# Patient Record
Sex: FEMALE | Race: WHITE | Employment: OTHER | URBAN - NONMETROPOLITAN AREA
[De-identification: names, ages, dates, MRNs, and addresses within clinical notes are randomized per-mention and may not be internally consistent; named-entity substitution may affect disease eponyms.]

---

## 2017-08-12 PROBLEM — E55.9 VITAMIN D DEFICIENCY: Status: ACTIVE | Noted: 2017-08-12

## 2017-08-12 PROBLEM — Z12.11 ENCOUNTER FOR COLORECTAL CANCER SCREENING: Status: ACTIVE | Noted: 2017-08-12

## 2017-08-12 PROBLEM — E11.9 TYPE 2 DIABETES MELLITUS WITHOUT COMPLICATION, WITHOUT LONG-TERM CURRENT USE OF INSULIN (HCC): Status: ACTIVE | Noted: 2017-08-12

## 2017-08-12 PROBLEM — F41.1 GENERALIZED ANXIETY DISORDER: Status: ACTIVE | Noted: 2017-08-12

## 2017-08-12 PROBLEM — E66.09 NON MORBID OBESITY DUE TO EXCESS CALORIES: Status: ACTIVE | Noted: 2017-08-12

## 2017-08-12 PROBLEM — Z12.12 ENCOUNTER FOR COLORECTAL CANCER SCREENING: Status: ACTIVE | Noted: 2017-08-12

## 2017-08-12 PROBLEM — K21.9 GERD (GASTROESOPHAGEAL REFLUX DISEASE): Status: ACTIVE | Noted: 2017-08-12

## 2017-08-12 PROBLEM — E78.00 PURE HYPERCHOLESTEROLEMIA: Status: ACTIVE | Noted: 2017-08-12

## 2017-08-14 LAB
ALBUMIN SERPL-MCNC: 4.1 G/DL (ref 3.5–5.2)
ALP BLD-CCNC: 94 U/L (ref 35–104)
ALT SERPL-CCNC: 25 U/L (ref 5–33)
ANION GAP SERPL CALCULATED.3IONS-SCNC: 16 MMOL/L (ref 7–19)
AST SERPL-CCNC: 19 U/L (ref 5–32)
BILIRUB SERPL-MCNC: 0.3 MG/DL (ref 0.2–1.2)
BUN BLDV-MCNC: 14 MG/DL (ref 8–23)
CALCIUM SERPL-MCNC: 9.6 MG/DL (ref 8.8–10.2)
CHLORIDE BLD-SCNC: 102 MMOL/L (ref 98–111)
CHOLESTEROL, TOTAL: 178 MG/DL (ref 160–199)
CO2: 24 MMOL/L (ref 22–29)
CREAT SERPL-MCNC: 0.6 MG/DL (ref 0.5–0.9)
GFR NON-AFRICAN AMERICAN: >60
GLUCOSE BLD-MCNC: 126 MG/DL (ref 74–109)
HBA1C MFR BLD: 6.8 %
HDLC SERPL-MCNC: 51 MG/DL (ref 65–121)
LDL CHOLESTEROL CALCULATED: 98 MG/DL
POTASSIUM SERPL-SCNC: 4.1 MMOL/L (ref 3.5–5)
SODIUM BLD-SCNC: 142 MMOL/L (ref 136–145)
TOTAL PROTEIN: 7.2 G/DL (ref 6.6–8.7)
TRIGL SERPL-MCNC: 143 MG/DL (ref 150–199)

## 2017-08-16 PROBLEM — M85.862 OSTEOPENIA OF LEFT LOWER LEG: Status: ACTIVE | Noted: 2017-08-16

## 2017-08-17 RX ORDER — MULTIVIT-MIN/IRON/FOLIC ACID/K 18-600-40
CAPSULE ORAL DAILY
COMMUNITY

## 2017-08-17 RX ORDER — LOVASTATIN 20 MG/1
20 TABLET ORAL NIGHTLY
COMMUNITY
End: 2018-01-19 | Stop reason: SDUPTHER

## 2017-08-21 ENCOUNTER — OFFICE VISIT (OUTPATIENT)
Dept: INTERNAL MEDICINE | Age: 67
End: 2017-08-21
Payer: COMMERCIAL

## 2017-08-21 VITALS
DIASTOLIC BLOOD PRESSURE: 68 MMHG | WEIGHT: 193 LBS | HEIGHT: 64 IN | OXYGEN SATURATION: 94 % | BODY MASS INDEX: 32.95 KG/M2 | SYSTOLIC BLOOD PRESSURE: 110 MMHG | HEART RATE: 77 BPM

## 2017-08-21 DIAGNOSIS — R07.89 FEELING OF CHEST TIGHTNESS: ICD-10-CM

## 2017-08-21 DIAGNOSIS — E78.00 PURE HYPERCHOLESTEROLEMIA: ICD-10-CM

## 2017-08-21 DIAGNOSIS — R06.09 EXERTIONAL DYSPNEA: ICD-10-CM

## 2017-08-21 DIAGNOSIS — E55.9 VITAMIN D DEFICIENCY: ICD-10-CM

## 2017-08-21 DIAGNOSIS — E55.9 VITAMIN D DEFICIENCY: Primary | ICD-10-CM

## 2017-08-21 DIAGNOSIS — E11.9 TYPE 2 DIABETES MELLITUS WITHOUT COMPLICATION, WITHOUT LONG-TERM CURRENT USE OF INSULIN (HCC): ICD-10-CM

## 2017-08-21 DIAGNOSIS — E11.9 TYPE 2 DIABETES MELLITUS WITHOUT COMPLICATION, WITHOUT LONG-TERM CURRENT USE OF INSULIN (HCC): Primary | ICD-10-CM

## 2017-08-21 DIAGNOSIS — K21.9 GASTROESOPHAGEAL REFLUX DISEASE WITHOUT ESOPHAGITIS: ICD-10-CM

## 2017-08-21 DIAGNOSIS — Z00.00 ANNUAL PHYSICAL EXAM: ICD-10-CM

## 2017-08-21 DIAGNOSIS — F41.1 GENERALIZED ANXIETY DISORDER: ICD-10-CM

## 2017-08-21 PROCEDURE — 99214 OFFICE O/P EST MOD 30 MIN: CPT | Performed by: INTERNAL MEDICINE

## 2017-08-21 ASSESSMENT — PATIENT HEALTH QUESTIONNAIRE - PHQ9
2. FEELING DOWN, DEPRESSED OR HOPELESS: 0
1. LITTLE INTEREST OR PLEASURE IN DOING THINGS: 0
SUM OF ALL RESPONSES TO PHQ QUESTIONS 1-9: 0
SUM OF ALL RESPONSES TO PHQ9 QUESTIONS 1 & 2: 0

## 2017-08-21 ASSESSMENT — ENCOUNTER SYMPTOMS
ABDOMINAL PAIN: 0
COUGH: 0
CONSTIPATION: 0
CHEST TIGHTNESS: 1
SORE THROAT: 0
SHORTNESS OF BREATH: 1
WHEEZING: 0

## 2018-01-19 RX ORDER — LOVASTATIN 20 MG/1
TABLET ORAL
Qty: 180 TABLET | Refills: 1 | Status: SHIPPED | OUTPATIENT
Start: 2018-01-19 | End: 2018-02-23 | Stop reason: SDUPTHER

## 2018-01-19 RX ORDER — ESCITALOPRAM OXALATE 10 MG/1
TABLET ORAL
Qty: 90 TABLET | Refills: 1 | Status: SHIPPED | OUTPATIENT
Start: 2018-01-19 | End: 2018-02-23 | Stop reason: SDUPTHER

## 2018-02-19 DIAGNOSIS — E11.9 TYPE 2 DIABETES MELLITUS WITHOUT COMPLICATION, WITHOUT LONG-TERM CURRENT USE OF INSULIN (HCC): ICD-10-CM

## 2018-02-19 DIAGNOSIS — Z00.00 ANNUAL PHYSICAL EXAM: ICD-10-CM

## 2018-02-19 DIAGNOSIS — E78.00 PURE HYPERCHOLESTEROLEMIA: ICD-10-CM

## 2018-02-19 DIAGNOSIS — E55.9 VITAMIN D DEFICIENCY: ICD-10-CM

## 2018-02-19 LAB
ALBUMIN SERPL-MCNC: 3.9 G/DL (ref 3.5–5.2)
ALP BLD-CCNC: 100 U/L (ref 35–104)
ALT SERPL-CCNC: 31 U/L (ref 5–33)
ANION GAP SERPL CALCULATED.3IONS-SCNC: 14 MMOL/L (ref 7–19)
AST SERPL-CCNC: 19 U/L (ref 5–32)
BACTERIA: NEGATIVE /HPF
BASOPHILS ABSOLUTE: 0 K/UL (ref 0–0.2)
BASOPHILS RELATIVE PERCENT: 0.7 % (ref 0–1)
BILIRUB SERPL-MCNC: <0.2 MG/DL (ref 0.2–1.2)
BILIRUBIN URINE: NEGATIVE
BLOOD, URINE: NEGATIVE
BUN BLDV-MCNC: 15 MG/DL (ref 8–23)
CALCIUM SERPL-MCNC: 8.7 MG/DL (ref 8.8–10.2)
CHLORIDE BLD-SCNC: 101 MMOL/L (ref 98–111)
CHOLESTEROL, TOTAL: 173 MG/DL (ref 160–199)
CLARITY: CLEAR
CO2: 24 MMOL/L (ref 22–29)
COLOR: YELLOW
CREAT SERPL-MCNC: 0.6 MG/DL (ref 0.5–0.9)
CREATININE URINE: 152.6 MG/DL (ref 4.2–622)
EOSINOPHILS ABSOLUTE: 0.2 K/UL (ref 0–0.6)
EOSINOPHILS RELATIVE PERCENT: 2.9 % (ref 0–5)
EPITHELIAL CELLS, UA: 3 /HPF (ref 0–5)
GFR NON-AFRICAN AMERICAN: >60
GLUCOSE BLD-MCNC: 157 MG/DL (ref 74–109)
GLUCOSE URINE: NEGATIVE MG/DL
HBA1C MFR BLD: 6.6 %
HCT VFR BLD CALC: 38.1 % (ref 37–47)
HDLC SERPL-MCNC: 51 MG/DL (ref 65–121)
HEMOGLOBIN: 12 G/DL (ref 12–16)
HYALINE CASTS: 7 /HPF (ref 0–8)
KETONES, URINE: NEGATIVE MG/DL
LDL CHOLESTEROL CALCULATED: 105 MG/DL
LEUKOCYTE ESTERASE, URINE: ABNORMAL
LYMPHOCYTES ABSOLUTE: 2.1 K/UL (ref 1.1–4.5)
LYMPHOCYTES RELATIVE PERCENT: 34.2 % (ref 20–40)
MCH RBC QN AUTO: 29 PG (ref 27–31)
MCHC RBC AUTO-ENTMCNC: 31.5 G/DL (ref 33–37)
MCV RBC AUTO: 92 FL (ref 81–99)
MICROALBUMIN UR-MCNC: <1.2 MG/DL (ref 0–19)
MICROALBUMIN/CREAT UR-RTO: NORMAL MG/G
MONOCYTES ABSOLUTE: 0.5 K/UL (ref 0–0.9)
MONOCYTES RELATIVE PERCENT: 8.2 % (ref 0–10)
NEUTROPHILS ABSOLUTE: 3.3 K/UL (ref 1.5–7.5)
NEUTROPHILS RELATIVE PERCENT: 53.7 % (ref 50–65)
NITRITE, URINE: NEGATIVE
PDW BLD-RTO: 14.7 % (ref 11.5–14.5)
PH UA: 5.5
PLATELET # BLD: 255 K/UL (ref 130–400)
PMV BLD AUTO: 11 FL (ref 9.4–12.3)
POTASSIUM SERPL-SCNC: 4 MMOL/L (ref 3.5–5)
PROTEIN UA: NEGATIVE MG/DL
RBC # BLD: 4.14 M/UL (ref 4.2–5.4)
RBC UA: 2 /HPF (ref 0–4)
SODIUM BLD-SCNC: 139 MMOL/L (ref 136–145)
SPECIFIC GRAVITY UA: 1.02
TOTAL PROTEIN: 6.7 G/DL (ref 6.6–8.7)
TRIGL SERPL-MCNC: 84 MG/DL (ref 0–149)
TSH SERPL DL<=0.05 MIU/L-ACNC: 2.37 UIU/ML (ref 0.27–4.2)
UROBILINOGEN, URINE: 0.2 E.U./DL
VITAMIN D 25-HYDROXY: 54.2 NG/ML
WBC # BLD: 6.1 K/UL (ref 4.8–10.8)
WBC UA: 9 /HPF (ref 0–5)

## 2018-02-23 ENCOUNTER — OFFICE VISIT (OUTPATIENT)
Dept: INTERNAL MEDICINE | Age: 68
End: 2018-02-23
Payer: COMMERCIAL

## 2018-02-23 VITALS
RESPIRATION RATE: 18 BRPM | WEIGHT: 198 LBS | DIASTOLIC BLOOD PRESSURE: 80 MMHG | HEIGHT: 64 IN | BODY MASS INDEX: 33.8 KG/M2 | SYSTOLIC BLOOD PRESSURE: 126 MMHG | OXYGEN SATURATION: 97 % | HEART RATE: 80 BPM

## 2018-02-23 DIAGNOSIS — E11.9 TYPE 2 DIABETES MELLITUS WITHOUT COMPLICATION, WITHOUT LONG-TERM CURRENT USE OF INSULIN (HCC): ICD-10-CM

## 2018-02-23 DIAGNOSIS — E78.00 PURE HYPERCHOLESTEROLEMIA: ICD-10-CM

## 2018-02-23 DIAGNOSIS — Z00.00 ANNUAL PHYSICAL EXAM: Primary | ICD-10-CM

## 2018-02-23 DIAGNOSIS — Z12.31 SCREENING MAMMOGRAM, ENCOUNTER FOR: ICD-10-CM

## 2018-02-23 DIAGNOSIS — E55.9 VITAMIN D DEFICIENCY: ICD-10-CM

## 2018-02-23 DIAGNOSIS — F41.1 GENERALIZED ANXIETY DISORDER: ICD-10-CM

## 2018-02-23 DIAGNOSIS — E66.09 EXOGENOUS OBESITY: ICD-10-CM

## 2018-02-23 PROCEDURE — 99397 PER PM REEVAL EST PAT 65+ YR: CPT | Performed by: INTERNAL MEDICINE

## 2018-02-23 RX ORDER — ESCITALOPRAM OXALATE 10 MG/1
TABLET ORAL
Qty: 45 TABLET | Refills: 1 | Status: SHIPPED | OUTPATIENT
Start: 2018-02-23 | End: 2018-08-27 | Stop reason: SDUPTHER

## 2018-02-23 RX ORDER — LOVASTATIN 20 MG/1
TABLET ORAL
Qty: 180 TABLET | Refills: 1 | Status: SHIPPED | OUTPATIENT
Start: 2018-02-23 | End: 2018-08-27 | Stop reason: SDUPTHER

## 2018-02-23 ASSESSMENT — ENCOUNTER SYMPTOMS
VOMITING: 0
ABDOMINAL PAIN: 0
WHEEZING: 0
COUGH: 0
COLOR CHANGE: 0
DIARRHEA: 0
SINUS PRESSURE: 0
BLOOD IN STOOL: 0
CONSTIPATION: 0
EYE REDNESS: 0
VOICE CHANGE: 0
NAUSEA: 0
SORE THROAT: 0
TROUBLE SWALLOWING: 0
CHEST TIGHTNESS: 0
EYE PAIN: 0

## 2018-02-23 NOTE — PROGRESS NOTES
COLONOSCOPY      HERNIA REPAIR      LOBECTOMY Right 5/3/2016    THORACOTOMY, WEDGE RESECTION PULMONARY NODULE RIGHT MIDDLE LOBE performed by Jess Altamirano MD at [address] [facility]          Current Outpatient Prescriptions   Medication Sig Dispense Refill    lovastatin (MEVACOR) 20 MG tablet TAKE TWO TABLETS BY MOUTH ONCE DAILY 180 tablet 1    escitalopram (LEXAPRO) 10 MG tablet Take 1/2 tablet daily 45 tablet 1    aspirin 81 MG tablet Take 81 mg by mouth daily      Cholecalciferol (VITAMIN D) 2000 units CAPS capsule Take by mouth       No current facility-administered medications for this visit.       Allergies   Allergen Reactions    Codeine Itching       Social History     Social History    Marital status:      Spouse name: N/A    Number of children: N/A    Years of education: N/A     Social History Main Topics    Smoking status: Former Smoker     Years: 3.00    Smokeless tobacco: Never Used      Comment: 3 YEARS PLAYED AROUND AND QUIT IN THE 1980's    Alcohol use No    Drug use: No    Sexual activity: Not on file     Other Topics Concern    Not on file     Social History Narrative    No narrative on file     Family History   Problem Relation Age of Onset    Cancer Father      bladder ca    Other Sister      MULTIPLE SCLEROSIS    High Blood Pressure Mother     Heart Disease Mother        Past Surgical History:   Procedure Laterality Date    CHOLECYSTECTOMY      COLONOSCOPY      HERNIA REPAIR      LOBECTOMY Right 5/3/2016    THORACOTOMY, WEDGE RESECTION PULMONARY NODULE RIGHT MIDDLE LOBE performed by Jess Altamirano MD at [address]            Lab Review   Orders Only on 02/19/2018   Component Date Value    WBC 02/19/2018 6.1     RBC 02/19/2018 4.14*    Hemoglobin 02/19/2018 12.0     Hematocrit 02/19/2018 38.1     MCV 02/19/2018 92.0     MCH 02/19/2018 29.0     MCHC 02/19/2018 31.5*    RDW 02/19/2018 14.7*    congestion, ear pain, postnasal drip, sinus pressure, sore throat, trouble swallowing and voice change. Eyes: Negative for pain, redness and visual disturbance. Respiratory: Negative for cough, chest tightness and wheezing. Cardiovascular: Negative for chest pain, palpitations and leg swelling. Gastrointestinal: Negative for abdominal pain, blood in stool, constipation, diarrhea, nausea and vomiting. Endocrine: Negative for polydipsia and polyuria. Genitourinary: Negative for dysuria, enuresis, flank pain, frequency and urgency. Musculoskeletal: Negative for arthralgias, gait problem and joint swelling. Skin: Negative for color change and rash. Neurological: Negative for dizziness, tremors, syncope, facial asymmetry, speech difficulty, weakness, numbness and headaches. Psychiatric/Behavioral: Negative for agitation, behavioral problems, confusion, sleep disturbance and suicidal ideas. The patient is not nervous/anxious. Vitals:    02/23/18 1318   BP: 126/80   Site: Left Arm   Position: Sitting   Cuff Size: Large Adult   Pulse: 80   Resp: 18   SpO2: 97%   Weight: 198 lb (89.8 kg)   Height: 5' 4\" (1.626 m)      Wt Readings from Last 3 Encounters:   02/23/18 198 lb (89.8 kg)   08/21/17 193 lb (87.5 kg)   06/07/16 188 lb (85.3 kg)   Body mass index is 33.99 kg/m². BP Readings from Last 3 Encounters:   02/23/18 126/80   08/21/17 110/68   06/07/16 123/71       Physical Exam   Constitutional: She is oriented to person, place, and time. She appears well-developed and well-nourished. HENT:   Head: Normocephalic and atraumatic. Right Ear: External ear normal.   Left Ear: External ear normal.   Mouth/Throat: Oropharynx is clear and moist.   Eyes: Conjunctivae are normal. Pupils are equal, round, and reactive to light. No scleral icterus. Neck: Normal range of motion. Neck supple. No JVD present. No thyromegaly present. Cardiovascular: Normal rate, regular rhythm and normal heart sounds. life, it can be hard to focus on making healthy changes to your daily habits. · Track your food and activity. You are likely to do better at losing weight if you keep track of what you eat and what you do. Follow-up care is a key part of your treatment and safety. Be sure to make and go to all appointments, and call your doctor if you are having problems. It's also a good idea to know your test results and keep a list of the medicines you take. Where can you learn more? Go to https://[employer] Expression[employer].Buddy[employer]ounce. org and sign in to your [employer] account. Enter A121 in the [employer] box to learn more about \"Learning About Low-Carbohydrate Diets for Weight Loss. \"     If you do not have an account, please click on the \"Sign Up Now\" link. Current as of: May 12, 2017  Content Version: 11.5  © 4880-9301 Healthwise, [employer]. Care instructions adapted under license by [facility] ([facility]). If you have questions about a medical condition or this instruction, always ask your healthcare professional. [address] any warranty or liability for your use of this information. Return in about 6 months (around 8/23/2018) for Medication check. EMR Dragon/transcription disclaimer:Significant part of this  encounter note is electronic transcription/translation of spoken language to printed text. The electronic translation of spoken language may be erroneous, or at times, nonsensical words or phrases may be inadvertently transcribed.  Although I have reviewed the note for such errors, some may still exist.

## 2018-02-28 DIAGNOSIS — R07.89 FEELING OF CHEST TIGHTNESS: ICD-10-CM

## 2018-02-28 DIAGNOSIS — R06.09 EXERTIONAL DYSPNEA: ICD-10-CM

## 2018-02-28 PROCEDURE — 93000 ELECTROCARDIOGRAM COMPLETE: CPT | Performed by: INTERNAL MEDICINE

## 2018-03-01 ENCOUNTER — PATIENT MESSAGE (OUTPATIENT)
Dept: INTERNAL MEDICINE | Age: 68
End: 2018-03-01

## 2018-03-01 DIAGNOSIS — Z12.11 ENCOUNTER FOR SCREENING COLONOSCOPY: Primary | ICD-10-CM

## 2018-03-01 NOTE — TELEPHONE ENCOUNTER
From: Mariana Ying  To: Shirley Peng MD  Sent: 3/1/2018 3:44 PM CST  Subject: Visit Follow-Up Question    Dr. Maynor Sebastian wanted me to let her know if my insurance will pay for a colonoscopy and I called my insurance and they will pay 100%. So you can schedule me an appointment in about a month on a Monday or get back with me at [phone] or daytime [phone]. Thank you.

## 2018-03-09 DIAGNOSIS — Z12.31 SCREENING MAMMOGRAM, ENCOUNTER FOR: ICD-10-CM

## 2018-03-26 ENCOUNTER — TELEPHONE (OUTPATIENT)
Dept: GASTROENTEROLOGY | Age: 68
End: 2018-03-26

## 2018-04-11 PROBLEM — Z12.31 SCREENING MAMMOGRAM, ENCOUNTER FOR: Status: RESOLVED | Noted: 2018-02-23 | Resolved: 2018-04-11

## 2018-04-23 ENCOUNTER — TELEPHONE (OUTPATIENT)
Dept: INTERNAL MEDICINE | Age: 68
End: 2018-04-23

## 2018-04-23 RX ORDER — AZITHROMYCIN 250 MG/1
TABLET, FILM COATED ORAL
Qty: 1 PACKET | Refills: 0 | Status: SHIPPED | OUTPATIENT
Start: 2018-04-23 | End: 2018-04-27

## 2018-04-23 RX ORDER — BENZONATATE 200 MG/1
200 CAPSULE ORAL 3 TIMES DAILY PRN
Qty: 30 CAPSULE | Refills: 0 | Status: SHIPPED | OUTPATIENT
Start: 2018-04-23 | End: 2018-04-30

## 2018-05-03 ENCOUNTER — PATIENT MESSAGE (OUTPATIENT)
Dept: INTERNAL MEDICINE | Age: 68
End: 2018-05-03

## 2018-05-04 ENCOUNTER — HOSPITAL ENCOUNTER (OUTPATIENT)
Dept: GENERAL RADIOLOGY | Age: 68
Discharge: HOME | End: 2018-05-04
Payer: COMMERCIAL

## 2018-05-04 ENCOUNTER — OFFICE VISIT (OUTPATIENT)
Dept: INTERNAL MEDICINE | Age: 68
End: 2018-05-04
Payer: COMMERCIAL

## 2018-05-04 VITALS
WEIGHT: 196 LBS | RESPIRATION RATE: 18 BRPM | BODY MASS INDEX: 33.46 KG/M2 | HEIGHT: 64 IN | SYSTOLIC BLOOD PRESSURE: 120 MMHG | DIASTOLIC BLOOD PRESSURE: 62 MMHG | OXYGEN SATURATION: 96 % | HEART RATE: 80 BPM

## 2018-05-04 DIAGNOSIS — R07.89 RIGHT-SIDED CHEST WALL PAIN: Primary | ICD-10-CM

## 2018-05-04 DIAGNOSIS — G89.29 CHRONIC RIGHT FLANK PAIN: ICD-10-CM

## 2018-05-04 DIAGNOSIS — R10.9 CHRONIC RIGHT FLANK PAIN: ICD-10-CM

## 2018-05-04 DIAGNOSIS — R07.89 RIGHT-SIDED CHEST WALL PAIN: ICD-10-CM

## 2018-05-04 PROBLEM — M54.50 BILATERAL LOW BACK PAIN WITHOUT SCIATICA: Status: ACTIVE | Noted: 2018-05-04

## 2018-05-04 LAB
APPEARANCE FLUID: CLEAR
BILIRUBIN, POC: NEGATIVE
BLOOD URINE, POC: ABNORMAL
CLARITY, POC: CLEAR
COLOR, POC: YELLOW
GLUCOSE URINE, POC: NEGATIVE
KETONES, POC: NEGATIVE
LEUKOCYTE EST, POC: ABNORMAL
NITRITE, POC: NEGATIVE
PH, POC: 5.5
PROTEIN, POC: NEGATIVE
SPECIFIC GRAVITY, POC: 1.02
UROBILINOGEN, POC: 0.2

## 2018-05-04 PROCEDURE — 71046 X-RAY EXAM CHEST 2 VIEWS: CPT

## 2018-05-04 PROCEDURE — 81002 URINALYSIS NONAUTO W/O SCOPE: CPT | Performed by: INTERNAL MEDICINE

## 2018-05-04 PROCEDURE — 99213 OFFICE O/P EST LOW 20 MIN: CPT | Performed by: INTERNAL MEDICINE

## 2018-05-04 RX ORDER — PREDNISONE 10 MG/1
10 TABLET ORAL 2 TIMES DAILY
Qty: 8 TABLET | Refills: 0 | Status: SHIPPED | OUTPATIENT
Start: 2018-05-04 | End: 2018-05-08

## 2018-05-05 ASSESSMENT — ENCOUNTER SYMPTOMS
ABDOMINAL PAIN: 0
SPUTUM PRODUCTION: 0
NAUSEA: 0
EYE DISCHARGE: 0
HEMOPTYSIS: 0
EYES NEGATIVE: 1
COUGH: 0
BACK PAIN: 0
EYE PAIN: 0
VOMITING: 0
DIARRHEA: 0
SHORTNESS OF BREATH: 0
WHEEZING: 0

## 2018-05-06 LAB — URINE CULTURE, ROUTINE: NORMAL

## 2018-05-07 ENCOUNTER — TELEPHONE (OUTPATIENT)
Dept: INTERNAL MEDICINE | Age: 68
End: 2018-05-07

## 2018-05-07 DIAGNOSIS — R10.9 CHRONIC RIGHT FLANK PAIN: Primary | ICD-10-CM

## 2018-05-07 DIAGNOSIS — G89.29 CHRONIC RIGHT FLANK PAIN: Primary | ICD-10-CM

## 2018-05-08 DIAGNOSIS — E11.9 TYPE 2 DIABETES MELLITUS WITHOUT COMPLICATION, WITHOUT LONG-TERM CURRENT USE OF INSULIN (HCC): ICD-10-CM

## 2018-05-08 DIAGNOSIS — E78.00 PURE HYPERCHOLESTEROLEMIA: ICD-10-CM

## 2018-05-08 DIAGNOSIS — E11.9 TYPE 2 DIABETES MELLITUS WITHOUT COMPLICATION, WITHOUT LONG-TERM CURRENT USE OF INSULIN (HCC): Primary | ICD-10-CM

## 2018-05-08 DIAGNOSIS — G89.29 CHRONIC RIGHT FLANK PAIN: ICD-10-CM

## 2018-05-08 DIAGNOSIS — R10.9 CHRONIC RIGHT FLANK PAIN: ICD-10-CM

## 2018-05-08 LAB
BILIRUBIN URINE: NEGATIVE
BLOOD, URINE: NEGATIVE
CLARITY: CLEAR
COLOR: YELLOW
GLUCOSE URINE: NEGATIVE MG/DL
KETONES, URINE: NEGATIVE MG/DL
LEUKOCYTE ESTERASE, URINE: NEGATIVE
NITRITE, URINE: NEGATIVE
PH UA: 5.5
PROTEIN UA: NEGATIVE MG/DL
SPECIFIC GRAVITY UA: 1.01
UROBILINOGEN, URINE: 0.2 E.U./DL

## 2018-08-01 NOTE — TELEPHONE ENCOUNTER
Requested Prescriptions     Pending Prescriptions Disp Refills    diclofenac (VOLTAREN) 50 MG EC tablet [Pharmacy Med Name: DICLOFENAC SOD DR 50 MG TAB] 40 tablet 0     Sig: TAKE ONE TABLET BY MOUTH TWICE A DAY

## 2018-08-22 DIAGNOSIS — E11.9 TYPE 2 DIABETES MELLITUS WITHOUT COMPLICATION, WITHOUT LONG-TERM CURRENT USE OF INSULIN (HCC): ICD-10-CM

## 2018-08-22 DIAGNOSIS — E78.00 PURE HYPERCHOLESTEROLEMIA: ICD-10-CM

## 2018-08-22 LAB
ALBUMIN SERPL-MCNC: 3.9 G/DL (ref 3.5–5.2)
ALP BLD-CCNC: 95 U/L (ref 35–104)
ALT SERPL-CCNC: 26 U/L (ref 5–33)
ANION GAP SERPL CALCULATED.3IONS-SCNC: 17 MMOL/L (ref 7–19)
AST SERPL-CCNC: 20 U/L (ref 5–32)
BILIRUB SERPL-MCNC: <0.2 MG/DL (ref 0.2–1.2)
BUN BLDV-MCNC: 12 MG/DL (ref 8–23)
CALCIUM SERPL-MCNC: 9.2 MG/DL (ref 8.8–10.2)
CHLORIDE BLD-SCNC: 103 MMOL/L (ref 98–111)
CHOLESTEROL, TOTAL: 161 MG/DL (ref 160–199)
CO2: 21 MMOL/L (ref 22–29)
CREAT SERPL-MCNC: 0.6 MG/DL (ref 0.5–0.9)
GFR NON-AFRICAN AMERICAN: >60
GLUCOSE BLD-MCNC: 153 MG/DL (ref 74–109)
HBA1C MFR BLD: 6.8 % (ref 4–6)
HDLC SERPL-MCNC: 47 MG/DL (ref 65–121)
LDL CHOLESTEROL CALCULATED: 88 MG/DL
POTASSIUM SERPL-SCNC: 4.4 MMOL/L (ref 3.5–5)
SODIUM BLD-SCNC: 141 MMOL/L (ref 136–145)
TOTAL PROTEIN: 6.8 G/DL (ref 6.6–8.7)
TRIGL SERPL-MCNC: 132 MG/DL (ref 0–149)

## 2018-08-27 ENCOUNTER — OFFICE VISIT (OUTPATIENT)
Dept: INTERNAL MEDICINE | Age: 68
End: 2018-08-27
Payer: COMMERCIAL

## 2018-08-27 VITALS
DIASTOLIC BLOOD PRESSURE: 65 MMHG | HEART RATE: 76 BPM | BODY MASS INDEX: 33.29 KG/M2 | WEIGHT: 195 LBS | SYSTOLIC BLOOD PRESSURE: 120 MMHG | HEIGHT: 64 IN | OXYGEN SATURATION: 93 %

## 2018-08-27 DIAGNOSIS — F41.1 GENERALIZED ANXIETY DISORDER: ICD-10-CM

## 2018-08-27 DIAGNOSIS — E11.9 TYPE 2 DIABETES MELLITUS WITHOUT COMPLICATION, WITHOUT LONG-TERM CURRENT USE OF INSULIN (HCC): Primary | ICD-10-CM

## 2018-08-27 DIAGNOSIS — E55.9 VITAMIN D DEFICIENCY: ICD-10-CM

## 2018-08-27 DIAGNOSIS — K21.9 GASTROESOPHAGEAL REFLUX DISEASE WITHOUT ESOPHAGITIS: ICD-10-CM

## 2018-08-27 DIAGNOSIS — L25.8 CONTACT DERMATITIS DUE TO OTHER AGENT, UNSPECIFIED CONTACT DERMATITIS TYPE: ICD-10-CM

## 2018-08-27 DIAGNOSIS — E78.00 PURE HYPERCHOLESTEROLEMIA: ICD-10-CM

## 2018-08-27 DIAGNOSIS — R13.19 ESOPHAGEAL DYSPHAGIA: ICD-10-CM

## 2018-08-27 PROCEDURE — 99214 OFFICE O/P EST MOD 30 MIN: CPT | Performed by: INTERNAL MEDICINE

## 2018-08-27 RX ORDER — LOVASTATIN 20 MG/1
TABLET ORAL
Qty: 180 TABLET | Refills: 1 | Status: SHIPPED | OUTPATIENT
Start: 2018-08-27 | End: 2019-06-18 | Stop reason: SDUPTHER

## 2018-08-27 RX ORDER — TRIAMCINOLONE ACETONIDE 1 MG/G
CREAM TOPICAL
Qty: 45 G | Refills: 1 | Status: SHIPPED | OUTPATIENT
Start: 2018-08-27 | End: 2019-06-07

## 2018-08-27 RX ORDER — ESCITALOPRAM OXALATE 10 MG/1
TABLET ORAL
Qty: 45 TABLET | Refills: 1 | Status: SHIPPED | OUTPATIENT
Start: 2018-08-27 | End: 2018-12-18

## 2018-08-27 ASSESSMENT — ENCOUNTER SYMPTOMS
SORE THROAT: 0
ABDOMINAL PAIN: 0
CONSTIPATION: 0
CHEST TIGHTNESS: 0
WHEEZING: 0
COUGH: 0

## 2018-08-27 ASSESSMENT — PATIENT HEALTH QUESTIONNAIRE - PHQ9
2. FEELING DOWN, DEPRESSED OR HOPELESS: 0
SUM OF ALL RESPONSES TO PHQ9 QUESTIONS 1 & 2: 0
SUM OF ALL RESPONSES TO PHQ QUESTIONS 1-9: 0
1. LITTLE INTEREST OR PLEASURE IN DOING THINGS: 0
SUM OF ALL RESPONSES TO PHQ QUESTIONS 1-9: 0

## 2018-08-27 NOTE — PROGRESS NOTES
CHOLECYSTECTOMY      COLONOSCOPY      HERNIA REPAIR      LOBECTOMY Right 5/3/2016    THORACOTOMY, WEDGE RESECTION PULMONARY NODULE RIGHT MIDDLE LOBE performed by Loni Jacques MD at [address]       Current Outpatient Prescriptions   Medication Sig Dispense Refill    lovastatin (MEVACOR) 20 MG tablet TAKE TWO TABLETS BY MOUTH ONCE DAILY 180 tablet 1    escitalopram (LEXAPRO) 10 MG tablet Take 1/2 tablet daily 45 tablet 1    triamcinolone (KENALOG) 0.1 % cream Apply topically 2 times daily. 45 g 1    aspirin 81 MG tablet Take 81 mg by mouth daily      Cholecalciferol (VITAMIN D) 2000 units CAPS capsule Take by mouth       No current facility-administered medications for this visit. Allergies   Allergen Reactions    Codeine Itching     Social History   Substance Use Topics    Smoking status: Former Smoker     Packs/day: 1.00     Years: 3.00     Types: Cigarettes     Quit date: 10/1/1984    Smokeless tobacco: Never Used      Comment: 3 YEARS PLAYED AROUND AND QUIT IN THE 1980's    Alcohol use No      Family History   Problem Relation Age of Onset    Cancer Father         bladder ca    Other Sister         MULTIPLE SCLEROSIS    High Blood Pressure Mother     Heart Disease Mother        Review of Systems   Constitutional: Negative for chills, fatigue and fever. HENT: Negative for congestion, ear pain, nosebleeds, postnasal drip and sore throat. Respiratory: Negative for cough, chest tightness and wheezing. Cardiovascular: Negative for chest pain, palpitations and leg swelling. Gastrointestinal: Negative for abdominal pain and constipation. Issues with swallowing   Genitourinary: Negative for dysuria and urgency. Musculoskeletal: Negative. Negative for arthralgias. Skin: Negative for rash. Neurological: Negative for dizziness and headaches. Psychiatric/Behavioral: Negative.       Vitals:    08/27/18 0732   BP: 120/65   Pulse: 76   SpO2: 93%

## 2018-09-06 RX ORDER — ESCITALOPRAM OXALATE 5 MG/1
2.5 TABLET ORAL DAILY
COMMUNITY
End: 2019-11-30 | Stop reason: DRUGHIGH

## 2018-09-06 RX ORDER — ASPIRIN 81 MG/1
81 TABLET ORAL DAILY
COMMUNITY

## 2018-09-06 RX ORDER — TRIAMCINOLONE ACETONIDE 1 MG/G
CREAM TOPICAL 2 TIMES DAILY
COMMUNITY
End: 2021-07-20

## 2018-09-06 RX ORDER — LOVASTATIN 20 MG/1
40 TABLET ORAL NIGHTLY
COMMUNITY

## 2018-09-26 ENCOUNTER — OFFICE VISIT (OUTPATIENT)
Dept: GASTROENTEROLOGY | Facility: CLINIC | Age: 68
End: 2018-09-26

## 2018-09-26 VITALS
WEIGHT: 199 LBS | BODY MASS INDEX: 33.97 KG/M2 | OXYGEN SATURATION: 99 % | DIASTOLIC BLOOD PRESSURE: 70 MMHG | SYSTOLIC BLOOD PRESSURE: 126 MMHG | HEIGHT: 64 IN | HEART RATE: 83 BPM | TEMPERATURE: 97.6 F

## 2018-09-26 DIAGNOSIS — K62.5 BRBPR (BRIGHT RED BLOOD PER RECTUM): ICD-10-CM

## 2018-09-26 DIAGNOSIS — R13.19 ESOPHAGEAL DYSPHAGIA: Primary | ICD-10-CM

## 2018-09-26 PROBLEM — Z12.11 ENCOUNTER FOR COLORECTAL CANCER SCREENING: Status: RESOLVED | Noted: 2017-08-12 | Resolved: 2018-09-26

## 2018-09-26 PROBLEM — Z00.00 ANNUAL PHYSICAL EXAM: Status: RESOLVED | Noted: 2017-08-21 | Resolved: 2018-09-26

## 2018-09-26 PROBLEM — Z12.12 ENCOUNTER FOR COLORECTAL CANCER SCREENING: Status: RESOLVED | Noted: 2017-08-12 | Resolved: 2018-09-26

## 2018-09-26 PROCEDURE — 99204 OFFICE O/P NEW MOD 45 MIN: CPT | Performed by: NURSE PRACTITIONER

## 2018-09-26 NOTE — PROGRESS NOTES
[facility]    Primary Physician Mary Becker MD    9/26/2018    Chely Venegas   1950      Chief Complaint   Patient presents with   • Colonoscopy   dysphagia.   brbpr         Subjective     HPI    Chely Venegas is a 68 y.o. female who presents with dysphagia x 6 months. Occurs about once per month. Noted with solids and liquids but mostly solids. Points to mid chest area where food and liquids will hang. Feels like has to belch when this occurs.   Eventually has to regurgitate food back up with a lot of phlegm. No reflux symptoms. No weight loss. No nausea. No hematemesis. No fever. No h/o egd     Has noted a cough for a few months. Non productive.       Does have intermittent brb pr, small amount x 3-4 months.  No change in bowel habits.  No melena. No abdominal pain.  No weight loss.       Last colonoscopy was 4/2013, normal, recall rec 10 yr.  The patient does not have history of colon polyps. The patient does not have history of colon cancer.   There is no family history of colon polyps. There is no family history of colon cancer.     Past Medical History:   Diagnosis Date   • Anxiety    • Arthritis    • Carpal tunnel syndrome    • Cervicalgia    • Depression    • Diabetes mellitus (CMS/HCC)    • Esophagitis    • Exogenous obesity    • Hyperlipidemia    • Insomnia    • Neuropathy    • Osteopenia    • Vitamin D deficiency        Past Surgical History:   Procedure Laterality Date   • CHOLECYSTECTOMY     • COLONOSCOPY  07/21/2006    normal   • HERNIA REPAIR     • LUNG LOBECTOMY     • THORACOTOMY     • TONSILLECTOMY         Outpatient Prescriptions Marked as Taking for the 9/26/18 encounter (Office Visit) with Renata Almodovar APRN   Medication Sig Dispense Refill   • aspirin 81 MG EC tablet Take 81 mg by mouth Daily.     • escitalopram (LEXAPRO) 5 MG tablet Take 2.5 mg by mouth Daily.     • lovastatin (MEVACOR) 20 MG tablet Take 40 mg by mouth Every Night.     •  triamcinolone (KENALOG) 0.1 % cream Apply  topically to the appropriate area as directed 2 (Two) Times a Day.         Allergies   Allergen Reactions   • Codeine Itching       Social History     Social History   • Marital status:      Spouse name: N/A   • Number of children: N/A   • Years of education: N/A     Occupational History   • Not on file.     Social History Main Topics   • Smoking status: Former Smoker   • Smokeless tobacco: Never Used   • Alcohol use No   • Drug use: No   • Sexual activity: Defer     Other Topics Concern   • Not on file     Social History Narrative   • No narrative on file       Family History   Problem Relation Age of Onset   • Colon cancer Neg Hx    • Colon polyps Neg Hx        Review of Systems   Constitutional: Positive for fatigue. Negative for appetite change, chills, fever and unexpected weight change.   HENT: Positive for trouble swallowing. Negative for sore throat.    Eyes: Negative for visual disturbance.   Respiratory: Negative for cough, chest tightness, shortness of breath and wheezing.    Cardiovascular: Negative for chest pain and palpitations.   Gastrointestinal: Positive for blood in stool. Negative for abdominal distention, abdominal pain, constipation, diarrhea, nausea, rectal pain and vomiting.        As mentioned in hpi   Genitourinary: Negative for difficulty urinating and hematuria.   Musculoskeletal: Negative for arthralgias and back pain.   Skin: Negative for color change and rash.   Neurological: Negative for dizziness, seizures, syncope, light-headedness and headaches.   Hematological: Negative for adenopathy.   Psychiatric/Behavioral: Negative for confusion. The patient is not nervous/anxious.        Objective     Vitals:    09/26/18 1032   BP: 126/70   Pulse: 83   Temp: 97.6 °F (36.4 °C)   SpO2: 99%     1    09/26/18  1032   Weight: 90.3 kg (199 lb)     Body mass index is 34.16 kg/m².    Physical Exam   Constitutional: She appears well-developed and  well-nourished. No distress.   HENT:   Head: Normocephalic and atraumatic.   Eyes: EOM are normal. No scleral icterus.   Neck: Neck supple. No JVD present.   Cardiovascular: Normal rate, regular rhythm and normal heart sounds.    Pulmonary/Chest: Effort normal and breath sounds normal. No stridor.   Abdominal: Soft. Bowel sounds are normal. She exhibits no distension and no mass. There is no tenderness. There is no rebound and no guarding.   Musculoskeletal: Normal range of motion. She exhibits no deformity.   Neurological: She is alert.   Skin: Skin is warm and dry. No rash noted.   Psychiatric: She has a normal mood and affect. Her behavior is normal.   Vitals reviewed.      Imaging Results (most recent)     None          Assessment/Plan     Chely was seen today for colonoscopy.    Diagnoses and all orders for this visit:    Esophageal dysphagia  -     Case Request; Standing  -     Case Request  -     FL Esophagram Complete    BRBPR (bright red blood per rectum)  -     Case Request; Standing  -     Case Request    Other orders  -     Follow Anesthesia Guidelines / Standing Orders; Future  -     Implement Anesthesia Orders Day of Procedure; Standing  -     Obtain Informed Consent; Standing    in regards to dysphagia ,  differential diagnoses discussed.  Plan for EGD.  Recommend cut food small pieces and chew well.     In regards to brbpr, differential diagnoses discussed. Plan for colonoscopy. Recommend ER if worsening bleeding. She did have appt scheduled with dr solitario earlier this year for colonoscopy and got prescription for laxative. She will call me with the name of laxative so we can get her instructions.             Body mass index is 34.16 kg/m².    Patient's Body mass index is 34.16 kg/m². BMI is above normal parameters. Recommendations include: nutrition counseling. Recommend weight loss.       ESOPHAGOGASTRODUODENOSCOPY WITH ANESTHESIA (N/A), COLONOSCOPY WITH ANESTHESIA (N/A)  All risks, benefits,  alternatives, and indications of colonoscopy procedure have been discussed with the patient. Risks to include perforation of the colon requiring possible surgery or colostomy, risk of bleeding from biopsies or removal of colon tissue, possibility of missing a colon polyp or cancer, or adverse drug reaction.  Benefits to include the diagnosis and management of disease of the colon and rectum. Alternatives to include barium enema, radiographic evaluation, lab testing or no intervention. Pt verbalizes understanding and agrees.     Risk, benefits, and alternatives of endoscopy were explained in full.  They understand that there is a risk of bleeding, perforation, and infection.  The risk of perforation goes up with esophageal dilation.  Other options to evaluate UGI complaints could involve barium swallow or UGI series, but these would be diagnostic tests only.  Patient was given time to ask questions.  I answered them to their satisfaction and they are agreeable to proceeding      NAKUL Powers      EMR Dragon/transcription disclaimer:  Much of this encounter note is electronic transcription/translation of spoken language to printed text.  The electronic translation of spoken language may be erroneous, or at times, nonsensical words or phrases may be inadvertently transcribed.  Although I have reviewed the note for such errors, some may still exist.

## 2018-10-01 ENCOUNTER — HOSPITAL ENCOUNTER (OUTPATIENT)
Dept: GENERAL RADIOLOGY | Facility: HOSPITAL | Age: 68
Discharge: HOME | End: 2018-10-01
Admitting: NURSE PRACTITIONER

## 2018-10-01 ENCOUNTER — TELEPHONE (OUTPATIENT)
Dept: GASTROENTEROLOGY | Facility: CLINIC | Age: 68
End: 2018-10-01

## 2018-10-01 PROCEDURE — 74220 X-RAY XM ESOPHAGUS 1CNTRST: CPT

## 2018-10-01 RX ADMIN — BARIUM SULFATE 50 ML: 980 POWDER, FOR SUSPENSION ORAL at 08:25

## 2018-10-01 RX ADMIN — BARIUM SULFATE 40 ML: 960 POWDER, FOR SUSPENSION ORAL at 08:26

## 2018-10-01 RX ADMIN — ANTACID/ANTIFLATULENT 1 TABLET: 380; 550; 10; 10 GRANULE, EFFERVESCENT ORAL at 08:25

## 2018-10-01 RX ADMIN — BARIUM SULFATE 700 MG: 700 TABLET ORAL at 08:25

## 2018-10-01 NOTE — TELEPHONE ENCOUNTER
Let patient know esophagram showed mild stricture of the distal esophagus. I will fax report to pcp.  She is scheduled for egd and colonoscopy 11-2-18, you can ask her if wants to be moved up.  Recommend continue cut food small pieces and chew well.

## 2018-11-16 ENCOUNTER — ANESTHESIA EVENT (OUTPATIENT)
Dept: GASTROENTEROLOGY | Facility: HOSPITAL | Age: 68
End: 2018-11-16

## 2018-11-16 ENCOUNTER — HOSPITAL ENCOUNTER (OUTPATIENT)
Facility: HOSPITAL | Age: 68
Setting detail: HOSPITAL OUTPATIENT SURGERY
Discharge: HOME | End: 2018-11-16
Attending: INTERNAL MEDICINE | Admitting: INTERNAL MEDICINE

## 2018-11-16 ENCOUNTER — ANESTHESIA (OUTPATIENT)
Dept: GASTROENTEROLOGY | Facility: HOSPITAL | Age: 68
End: 2018-11-16

## 2018-11-16 VITALS
HEIGHT: 64 IN | WEIGHT: 194 LBS | SYSTOLIC BLOOD PRESSURE: 117 MMHG | HEART RATE: 68 BPM | BODY MASS INDEX: 33.12 KG/M2 | RESPIRATION RATE: 21 BRPM | DIASTOLIC BLOOD PRESSURE: 67 MMHG | TEMPERATURE: 97.6 F | OXYGEN SATURATION: 98 %

## 2018-11-16 DIAGNOSIS — R13.19 ESOPHAGEAL DYSPHAGIA: ICD-10-CM

## 2018-11-16 DIAGNOSIS — K62.5 BRBPR (BRIGHT RED BLOOD PER RECTUM): ICD-10-CM

## 2018-11-16 PROCEDURE — 88305 TISSUE EXAM BY PATHOLOGIST: CPT | Performed by: INTERNAL MEDICINE

## 2018-11-16 PROCEDURE — 25010000002 PROPOFOL 10 MG/ML EMULSION: Performed by: NURSE ANESTHETIST, CERTIFIED REGISTERED

## 2018-11-16 PROCEDURE — 45385 COLONOSCOPY W/LESION REMOVAL: CPT | Performed by: INTERNAL MEDICINE

## 2018-11-16 PROCEDURE — 43248 EGD GUIDE WIRE INSERTION: CPT | Performed by: INTERNAL MEDICINE

## 2018-11-16 RX ORDER — LIDOCAINE HYDROCHLORIDE 20 MG/ML
INJECTION, SOLUTION INFILTRATION; PERINEURAL AS NEEDED
Status: DISCONTINUED | OUTPATIENT
Start: 2018-11-16 | End: 2018-11-16 | Stop reason: SURG

## 2018-11-16 RX ORDER — SODIUM CHLORIDE 9 MG/ML
500 INJECTION, SOLUTION INTRAVENOUS CONTINUOUS PRN
Status: DISCONTINUED | OUTPATIENT
Start: 2018-11-16 | End: 2018-11-16 | Stop reason: HOSPADM

## 2018-11-16 RX ORDER — PROPOFOL 10 MG/ML
VIAL (ML) INTRAVENOUS AS NEEDED
Status: DISCONTINUED | OUTPATIENT
Start: 2018-11-16 | End: 2018-11-16 | Stop reason: SURG

## 2018-11-16 RX ORDER — SODIUM CHLORIDE 0.9 % (FLUSH) 0.9 %
3 SYRINGE (ML) INJECTION AS NEEDED
Status: DISCONTINUED | OUTPATIENT
Start: 2018-11-16 | End: 2018-11-16 | Stop reason: HOSPADM

## 2018-11-16 RX ORDER — ONDANSETRON 2 MG/ML
4 INJECTION INTRAMUSCULAR; INTRAVENOUS ONCE AS NEEDED
Status: DISCONTINUED | OUTPATIENT
Start: 2018-11-16 | End: 2018-11-16 | Stop reason: HOSPADM

## 2018-11-16 RX ADMIN — PROPOFOL 50 MG: 10 INJECTION, EMULSION INTRAVENOUS at 09:38

## 2018-11-16 RX ADMIN — PROPOFOL 50 MG: 10 INJECTION, EMULSION INTRAVENOUS at 09:26

## 2018-11-16 RX ADMIN — SODIUM CHLORIDE 500 ML: 9 INJECTION, SOLUTION INTRAVENOUS at 08:15

## 2018-11-16 RX ADMIN — LIDOCAINE HYDROCHLORIDE 100 MG: 20 INJECTION, SOLUTION INFILTRATION; PERINEURAL at 09:23

## 2018-11-16 RX ADMIN — PROPOFOL 50 MG: 10 INJECTION, EMULSION INTRAVENOUS at 09:33

## 2018-11-16 RX ADMIN — PROPOFOL 50 MG: 10 INJECTION, EMULSION INTRAVENOUS at 09:23

## 2018-11-16 RX ADMIN — LIDOCAINE HYDROCHLORIDE 100 MG: 20 INJECTION, SOLUTION INFILTRATION; PERINEURAL at 09:20

## 2018-11-16 RX ADMIN — PROPOFOL 40 MG: 10 INJECTION, EMULSION INTRAVENOUS at 09:41

## 2018-11-16 RX ADMIN — PROPOFOL 100 MG: 10 INJECTION, EMULSION INTRAVENOUS at 09:20

## 2018-11-16 RX ADMIN — PROPOFOL 60 MG: 10 INJECTION, EMULSION INTRAVENOUS at 09:29

## 2018-11-16 NOTE — ANESTHESIA PREPROCEDURE EVALUATION
Anesthesia Evaluation     Patient summary reviewed   no history of anesthetic complications:  NPO Solid Status: > 8 hours             Airway   Mallampati: II  TM distance: >3 FB  Neck ROM: full  Dental      Pulmonary - negative pulmonary ROS   Cardiovascular   Exercise tolerance: excellent (>7 METS)    (+) hyperlipidemia,       Neuro/Psych- negative ROS  GI/Hepatic/Renal/Endo    (+) obesity,       Musculoskeletal     Abdominal    Substance History      OB/GYN          Other                        Anesthesia Plan    ASA 2     general     intravenous induction   Anesthetic plan, all risks, benefits, and alternatives have been provided, discussed and informed consent has been obtained with: patient.

## 2018-11-16 NOTE — ANESTHESIA POSTPROCEDURE EVALUATION
"Patient: Chely Venegas    Procedure Summary     Date:  11/16/18 Room / Location:  [facility] ENDOSCOPY 4 / [facility]    Anesthesia Start:  0916 Anesthesia Stop:  0946    Procedures:       ESOPHAGOGASTRODUODENOSCOPY WITH ANESTHESIA (N/A )      COLONOSCOPY WITH ANESTHESIA (N/A ) Diagnosis:       Esophageal dysphagia      BRBPR (bright red blood per rectum)      (Esophageal dysphagia [R13.10])      (BRBPR (bright red blood per rectum) [K62.5])    Surgeon:  Joe Dougherty MD Provider:  Cuco Muhammad CRNA    Anesthesia Type:  general ASA Status:  2          Anesthesia Type: general  Last vitals  BP   140/81 (11/16/18 0755)   Temp   97.6 °F (36.4 °C) (11/16/18 0755)   Pulse   81 (11/16/18 0755)   Resp   18 (11/16/18 0755)     SpO2   97 % (11/16/18 0755)     Post Anesthesia Care and Evaluation    Patient location during evaluation: PHASE II  Patient participation: complete - patient participated  Level of consciousness: sleepy but conscious  Pain score: 0  Pain management: adequate  Airway patency: patent  Anesthetic complications: No anesthetic complications  PONV Status: none  Cardiovascular status: acceptable  Respiratory status: acceptable  Hydration status: acceptable    Comments: Blood pressure 140/81, pulse 65, temperature 97.6 °F (36.4 °C), temperature source Temporal, resp. rate 19, height 161.3 cm (63.5\"), weight 88 kg (194 lb), SpO2 95 %.        "

## 2018-11-16 NOTE — H&P
"[facility]  Pre Procedure History & Physical    Chief Complaint:   Dysphagia    Subjective     HPI:   The patient complained of dysphagia.  She also had occasional bright red blood per rectum.  None recently    Past Medical History:   Past Medical History:   Diagnosis Date   • Anxiety    • Arthritis    • Depression    • Esophagitis    • Hyperlipidemia        Past Surgical History:  Past Surgical History:   Procedure Laterality Date   • CHOLECYSTECTOMY     • COLONOSCOPY  07/21/2006    normal   • HERNIA REPAIR     • LUNG LOBECTOMY     • THORACOTOMY     • TONSILLECTOMY         Family History:  Family History   Problem Relation Age of Onset   • Colon cancer Neg Hx    • Colon polyps Neg Hx        Social History:   reports that she has quit smoking. she has never used smokeless tobacco. She reports that she does not drink alcohol or use drugs.    Medications:   Prior to Admission medications    Medication Sig Start Date End Date Taking? Authorizing Provider   escitalopram (LEXAPRO) 5 MG tablet Take 2.5 mg by mouth Daily.   Yes Karley Cronin MD   lovastatin (MEVACOR) 20 MG tablet Take 40 mg by mouth Every Night.   Yes Karley Cronin MD   aspirin 81 MG EC tablet Take 81 mg by mouth Daily.    ProviderKarley MD   triamcinolone (KENALOG) 0.1 % cream Apply  topically to the appropriate area as directed 2 (Two) Times a Day.    ProviderKarley MD       Allergies:  Codeine    ROS:    General: Weight stable  Resp: No SOA  Cardiovascular: No CP    Objective     Blood pressure 140/81, pulse 81, temperature 97.6 °F (36.4 °C), temperature source Temporal, resp. rate 18, height 161.3 cm (63.5\"), weight 88 kg (194 lb), SpO2 97 %.    Physical Exam   Constitutional: Pt is oriented to person, place, and in no distress.   HENT: Mouth/Throat: Oropharynx is clear.   Cardiovascular: Normal rate, regular rhythm.    Pulmonary/Chest: Effort normal. No respiratory distress. No  wheezes.   Abdominal: " Soft. Non-distended.  Skin: Skin is warm and dry.   Psychiatric: Mood, memory, affect and judgment appear normal.     Assessment/Plan     Diagnosis:  Dysphagia  Bright red blood per rectum  Anticipated Surgical Procedure:  Endoscopy  Colonoscopy  The risks, benefits, and alternatives of this procedure have been discussed with the patient or the responsible party- the patient understands and agrees to proceed.      EMR Dragon/transcription disclaimer:  Much of this encounter note is electronic transcription/translation of spoken language to printed text.  The electronic translation of spoken language may be erroneous, or at times, nonsensical words or phrases may be inadvertently transcribed.  Although I have reviewed the note for such errors, some may still exist.

## 2018-11-19 LAB
CYTO UR: NORMAL
LAB AP CASE REPORT: NORMAL
LAB AP CLINICAL INFORMATION: NORMAL
PATH REPORT.FINAL DX SPEC: NORMAL
PATH REPORT.GROSS SPEC: NORMAL

## 2018-12-03 ENCOUNTER — PATIENT MESSAGE (OUTPATIENT)
Dept: INTERNAL MEDICINE | Age: 68
End: 2018-12-03

## 2018-12-03 DIAGNOSIS — M19.049 ARTHRITIS OF HAND: Primary | ICD-10-CM

## 2018-12-14 DIAGNOSIS — K21.9 GASTROESOPHAGEAL REFLUX DISEASE WITHOUT ESOPHAGITIS: ICD-10-CM

## 2018-12-14 DIAGNOSIS — R13.19 ESOPHAGEAL DYSPHAGIA: ICD-10-CM

## 2018-12-14 DIAGNOSIS — F41.1 GENERALIZED ANXIETY DISORDER: ICD-10-CM

## 2018-12-14 DIAGNOSIS — E11.9 TYPE 2 DIABETES MELLITUS WITHOUT COMPLICATION, WITHOUT LONG-TERM CURRENT USE OF INSULIN (HCC): ICD-10-CM

## 2018-12-14 DIAGNOSIS — E55.9 VITAMIN D DEFICIENCY: ICD-10-CM

## 2018-12-14 DIAGNOSIS — E78.00 PURE HYPERCHOLESTEROLEMIA: ICD-10-CM

## 2018-12-14 LAB
ALBUMIN SERPL-MCNC: 4.4 G/DL (ref 3.5–5.2)
ALP BLD-CCNC: 112 U/L (ref 35–104)
ALT SERPL-CCNC: 39 U/L (ref 5–33)
ANION GAP SERPL CALCULATED.3IONS-SCNC: 12 MMOL/L (ref 7–19)
AST SERPL-CCNC: 31 U/L (ref 5–32)
BILIRUB SERPL-MCNC: <0.2 MG/DL (ref 0.2–1.2)
BUN BLDV-MCNC: 11 MG/DL (ref 8–23)
CALCIUM SERPL-MCNC: 9.5 MG/DL (ref 8.8–10.2)
CHLORIDE BLD-SCNC: 100 MMOL/L (ref 98–111)
CO2: 28 MMOL/L (ref 22–29)
CREAT SERPL-MCNC: 0.6 MG/DL (ref 0.5–0.9)
GFR NON-AFRICAN AMERICAN: >60
GLUCOSE BLD-MCNC: 148 MG/DL (ref 74–109)
HBA1C MFR BLD: 6.6 % (ref 4–6)
POTASSIUM SERPL-SCNC: 4.2 MMOL/L (ref 3.5–5)
SODIUM BLD-SCNC: 140 MMOL/L (ref 136–145)
TOTAL PROTEIN: 7.2 G/DL (ref 6.6–8.7)

## 2018-12-18 ENCOUNTER — OFFICE VISIT (OUTPATIENT)
Dept: INTERNAL MEDICINE | Age: 68
End: 2018-12-18
Payer: COMMERCIAL

## 2018-12-18 VITALS
OXYGEN SATURATION: 97 % | HEART RATE: 76 BPM | BODY MASS INDEX: 33.63 KG/M2 | SYSTOLIC BLOOD PRESSURE: 110 MMHG | HEIGHT: 64 IN | DIASTOLIC BLOOD PRESSURE: 70 MMHG | WEIGHT: 197 LBS

## 2018-12-18 DIAGNOSIS — E11.9 TYPE 2 DIABETES MELLITUS WITHOUT COMPLICATION, WITHOUT LONG-TERM CURRENT USE OF INSULIN (HCC): Primary | ICD-10-CM

## 2018-12-18 DIAGNOSIS — F41.1 GENERALIZED ANXIETY DISORDER: ICD-10-CM

## 2018-12-18 DIAGNOSIS — E66.09 EXOGENOUS OBESITY: ICD-10-CM

## 2018-12-18 DIAGNOSIS — E55.9 VITAMIN D DEFICIENCY: ICD-10-CM

## 2018-12-18 DIAGNOSIS — E78.00 PURE HYPERCHOLESTEROLEMIA: ICD-10-CM

## 2018-12-18 PROCEDURE — 99214 OFFICE O/P EST MOD 30 MIN: CPT | Performed by: INTERNAL MEDICINE

## 2018-12-18 ASSESSMENT — ENCOUNTER SYMPTOMS
COUGH: 0
CHEST TIGHTNESS: 0
WHEEZING: 0
CONSTIPATION: 0
ABDOMINAL PAIN: 0
SORE THROAT: 0

## 2018-12-18 NOTE — PROGRESS NOTES
Chief Complaint   Patient presents with    3 Month Follow-Up     History of presenting illness:  Amparo Veras is a81 y.o. female who presents today for follow up on her chronic medical conditions as noted below.     Type 2 diabetes mellitus without complication, without long-term current use of insulin ([address]- has tried to follow diet, she is unable to take metformin     Vitamin D deficiency- takes vitamin D supplement 2000 every day      Exogenous obesity- has difficulty loosing weight     Anxiety/ stress- he feels that Lexapro is controlling her symptoms well      Patient Active Problem List    Diagnosis Date Noted    Bilateral low back pain without sciatica 05/04/2018    Osteopenia of left lower leg 08/16/2017     Overview Note:     12/16 lumbar spine normal, left hip neck -1.7      Type 2 diabetes mellitus without complication, without long-term current use of insulin ([address] 08/12/2017    Vitamin D deficiency 08/12/2017    Pure hypercholesterolemia 08/12/2017    GERD (gastroesophageal reflux disease) 08/12/2017    Exogenous obesity 08/12/2017    Generalized anxiety disorder 08/12/2017     Past Medical History:   Diagnosis Date    Anxiety     Carpal tunnel syndrome     Cervicalgia     Depression     Esophagitis     Hyperlipidemia     Idiopathic peripheral neuropathy     Insomnia     Menopause     Obesity due to excess calories     Osteoarthritis     Osteopenia     Type 2 diabetes mellitus without complication ([address]     Vitamin D deficiency       Past Surgical History:   Procedure Laterality Date    CHOLECYSTECTOMY      COLONOSCOPY      HERNIA REPAIR      LOBECTOMY Right 5/3/2016    THORACOTOMY, WEDGE RESECTION PULMONARY NODULE RIGHT MIDDLE LOBE performed by Kathi Bellamy MD at [address]      TONSILLECTOMY       Current Outpatient Prescriptions   Medication Sig Dispense Refill    lovastatin (MEVACOR) 20 MG tablet TAKE TWO TABLETS BY MOUTH ONCE DAILY 180 tablet 1 present. Cardiovascular: Normal rate and normal heart sounds. No murmur heard. Pulmonary/Chest: Breath sounds normal. No respiratory distress. She has no wheezes. She has no rales. She exhibits no tenderness. Abdominal: Soft. Bowel sounds are normal.   Musculoskeletal: Normal range of motion. Lymphadenopathy:     She has no cervical adenopathy. Neurological: She is oriented to person, place, and time. Skin: Skin is warm. No rash noted. Lab Review   Orders Only on 12/14/2018   Component Date Value    Hemoglobin A1C 12/14/2018 6.6*    Sodium 12/14/2018 140     Potassium 12/14/2018 4.2     Chloride 12/14/2018 100     CO2 12/14/2018 28     Anion Gap 12/14/2018 12     Glucose 12/14/2018 148*    BUN 12/14/2018 11     CREATININE 12/14/2018 0.6     GFR Non- 12/14/2018 >60     Calcium 12/14/2018 9.5     Total Protein 12/14/2018 7.2     Alb 12/14/2018 4.4     Total Bilirubin 12/14/2018 <0.2     Alkaline Phosphatase 12/14/2018 112*    ALT 12/14/2018 39*    AST 12/14/2018 31            ASSESSMENT/PLAN:    Type 2 diabetes mellitus without complication, without long-term current use of insulin (HCC)  Her A1c is 6.6 (6.8) ( 6.6)( 6.8) she was unable to tolerate metformin. Strict diet and wtl oss recommneded  Does not want to start Ozepmic today     Vitamin D deficiency- her level was good at 47; cont same 2000 daily/ check in 4/19      Generalized anxiety disorder- she stopped lexapro/ doing better    Obesity  Pt was given approximately 5 minutes of counseling about diet and exercise including education on what calories are, where calories come from, the need for portion control,following lower carbohydrate dietary regimen and healthy snacks along side an active lifestyle with supplementary exercise of approx 30 minutes a week, 5 days a week of exercise for weight loss. The patient voiced increased understanding of the topics discussed.     She has bought treadmill and is

## 2019-02-19 ENCOUNTER — OFFICE VISIT (OUTPATIENT)
Dept: INTERNAL MEDICINE | Age: 69
End: 2019-02-19
Payer: COMMERCIAL

## 2019-02-19 VITALS
WEIGHT: 198 LBS | SYSTOLIC BLOOD PRESSURE: 128 MMHG | BODY MASS INDEX: 33.8 KG/M2 | HEIGHT: 64 IN | RESPIRATION RATE: 18 BRPM | DIASTOLIC BLOOD PRESSURE: 72 MMHG | OXYGEN SATURATION: 96 % | HEART RATE: 88 BPM

## 2019-02-19 DIAGNOSIS — N90.7 VULVAR CYST: Primary | ICD-10-CM

## 2019-02-19 DIAGNOSIS — N90.89 VULVAR MASS: ICD-10-CM

## 2019-02-19 PROCEDURE — 99213 OFFICE O/P EST LOW 20 MIN: CPT | Performed by: INTERNAL MEDICINE

## 2019-02-19 RX ORDER — CEPHALEXIN 500 MG/1
500 CAPSULE ORAL 3 TIMES DAILY
Qty: 21 CAPSULE | Refills: 0 | Status: SHIPPED | OUTPATIENT
Start: 2019-02-19 | End: 2019-02-26

## 2019-02-19 ASSESSMENT — ENCOUNTER SYMPTOMS
SORE THROAT: 0
ABDOMINAL PAIN: 0
WHEEZING: 0
CHEST TIGHTNESS: 0
COUGH: 0
CONSTIPATION: 0

## 2019-02-19 ASSESSMENT — PATIENT HEALTH QUESTIONNAIRE - PHQ9
SUM OF ALL RESPONSES TO PHQ QUESTIONS 1-9: 0
SUM OF ALL RESPONSES TO PHQ9 QUESTIONS 1 & 2: 0
1. LITTLE INTEREST OR PLEASURE IN DOING THINGS: 0
2. FEELING DOWN, DEPRESSED OR HOPELESS: 0
SUM OF ALL RESPONSES TO PHQ QUESTIONS 1-9: 0

## 2019-04-15 ENCOUNTER — OFFICE VISIT (OUTPATIENT)
Dept: INTERNAL MEDICINE | Age: 69
End: 2019-04-15
Payer: COMMERCIAL

## 2019-04-15 ENCOUNTER — NURSE TRIAGE (OUTPATIENT)
Dept: OTHER | Facility: CLINIC | Age: 69
End: 2019-04-15

## 2019-04-15 VITALS
BODY MASS INDEX: 33.63 KG/M2 | SYSTOLIC BLOOD PRESSURE: 118 MMHG | HEIGHT: 64 IN | RESPIRATION RATE: 18 BRPM | HEART RATE: 82 BPM | WEIGHT: 197 LBS | OXYGEN SATURATION: 96 % | DIASTOLIC BLOOD PRESSURE: 70 MMHG

## 2019-04-15 DIAGNOSIS — R07.89 CHEST WALL PAIN: Primary | ICD-10-CM

## 2019-04-15 DIAGNOSIS — R09.1 PLEURISY: ICD-10-CM

## 2019-04-15 PROCEDURE — 99213 OFFICE O/P EST LOW 20 MIN: CPT | Performed by: INTERNAL MEDICINE

## 2019-04-15 RX ORDER — MELOXICAM 15 MG/1
15 TABLET ORAL DAILY PRN
Qty: 15 TABLET | Refills: 0 | Status: SHIPPED | OUTPATIENT
Start: 2019-04-15 | End: 2019-06-07

## 2019-04-15 RX ORDER — PREDNISONE 10 MG/1
10 TABLET ORAL 2 TIMES DAILY
Qty: 10 TABLET | Refills: 0 | Status: SHIPPED | OUTPATIENT
Start: 2019-04-15 | End: 2019-04-20

## 2019-04-15 ASSESSMENT — ENCOUNTER SYMPTOMS
CONSTIPATION: 0
WHEEZING: 0
SORE THROAT: 0
COUGH: 0
CHEST TIGHTNESS: 0
ABDOMINAL PAIN: 0

## 2019-04-15 NOTE — TELEPHONE ENCOUNTER
Reason for Disposition   Intermittent chest pains persist > 3 days    Protocols used: CHEST PAIN-ADULT-[place]    Caller states that she has had chest pain that radiates to her back for the past several weeks. She has had a cough during this time. Pain is not constant, but does hurt when she coughs or sneezes. Caller states that this pain is worsening. Caller has an appointment to be seen in the office today.

## 2019-04-15 NOTE — PROGRESS NOTES
Chief Complaint:   Valiant Cowden is a 79 y.o. female  [address]   Chief Complaint   Patient presents with    Chest Pain     Pt states that she has noticed this off an on for the past few weeks, states that her chest is really sore, and to cough hurts. hurts into her back   .     History of Present Illness:      Bilateral chest wall ) both from and back) pain when  * coughing  * moving./ turning  * at nighttime when turns in bed  * laughing makes it sore    Patient Active Problem List    Diagnosis Date Noted    Bilateral low back pain without sciatica 05/04/2018    Osteopenia of left lower leg 08/16/2017 12/16 lumbar spine normal, left hip neck -1.7      Type 2 diabetes mellitus without complication, without long-term current use of insulin ([address] 08/12/2017    Vitamin D deficiency 08/12/2017    Pure hypercholesterolemia 08/12/2017    GERD (gastroesophageal reflux disease) 08/12/2017    Exogenous obesity 08/12/2017    Generalized anxiety disorder 08/12/2017       Past Medical History:   Diagnosis Date    Anxiety     Carpal tunnel syndrome     Cervicalgia     Depression     Esophagitis     Hyperlipidemia     Idiopathic peripheral neuropathy     Insomnia     Menopause     Obesity due to excess calories     Osteoarthritis     Osteopenia     Type 2 diabetes mellitus without complication ([address]     Vitamin D deficiency        Past Surgical History:   Procedure Laterality Date    CHOLECYSTECTOMY      COLONOSCOPY      HERNIA REPAIR      LOBECTOMY Right 5/3/2016    THORACOTOMY, WEDGE RESECTION PULMONARY NODULE RIGHT MIDDLE LOBE performed by Nicolas Mcwilliams MD at [address]      TONSILLECTOMY         Current Outpatient Medications   Medication Sig Dispense Refill    meloxicam (MOBIC) 15 MG tablet Take 1 tablet by mouth daily as needed for Pain 15 tablet 0    predniSONE (DELTASONE) 10 MG tablet Take 1 tablet by mouth 2 times daily for 5 days 10 tablet 0    lovastatin (MEVACOR)  Heart Disease Mother            Review of Systems   Constitutional: Positive for fatigue. Negative for chills and fever. HENT: Negative for congestion, ear pain, nosebleeds, postnasal drip and sore throat. Respiratory: Negative for cough, chest tightness and wheezing. Cardiovascular: Positive for chest pain. Negative for palpitations and leg swelling. Gastrointestinal: Negative for abdominal pain and constipation. Genitourinary: Negative for dysuria and urgency. Musculoskeletal: Negative. Negative for arthralgias. Skin: Negative for rash. Neurological: Negative for dizziness and headaches. Psychiatric/Behavioral: Negative. Vitals:    04/15/19 1356   BP: 118/70   Site: Left Upper Arm   Position: Sitting   Cuff Size: Large Adult   Pulse: 82   Resp: 18   SpO2: 96%   Weight: 197 lb (89.4 kg)   Height: 5' 4\" (1.626 m)      Wt Readings from Last 3 Encounters:   04/15/19 197 lb (89.4 kg)   02/19/19 198 lb (89.8 kg)   12/18/18 197 lb (89.4 kg)   Body mass index is 33.81 kg/m². BP Readings from Last 3 Encounters:   04/15/19 118/70   02/19/19 128/72   12/18/18 110/70       Physical exam:  GENERAL: Patient is  In no acute distress. HEENT: External ear canals are normal, not erythematous with no discharge. Typmanic membranes are normal. Posterior pharynx is not erythematous, no postnasal drip is present. There is no significant pain on palpation over maxillary sinuses. NECK:There is NO significant palpable submandibular lymphadenopathy present . CHEST: On exam bilaterally  NO  rhonci auscultated. . There is NO wheezing or tightness when patient is coughing. Chest wall tenderness on palpation over upper posterior chest wall and over anterior chest wall  Chest wall pain symptoms also triggered when patient is taking deep breath or coughing  CARDIOVASCULAR: Regular rate, no murmurs, no rubs or gallops present. ABDOMEN: Soft, non-tender with good BS and no organomegaly.   EXTREMITIES: Warm with goodperipheral pulses and no peripheral edema noticed      Lipid panel:   Lab Results   Component Value Date    TRIG 132 08/22/2018    HDL 47 08/22/2018      CBC:  WBC (K/uL)   Date Value   02/19/2018 6.1     Hemoglobin (g/dL)   Date Value   02/19/2018 12.0     Hematocrit (%)   Date Value   02/19/2018 38.1     Platelets (K/uL)   Date Value   02/19/2018 255         Assessment and Plan      Chest wall pain  Pleurisy  -     XR CHEST STANDARD (2 VW); Future    Other orders  -     meloxicam (MOBIC) 15 MG tablet; Take 1 tablet by mouth daily as needed for Pain  -     predniSONE (DELTASONE) 10 MG tablet; Take 1 tablet by mouth 2 times daily for 5 days  If symptoms do not completely resolve, patient would need to call me  She also has appointment with me on 5/10/19 for follow-up    Orders Placed This Encounter   Procedures    XR CHEST STANDARD (2 VW)     New Prescriptions    MELOXICAM (MOBIC) 15 MG TABLET    Take 1 tablet by mouth daily as needed for Pain    PREDNISONE (DELTASONE) 10 MG TABLET    Take 1 tablet by mouth 2 times daily for 5 days      There are no Patient Instructions on file for this visit. No follow-ups on file. EMR Dragon/transcription disclaimer:Significant part of thisencounter note is electronic transcription/translation of spoken language to printed text. The electronic translation of spoken language may be erroneous, or at times, nonsensical words or phrases may be inadvertentlytranscribed.  Although I have reviewed the note for such errors, some may still exist.

## 2019-04-16 DIAGNOSIS — R07.89 CHEST WALL PAIN: ICD-10-CM

## 2019-04-16 DIAGNOSIS — R09.1 PLEURISY: ICD-10-CM

## 2019-05-16 RX ORDER — ESCITALOPRAM OXALATE 10 MG/1
TABLET ORAL
Qty: 45 TABLET | Refills: 0 | Status: SHIPPED | OUTPATIENT
Start: 2019-05-16 | End: 2019-09-12 | Stop reason: SDUPTHER

## 2019-06-03 DIAGNOSIS — E66.09 EXOGENOUS OBESITY: ICD-10-CM

## 2019-06-03 DIAGNOSIS — E78.00 PURE HYPERCHOLESTEROLEMIA: ICD-10-CM

## 2019-06-03 DIAGNOSIS — E11.9 TYPE 2 DIABETES MELLITUS WITHOUT COMPLICATION, WITHOUT LONG-TERM CURRENT USE OF INSULIN (HCC): ICD-10-CM

## 2019-06-03 DIAGNOSIS — E55.9 VITAMIN D DEFICIENCY: ICD-10-CM

## 2019-06-03 DIAGNOSIS — F41.1 GENERALIZED ANXIETY DISORDER: ICD-10-CM

## 2019-06-03 LAB
ALBUMIN SERPL-MCNC: 4.1 G/DL (ref 3.5–5.2)
ALP BLD-CCNC: 106 U/L (ref 35–104)
ALT SERPL-CCNC: 33 U/L (ref 5–33)
ANION GAP SERPL CALCULATED.3IONS-SCNC: 16 MMOL/L (ref 7–19)
AST SERPL-CCNC: 21 U/L (ref 5–32)
BACTERIA: ABNORMAL /HPF
BASOPHILS ABSOLUTE: 0 K/UL (ref 0–0.2)
BASOPHILS RELATIVE PERCENT: 0.6 % (ref 0–1)
BILIRUB SERPL-MCNC: <0.2 MG/DL (ref 0.2–1.2)
BILIRUBIN URINE: NEGATIVE
BLOOD, URINE: NEGATIVE
BUN BLDV-MCNC: 10 MG/DL (ref 8–23)
CALCIUM SERPL-MCNC: 9 MG/DL (ref 8.8–10.2)
CHLORIDE BLD-SCNC: 103 MMOL/L (ref 98–111)
CHOLESTEROL, TOTAL: 173 MG/DL (ref 160–199)
CLARITY: ABNORMAL
CO2: 22 MMOL/L (ref 22–29)
COLOR: YELLOW
CREAT SERPL-MCNC: 0.5 MG/DL (ref 0.5–0.9)
CREATININE URINE: 95.3 MG/DL (ref 4.2–622)
EOSINOPHILS ABSOLUTE: 0.3 K/UL (ref 0–0.6)
EOSINOPHILS RELATIVE PERCENT: 5 % (ref 0–5)
EPITHELIAL CELLS, UA: ABNORMAL /HPF
GFR NON-AFRICAN AMERICAN: >60
GLUCOSE BLD-MCNC: 181 MG/DL (ref 74–109)
GLUCOSE URINE: NEGATIVE MG/DL
HBA1C MFR BLD: 8 % (ref 4–6)
HCT VFR BLD CALC: 38.9 % (ref 37–47)
HDLC SERPL-MCNC: 46 MG/DL (ref 65–121)
HEMOGLOBIN: 12.4 G/DL (ref 12–16)
KETONES, URINE: NEGATIVE MG/DL
LDL CHOLESTEROL CALCULATED: 106 MG/DL
LEUKOCYTE ESTERASE, URINE: ABNORMAL
LYMPHOCYTES ABSOLUTE: 2 K/UL (ref 1.1–4.5)
LYMPHOCYTES RELATIVE PERCENT: 29.9 % (ref 20–40)
MCH RBC QN AUTO: 29.5 PG (ref 27–31)
MCHC RBC AUTO-ENTMCNC: 31.9 G/DL (ref 33–37)
MCV RBC AUTO: 92.4 FL (ref 81–99)
MICROALBUMIN UR-MCNC: <1.2 MG/DL (ref 0–19)
MICROALBUMIN/CREAT UR-RTO: NORMAL MG/G
MONOCYTES ABSOLUTE: 0.6 K/UL (ref 0–0.9)
MONOCYTES RELATIVE PERCENT: 8.5 % (ref 0–10)
NEUTROPHILS ABSOLUTE: 3.7 K/UL (ref 1.5–7.5)
NEUTROPHILS RELATIVE PERCENT: 55.8 % (ref 50–65)
NITRITE, URINE: NEGATIVE
PDW BLD-RTO: 14 % (ref 11.5–14.5)
PH UA: 5.5 (ref 5–8)
PLATELET # BLD: 249 K/UL (ref 130–400)
PMV BLD AUTO: 11.7 FL (ref 9.4–12.3)
POTASSIUM SERPL-SCNC: 4.2 MMOL/L (ref 3.5–5)
PROTEIN UA: NEGATIVE MG/DL
RBC # BLD: 4.21 M/UL (ref 4.2–5.4)
RBC UA: ABNORMAL /HPF (ref 0–2)
SODIUM BLD-SCNC: 141 MMOL/L (ref 136–145)
SPECIFIC GRAVITY UA: 1.02 (ref 1–1.03)
TOTAL PROTEIN: 6.7 G/DL (ref 6.6–8.7)
TRIGL SERPL-MCNC: 103 MG/DL (ref 0–149)
TSH SERPL DL<=0.05 MIU/L-ACNC: 2.48 UIU/ML (ref 0.27–4.2)
UROBILINOGEN, URINE: 0.2 E.U./DL
VITAMIN D 25-HYDROXY: 40.4 NG/ML
WBC # BLD: 6.6 K/UL (ref 4.8–10.8)
WBC UA: ABNORMAL /HPF (ref 0–5)

## 2019-06-07 ENCOUNTER — OFFICE VISIT (OUTPATIENT)
Dept: INTERNAL MEDICINE | Age: 69
End: 2019-06-07
Payer: COMMERCIAL

## 2019-06-07 ENCOUNTER — HOSPITAL ENCOUNTER (OUTPATIENT)
Age: 69
Setting detail: SPECIMEN
Discharge: HOME | End: 2019-06-07
Payer: COMMERCIAL

## 2019-06-07 VITALS
WEIGHT: 198 LBS | SYSTOLIC BLOOD PRESSURE: 134 MMHG | OXYGEN SATURATION: 98 % | DIASTOLIC BLOOD PRESSURE: 68 MMHG | BODY MASS INDEX: 33.8 KG/M2 | HEIGHT: 64 IN | HEART RATE: 72 BPM

## 2019-06-07 DIAGNOSIS — Z00.00 ANNUAL PHYSICAL EXAM: ICD-10-CM

## 2019-06-07 DIAGNOSIS — R31.29 MICROHEMATURIA: ICD-10-CM

## 2019-06-07 DIAGNOSIS — Z12.4 PAP SMEAR FOR CERVICAL CANCER SCREENING: ICD-10-CM

## 2019-06-07 DIAGNOSIS — E11.9 TYPE 2 DIABETES MELLITUS WITHOUT COMPLICATION, WITHOUT LONG-TERM CURRENT USE OF INSULIN (HCC): Primary | ICD-10-CM

## 2019-06-07 DIAGNOSIS — M85.862 OSTEOPENIA OF LEFT LOWER LEG: ICD-10-CM

## 2019-06-07 DIAGNOSIS — E66.09 EXOGENOUS OBESITY: ICD-10-CM

## 2019-06-07 DIAGNOSIS — Z12.39 SCREENING FOR BREAST CANCER: ICD-10-CM

## 2019-06-07 DIAGNOSIS — E78.00 PURE HYPERCHOLESTEROLEMIA: ICD-10-CM

## 2019-06-07 DIAGNOSIS — F41.1 GENERALIZED ANXIETY DISORDER: ICD-10-CM

## 2019-06-07 DIAGNOSIS — E55.9 VITAMIN D DEFICIENCY: ICD-10-CM

## 2019-06-07 PROCEDURE — 99397 PER PM REEVAL EST PAT 65+ YR: CPT | Performed by: INTERNAL MEDICINE

## 2019-06-07 ASSESSMENT — ENCOUNTER SYMPTOMS
EYE REDNESS: 0
CHEST TIGHTNESS: 0
EYE PAIN: 0
WHEEZING: 0
COUGH: 0
DIARRHEA: 0
BLOOD IN STOOL: 0
SINUS PRESSURE: 0
ABDOMINAL PAIN: 0
VOICE CHANGE: 0
VOMITING: 0
TROUBLE SWALLOWING: 0
CONSTIPATION: 0
SORE THROAT: 0
COLOR CHANGE: 0
NAUSEA: 0

## 2019-06-07 NOTE — PROGRESS NOTES
Dispense Refill    escitalopram (LEXAPRO) 10 MG tablet TAKE ONE-HALF TABLET BY MOUTH DAILY 45 tablet 0    lovastatin (MEVACOR) 20 MG tablet TAKE TWO TABLETS BY MOUTH ONCE DAILY 180 tablet 1    Cholecalciferol (VITAMIN D) 2000 units CAPS capsule Take by mouth daily       aspirin 81 MG tablet Take 81 mg by mouth daily       No current facility-administered medications for this visit.       Allergies   Allergen Reactions    Codeine Itching       Social History     Socioeconomic History    Marital status:      Spouse name: None    Number of children: None    Years of education: None    Highest education level: None   Occupational History    None   Social Needs    Financial resource strain: None    Food insecurity:     Worry: None     Inability: None    Transportation needs:     Medical: None     Non-medical: None   Tobacco Use    Smoking status: Former Smoker     Packs/day: 1.00     Years: 3.00     Pack years: 3.00     Types: Cigarettes     Last attempt to quit: 10/1/1984     Years since quittin.7    Smokeless tobacco: Never Used    Tobacco comment: 3 YEARS PLAYED AROUND AND QUIT IN THE    Substance and Sexual Activity    Alcohol use: No     Alcohol/week: 0.0 oz    Drug use: No    Sexual activity: None   Lifestyle    Physical activity:     Days per week: None     Minutes per session: None    Stress: None   Relationships    Social connections:     Talks on phone: None     Gets together: None     Attends Jewish service: None     Active member of club or organization: None     Attends meetings of clubs or organizations: None     Relationship status: None    Intimate partner violence:     Fear of current or ex partner: None     Emotionally abused: None     Physically abused: None     Forced sexual activity: None   Other Topics Concern    None   Social History Narrative    None     Family History   Problem Relation Age of Onset    Cancer Father         bladder ca    Other Sister MULTIPLE SCLEROSIS    High Blood Pressure Mother     Heart Disease Mother           Past Surgical History:   Procedure Laterality Date    CHOLECYSTECTOMY      COLONOSCOPY      HERNIA REPAIR      LOBECTOMY Right 5/3/2016    THORACOTOMY, WEDGE RESECTION PULMONARY NODULE RIGHT MIDDLE LOBE performed by Shefali Rodriguez MD at [address]           Lab Review   Orders Only on 06/03/2019   Component Date Value    Vit D, 25-Hydroxy 06/03/2019 40.4     TSH 06/03/2019 2.480     Color, UA 06/03/2019 YELLOW     Clarity, UA 06/03/2019 CLOUDY*    Glucose, Ur 06/03/2019 Negative     Bilirubin Urine 06/03/2019 Negative     Ketones, Urine 06/03/2019 Negative     Specific Gravity, UA 06/03/2019 1.017     Blood, Urine 06/03/2019 Negative     pH, UA 06/03/2019 5.5     Protein, UA 06/03/2019 Negative     Urobilinogen, Urine 06/03/2019 0.2     Nitrite, Urine 06/03/2019 Negative     Leukocyte Esterase, Urine 06/03/2019 SMALL*    Microalbumin, Random Uri* 06/03/2019 <1.20     Creatinine, Ur 06/03/2019 95.3     Microalbumin Creatinine * 06/03/2019 see below     Cholesterol, Total 06/03/2019 173     Triglycerides 06/03/2019 103     HDL 06/03/2019 46*    LDL Calculated 06/03/2019 106     Hemoglobin A1C 06/03/2019 8.0*    Sodium 06/03/2019 141     Potassium 06/03/2019 4.2     Chloride 06/03/2019 103     CO2 06/03/2019 22     Anion Gap 06/03/2019 16     Glucose 06/03/2019 181*    BUN 06/03/2019 10     CREATININE 06/03/2019 0.5     GFR Non- 06/03/2019 >60     Calcium 06/03/2019 9.0     Total Protein 06/03/2019 6.7     Alb 06/03/2019 4.1     Total Bilirubin 06/03/2019 <0.2     Alkaline Phosphatase 06/03/2019 106*    ALT 06/03/2019 33     AST 06/03/2019 21     WBC 06/03/2019 6.6     RBC 06/03/2019 4.21     Hemoglobin 06/03/2019 12.4     Hematocrit 06/03/2019 38.9     MCV 06/03/2019 92.4     MCH 06/03/2019 29.5     MCHC 06/03/2019 31.9*    04/15/19 118/70   02/19/19 128/72       Physical Exam   Constitutional: She is oriented to person, place, and time. She appears well-developed and well-nourished. HENT:   Head: Normocephalic and atraumatic. Eyes: Pupils are equal, round, and reactive to light. Conjunctivae and EOM are normal.   Neck: Normal range of motion. Neck supple. No JVD present. No thyromegaly present. Cardiovascular: Normal rate, regular rhythm, normal heart sounds and intact distal pulses. No murmur heard. Pulmonary/Chest: Effort normal and breath sounds normal. No respiratory distress. She has no wheezes. She has no rales. She exhibits no tenderness. No breast tenderness, discharge or bleeding. Abdominal: Soft. Bowel sounds are normal. She exhibits no distension and no mass. There is no tenderness. There is no rebound and no guarding. Genitourinary: Vagina normal and uterus normal. No breast tenderness, discharge or bleeding. Uterus is not deviated, not enlarged and not tender. Cervix exhibits no motion tenderness and no discharge. Right adnexum displays no mass. Left adnexum displays no mass. No signs of injury around the vagina. No vaginal discharge found. Musculoskeletal: Normal range of motion. She exhibits no edema, tenderness or deformity. Lymphadenopathy:     She has no cervical adenopathy. Neurological: She is alert and oriented to person, place, and time. She has normal reflexes. She displays normal reflexes. No cranial nerve deficit. She exhibits normal muscle tone. Skin: Skin is warm and dry. No rash noted. No erythema. No pallor. Psychiatric: Her behavior is normal. Judgment and thought content normal.   Nursing note and vitals reviewed.     Breast exam  Bilateral breast exam- symmetric, no nodules, no lymphadenopathy, no nipple discharge          ASSESSMENT/PLAN  Annual physical  * mammogram- order to pt  * cscope 2018 repeat 10 yrs ( hyperplastic cecum)  * BD 12/16- repeat 3-4 yrs- amrik  * PAP today     Type 2 diabetes mellitus without complication, without long-term current use of insulin (HCC)  Her A1c is high 8.0 ( 6.6) (6.8) ( 6.6)( 6.8) she was unable to tolerate metformin. Strict diet and wtl oss recommneded  Initiate ozempic  Starter kit to pt  FU 6 weeks  BS twice daily  Weeks 4-6     Vitamin D deficiency- her level was good at 36( 54); cont same 2000 daily/ check in 4/19      Generalized anxiety disorder- she stopped lexapro/ doing better    - cont lovastatin     Obesity  Pt was given approximately 5 minutes of counseling about diet and exercise including education on what calories are, where calories come from, the need for portion control,following lower carbohydrate dietary regimen and healthy snacks along side an active lifestyle with supplementary exercise of approx 30 minutes a week, 5 days a week of exercise for weight loss. The patient voiced increased understanding of the topics discussed.         UA 3-5 rbc  reepat 6 weeks    Orders Placed This Encounter   Procedures    DEXA BONE DENSITY 2 SITES    DOUG DIGITAL SCREEN W CAD BILATERAL    PAP SMEAR    Urinalysis Reflex to Culture     New Prescriptions    No medications on file      There are no Patient Instructions on file for this visit. Return in about 6 weeks (around 7/19/2019) for Medication check. EMR Dragon/transcription disclaimer:Significant part of this  encounter note is electronic transcription/translation of spoken language to printed text. The electronic translation of spoken language may beerroneous, or at times, nonsensical words or phrases may be inadvertently transcribed.  Although I have reviewed the note for such errors, some may still exist.

## 2019-06-10 ENCOUNTER — HOSPITAL ENCOUNTER (OUTPATIENT)
Age: 69
Setting detail: SPECIMEN
Discharge: HOME | End: 2019-06-10
Payer: COMMERCIAL

## 2019-06-10 DIAGNOSIS — Z12.4 SCREENING FOR CERVICAL CANCER: Primary | ICD-10-CM

## 2019-06-10 PROCEDURE — 88142 CYTOPATH C/V THIN LAYER: CPT

## 2019-06-18 RX ORDER — LOVASTATIN 20 MG/1
TABLET ORAL
Qty: 180 TABLET | Refills: 0 | Status: SHIPPED | OUTPATIENT
Start: 2019-06-18 | End: 2019-10-16 | Stop reason: SDUPTHER

## 2019-06-27 DIAGNOSIS — R31.29 MICROSCOPIC HEMATURIA: Primary | ICD-10-CM

## 2019-07-19 ENCOUNTER — OFFICE VISIT (OUTPATIENT)
Dept: INTERNAL MEDICINE | Age: 69
End: 2019-07-19
Payer: COMMERCIAL

## 2019-07-19 VITALS
DIASTOLIC BLOOD PRESSURE: 70 MMHG | OXYGEN SATURATION: 98 % | BODY MASS INDEX: 32.1 KG/M2 | WEIGHT: 188 LBS | HEIGHT: 64 IN | HEART RATE: 74 BPM | SYSTOLIC BLOOD PRESSURE: 132 MMHG

## 2019-07-19 DIAGNOSIS — E11.9 TYPE 2 DIABETES MELLITUS WITHOUT COMPLICATION, WITHOUT LONG-TERM CURRENT USE OF INSULIN (HCC): Primary | ICD-10-CM

## 2019-07-19 DIAGNOSIS — R31.29 MICROSCOPIC HEMATURIA: ICD-10-CM

## 2019-07-19 DIAGNOSIS — R73.9 HYPERGLYCEMIA: ICD-10-CM

## 2019-07-19 LAB
BILIRUBIN URINE: NEGATIVE
BLOOD, URINE: NEGATIVE
CLARITY: CLEAR
COLOR: YELLOW
GLUCOSE URINE: NEGATIVE MG/DL
KETONES, URINE: NEGATIVE MG/DL
LEUKOCYTE ESTERASE, URINE: NEGATIVE
NITRITE, URINE: NEGATIVE
PH UA: 6 (ref 5–8)
PROTEIN UA: NEGATIVE MG/DL
SPECIFIC GRAVITY UA: 1.01 (ref 1–1.03)
URINE REFLEX TO CULTURE: NORMAL
UROBILINOGEN, URINE: 0.2 E.U./DL

## 2019-07-19 PROCEDURE — 99213 OFFICE O/P EST LOW 20 MIN: CPT | Performed by: INTERNAL MEDICINE

## 2019-07-19 ASSESSMENT — ENCOUNTER SYMPTOMS
ABDOMINAL PAIN: 0
CONSTIPATION: 0
COUGH: 0
CHEST TIGHTNESS: 0
WHEEZING: 0
SORE THROAT: 0

## 2019-07-19 NOTE — PROGRESS NOTES
Chief Complaint   Patient presents with    Diabetes     6 week follow up from being put on ozempic, states she is doing fine     History of presenting illness:  Waqas Bryant is a79 y.o. female who presents today for follow up on her chronic medical conditions as noted below.     Started ozempic 6/7/19  Did well on 0.25 dose  When increased to 0.5 developed cramping and diarrhea    Lost 10 lbs  Feeling much better      Patient Active Problem List    Diagnosis Date Noted    Microhematuria 06/07/2019    Bilateral low back pain without sciatica 05/04/2018    Osteopenia of left lower leg 08/16/2017     Overview Note:     12/16 lumbar spine normal, left hip neck -1.7      Type 2 diabetes mellitus without complication, without long-term current use of insulin ([address] 08/12/2017    Vitamin D deficiency 08/12/2017    Pure hypercholesterolemia 08/12/2017    GERD (gastroesophageal reflux disease) 08/12/2017    Exogenous obesity 08/12/2017    Generalized anxiety disorder 08/12/2017     Past Medical History:   Diagnosis Date    Anxiety     Carpal tunnel syndrome     Cervicalgia     Depression     Esophagitis     Hyperlipidemia     Idiopathic peripheral neuropathy     Insomnia     Menopause     Obesity due to excess calories     Osteoarthritis     Osteopenia     Type 2 diabetes mellitus without complication ([address]     Vitamin D deficiency       Past Surgical History:   Procedure Laterality Date    CHOLECYSTECTOMY      COLONOSCOPY      HERNIA REPAIR      LOBECTOMY Right 5/3/2016    THORACOTOMY, WEDGE RESECTION PULMONARY NODULE RIGHT MIDDLE LOBE performed by Colt Chavez MD at [address]      TONSILLECTOMY       Current Outpatient Medications   Medication Sig Dispense Refill    Semaglutide (OZEMPIC) 0.25 or 0.5 MG/DOSE SOPN Inject 0.25 Units into the skin once a week      Semaglutide 0.25 or 0.5 MG/DOSE SOPN Inject 0.25 mg into the skin once a week 3 pen 3    lovastatin 06/03/2019 <0.2     Alkaline Phosphatase 06/03/2019 106*    ALT 06/03/2019 33     AST 06/03/2019 21     WBC 06/03/2019 6.6     RBC 06/03/2019 4.21     Hemoglobin 06/03/2019 12.4     Hematocrit 06/03/2019 38.9     MCV 06/03/2019 92.4     MCH 06/03/2019 29.5     MCHC 06/03/2019 31.9*    RDW 06/03/2019 14.0     Platelets 11/24/6582 249     MPV 06/03/2019 11.7     Neutrophils % 06/03/2019 55.8     Lymphocytes % 06/03/2019 29.9     Monocytes % 06/03/2019 8.5     Eosinophils % 06/03/2019 5.0     Basophils % 06/03/2019 0.6     Neutrophils # 06/03/2019 3.7     Lymphocytes # 06/03/2019 2.0     Monocytes # 06/03/2019 0.60     Eosinophils # 06/03/2019 0.30     Basophils # 06/03/2019 0.00     WBC, UA 06/03/2019 6-10*    RBC, UA 06/03/2019 3-5*    Epi Cells 06/03/2019 0-2     Bacteria, UA 06/03/2019 NONE            ASSESSMENT/PLAN:    Type 2 diabetes mellitus without complication, without long-term current use of insulin (HCC)  Hyperglycemia  Obesity    Blood sugars improved significantly  Prior to Ozempic 180s fasting now 1 30-1 50 range fasting  As above, patient unable to tolerate higher dose than at 0.25  She, however, has started strict diet and walking  At this time suggest following  1. Continue Ozempic at 0.25 dose  2. Continue strict diet and weight loss program with exercise included  3. Repeat A1c in 6 weeks from now    Microscopic hematuria  3-5 RBCs on urine test that was done 6 weeks ago  Repeat urinalysis today needed              Orders Placed This Encounter   Procedures    Hemoglobin A1C     New Prescriptions    SEMAGLUTIDE 0.25 OR 0.5 MG/DOSE SOPN    Inject 0.25 mg into the skin once a week         No follow-ups on file. There are no Patient Instructions on file for this visit. EMR Dragon/transcription disclaimer:Significant part of this  encounter note is electronic transcription/translationof spoken language to printed text.  The electronic translation of spoken language may be erroneous, or at times, nonsensical words or phrases may be inadvertently transcribed.  Although I have reviewed the note for sucherrors, some may still exist.

## 2019-07-22 ENCOUNTER — TELEPHONE (OUTPATIENT)
Dept: INTERNAL MEDICINE | Age: 69
End: 2019-07-22

## 2019-08-28 DIAGNOSIS — E11.9 TYPE 2 DIABETES MELLITUS WITHOUT COMPLICATION, WITHOUT LONG-TERM CURRENT USE OF INSULIN (HCC): ICD-10-CM

## 2019-08-28 LAB — HBA1C MFR BLD: 6.1 % (ref 4–6)

## 2019-09-03 ENCOUNTER — OFFICE VISIT (OUTPATIENT)
Dept: INTERNAL MEDICINE | Age: 69
End: 2019-09-03
Payer: COMMERCIAL

## 2019-09-03 VITALS
WEIGHT: 177 LBS | HEIGHT: 64 IN | BODY MASS INDEX: 30.22 KG/M2 | HEART RATE: 74 BPM | OXYGEN SATURATION: 96 % | DIASTOLIC BLOOD PRESSURE: 64 MMHG | SYSTOLIC BLOOD PRESSURE: 110 MMHG

## 2019-09-03 DIAGNOSIS — F41.1 GENERALIZED ANXIETY DISORDER: ICD-10-CM

## 2019-09-03 DIAGNOSIS — E78.00 PURE HYPERCHOLESTEROLEMIA: ICD-10-CM

## 2019-09-03 DIAGNOSIS — E11.9 TYPE 2 DIABETES MELLITUS WITHOUT COMPLICATION, WITHOUT LONG-TERM CURRENT USE OF INSULIN (HCC): Primary | ICD-10-CM

## 2019-09-03 DIAGNOSIS — E66.09 EXOGENOUS OBESITY: ICD-10-CM

## 2019-09-03 PROCEDURE — 99214 OFFICE O/P EST MOD 30 MIN: CPT | Performed by: INTERNAL MEDICINE

## 2019-09-03 ASSESSMENT — ENCOUNTER SYMPTOMS
CONSTIPATION: 0
SORE THROAT: 0
COUGH: 0
CHEST TIGHTNESS: 0
WHEEZING: 0
ABDOMINAL PAIN: 0

## 2019-09-12 RX ORDER — ESCITALOPRAM OXALATE 10 MG/1
TABLET ORAL
Qty: 45 TABLET | Refills: 2 | Status: SHIPPED | OUTPATIENT
Start: 2019-09-12 | End: 2020-04-20

## 2019-10-17 RX ORDER — LOVASTATIN 20 MG/1
TABLET ORAL
Qty: 180 TABLET | Refills: 0 | Status: SHIPPED | OUTPATIENT
Start: 2019-10-17 | End: 2020-02-07

## 2019-11-26 DIAGNOSIS — E11.9 TYPE 2 DIABETES MELLITUS WITHOUT COMPLICATION, WITHOUT LONG-TERM CURRENT USE OF INSULIN (HCC): ICD-10-CM

## 2019-11-26 DIAGNOSIS — F41.1 GENERALIZED ANXIETY DISORDER: ICD-10-CM

## 2019-11-26 DIAGNOSIS — E66.09 EXOGENOUS OBESITY: ICD-10-CM

## 2019-11-26 DIAGNOSIS — E78.00 PURE HYPERCHOLESTEROLEMIA: ICD-10-CM

## 2019-11-26 LAB
ALBUMIN SERPL-MCNC: 4.3 G/DL (ref 3.5–5.2)
ALP BLD-CCNC: 96 U/L (ref 35–104)
ALT SERPL-CCNC: 21 U/L (ref 5–33)
ANION GAP SERPL CALCULATED.3IONS-SCNC: 16 MMOL/L (ref 7–19)
AST SERPL-CCNC: 18 U/L (ref 5–32)
BILIRUB SERPL-MCNC: 0.4 MG/DL (ref 0.2–1.2)
BUN BLDV-MCNC: 13 MG/DL (ref 8–23)
CALCIUM SERPL-MCNC: 8.9 MG/DL (ref 8.8–10.2)
CHLORIDE BLD-SCNC: 103 MMOL/L (ref 98–111)
CHOLESTEROL, TOTAL: 172 MG/DL (ref 160–199)
CO2: 23 MMOL/L (ref 22–29)
CREAT SERPL-MCNC: 0.7 MG/DL (ref 0.5–0.9)
GFR NON-AFRICAN AMERICAN: >60
GLUCOSE BLD-MCNC: 105 MG/DL (ref 74–109)
HBA1C MFR BLD: 5.9 % (ref 4–6)
HDLC SERPL-MCNC: 59 MG/DL (ref 65–121)
LDL CHOLESTEROL CALCULATED: 98 MG/DL
POTASSIUM SERPL-SCNC: 4.2 MMOL/L (ref 3.5–5)
SODIUM BLD-SCNC: 142 MMOL/L (ref 136–145)
TOTAL PROTEIN: 7 G/DL (ref 6.6–8.7)
TRIGL SERPL-MCNC: 76 MG/DL (ref 0–149)
TSH SERPL DL<=0.05 MIU/L-ACNC: 2.62 UIU/ML (ref 0.27–4.2)
VITAMIN D 25-HYDROXY: 41.9 NG/ML

## 2019-11-30 ENCOUNTER — OFFICE VISIT (OUTPATIENT)
Dept: RETAIL CLINIC | Facility: CLINIC | Age: 69
End: 2019-11-30

## 2019-11-30 VITALS
TEMPERATURE: 98 F | RESPIRATION RATE: 14 BRPM | OXYGEN SATURATION: 97 % | WEIGHT: 169 LBS | SYSTOLIC BLOOD PRESSURE: 124 MMHG | DIASTOLIC BLOOD PRESSURE: 86 MMHG | HEART RATE: 82 BPM | BODY MASS INDEX: 29.47 KG/M2

## 2019-11-30 DIAGNOSIS — J20.9 ACUTE BRONCHITIS, UNSPECIFIED ORGANISM: Primary | ICD-10-CM

## 2019-11-30 PROBLEM — E66.09 EXOGENOUS OBESITY: Status: ACTIVE | Noted: 2017-08-12

## 2019-11-30 PROBLEM — E78.00 PURE HYPERCHOLESTEROLEMIA: Status: ACTIVE | Noted: 2017-08-12

## 2019-11-30 PROBLEM — E11.9 TYPE 2 DIABETES MELLITUS WITHOUT COMPLICATION, WITHOUT LONG-TERM CURRENT USE OF INSULIN (HCC): Status: ACTIVE | Noted: 2017-08-12

## 2019-11-30 PROBLEM — M85.862 OSTEOPENIA OF LEFT LOWER LEG: Status: ACTIVE | Noted: 2017-08-16

## 2019-11-30 PROBLEM — M54.50 BILATERAL LOW BACK PAIN WITHOUT SCIATICA: Status: ACTIVE | Noted: 2018-05-04

## 2019-11-30 PROBLEM — E55.9 VITAMIN D DEFICIENCY: Status: ACTIVE | Noted: 2017-08-12

## 2019-11-30 PROBLEM — F41.1 GENERALIZED ANXIETY DISORDER: Status: ACTIVE | Noted: 2017-08-12

## 2019-11-30 PROBLEM — R31.29 MICROHEMATURIA: Status: ACTIVE | Noted: 2019-06-07

## 2019-11-30 PROCEDURE — 99213 OFFICE O/P EST LOW 20 MIN: CPT | Performed by: NURSE PRACTITIONER

## 2019-11-30 PROCEDURE — 96372 THER/PROPH/DIAG INJ SC/IM: CPT | Performed by: NURSE PRACTITIONER

## 2019-11-30 RX ORDER — AZITHROMYCIN 250 MG/1
TABLET, FILM COATED ORAL
Qty: 6 TABLET | Refills: 0 | Status: SHIPPED | OUTPATIENT
Start: 2019-11-30 | End: 2020-12-03

## 2019-11-30 RX ORDER — METHYLPREDNISOLONE ACETATE 80 MG/ML
80 INJECTION, SUSPENSION INTRA-ARTICULAR; INTRALESIONAL; INTRAMUSCULAR; SOFT TISSUE ONCE
Status: COMPLETED | OUTPATIENT
Start: 2019-11-30 | End: 2019-11-30

## 2019-11-30 RX ORDER — ESCITALOPRAM OXALATE 5 MG/1
5 TABLET ORAL DAILY
COMMUNITY
Start: 2019-09-12

## 2019-11-30 RX ORDER — MULTIVIT-MIN/IRON/FOLIC ACID/K 18-600-40
CAPSULE ORAL
COMMUNITY

## 2019-11-30 RX ORDER — SEMAGLUTIDE 1.34 MG/ML
INJECTION, SOLUTION SUBCUTANEOUS
COMMUNITY
Start: 2019-10-10

## 2019-11-30 RX ADMIN — METHYLPREDNISOLONE ACETATE 80 MG: 80 INJECTION, SUSPENSION INTRA-ARTICULAR; INTRALESIONAL; INTRAMUSCULAR; SOFT TISSUE at 08:35

## 2019-11-30 NOTE — PROGRESS NOTES
Subjective   Chely Venegas is a 69 y.o. female.     Presents with  being seen for similar      Cough   This is a new problem. The current episode started in the past 7 days. The problem has been gradually worsening. The problem occurs every few minutes. The cough is productive of purulent sputum. Associated symptoms include ear pain (right), postnasal drip and a sore throat. Pertinent negatives include no chest pain, fever, heartburn, hemoptysis, shortness of breath or wheezing. Nothing aggravates the symptoms.        The following portions of the patient's history were reviewed and updated as appropriate: allergies, current medications, past family history, past medical history, past social history, past surgical history and problem list.    Review of Systems   Constitutional: Positive for fatigue. Negative for fever.   HENT: Positive for congestion (chest), ear pain (right), postnasal drip and sore throat.    Respiratory: Positive for cough. Negative for hemoptysis, shortness of breath and wheezing.    Cardiovascular: Negative for chest pain and palpitations.   Gastrointestinal: Negative for diarrhea, nausea and vomiting.   Musculoskeletal: Negative for neck pain and neck stiffness.   Neurological: Negative for dizziness and headache.       Objective      /86   Pulse 82   Temp 98 °F (36.7 °C)   Resp 14   Wt 76.7 kg (169 lb)   SpO2 97%   BMI 29.47 kg/m²     Physical Exam   Constitutional: She is oriented to person, place, and time. She appears well-developed and well-nourished. No distress.   HENT:   Head: Normocephalic and atraumatic.   Right Ear: Hearing, tympanic membrane, external ear and ear canal normal.   Left Ear: Hearing, tympanic membrane, external ear and ear canal normal.   Mouth/Throat: Posterior oropharyngeal erythema present.   Eyes: Conjunctivae and EOM are normal. Pupils are equal, round, and reactive to light.   Neck: Normal range of motion. Neck supple.   Cardiovascular:  Normal rate and regular rhythm.   Pulmonary/Chest: Effort normal. She has rales.   Musculoskeletal: Normal range of motion.   Neurological: She is alert and oriented to person, place, and time. No cranial nerve deficit.   Skin: Skin is warm and dry. Capillary refill takes less than 2 seconds.   Psychiatric: She has a normal mood and affect.   Nursing note and vitals reviewed.        Assessment/Plan   Chely was seen today for cough.    Diagnoses and all orders for this visit:    Acute bronchitis, unspecified organism  -     methylPREDNISolone acetate (DEPO-medrol) injection 80 mg    Other orders  -     azithromycin (ZITHROMAX Z-PANKAJ) 250 MG tablet; Take 2 tablets the first day, then 1 tablet daily for 4 days.      Adequate rest and hydration, and cool mist humidifier  may be useful. Nasal saline spray may help with clearing congestion within the sinuses.       For worsening or persistent problems, follow up with your primary care provider.     You have voiced understanding of treatment plan, visit summary provided.     NAKUL Rosen

## 2019-12-03 ENCOUNTER — OFFICE VISIT (OUTPATIENT)
Dept: INTERNAL MEDICINE | Age: 69
End: 2019-12-03
Payer: COMMERCIAL

## 2019-12-03 VITALS
HEART RATE: 73 BPM | OXYGEN SATURATION: 98 % | WEIGHT: 173 LBS | DIASTOLIC BLOOD PRESSURE: 68 MMHG | BODY MASS INDEX: 29.53 KG/M2 | SYSTOLIC BLOOD PRESSURE: 120 MMHG | HEIGHT: 64 IN

## 2019-12-03 DIAGNOSIS — E66.09 EXOGENOUS OBESITY: ICD-10-CM

## 2019-12-03 DIAGNOSIS — E11.9 TYPE 2 DIABETES MELLITUS WITHOUT COMPLICATION, WITHOUT LONG-TERM CURRENT USE OF INSULIN (HCC): Primary | ICD-10-CM

## 2019-12-03 DIAGNOSIS — E55.9 VITAMIN D DEFICIENCY: ICD-10-CM

## 2019-12-03 DIAGNOSIS — F41.1 GENERALIZED ANXIETY DISORDER: ICD-10-CM

## 2019-12-03 DIAGNOSIS — E78.00 PURE HYPERCHOLESTEROLEMIA: ICD-10-CM

## 2019-12-03 PROCEDURE — 99214 OFFICE O/P EST MOD 30 MIN: CPT | Performed by: INTERNAL MEDICINE

## 2019-12-03 ASSESSMENT — ENCOUNTER SYMPTOMS
ABDOMINAL PAIN: 0
SORE THROAT: 0
CHEST TIGHTNESS: 0
SINUS PAIN: 1
WHEEZING: 0
SINUS PRESSURE: 1
COUGH: 0
CONSTIPATION: 0

## 2020-02-07 RX ORDER — LOVASTATIN 20 MG/1
TABLET ORAL
Qty: 180 TABLET | Refills: 0 | Status: SHIPPED | OUTPATIENT
Start: 2020-02-07 | End: 2020-04-20

## 2020-03-12 ENCOUNTER — TELEPHONE (OUTPATIENT)
Dept: INTERNAL MEDICINE | Age: 70
End: 2020-03-12

## 2020-03-19 ENCOUNTER — HOSPITAL ENCOUNTER (OUTPATIENT)
Dept: WOMENS IMAGING | Age: 70
Discharge: HOME | End: 2020-03-19
Payer: COMMERCIAL

## 2020-03-19 PROCEDURE — 2709999900 US BREAST BIOPSY W LOC DEVICE 1ST LESION RIGHT

## 2020-03-19 PROCEDURE — 88305 TISSUE EXAM BY PATHOLOGIST: CPT

## 2020-03-19 PROCEDURE — 77065 DX MAMMO INCL CAD UNI: CPT

## 2020-03-23 ENCOUNTER — OFFICE VISIT (OUTPATIENT)
Dept: SURGERY | Age: 70
End: 2020-03-23
Payer: COMMERCIAL

## 2020-03-23 VITALS
DIASTOLIC BLOOD PRESSURE: 70 MMHG | BODY MASS INDEX: 30.9 KG/M2 | SYSTOLIC BLOOD PRESSURE: 124 MMHG | HEIGHT: 64 IN | WEIGHT: 181 LBS | HEART RATE: 76 BPM

## 2020-03-23 PROCEDURE — 99213 OFFICE O/P EST LOW 20 MIN: CPT | Performed by: SURGERY

## 2020-03-23 RX ORDER — SEMAGLUTIDE 1.34 MG/ML
INJECTION, SOLUTION SUBCUTANEOUS
COMMUNITY
Start: 2019-10-10 | End: 2020-03-23 | Stop reason: SDUPTHER

## 2020-03-23 NOTE — PROGRESS NOTES
HISTORY OF PRESENT ILLNESS:    Ms. Lori Kaur is a 81 y.o. white female who presents after uncomplicated Right  ultrasound guided breast biopsy by radiology. Pathology demonstrated Invasive ductal carcinoma measuring 0.7 cm in greatest Linear Dimension. We do not have markers yet. PHYSICAL EXAM:  Her wound is well healed with no evidence of infection or hematoma. She has no palpable breast mass. IMPRESSION: Invasive Ductal Carcinoma     PLAN: Will order MRI of Breast, Right Breast Ultrasound and I will see her back for a Breast Talk. DISCUSSION: We had a general discussion for quite some time about the options for breast cancer and how we determine what treatment is right for which patient. She understands that we will discuss this at much greater length when we have the rest of our data. I have seen, examined and reviewed this patient medication list, appropriate labs and imaging studies. I reviewed relevant medical records and others physicians notes. I discussed the plans of care with the patient. I answered all the questions to the patients satisfaction. I, Dr Kanchan Murphy, personally performed the services described in this documentation as scribed by Cassi Gaming MA in my presence and is both accurate and complete. (Please note that portions of this note were completed with a voice recognition program. Efforts were made to edit the dictations but occasionally words are mis-transcribed.)  Over 50% of the total visit time of 30 minutes in face to face encounter with the patient, out of which more than 50% of the time was spent in counseling patient or family and coordination of care. Counseling included but was not limited to time spent reviewing labs, imaging studies/ treatment plan and answering questions.

## 2020-03-24 ENCOUNTER — TELEPHONE (OUTPATIENT)
Dept: SURGERY | Age: 70
End: 2020-03-24

## 2020-03-24 NOTE — TELEPHONE ENCOUNTER
Patient was informed:    MRI scheduled on 4/1/2020 at 8 Am to arrive at 7:30 AM. All instructions for test were given.   Breast US Scheduled before breast talk at [facility] on 4/15/2020 @ 4 PM. She has a Breast talk with Dr. Zeny Pedroza at 5 PM.

## 2020-03-26 ENCOUNTER — TELEPHONE (OUTPATIENT)
Dept: OTHER | Age: 70
End: 2020-03-26

## 2020-04-01 ENCOUNTER — HOSPITAL ENCOUNTER (OUTPATIENT)
Dept: MRI IMAGING | Age: 70
Discharge: HOME | End: 2020-04-01
Payer: COMMERCIAL

## 2020-04-01 LAB
GFR NON-AFRICAN AMERICAN: >60
PERFORMED ON: NORMAL
POC CREATININE: 0.9 MG/DL (ref 0.3–1.3)
POC SAMPLE TYPE: NORMAL

## 2020-04-01 PROCEDURE — 77049 MRI BREAST C-+ W/CAD BI: CPT

## 2020-04-01 PROCEDURE — 82565 ASSAY OF CREATININE: CPT

## 2020-04-01 PROCEDURE — A9577 INJ MULTIHANCE: HCPCS | Performed by: SURGERY

## 2020-04-01 PROCEDURE — 6360000004 HC RX CONTRAST MEDICATION: Performed by: SURGERY

## 2020-04-01 RX ADMIN — GADOBENATE DIMEGLUMINE 20 ML: 529 INJECTION, SOLUTION INTRAVENOUS at 08:50

## 2020-04-14 NOTE — PROGRESS NOTES
HISTORY OF PRESENT ILLNESS:    Ms. Jim Ritter  is recently status post ultrasound guided breast biopsy  on the right which revealed a 0.7 cm grade 3 invasive ductal carcinoma. ER is positive at 4%. NJ is Negative. Her2 is Negative. Ki67 is High at 86%. Mammaprint is High Risk Basal Type. MRI-4/1/2020     MRI BREAST BILATERAL W WO CONTRAST    4/1/2020 9:26 AM   History: Biopsy-proven right breast carcinoma. Comparison is made with ultrasound imaging performed at the time of   biopsy on 3/19/2020. BILATERAL BREAST MRI WITH AND WITHOUT CONTRAST   Multiplanar MR imaging of the breast was performed before and after   contrast injection. The entire study has been evaluated using the [facility] software   program.   Enhancing right breast lesion within the upper outer quadrant. Mid breast 11:00 position. Diameter = 1.1 x 0.85 x 1.4 cm. Lesion volume = 0.64 mL. Nonenlarged lymph nodes deep within the upper outer right breast   against the chest wall. The lymph nodes show no overt malignant characteristics though there   is mild asymmetry of the number of lymph nodes with regards to the   left axilla and directed ultrasound evaluation is recommended. Ultrasound-guided fine-needle biopsy can be performed at that time if   needed. Asymmetric lymph nodes are within the upper outer quadrant deep   against the pectoralis muscle and in the posterior 11:00 position. There is also a small focus of enhancement within the midportion of   the right breast.   This is within the upper outer quadrant and located within the mid   breast 11:00 position. This finding is slightly more medial and anterior with regards to the   biopsy-proven lesion. Diameter = 0.4 x 0.3 x 0.35 cm. Enhancement kinetics show 40% washout. Directed ultrasound of the right breast is recommended in attempt to   visualize this small enhancing focus. Ultrasound-guided needle biopsy can be performed at that time if   needed. malignancy do receive systemic therapy, hormonal therapy or  chemotherapy postoperatively depending upon the final pathology, the lymph node status, and the hormone receptor status. I discussed Oncotype Dx, Mammoprint, and Adjuvant Online as tools which aid in the decision for chemotherapy or hormonal therapy. We also discussed the possibility of breast reconstruction if a mastectomy was required. .  I explained to her the different techniques including placement of a subpectoral implant with a Alloderm sling  versus TRAM flap reconstruction as welll as other methods of reconstruction. She does not wish to pursue reconstruction at this time. After a prolonged discussion lasting 130 minutes  we felt it was most appropriate that she undergo  neoadjuvant chemotherapy and port placement      We discussed the risks and benefits of the surgery including but not limited to bleeding, infection, pain, lymphedema, numbness, and also the risks of general anesthesia including pneumonia, blood clots, heart attack, stroke, and death. She expresses good understanding and is agreeable to proceed with surgery.       We will schedule this to be done as soon as possible  at [facility].

## 2020-04-15 ENCOUNTER — HOSPITAL ENCOUNTER (OUTPATIENT)
Dept: WOMENS IMAGING | Age: 70
Discharge: HOME | End: 2020-04-15
Payer: COMMERCIAL

## 2020-04-15 ENCOUNTER — INITIAL CONSULT (OUTPATIENT)
Dept: SURGERY | Age: 70
End: 2020-04-15
Payer: COMMERCIAL

## 2020-04-15 PROCEDURE — 99355 PR PROLONGED SVC OUTPATIENT SETTING EA ADDL 30 MIN: CPT | Performed by: SURGERY

## 2020-04-15 PROCEDURE — 76642 ULTRASOUND BREAST LIMITED: CPT

## 2020-04-15 PROCEDURE — 99354 PR PROLONGED SVC OUTPATIENT SETTING 1ST HOUR: CPT | Performed by: SURGERY

## 2020-04-15 PROCEDURE — 99215 OFFICE O/P EST HI 40 MIN: CPT | Performed by: SURGERY

## 2020-04-15 NOTE — PROGRESS NOTES
History:    Past Surgical History:   Procedure Laterality Date    CHOLECYSTECTOMY      COLONOSCOPY      HERNIA REPAIR      LOBECTOMY Right 5/3/2016    THORACOTOMY, WEDGE RESECTION PULMONARY NODULE RIGHT MIDDLE LOBE performed by Ruel Walden MD at [address]         Social History:    Social History     Socioeconomic History    Marital status:      Spouse name: Not on file    Number of children: Not on file    Years of education: Not on file    Highest education level: Not on file   Occupational History    Not on file   Social Needs    Financial resource strain: Not on file    Food insecurity     Worry: Not on file     Inability: Not on file    Transportation needs     Medical: Not on file     Non-medical: Not on file   Tobacco Use    Smoking status: Former Smoker     Packs/day: 1.00     Years: 3.00     Pack years: 3.00     Types: Cigarettes     Last attempt to quit: 10/1/1984     Years since quittin.5    Smokeless tobacco: Never Used    Tobacco comment: 3 YEARS PLAYED AROUND AND QUIT IN THE    Substance and Sexual Activity    Alcohol use: No     Alcohol/week: 0.0 standard drinks    Drug use: No    Sexual activity: Not on file   Lifestyle    Physical activity     Days per week: Not on file     Minutes per session: Not on file    Stress: Not on file   Relationships    Social connections     Talks on phone: Not on file     Gets together: Not on file     Attends Zoroastrian service: Not on file     Active member of club or organization: Not on file     Attends meetings of clubs or organizations: Not on file     Relationship status: Not on file    Intimate partner violence     Fear of current or ex partner: Not on file     Emotionally abused: Not on file     Physically abused: Not on file     Forced sexual activity: Not on file   Other Topics Concern    Not on file   Social History Narrative    Not on file       Family History:   Family History abdomen pelvis  E prescribe Zyprexa  E prescribed Zofran    I have seen, examined and reviewed this patient medication list, appropriate labs and imaging studies. I reviewed relevant medical records and others physicians notes. I discussed the plans of care with the patient. I answered all the questions to the patients satisfaction. I have also reviewed the chief complaint (CC) and part of the history (History of Present Illness (HPI), Past Family Social History [facility]), or Review of Systems (ROS) and made changes when appropriated. (Please note that portions of this note were completed with a voice recognition program. Efforts were made to edit the dictations but occasionally words are mis-transcribed.)    Over 50% of the total visit time of 60 minutes in face to face encounter with the patient, out of which more than 50% of the time was spent in counseling patient or family and coordination of care. Counseling included but was not limited to time spent reviewing labs, imaging studies/ treatment plan and answering questions.          Britney Merino MD    04/16/20  2:56 PM

## 2020-04-16 ENCOUNTER — OFFICE VISIT (OUTPATIENT)
Dept: HEMATOLOGY | Age: 70
End: 2020-04-16
Payer: COMMERCIAL

## 2020-04-16 ENCOUNTER — CLINICAL DOCUMENTATION (OUTPATIENT)
Dept: HEMATOLOGY | Age: 70
End: 2020-04-16

## 2020-04-16 ENCOUNTER — HOSPITAL ENCOUNTER (OUTPATIENT)
Dept: INFUSION THERAPY | Age: 70
Discharge: HOME | End: 2020-04-16
Payer: COMMERCIAL

## 2020-04-16 VITALS
HEART RATE: 82 BPM | DIASTOLIC BLOOD PRESSURE: 80 MMHG | OXYGEN SATURATION: 97 % | HEIGHT: 64 IN | BODY MASS INDEX: 30.81 KG/M2 | WEIGHT: 180.5 LBS | SYSTOLIC BLOOD PRESSURE: 122 MMHG | TEMPERATURE: 98.2 F

## 2020-04-16 DIAGNOSIS — Z17.0 MALIGNANT NEOPLASM OF BREAST IN FEMALE, ESTROGEN RECEPTOR POSITIVE, UNSPECIFIED LATERALITY, UNSPECIFIED SITE OF BREAST (HCC): ICD-10-CM

## 2020-04-16 DIAGNOSIS — C50.919 MALIGNANT NEOPLASM OF BREAST IN FEMALE, ESTROGEN RECEPTOR POSITIVE, UNSPECIFIED LATERALITY, UNSPECIFIED SITE OF BREAST (HCC): ICD-10-CM

## 2020-04-16 PROCEDURE — G8427 DOCREV CUR MEDS BY ELIG CLIN: HCPCS | Performed by: INTERNAL MEDICINE

## 2020-04-16 PROCEDURE — 36415 COLL VENOUS BLD VENIPUNCTURE: CPT

## 2020-04-16 PROCEDURE — 1036F TOBACCO NON-USER: CPT | Performed by: INTERNAL MEDICINE

## 2020-04-16 PROCEDURE — G8400 PT W/DXA NO RESULTS DOC: HCPCS | Performed by: INTERNAL MEDICINE

## 2020-04-16 PROCEDURE — 99205 OFFICE O/P NEW HI 60 MIN: CPT | Performed by: INTERNAL MEDICINE

## 2020-04-16 PROCEDURE — 4040F PNEUMOC VAC/ADMIN/RCVD: CPT | Performed by: INTERNAL MEDICINE

## 2020-04-16 PROCEDURE — 85025 COMPLETE CBC W/AUTO DIFF WBC: CPT

## 2020-04-16 PROCEDURE — G8417 CALC BMI ABV UP PARAM F/U: HCPCS | Performed by: INTERNAL MEDICINE

## 2020-04-16 PROCEDURE — 80053 COMPREHEN METABOLIC PANEL: CPT

## 2020-04-16 PROCEDURE — 1123F ACP DISCUSS/DSCN MKR DOCD: CPT | Performed by: INTERNAL MEDICINE

## 2020-04-16 PROCEDURE — 3017F COLORECTAL CA SCREEN DOC REV: CPT | Performed by: INTERNAL MEDICINE

## 2020-04-16 PROCEDURE — 1090F PRES/ABSN URINE INCON ASSESS: CPT | Performed by: INTERNAL MEDICINE

## 2020-04-16 PROCEDURE — 99213 OFFICE O/P EST LOW 20 MIN: CPT

## 2020-04-16 PROCEDURE — 99203 OFFICE O/P NEW LOW 30 MIN: CPT

## 2020-04-16 RX ORDER — ONDANSETRON 4 MG/1
4 TABLET, FILM COATED ORAL 3 TIMES DAILY PRN
Qty: 40 TABLET | Refills: 0 | Status: SHIPPED
Start: 2020-04-16 | End: 2020-09-09 | Stop reason: CLARIF

## 2020-04-16 RX ORDER — OLANZAPINE 10 MG/1
10 TABLET ORAL NIGHTLY
Qty: 16 TABLET | Refills: 0 | Status: SHIPPED
Start: 2020-04-16 | End: 2020-09-09 | Stop reason: CLARIF

## 2020-04-16 NOTE — PROGRESS NOTES
Scheduled to start chemotherapy in 2 weeks    Dose dense Adriamycin/cyclophosphamide with G-CSF support every 2 weeks to be followed by 12 weekly cycles of Taxol 80 mg/m2.

## 2020-04-17 PROBLEM — Z17.1 MALIGNANT NEOPLASM OF RIGHT BREAST IN FEMALE, ESTROGEN RECEPTOR NEGATIVE (HCC): Status: ACTIVE | Noted: 2020-04-17

## 2020-04-17 PROBLEM — C50.911 MALIGNANT NEOPLASM OF RIGHT BREAST IN FEMALE, ESTROGEN RECEPTOR NEGATIVE (HCC): Status: ACTIVE | Noted: 2020-04-17

## 2020-04-20 ENCOUNTER — HOSPITAL ENCOUNTER (OUTPATIENT)
Dept: PREADMISSION TESTING | Age: 70
Discharge: HOME | End: 2020-04-24
Payer: COMMERCIAL

## 2020-04-20 VITALS — WEIGHT: 177 LBS | BODY MASS INDEX: 30.22 KG/M2 | TEMPERATURE: 97.9 F | HEIGHT: 64 IN

## 2020-04-20 LAB
EKG P AXIS: 38 DEGREES
EKG P-R INTERVAL: 148 MS
EKG Q-T INTERVAL: 388 MS
EKG QRS DURATION: 86 MS
EKG QTC CALCULATION (BAZETT): 410 MS
EKG T AXIS: 51 DEGREES

## 2020-04-20 PROCEDURE — 93010 ELECTROCARDIOGRAM REPORT: CPT | Performed by: INTERNAL MEDICINE

## 2020-04-20 PROCEDURE — 93005 ELECTROCARDIOGRAM TRACING: CPT | Performed by: ANESTHESIOLOGY

## 2020-04-20 RX ORDER — ESCITALOPRAM OXALATE 10 MG/1
5 TABLET ORAL DAILY
COMMUNITY
End: 2020-12-28 | Stop reason: SDUPTHER

## 2020-04-22 ENCOUNTER — ANESTHESIA (OUTPATIENT)
Dept: OPERATING ROOM | Age: 70
End: 2020-04-22
Payer: COMMERCIAL

## 2020-04-22 ENCOUNTER — APPOINTMENT (OUTPATIENT)
Dept: GENERAL RADIOLOGY | Age: 70
End: 2020-04-22
Attending: SURGERY
Payer: COMMERCIAL

## 2020-04-22 ENCOUNTER — ANESTHESIA EVENT (OUTPATIENT)
Dept: OPERATING ROOM | Age: 70
End: 2020-04-22
Payer: COMMERCIAL

## 2020-04-22 ENCOUNTER — HOSPITAL ENCOUNTER (OUTPATIENT)
Age: 70
Setting detail: OUTPATIENT SURGERY
Discharge: HOME | End: 2020-04-22
Attending: SURGERY | Admitting: SURGERY
Payer: COMMERCIAL

## 2020-04-22 VITALS
DIASTOLIC BLOOD PRESSURE: 71 MMHG | TEMPERATURE: 97.4 F | SYSTOLIC BLOOD PRESSURE: 130 MMHG | BODY MASS INDEX: 30.22 KG/M2 | OXYGEN SATURATION: 97 % | WEIGHT: 177 LBS | HEART RATE: 64 BPM | HEIGHT: 64 IN | RESPIRATION RATE: 16 BRPM

## 2020-04-22 VITALS — SYSTOLIC BLOOD PRESSURE: 93 MMHG | OXYGEN SATURATION: 91 % | TEMPERATURE: 98 F | DIASTOLIC BLOOD PRESSURE: 54 MMHG

## 2020-04-22 LAB
GLUCOSE BLD-MCNC: 122 MG/DL (ref 70–99)
PERFORMED ON: ABNORMAL

## 2020-04-22 PROCEDURE — 71045 X-RAY EXAM CHEST 1 VIEW: CPT

## 2020-04-22 PROCEDURE — 36561 INSERT TUNNELED CV CATH: CPT | Performed by: SURGERY

## 2020-04-22 PROCEDURE — 2500000003 HC RX 250 WO HCPCS: Performed by: SURGERY

## 2020-04-22 PROCEDURE — 7100000010 HC PHASE II RECOVERY - FIRST 15 MIN: Performed by: SURGERY

## 2020-04-22 PROCEDURE — 3600000003 HC SURGERY LEVEL 3 BASE: Performed by: SURGERY

## 2020-04-22 PROCEDURE — 3700000001 HC ADD 15 MINUTES (ANESTHESIA): Performed by: SURGERY

## 2020-04-22 PROCEDURE — 3600000013 HC SURGERY LEVEL 3 ADDTL 15MIN: Performed by: SURGERY

## 2020-04-22 PROCEDURE — 3700000000 HC ANESTHESIA ATTENDED CARE: Performed by: SURGERY

## 2020-04-22 PROCEDURE — 2580000003 HC RX 258: Performed by: SURGERY

## 2020-04-22 PROCEDURE — 2580000003 HC RX 258: Performed by: ANESTHESIOLOGY

## 2020-04-22 PROCEDURE — 6360000002 HC RX W HCPCS: Performed by: SURGERY

## 2020-04-22 PROCEDURE — 7100000000 HC PACU RECOVERY - FIRST 15 MIN: Performed by: SURGERY

## 2020-04-22 PROCEDURE — 7100000011 HC PHASE II RECOVERY - ADDTL 15 MIN: Performed by: SURGERY

## 2020-04-22 PROCEDURE — 6360000002 HC RX W HCPCS: Performed by: NURSE ANESTHETIST, CERTIFIED REGISTERED

## 2020-04-22 PROCEDURE — 77001 FLUOROGUIDE FOR VEIN DEVICE: CPT | Performed by: SURGERY

## 2020-04-22 PROCEDURE — 82947 ASSAY GLUCOSE BLOOD QUANT: CPT

## 2020-04-22 PROCEDURE — 3209999900 FLUORO FOR SURGICAL PROCEDURES

## 2020-04-22 PROCEDURE — 6360000004 HC RX CONTRAST MEDICATION: Performed by: SURGERY

## 2020-04-22 PROCEDURE — C1788 PORT, INDWELLING, IMP: HCPCS | Performed by: SURGERY

## 2020-04-22 PROCEDURE — C1894 INTRO/SHEATH, NON-LASER: HCPCS | Performed by: SURGERY

## 2020-04-22 PROCEDURE — 36561 INSERT TUNNELED CV CATH: CPT | Performed by: PHYSICIAN ASSISTANT

## 2020-04-22 PROCEDURE — 2709999900 HC NON-CHARGEABLE SUPPLY: Performed by: SURGERY

## 2020-04-22 DEVICE — PORT INFUS PLAS SGL LUMN W/ 9.6FR SIL CATH AIRGUARD VLV: Type: IMPLANTABLE DEVICE | Status: FUNCTIONAL

## 2020-04-22 RX ORDER — HEPARIN SODIUM,PORCINE 10 UNIT/ML
VIAL (ML) INTRAVENOUS PRN
Status: DISCONTINUED | OUTPATIENT
Start: 2020-04-22 | End: 2020-04-22 | Stop reason: ALTCHOICE

## 2020-04-22 RX ORDER — FENTANYL CITRATE 50 UG/ML
INJECTION, SOLUTION INTRAMUSCULAR; INTRAVENOUS PRN
Status: DISCONTINUED | OUTPATIENT
Start: 2020-04-22 | End: 2020-04-22 | Stop reason: SDUPTHER

## 2020-04-22 RX ORDER — ENALAPRILAT 2.5 MG/2ML
1.25 INJECTION INTRAVENOUS
Status: DISCONTINUED | OUTPATIENT
Start: 2020-04-22 | End: 2020-04-22 | Stop reason: HOSPADM

## 2020-04-22 RX ORDER — LABETALOL 20 MG/4 ML (5 MG/ML) INTRAVENOUS SYRINGE
5 EVERY 10 MIN PRN
Status: DISCONTINUED | OUTPATIENT
Start: 2020-04-22 | End: 2020-04-22 | Stop reason: HOSPADM

## 2020-04-22 RX ORDER — HYDRALAZINE HYDROCHLORIDE 20 MG/ML
5 INJECTION INTRAMUSCULAR; INTRAVENOUS EVERY 10 MIN PRN
Status: DISCONTINUED | OUTPATIENT
Start: 2020-04-22 | End: 2020-04-22 | Stop reason: HOSPADM

## 2020-04-22 RX ORDER — METOCLOPRAMIDE HYDROCHLORIDE 5 MG/ML
10 INJECTION INTRAMUSCULAR; INTRAVENOUS
Status: DISCONTINUED | OUTPATIENT
Start: 2020-04-22 | End: 2020-04-22 | Stop reason: HOSPADM

## 2020-04-22 RX ORDER — MEPERIDINE HYDROCHLORIDE 50 MG/ML
12.5 INJECTION INTRAMUSCULAR; INTRAVENOUS; SUBCUTANEOUS EVERY 5 MIN PRN
Status: DISCONTINUED | OUTPATIENT
Start: 2020-04-22 | End: 2020-04-22 | Stop reason: HOSPADM

## 2020-04-22 RX ORDER — PROPOFOL 10 MG/ML
INJECTION, EMULSION INTRAVENOUS CONTINUOUS PRN
Status: DISCONTINUED | OUTPATIENT
Start: 2020-04-22 | End: 2020-04-22 | Stop reason: SDUPTHER

## 2020-04-22 RX ORDER — SODIUM CHLORIDE, SODIUM LACTATE, POTASSIUM CHLORIDE, CALCIUM CHLORIDE 600; 310; 30; 20 MG/100ML; MG/100ML; MG/100ML; MG/100ML
INJECTION, SOLUTION INTRAVENOUS CONTINUOUS
Status: DISCONTINUED | OUTPATIENT
Start: 2020-04-22 | End: 2020-04-22 | Stop reason: HOSPADM

## 2020-04-22 RX ORDER — PROMETHAZINE HYDROCHLORIDE 25 MG/ML
6.25 INJECTION, SOLUTION INTRAMUSCULAR; INTRAVENOUS
Status: DISCONTINUED | OUTPATIENT
Start: 2020-04-22 | End: 2020-04-22 | Stop reason: HOSPADM

## 2020-04-22 RX ORDER — SODIUM CHLORIDE, SODIUM LACTATE, POTASSIUM CHLORIDE, CALCIUM CHLORIDE 600; 310; 30; 20 MG/100ML; MG/100ML; MG/100ML; MG/100ML
INJECTION, SOLUTION INTRAVENOUS CONTINUOUS
Status: DISCONTINUED | OUTPATIENT
Start: 2020-04-22 | End: 2020-04-22

## 2020-04-22 RX ORDER — HYDROCODONE BITARTRATE AND ACETAMINOPHEN 5; 325 MG/1; MG/1
1 TABLET ORAL EVERY 6 HOURS PRN
Qty: 10 TABLET | Refills: 0 | Status: SHIPPED | OUTPATIENT
Start: 2020-04-22 | End: 2020-09-09 | Stop reason: CLARIF

## 2020-04-22 RX ORDER — DIPHENHYDRAMINE HYDROCHLORIDE 50 MG/ML
12.5 INJECTION INTRAMUSCULAR; INTRAVENOUS
Status: DISCONTINUED | OUTPATIENT
Start: 2020-04-22 | End: 2020-04-22 | Stop reason: HOSPADM

## 2020-04-22 RX ORDER — MIDAZOLAM HYDROCHLORIDE 1 MG/ML
INJECTION INTRAMUSCULAR; INTRAVENOUS PRN
Status: DISCONTINUED | OUTPATIENT
Start: 2020-04-22 | End: 2020-04-22 | Stop reason: SDUPTHER

## 2020-04-22 RX ADMIN — SODIUM CHLORIDE, SODIUM LACTATE, POTASSIUM CHLORIDE, AND CALCIUM CHLORIDE: 600; 310; 30; 20 INJECTION, SOLUTION INTRAVENOUS at 08:00

## 2020-04-22 RX ADMIN — FENTANYL CITRATE 100 MCG: 50 INJECTION INTRAMUSCULAR; INTRAVENOUS at 10:54

## 2020-04-22 RX ADMIN — PROPOFOL 100 MCG/KG/MIN: 10 INJECTION, EMULSION INTRAVENOUS at 10:54

## 2020-04-22 RX ADMIN — WATER 1 G: 1 INJECTION INTRAMUSCULAR; INTRAVENOUS; SUBCUTANEOUS at 10:56

## 2020-04-22 RX ADMIN — MIDAZOLAM 2 MG: 1 INJECTION INTRAMUSCULAR; INTRAVENOUS at 10:46

## 2020-04-22 ASSESSMENT — PAIN SCALES - GENERAL
PAINLEVEL_OUTOF10: 0

## 2020-04-22 ASSESSMENT — LIFESTYLE VARIABLES: SMOKING_STATUS: 0

## 2020-04-22 NOTE — ANESTHESIA POSTPROCEDURE EVALUATION
Department of Anesthesiology  Postprocedure Note    Patient: Tomás Torrez  MRN: [id number]  YOB: 1950  Date of evaluation: 4/22/2020  Time:  11:42 AM     Procedure Summary     Date:  04/22/20 Room / Location:  [address]    Anesthesia Start:  5778 Anesthesia Stop:      Procedure:  PORT INSERTION WITH FLUORO (N/A ) Diagnosis:  (C50.919)    Surgeon:  Kiko Kothari MD Responsible Provider:  ASHWIN Moncada CRNA    Anesthesia Type:  MAC ASA Status:  3          Anesthesia Type: MAC    Andra Phase I: Andra Score: 10    Andra Phase II:      Last vitals: Reviewed and per EMR flowsheets.        Anesthesia Post Evaluation    Patient location during evaluation: PACU  Patient participation: complete - patient participated  Level of consciousness: awake and alert  Pain score: 0  Airway patency: patent  Nausea & Vomiting: no nausea and no vomiting  Complications: no  Cardiovascular status: hemodynamically stable  Respiratory status: acceptable  Hydration status: euvolemic

## 2020-04-22 NOTE — BRIEF OP NOTE
Brief Postoperative Note      DATE OF PROCEDURE: 4/22/2020     SURGEON: Lalit Crocker MD    PREOPERATIVE DIAGNOSIS:  C50.919    POSTOPERATIVE DIAGNOSIS: Same     OPERATION: Procedure(s):  PORT INSERTION WITH FLUORO    ANESTHESIA: Monitor Anesthesia Care    ESTIMATED BLOOD LOSS: Minimal    COMPLICATIONS: None. SPECIMENS: * No specimens in log *    DRAINS: None    The patient tolerated the procedure well.     Electronically signed by Lalit Crocker MD  on 4/22/2020 at 11:28 AM

## 2020-04-22 NOTE — OP NOTE
[facility] [employer] [facility] [address], [address] [facility]                                OPERATIVE REPORT    PATIENT NAME: Chichi Ku                 :        1950  MED REC NO:   [id number]                              ROOM:  ACCOUNT NO:   [de-identified]                           ADMIT DATE: 2020  PROVIDER:     Patrizia Sarkar MD    DATE OF PROCEDURE:  2020    PREOPERATIVE DIAGNOSIS:  Right breast cancer, for chemotherapy. POSTOPERATIVE DIAGNOSIS:  Right breast cancer, for chemotherapy. PROCEDURE:  Left subclavian single-lumen LifePort catheter placement. SURGEON:  Patrizia Sarkar MD    ASSISTANT:  Jackie Bond PA-C    ANESTHESIA:  Local with sedation. INDICATIONS:  The patient is a 30-year-old lady newly diagnosed with  breast cancer, it is HER2-positive. She is going to be receiving  HER2-based chemotherapy. We discussed risks and benefits of port  placement with her and her family. They understand and are agreeable. OPERATIVE PROCEDURE:  Today, she was brought to the operating room,  adequately sedated, and prepped and draped in the sterile fashion. The  left infraclavicular area was anesthetized with 1/16% Xylocaine. Subclavian vein was cannulated without incident. A wire was passed into  the superior vena cava under fluoroscopic guidance. We then  anesthetized the subcutaneous pocket, made a transverse incision, and  elevated our pocket. We then threaded the dilator into the superior  vena cava, passed the catheter through the sheath, peeled away the  sheath, and positioned the catheter appropriately again under  fluoroscopic guidance. We did utilize intravenous contrast to  facilitate positioning of the catheter. Once this was accomplished, we  attached the port in the usual fashion, sutured the port in place with  2-0 silk stitches.   We irrigated the pocket with Kefzol-bacitracin  solution, flushed and aspirated the

## 2020-04-27 ENCOUNTER — TELEPHONE (OUTPATIENT)
Dept: HEMATOLOGY | Age: 70
End: 2020-04-27

## 2020-04-27 ENCOUNTER — OFFICE VISIT (OUTPATIENT)
Age: 70
End: 2020-04-27
Payer: COMMERCIAL

## 2020-04-27 VITALS
HEART RATE: 79 BPM | TEMPERATURE: 97.9 F | HEIGHT: 64 IN | SYSTOLIC BLOOD PRESSURE: 110 MMHG | OXYGEN SATURATION: 98 % | DIASTOLIC BLOOD PRESSURE: 60 MMHG | BODY MASS INDEX: 29.88 KG/M2 | WEIGHT: 175 LBS

## 2020-04-27 PROCEDURE — 99213 OFFICE O/P EST LOW 20 MIN: CPT | Performed by: PHYSICIAN ASSISTANT

## 2020-04-27 NOTE — TELEPHONE ENCOUNTER
Patient called in stating she has came in contact with a lady that has the  COVID 19, she works with her and last contact was Tuesday 21st 2020 and does not have any known symptoms. Spoke with Nahum and he advised to Cancell today's appointment to start treatment and go get tested and wait for results to get back and call and reschedule if negative.

## 2020-04-29 ENCOUNTER — TELEPHONE (OUTPATIENT)
Age: 70
End: 2020-04-29

## 2020-04-29 LAB
REPORT: NORMAL
SARS-COV-2: NOT DETECTED
THIS TEST SENT TO: NORMAL

## 2020-04-29 NOTE — TELEPHONE ENCOUNTER
----- Message from ASHWIN iKng sent at 4/29/2020  6:29 PM CDT -----  Please let patient know that her COVID swab ws negative. We continue to recommend that she continue to monitor for any fever.

## 2020-05-05 NOTE — PROGRESS NOTES
MEDICAL ONCOLOGY PROGRESS NOTE    Pt Name: Lennox Mendez  MRN: [id number]  YOB: 1950  Date of evaluation: 5/6/2020    HISTORY OF PRESENT ILLNESS:      Diagnosis  · Invasive ductal carcinoma right breast, March 2020  · ER 4%, NJ 0.1%, HER-2/paty IHC 0, Ki-67 86%  · [id number]  · Mammaprint High risk basal    Treatment summary   · 05/06/2020-ddAC with G-CSF support followed by dose dense weekly Taxol x12 weekly cycles. · Anticipated surgery  · Anticipated adjuvant radiation  · Anticipated adjuvant endocrine therapy    Patient presents today for her first cycle of chemotherapy. She has several questions regarding treatment. Cancer history  Kathleen Hunter was first seen by me on 4/16/2020 referred by Dr. Francia Jean-Baptiste for diagnosis of breast cancer. This was in a screening detected lesion. The patient has a remote history of hormone replacement. She has quit. She denies any family history of breast cancer. · 3/19/2020-core needle biopsy of suspicious right breast lesion consistent with invasive ductal carcinoma grade 3 measuring 0.7 cm. ER 4%, NJ 0%, HER-2/paty IHC 0.  Ki-67 86%. MammaPrint high risk basal type. · 4/1/2020- bilateral breast MRI showed right breast lesion measuring 1.1 x 1.4 x 0.85 cm. Nonenlarged lymph nodes within the upper outer right breast.  · 4/16/2020- she was first seen by me. Recommend neoadjuvant chemotherapy with dose dense AC with G-CSF support followed by 12 weekly cycles Taxol. · 4/23/2020 CT Chest Mild pulmonary scarring. Known right breast cancer. No metastatic disease evident. CT Abdomen Pelvis No acute pathology int the abdomen or pelvis. No evidence of metastatic disease. 2D Echo Normal with EF 60%.          Past Medical History:    Past Medical History:   Diagnosis Date    Anxiety     Cancer [facility])     Breast Cancer    Carpal tunnel syndrome     Cervicalgia     Depression     Esophagitis     Hyperlipidemia     Idiopathic peripheral neuropathy     Insomnia     Menopause     Obesity due to excess calories     Osteoarthritis     Osteopenia     Type 2 diabetes mellitus without complication (HCC)     Vitamin D deficiency        Past Surgical History:    Past Surgical History:   Procedure Laterality Date    CHOLECYSTECTOMY      COLONOSCOPY      HERNIA REPAIR      LOBECTOMY Right 5/3/2016    THORACOTOMY, WEDGE RESECTION PULMONARY NODULE RIGHT MIDDLE LOBE performed by Debi El MD at [address] N/A 2020    PORT INSERTION WITH FLUORO performed by Ivory Martell MD at [address] [facility]          Social History:    Social History     Socioeconomic History    Marital status:      Spouse name: Not on file    Number of children: Not on file    Years of education: Not on file    Highest education level: Not on file   Occupational History    Not on file   Social Needs    Financial resource strain: Not on file    Food insecurity     Worry: Not on file     Inability: Not on file    Transportation needs     Medical: Not on file     Non-medical: Not on file   Tobacco Use    Smoking status: Former Smoker     Packs/day: 1.00     Years: 3.00     Pack years: 3.00     Types: Cigarettes     Last attempt to quit: 10/1/1984     Years since quittin.6    Smokeless tobacco: Never Used    Tobacco comment: 3 YEARS PLAYED AROUND AND QUIT IN THE    Substance and Sexual Activity    Alcohol use: No     Alcohol/week: 0.0 standard drinks    Drug use: No    Sexual activity: Not on file   Lifestyle    Physical activity     Days per week: Not on file     Minutes per session: Not on file    Stress: Not on file   Relationships    Social connections     Talks on phone: Not on file     Gets together: Not on file     Attends Synagogue service: Not on file     Active member of club or organization: Not on file     Attends meetings of clubs or organizations: Not on file     Relationship status: Not on file   [facility] Intimate partner violence     Fear of current or ex partner: Not on file     Emotionally abused: Not on file     Physically abused: Not on file     Forced sexual activity: Not on file   Other Topics Concern    Not on file   Social History Narrative    Not on file       Family History:   Family History   Problem Relation Age of Onset    Cancer Father         bladder ca    Other Sister         MULTIPLE SCLEROSIS    High Blood Pressure Mother     Heart Disease Mother     Stroke Mother        Current Hospital Medications:    No current facility-administered medications for this visit. Facility-Administered Medications Ordered in Other Visits: 0.9 % sodium chloride infusion, 20 mL/hr, Intravenous, Once  sodium chloride flush 0.9 % injection 10 mL, 10 mL, Intravenous, PRN  heparin flush 100 UNIT/ML injection 500 Units, 500 Units, Intracatheter, PRN    Allergies:    Allergies   Allergen Reactions    Codeine Itching    Other Rash     Chloroprep surgical prep         Subjective   REVIEW OF SYSTEMS:   CONSTITUTIONAL: no fever, no night sweats, no fatigue;  HEENT: no blurring of vision, no double vision, no hearing difficulty, no tinnitus, no ulceration, no dysplasia, no epistaxis;  LUNGS: no cough, no hemoptysis, no wheeze,  no shortness of breath;  CARDIOVASCULAR: no palpitation, no chest pain, no shortness of breath;  Breast: Right breast mass  GI: no abdominal pain, no nausea, no vomiting, no diarrhea, no constipation;  PARVEZ: no dysuria, no hematuria, no frequency or urgency, no nephrolithiasis;  MUSCULOSKELETAL: no joint pain, no swelling, no stiffness;  ENDOCRINE: no polyuria, no polydipsia, no cold or heat intolerance;  HEMATOLOGY: no easy bruising or bleeding, no history of clotting disorder;  DERMATOLOGY: no skin rash, no eczema, no pruritus;  PSYCHIATRY: no depression, no anxiety, no panic attacks, no suicidal ideation, no homicidal ideation;  NEUROLOGY: no syncope, no seizures, no numbness or tingling of hands, no numbness or tingling of feet, no paresis;    Objective   /78   Pulse 75   Temp 98.4 °F (36.9 °C)   Ht 5' 4\" (1.626 m)   Wt 180 lb (81.6 kg)   SpO2 97%   BMI 30.90 kg/m²     PHYSICAL EXAM:  CONSTITUTIONAL: Alert, appropriate, no acute distress  EYES: Non icteric, EOM intact, pupils equal round   ENT: Mucus membranes moist, no oral pharyngeal lesions, external inspection of ears and nose are normal  NECK: Supple, no masses. No palpable thyroid mass  CHEST/LUNGS: CTA bilaterally, normal respiratory effort   CARDIOVASCULAR: RRR, no murmurs. No lower extremity edema  ABDOMEN: soft non-tender, active bowel sounds, no HSM. No palpable masses  EXTREMITIES: warm, full ROM in all 4 extremities, no focal weakness. SKIN: warm, dry with no rashes or lesions  LYMPH: No cervical, clavicular, axillary, or inguinal lymphadenopathy  NEUROLOGIC: follows commands, non focal   PSYCH: mood and affect appropriate. Alert and oriented to time, place, person. LABORATORY RESULTS REVIEWED BY ME:  CBC:5/6/2020  WBC-6.56  HGB-12.2  PLT-254,000  Neut-3.70      RADIOLOGY STUDIES REVIEWED BY ME:  4/23/2020 CT Chest    Mild pulmonary scarring. Known right breast cancer. No metastatic disease evident. 4/23/2020 CT Abdomen Pelvis    No acute pathology int the abdomen or pelvis. No evidence of metastatic disease. 4/23/2020 2D Echo    Normal with EF 60%      ASSESSMENT:  #Right breast IDC, triple negative (ER 4%), basal type  MammaPrint  The patient was counseled today about diagnosis, staging, prognosis, diagnostic tests, medications, side effects and disease management. The method of counseling included verbal explanation. The patient verbalized understanding. Essentially, triple negative lesion. Recommended neoadjuvant chemotherapy with dose dense AC followed by Taxol.    Proceed with chemotherapy today    PLAN:  Start chemotherapy today  E prescribe Zyprexa  E prescribed Zofran    I have seen, examined and

## 2020-05-06 ENCOUNTER — OFFICE VISIT (OUTPATIENT)
Dept: HEMATOLOGY | Age: 70
End: 2020-05-06
Payer: COMMERCIAL

## 2020-05-06 ENCOUNTER — HOSPITAL ENCOUNTER (OUTPATIENT)
Dept: INFUSION THERAPY | Age: 70
Discharge: HOME | End: 2020-05-06
Payer: COMMERCIAL

## 2020-05-06 VITALS
HEART RATE: 75 BPM | TEMPERATURE: 98.4 F | DIASTOLIC BLOOD PRESSURE: 78 MMHG | WEIGHT: 180 LBS | OXYGEN SATURATION: 97 % | SYSTOLIC BLOOD PRESSURE: 118 MMHG | BODY MASS INDEX: 30.73 KG/M2 | HEIGHT: 64 IN

## 2020-05-06 DIAGNOSIS — C50.911 MALIGNANT NEOPLASM OF RIGHT BREAST IN FEMALE, ESTROGEN RECEPTOR NEGATIVE, UNSPECIFIED SITE OF BREAST (HCC): Primary | ICD-10-CM

## 2020-05-06 DIAGNOSIS — Z17.0 MALIGNANT NEOPLASM OF BREAST IN FEMALE, ESTROGEN RECEPTOR POSITIVE, UNSPECIFIED LATERALITY, UNSPECIFIED SITE OF BREAST (HCC): ICD-10-CM

## 2020-05-06 DIAGNOSIS — C50.919 MALIGNANT NEOPLASM OF BREAST IN FEMALE, ESTROGEN RECEPTOR POSITIVE, UNSPECIFIED LATERALITY, UNSPECIFIED SITE OF BREAST (HCC): ICD-10-CM

## 2020-05-06 DIAGNOSIS — Z17.1 MALIGNANT NEOPLASM OF RIGHT BREAST IN FEMALE, ESTROGEN RECEPTOR NEGATIVE, UNSPECIFIED SITE OF BREAST (HCC): Primary | ICD-10-CM

## 2020-05-06 DIAGNOSIS — T45.1X5A ADVERSE EFFECT OF ANTINEOPLASTIC AND IMMUNOSUPPRESSIVE DRUGS, INITIAL ENCOUNTER: ICD-10-CM

## 2020-05-06 PROCEDURE — 1036F TOBACCO NON-USER: CPT | Performed by: INTERNAL MEDICINE

## 2020-05-06 PROCEDURE — G8400 PT W/DXA NO RESULTS DOC: HCPCS | Performed by: INTERNAL MEDICINE

## 2020-05-06 PROCEDURE — 4040F PNEUMOC VAC/ADMIN/RCVD: CPT | Performed by: INTERNAL MEDICINE

## 2020-05-06 PROCEDURE — 96375 TX/PRO/DX INJ NEW DRUG ADDON: CPT

## 2020-05-06 PROCEDURE — 1090F PRES/ABSN URINE INCON ASSESS: CPT | Performed by: INTERNAL MEDICINE

## 2020-05-06 PROCEDURE — 96367 TX/PROPH/DG ADDL SEQ IV INF: CPT

## 2020-05-06 PROCEDURE — 2580000003 HC RX 258: Performed by: INTERNAL MEDICINE

## 2020-05-06 PROCEDURE — G8417 CALC BMI ABV UP PARAM F/U: HCPCS | Performed by: INTERNAL MEDICINE

## 2020-05-06 PROCEDURE — 6360000002 HC RX W HCPCS: Performed by: INTERNAL MEDICINE

## 2020-05-06 PROCEDURE — 1123F ACP DISCUSS/DSCN MKR DOCD: CPT | Performed by: INTERNAL MEDICINE

## 2020-05-06 PROCEDURE — 96417 CHEMO IV INFUS EACH ADDL SEQ: CPT

## 2020-05-06 PROCEDURE — 3017F COLORECTAL CA SCREEN DOC REV: CPT | Performed by: INTERNAL MEDICINE

## 2020-05-06 PROCEDURE — 96413 CHEMO IV INFUSION 1 HR: CPT

## 2020-05-06 PROCEDURE — 96415 CHEMO IV INFUSION ADDL HR: CPT

## 2020-05-06 PROCEDURE — G8428 CUR MEDS NOT DOCUMENT: HCPCS | Performed by: INTERNAL MEDICINE

## 2020-05-06 PROCEDURE — 85025 COMPLETE CBC W/AUTO DIFF WBC: CPT

## 2020-05-06 PROCEDURE — 99214 OFFICE O/P EST MOD 30 MIN: CPT | Performed by: INTERNAL MEDICINE

## 2020-05-06 RX ORDER — DEXAMETHASONE SODIUM PHOSPHATE 10 MG/ML
10 INJECTION, SOLUTION INTRAMUSCULAR; INTRAVENOUS ONCE
Status: COMPLETED | OUTPATIENT
Start: 2020-05-06 | End: 2020-05-06

## 2020-05-06 RX ORDER — HEPARIN SODIUM (PORCINE) LOCK FLUSH IV SOLN 100 UNIT/ML 100 UNIT/ML
500 SOLUTION INTRAVENOUS PRN
Status: DISCONTINUED | OUTPATIENT
Start: 2020-05-06 | End: 2020-05-07 | Stop reason: HOSPADM

## 2020-05-06 RX ORDER — DOXORUBICIN HYDROCHLORIDE 2 MG/ML
60 INJECTION, SOLUTION INTRAVENOUS ONCE
Status: CANCELLED | OUTPATIENT
Start: 2020-05-06

## 2020-05-06 RX ORDER — METHYLPREDNISOLONE SODIUM SUCCINATE 125 MG/2ML
125 INJECTION, POWDER, LYOPHILIZED, FOR SOLUTION INTRAMUSCULAR; INTRAVENOUS ONCE
Status: CANCELLED | OUTPATIENT
Start: 2020-05-06

## 2020-05-06 RX ORDER — EPINEPHRINE 1 MG/ML
0.3 INJECTION, SOLUTION, CONCENTRATE INTRAVENOUS PRN
Status: CANCELLED | OUTPATIENT
Start: 2020-05-06

## 2020-05-06 RX ORDER — SODIUM CHLORIDE 9 MG/ML
INJECTION, SOLUTION INTRAVENOUS CONTINUOUS
Status: CANCELLED | OUTPATIENT
Start: 2020-05-06

## 2020-05-06 RX ORDER — DIPHENHYDRAMINE HYDROCHLORIDE 50 MG/ML
50 INJECTION INTRAMUSCULAR; INTRAVENOUS ONCE
Status: CANCELLED | OUTPATIENT
Start: 2020-05-06

## 2020-05-06 RX ORDER — DOXORUBICIN HYDROCHLORIDE 2 MG/ML
60 INJECTION, SOLUTION INTRAVENOUS ONCE
Status: DISCONTINUED | OUTPATIENT
Start: 2020-05-06 | End: 2020-05-06

## 2020-05-06 RX ORDER — SODIUM CHLORIDE 0.9 % (FLUSH) 0.9 %
5 SYRINGE (ML) INJECTION PRN
Status: CANCELLED | OUTPATIENT
Start: 2020-05-06

## 2020-05-06 RX ORDER — HEPARIN SODIUM (PORCINE) LOCK FLUSH IV SOLN 100 UNIT/ML 100 UNIT/ML
500 SOLUTION INTRAVENOUS PRN
Status: CANCELLED | OUTPATIENT
Start: 2020-05-06

## 2020-05-06 RX ORDER — SODIUM CHLORIDE 0.9 % (FLUSH) 0.9 %
10 SYRINGE (ML) INJECTION PRN
Status: CANCELLED | OUTPATIENT
Start: 2020-05-06

## 2020-05-06 RX ORDER — SODIUM CHLORIDE 0.9 % (FLUSH) 0.9 %
10 SYRINGE (ML) INJECTION PRN
Status: DISCONTINUED | OUTPATIENT
Start: 2020-05-06 | End: 2020-05-07 | Stop reason: HOSPADM

## 2020-05-06 RX ORDER — PALONOSETRON 0.05 MG/ML
0.25 INJECTION, SOLUTION INTRAVENOUS ONCE
Status: COMPLETED | OUTPATIENT
Start: 2020-05-06 | End: 2020-05-06

## 2020-05-06 RX ORDER — SODIUM CHLORIDE 9 MG/ML
20 INJECTION, SOLUTION INTRAVENOUS ONCE
Status: COMPLETED | OUTPATIENT
Start: 2020-05-06 | End: 2020-05-07

## 2020-05-06 RX ADMIN — PALONOSETRON HYDROCHLORIDE 0.25 MG: 0.25 INJECTION, SOLUTION INTRAVENOUS at 11:45

## 2020-05-06 RX ADMIN — DEXAMETHASONE SODIUM PHOSPHATE 10 MG: 10 INJECTION, SOLUTION INTRAMUSCULAR; INTRAVENOUS at 11:45

## 2020-05-06 RX ADMIN — SODIUM CHLORIDE 116 MG: 900 INJECTION, SOLUTION INTRAVENOUS at 13:19

## 2020-05-06 RX ADMIN — CYCLOPHOSPHAMIDE 1160 MG: 1 INJECTION, POWDER, FOR SOLUTION INTRAVENOUS; ORAL at 14:28

## 2020-05-06 RX ADMIN — HEPARIN 500 UNITS: 100 SYRINGE at 15:03

## 2020-05-06 RX ADMIN — SODIUM CHLORIDE 20 ML/HR: 9 INJECTION, SOLUTION INTRAVENOUS at 12:31

## 2020-05-06 RX ADMIN — SODIUM CHLORIDE 150 MG: 900 INJECTION, SOLUTION INTRAVENOUS at 11:59

## 2020-05-06 RX ADMIN — Medication 10 ML: at 15:03

## 2020-05-07 ENCOUNTER — HOSPITAL ENCOUNTER (OUTPATIENT)
Dept: INFUSION THERAPY | Age: 70
Discharge: HOME | End: 2020-05-07
Payer: COMMERCIAL

## 2020-05-07 DIAGNOSIS — C50.911 MALIGNANT NEOPLASM OF RIGHT BREAST IN FEMALE, ESTROGEN RECEPTOR NEGATIVE, UNSPECIFIED SITE OF BREAST (HCC): ICD-10-CM

## 2020-05-07 DIAGNOSIS — T45.1X5A ADVERSE EFFECT OF ANTINEOPLASTIC AND IMMUNOSUPPRESSIVE DRUGS, INITIAL ENCOUNTER: Primary | ICD-10-CM

## 2020-05-07 DIAGNOSIS — Z17.1 MALIGNANT NEOPLASM OF RIGHT BREAST IN FEMALE, ESTROGEN RECEPTOR NEGATIVE, UNSPECIFIED SITE OF BREAST (HCC): ICD-10-CM

## 2020-05-07 PROCEDURE — 6360000002 HC RX W HCPCS: Performed by: INTERNAL MEDICINE

## 2020-05-07 PROCEDURE — 96372 THER/PROPH/DIAG INJ SC/IM: CPT

## 2020-05-07 RX ADMIN — PEGFILGRASTIM-CBQV 6 MG: 6 INJECTION, SOLUTION SUBCUTANEOUS at 15:28

## 2020-05-20 ENCOUNTER — HOSPITAL ENCOUNTER (OUTPATIENT)
Dept: INFUSION THERAPY | Age: 70
Discharge: HOME | End: 2020-05-20
Payer: COMMERCIAL

## 2020-05-20 ENCOUNTER — OFFICE VISIT (OUTPATIENT)
Dept: HEMATOLOGY | Age: 70
End: 2020-05-20
Payer: COMMERCIAL

## 2020-05-20 VITALS
RESPIRATION RATE: 16 BRPM | DIASTOLIC BLOOD PRESSURE: 70 MMHG | HEART RATE: 89 BPM | TEMPERATURE: 97.7 F | SYSTOLIC BLOOD PRESSURE: 122 MMHG | HEIGHT: 64 IN | BODY MASS INDEX: 31.22 KG/M2 | OXYGEN SATURATION: 95 % | WEIGHT: 182.9 LBS

## 2020-05-20 VITALS
WEIGHT: 182.9 LBS | BODY MASS INDEX: 31.39 KG/M2 | HEART RATE: 89 BPM | DIASTOLIC BLOOD PRESSURE: 70 MMHG | SYSTOLIC BLOOD PRESSURE: 122 MMHG | RESPIRATION RATE: 16 BRPM | TEMPERATURE: 97.7 F | OXYGEN SATURATION: 95 %

## 2020-05-20 DIAGNOSIS — E55.9 VITAMIN D DEFICIENCY: ICD-10-CM

## 2020-05-20 DIAGNOSIS — T45.1X5A ADVERSE EFFECT OF ANTINEOPLASTIC AND IMMUNOSUPPRESSIVE DRUGS, INITIAL ENCOUNTER: ICD-10-CM

## 2020-05-20 DIAGNOSIS — E78.00 PURE HYPERCHOLESTEROLEMIA: ICD-10-CM

## 2020-05-20 DIAGNOSIS — Z17.0 MALIGNANT NEOPLASM OF BREAST IN FEMALE, ESTROGEN RECEPTOR POSITIVE, UNSPECIFIED LATERALITY, UNSPECIFIED SITE OF BREAST (HCC): ICD-10-CM

## 2020-05-20 DIAGNOSIS — Z17.1 MALIGNANT NEOPLASM OF RIGHT BREAST IN FEMALE, ESTROGEN RECEPTOR NEGATIVE, UNSPECIFIED SITE OF BREAST (HCC): ICD-10-CM

## 2020-05-20 DIAGNOSIS — F41.1 GENERALIZED ANXIETY DISORDER: ICD-10-CM

## 2020-05-20 DIAGNOSIS — E11.9 TYPE 2 DIABETES MELLITUS WITHOUT COMPLICATION, WITHOUT LONG-TERM CURRENT USE OF INSULIN (HCC): Primary | ICD-10-CM

## 2020-05-20 DIAGNOSIS — C50.911 MALIGNANT NEOPLASM OF RIGHT BREAST IN FEMALE, ESTROGEN RECEPTOR NEGATIVE, UNSPECIFIED SITE OF BREAST (HCC): ICD-10-CM

## 2020-05-20 DIAGNOSIS — E66.09 EXOGENOUS OBESITY: ICD-10-CM

## 2020-05-20 DIAGNOSIS — C50.919 MALIGNANT NEOPLASM OF BREAST IN FEMALE, ESTROGEN RECEPTOR POSITIVE, UNSPECIFIED LATERALITY, UNSPECIFIED SITE OF BREAST (HCC): ICD-10-CM

## 2020-05-20 LAB
ALBUMIN SERPL-MCNC: 4 G/DL (ref 3.5–5.2)
ALP BLD-CCNC: 126 U/L (ref 35–104)
ALT SERPL-CCNC: 37 U/L (ref 9–52)
ANION GAP SERPL CALCULATED.3IONS-SCNC: 7 MMOL/L (ref 7–19)
AST SERPL-CCNC: 35 U/L (ref 14–36)
BASOPHILS ABSOLUTE: 0.04 K/UL (ref 0.01–0.08)
BASOPHILS RELATIVE PERCENT: 0.5 % (ref 0.1–1.2)
BILIRUB SERPL-MCNC: 0.3 MG/DL (ref 0.2–1.3)
BUN BLDV-MCNC: 19 MG/DL (ref 7–17)
CALCIUM SERPL-MCNC: 8.7 MG/DL (ref 8.4–10.2)
CHLORIDE BLD-SCNC: 104 MMOL/L (ref 98–111)
CO2: 27 MMOL/L (ref 22–29)
CREAT SERPL-MCNC: 0.6 MG/DL (ref 0.5–1)
EOSINOPHILS ABSOLUTE: 0.26 K/UL (ref 0.04–0.54)
EOSINOPHILS RELATIVE PERCENT: 3.4 % (ref 0.7–7)
GFR NON-AFRICAN AMERICAN: >60
GLOBULIN: 2.3 G/DL
GLUCOSE BLD-MCNC: 141 MG/DL (ref 74–106)
HCT VFR BLD CALC: 33.5 % (ref 34.1–44.9)
HEMOGLOBIN: 11.3 G/DL (ref 11.2–15.7)
LYMPHOCYTES ABSOLUTE: 1.73 K/UL (ref 1.18–3.74)
LYMPHOCYTES RELATIVE PERCENT: 22.8 % (ref 19.3–53.1)
MCH RBC QN AUTO: 31.5 PG (ref 25.6–32.2)
MCHC RBC AUTO-ENTMCNC: 33.7 G/DL (ref 32.3–35.5)
MCV RBC AUTO: 93.3 FL (ref 79.4–94.8)
MONOCYTES ABSOLUTE: 0.79 K/UL (ref 0.24–0.82)
MONOCYTES RELATIVE PERCENT: 10.4 % (ref 4.7–12.5)
NEUTROPHILS ABSOLUTE: 4.77 K/UL (ref 1.56–6.13)
NEUTROPHILS RELATIVE PERCENT: 62.9 % (ref 34–71.1)
PDW BLD-RTO: 13.6 % (ref 11.7–14.4)
PLATELET # BLD: 176 K/UL (ref 182–369)
PMV BLD AUTO: 10 FL (ref 7.4–10.4)
POTASSIUM SERPL-SCNC: 4.3 MMOL/L (ref 3.5–5.1)
RBC # BLD: 3.59 M/UL (ref 3.93–5.22)
SODIUM BLD-SCNC: 138 MMOL/L (ref 137–145)
TOTAL PROTEIN: 6.3 G/DL (ref 6.3–8.2)
WBC # BLD: 7.59 K/UL (ref 3.98–10.04)

## 2020-05-20 PROCEDURE — 1123F ACP DISCUSS/DSCN MKR DOCD: CPT | Performed by: INTERNAL MEDICINE

## 2020-05-20 PROCEDURE — 96417 CHEMO IV INFUS EACH ADDL SEQ: CPT

## 2020-05-20 PROCEDURE — G8417 CALC BMI ABV UP PARAM F/U: HCPCS | Performed by: INTERNAL MEDICINE

## 2020-05-20 PROCEDURE — 85025 COMPLETE CBC W/AUTO DIFF WBC: CPT

## 2020-05-20 PROCEDURE — 1090F PRES/ABSN URINE INCON ASSESS: CPT | Performed by: INTERNAL MEDICINE

## 2020-05-20 PROCEDURE — G8428 CUR MEDS NOT DOCUMENT: HCPCS | Performed by: INTERNAL MEDICINE

## 2020-05-20 PROCEDURE — G8400 PT W/DXA NO RESULTS DOC: HCPCS | Performed by: INTERNAL MEDICINE

## 2020-05-20 PROCEDURE — 96361 HYDRATE IV INFUSION ADD-ON: CPT

## 2020-05-20 PROCEDURE — 96375 TX/PRO/DX INJ NEW DRUG ADDON: CPT

## 2020-05-20 PROCEDURE — 2580000003 HC RX 258: Performed by: INTERNAL MEDICINE

## 2020-05-20 PROCEDURE — 96415 CHEMO IV INFUSION ADDL HR: CPT

## 2020-05-20 PROCEDURE — 80053 COMPREHEN METABOLIC PANEL: CPT

## 2020-05-20 PROCEDURE — 96360 HYDRATION IV INFUSION INIT: CPT

## 2020-05-20 PROCEDURE — 4040F PNEUMOC VAC/ADMIN/RCVD: CPT | Performed by: INTERNAL MEDICINE

## 2020-05-20 PROCEDURE — 3017F COLORECTAL CA SCREEN DOC REV: CPT | Performed by: INTERNAL MEDICINE

## 2020-05-20 PROCEDURE — 96413 CHEMO IV INFUSION 1 HR: CPT

## 2020-05-20 PROCEDURE — 99214 OFFICE O/P EST MOD 30 MIN: CPT | Performed by: INTERNAL MEDICINE

## 2020-05-20 PROCEDURE — 1036F TOBACCO NON-USER: CPT | Performed by: INTERNAL MEDICINE

## 2020-05-20 PROCEDURE — 96367 TX/PROPH/DG ADDL SEQ IV INF: CPT

## 2020-05-20 PROCEDURE — 6360000002 HC RX W HCPCS: Performed by: INTERNAL MEDICINE

## 2020-05-20 RX ORDER — SODIUM CHLORIDE 9 MG/ML
20 INJECTION, SOLUTION INTRAVENOUS ONCE
Status: DISCONTINUED | OUTPATIENT
Start: 2020-05-20 | End: 2020-05-22 | Stop reason: HOSPADM

## 2020-05-20 RX ORDER — SODIUM CHLORIDE 0.9 % (FLUSH) 0.9 %
10 SYRINGE (ML) INJECTION PRN
Status: CANCELLED | OUTPATIENT
Start: 2020-05-20

## 2020-05-20 RX ORDER — PALONOSETRON 0.05 MG/ML
0.25 INJECTION, SOLUTION INTRAVENOUS ONCE
Status: COMPLETED | OUTPATIENT
Start: 2020-05-20 | End: 2020-05-20

## 2020-05-20 RX ORDER — HEPARIN SODIUM (PORCINE) LOCK FLUSH IV SOLN 100 UNIT/ML 100 UNIT/ML
500 SOLUTION INTRAVENOUS PRN
Status: DISCONTINUED | OUTPATIENT
Start: 2020-05-20 | End: 2020-05-21 | Stop reason: HOSPADM

## 2020-05-20 RX ORDER — SODIUM CHLORIDE 0.9 % (FLUSH) 0.9 %
10 SYRINGE (ML) INJECTION PRN
Status: DISCONTINUED | OUTPATIENT
Start: 2020-05-20 | End: 2020-05-21 | Stop reason: HOSPADM

## 2020-05-20 RX ORDER — EPINEPHRINE 1 MG/ML
0.3 INJECTION, SOLUTION, CONCENTRATE INTRAVENOUS PRN
Status: CANCELLED | OUTPATIENT
Start: 2020-05-20

## 2020-05-20 RX ORDER — DEXAMETHASONE SODIUM PHOSPHATE 10 MG/ML
10 INJECTION, SOLUTION INTRAMUSCULAR; INTRAVENOUS ONCE
Status: COMPLETED | OUTPATIENT
Start: 2020-05-20 | End: 2020-05-20

## 2020-05-20 RX ORDER — DIPHENHYDRAMINE HYDROCHLORIDE 50 MG/ML
50 INJECTION INTRAMUSCULAR; INTRAVENOUS ONCE
Status: CANCELLED | OUTPATIENT
Start: 2020-05-20

## 2020-05-20 RX ORDER — 0.9 % SODIUM CHLORIDE 0.9 %
1000 INTRAVENOUS SOLUTION INTRAVENOUS ONCE
Status: COMPLETED | OUTPATIENT
Start: 2020-05-20 | End: 2020-05-20

## 2020-05-20 RX ORDER — SODIUM CHLORIDE 9 MG/ML
INJECTION, SOLUTION INTRAVENOUS CONTINUOUS
Status: CANCELLED | OUTPATIENT
Start: 2020-05-20

## 2020-05-20 RX ORDER — PALONOSETRON 0.05 MG/ML
0.25 INJECTION, SOLUTION INTRAVENOUS ONCE
Status: CANCELLED | OUTPATIENT
Start: 2020-05-20

## 2020-05-20 RX ORDER — METHYLPREDNISOLONE SODIUM SUCCINATE 125 MG/2ML
125 INJECTION, POWDER, LYOPHILIZED, FOR SOLUTION INTRAMUSCULAR; INTRAVENOUS ONCE
Status: CANCELLED | OUTPATIENT
Start: 2020-05-20

## 2020-05-20 RX ORDER — HEPARIN SODIUM (PORCINE) LOCK FLUSH IV SOLN 100 UNIT/ML 100 UNIT/ML
500 SOLUTION INTRAVENOUS PRN
Status: CANCELLED | OUTPATIENT
Start: 2020-05-20

## 2020-05-20 RX ORDER — DEXAMETHASONE SODIUM PHOSPHATE 10 MG/ML
10 INJECTION, SOLUTION INTRAMUSCULAR; INTRAVENOUS ONCE
Status: CANCELLED | OUTPATIENT
Start: 2020-05-20

## 2020-05-20 RX ORDER — SODIUM CHLORIDE 0.9 % (FLUSH) 0.9 %
5 SYRINGE (ML) INJECTION PRN
Status: CANCELLED | OUTPATIENT
Start: 2020-05-20

## 2020-05-20 RX ORDER — SODIUM CHLORIDE 9 MG/ML
20 INJECTION, SOLUTION INTRAVENOUS ONCE
Status: CANCELLED | OUTPATIENT
Start: 2020-05-20

## 2020-05-20 RX ADMIN — CYCLOPHOSPHAMIDE 1160 MG: 1 INJECTION, POWDER, FOR SOLUTION INTRAVENOUS; ORAL at 11:54

## 2020-05-20 RX ADMIN — SODIUM CHLORIDE 116 MG: 900 INJECTION, SOLUTION INTRAVENOUS at 10:43

## 2020-05-20 RX ADMIN — DEXAMETHASONE SODIUM PHOSPHATE 10 MG: 10 INJECTION, SOLUTION INTRAMUSCULAR; INTRAVENOUS at 10:08

## 2020-05-20 RX ADMIN — PALONOSETRON HYDROCHLORIDE 0.25 MG: 0.25 INJECTION, SOLUTION INTRAVENOUS at 10:08

## 2020-05-20 RX ADMIN — SODIUM CHLORIDE 150 MG: 900 INJECTION, SOLUTION INTRAVENOUS at 10:08

## 2020-05-20 RX ADMIN — HEPARIN 500 UNITS: 100 SYRINGE at 14:02

## 2020-05-20 RX ADMIN — SODIUM CHLORIDE 1000 ML: 9 INJECTION, SOLUTION INTRAVENOUS at 12:00

## 2020-05-20 ASSESSMENT — PAIN SCALES - GENERAL: PAINLEVEL_OUTOF10: 0

## 2020-05-20 NOTE — PROGRESS NOTES
Relationship status: Not on file    Intimate partner violence     Fear of current or ex partner: Not on file     Emotionally abused: Not on file     Physically abused: Not on file     Forced sexual activity: Not on file   Other Topics Concern    Not on file   Social History Narrative    Not on file       Family History:   Family History   Problem Relation Age of Onset    Cancer Father         bladder ca    Other Sister         MULTIPLE SCLEROSIS    High Blood Pressure Mother     Heart Disease Mother     Stroke Mother        Current Hospital Medications:    No current facility-administered medications for this visit. Facility-Administered Medications Ordered in Other Visits: fosaprepitant (EMEND) 150 mg in sodium chloride 0.9 % 150 mL IVPB, 150 mg, Intravenous, Once  0.9 % sodium chloride infusion, 20 mL/hr, Intravenous, Once  DOXOrubicin HCl (ADRIAMYCIN) 116 mg in sodium chloride 0.9 % 100 mL chemo IVPB, 60 mg/m2 (Treatment Plan Recorded), Intravenous, Once  cyclophosphamide (CYTOXAN) 1,160 mg in sodium chloride 0.9 % 250 mL chemo IVPB, 600 mg/m2 (Treatment Plan Recorded), Intravenous, Once  sodium chloride flush 0.9 % injection 10 mL, 10 mL, Intravenous, PRN  heparin flush 100 UNIT/ML injection 500 Units, 500 Units, Intracatheter, PRN    Allergies:    Allergies   Allergen Reactions    Codeine Itching    Other Rash     Chloroprep surgical prep         Subjective   REVIEW OF SYSTEMS:   CONSTITUTIONAL: no fever, no night sweats, no fatigue;  HEENT: no blurring of vision, no double vision, no hearing difficulty, no tinnitus, no ulceration, no dysplasia, no epistaxis;  LUNGS: no cough, no hemoptysis, no wheeze,  no shortness of breath;  CARDIOVASCULAR: no palpitation, no chest pain, no shortness of breath;  Breast: Right breast mass  GI: no abdominal pain, nausea, no vomiting, no diarrhea, no constipation;  PARVEZ: no dysuria, no hematuria, no frequency or urgency, no nephrolithiasis;  MUSCULOSKELETAL: no joint

## 2020-05-21 ENCOUNTER — HOSPITAL ENCOUNTER (OUTPATIENT)
Dept: INFUSION THERAPY | Age: 70
Discharge: HOME | End: 2020-05-21
Payer: COMMERCIAL

## 2020-05-21 DIAGNOSIS — C50.911 MALIGNANT NEOPLASM OF RIGHT BREAST IN FEMALE, ESTROGEN RECEPTOR NEGATIVE, UNSPECIFIED SITE OF BREAST (HCC): ICD-10-CM

## 2020-05-21 DIAGNOSIS — T45.1X5A ADVERSE EFFECT OF ANTINEOPLASTIC AND IMMUNOSUPPRESSIVE DRUGS, INITIAL ENCOUNTER: Primary | ICD-10-CM

## 2020-05-21 DIAGNOSIS — Z17.1 MALIGNANT NEOPLASM OF RIGHT BREAST IN FEMALE, ESTROGEN RECEPTOR NEGATIVE, UNSPECIFIED SITE OF BREAST (HCC): ICD-10-CM

## 2020-05-21 PROCEDURE — 6360000002 HC RX W HCPCS: Performed by: INTERNAL MEDICINE

## 2020-05-21 PROCEDURE — 96372 THER/PROPH/DIAG INJ SC/IM: CPT

## 2020-05-21 RX ADMIN — PEGFILGRASTIM-CBQV 6 MG: 6 INJECTION, SOLUTION SUBCUTANEOUS at 12:57

## 2020-06-02 NOTE — PROGRESS NOTES
file     Attends meetings of clubs or organizations: Not on file     Relationship status: Not on file    Intimate partner violence     Fear of current or ex partner: Not on file     Emotionally abused: Not on file     Physically abused: Not on file     Forced sexual activity: Not on file   Other Topics Concern    Not on file   Social History Narrative    Not on file       Family History:   Family History   Problem Relation Age of Onset    Cancer Father         bladder ca    Other Sister         MULTIPLE SCLEROSIS    High Blood Pressure Mother     Heart Disease Mother     Stroke Mother        Current Hospital Medications:    No current facility-administered medications for this visit. Facility-Administered Medications Ordered in Other Visits: 0.9 % sodium chloride bolus, 1,000 mL, Intravenous, Once  DOXOrubicin HCl (ADRIAMYCIN) 116 mg in sodium chloride 0.9 % 100 mL chemo IVPB, 60 mg/m2 (Treatment Plan Recorded), Intravenous, Once  cyclophosphamide (CYTOXAN) 1,160 mg in sodium chloride 0.9 % 250 mL chemo IVPB, 600 mg/m2 (Treatment Plan Recorded), Intravenous, Once  sodium chloride flush 0.9 % injection 10 mL, 10 mL, Intravenous, PRN  heparin flush 100 UNIT/ML injection 500 Units, 500 Units, Intracatheter, PRN    Allergies:    Allergies   Allergen Reactions    Codeine Itching    Other Rash     Chloroprep surgical prep         Subjective   REVIEW OF SYSTEMS:   REVIEW OF SYSTEMS:    Constitutional: no fever, no night sweats, fatigue;   HEENT: no blurring of vision, no double vision, no hearing difficulty, no tinnitus,no ulceration, no dysphagia  Lungs: no cough, no shortness of breath, no wheeze;   CVS: no palpitation, no chest pain, no shortness of breath;  GI: Acid reflux, no abdominal pain, nausea , no vomiting, no constipation;   PARVEZ: no dysuria, frequency and urgency, no hematuria, no kidney stones;   Musculoskeletal: no joint pain, swelling , stiffness;   Endocrine: no polyuria, polydypsia, no cold or heat intolerence; Hematology/lymphatic: no easy brusing or bleeding, no hx of clotting disorder; no peripheral adenopathy. Dermatology: no skin rash, no eczema, no pruritis;   Psychiatry: no depression, no anxiety,no panic attacks, no suicide ideation; Neurology: no syncope, no seizures, no numbness or tingling of hands, no numbness or tingling of feet, no paresis;       Objective   /76   Pulse 96   Temp 97.9 °F (36.6 °C)   Ht 5' 4\" (1.626 m)   Wt 181 lb (82.1 kg)   SpO2 98%   BMI 31.07 kg/m²     PHYSICAL EXAM:  CONSTITUTIONAL: Alert, appropriate, no acute distress,   EYES: Non icteric, EOM intact, pupils equal round and reactive to light and accommodation. ENT: Oral mucus membranes moist, no oral pharyngeal lesions. External inspection of ears and nose are normal.   NECK: Supple, no masses. No palpable thyroid mass    CHEST/LUNGS: CTA bilaterally, normal respiratory effort   CARDIOVASCULAR: RRR, no murmurs. No lower extremity edema   ABDOMEN: soft non-tender, active bowel sounds, no hepatosplenomegaly. No palpable masses. EXTREMITIES: warm, Full ROM of all fours extremities. No focal weakness. SKIN: warm, dry with no rashes or lesions  LYMPH: No cervical, clavicular, axillary, or inguinal lymphadenopathy  NEUROLOGIC: follows commands, non focal.   PSYCH: mood and affect appropriate. Alert and oriented to time and place and person. LABORATORY RESULTS REVIEWED BY ME:  CBC:6/3/2020  WBC-10.19  HGB-11.5  PLT-191,000  Neut-7.39    RADIOLOGY STUDIES REVIEWED BY ME:  No results found. ASSESSMENT:  #Right breast IDC, triple negative (ER 4%), basal type  MammaPrint  The patient was counseled today about diagnosis, staging, prognosis, diagnostic tests, medications, side effects and disease management. The method of counseling included verbal explanation. The patient verbalized understanding. Essentially, triple negative lesion.   Recommended neoadjuvant chemotherapy with dose dense AC

## 2020-06-03 ENCOUNTER — HOSPITAL ENCOUNTER (OUTPATIENT)
Dept: INFUSION THERAPY | Age: 70
Discharge: HOME | End: 2020-06-03
Payer: COMMERCIAL

## 2020-06-03 ENCOUNTER — OFFICE VISIT (OUTPATIENT)
Dept: HEMATOLOGY | Age: 70
End: 2020-06-03
Payer: COMMERCIAL

## 2020-06-03 VITALS
BODY MASS INDEX: 30.9 KG/M2 | DIASTOLIC BLOOD PRESSURE: 76 MMHG | HEART RATE: 96 BPM | SYSTOLIC BLOOD PRESSURE: 132 MMHG | OXYGEN SATURATION: 98 % | TEMPERATURE: 97.9 F | WEIGHT: 181 LBS | HEIGHT: 64 IN

## 2020-06-03 DIAGNOSIS — Z17.1 MALIGNANT NEOPLASM OF RIGHT BREAST IN FEMALE, ESTROGEN RECEPTOR NEGATIVE, UNSPECIFIED SITE OF BREAST (HCC): ICD-10-CM

## 2020-06-03 DIAGNOSIS — T45.1X5A ADVERSE EFFECT OF ANTINEOPLASTIC AND IMMUNOSUPPRESSIVE DRUGS, INITIAL ENCOUNTER: ICD-10-CM

## 2020-06-03 DIAGNOSIS — C50.919 MALIGNANT NEOPLASM OF BREAST IN FEMALE, ESTROGEN RECEPTOR POSITIVE, UNSPECIFIED LATERALITY, UNSPECIFIED SITE OF BREAST (HCC): Primary | ICD-10-CM

## 2020-06-03 DIAGNOSIS — Z17.0 MALIGNANT NEOPLASM OF BREAST IN FEMALE, ESTROGEN RECEPTOR POSITIVE, UNSPECIFIED LATERALITY, UNSPECIFIED SITE OF BREAST (HCC): Primary | ICD-10-CM

## 2020-06-03 DIAGNOSIS — C50.911 MALIGNANT NEOPLASM OF RIGHT BREAST IN FEMALE, ESTROGEN RECEPTOR NEGATIVE, UNSPECIFIED SITE OF BREAST (HCC): ICD-10-CM

## 2020-06-03 LAB
ALBUMIN SERPL-MCNC: 4.2 G/DL (ref 3.5–5.2)
ALP BLD-CCNC: 130 U/L (ref 35–104)
ALT SERPL-CCNC: 30 U/L (ref 9–52)
ANION GAP SERPL CALCULATED.3IONS-SCNC: 11 MMOL/L (ref 7–19)
AST SERPL-CCNC: 24 U/L (ref 14–36)
BASOPHILS ABSOLUTE: 0.05 K/UL (ref 0.01–0.08)
BASOPHILS RELATIVE PERCENT: 0.5 % (ref 0.1–1.2)
BILIRUB SERPL-MCNC: <0.2 MG/DL (ref 0.2–1.3)
BUN BLDV-MCNC: 19 MG/DL (ref 7–17)
CALCIUM SERPL-MCNC: 9 MG/DL (ref 8.4–10.2)
CHLORIDE BLD-SCNC: 105 MMOL/L (ref 98–111)
CO2: 23 MMOL/L (ref 22–29)
CREAT SERPL-MCNC: 0.6 MG/DL (ref 0.5–1)
EOSINOPHILS ABSOLUTE: 0.15 K/UL (ref 0.04–0.54)
EOSINOPHILS RELATIVE PERCENT: 1.5 % (ref 0.7–7)
GFR NON-AFRICAN AMERICAN: >60
GLOBULIN: 2 G/DL
GLUCOSE BLD-MCNC: 160 MG/DL (ref 74–106)
HCT VFR BLD CALC: 34.2 % (ref 34.1–44.9)
HEMOGLOBIN: 11.5 G/DL (ref 11.2–15.7)
LYMPHOCYTES ABSOLUTE: 1.53 K/UL (ref 1.18–3.74)
LYMPHOCYTES RELATIVE PERCENT: 15 % (ref 19.3–53.1)
MCH RBC QN AUTO: 31.6 PG (ref 25.6–32.2)
MCHC RBC AUTO-ENTMCNC: 33.6 G/DL (ref 32.3–35.5)
MCV RBC AUTO: 94 FL (ref 79.4–94.8)
MONOCYTES ABSOLUTE: 1.07 K/UL (ref 0.24–0.82)
MONOCYTES RELATIVE PERCENT: 10.5 % (ref 4.7–12.5)
NEUTROPHILS ABSOLUTE: 7.39 K/UL (ref 1.56–6.13)
NEUTROPHILS RELATIVE PERCENT: 72.5 % (ref 34–71.1)
PDW BLD-RTO: 14.9 % (ref 11.7–14.4)
PLATELET # BLD: 191 K/UL (ref 182–369)
PMV BLD AUTO: 10.1 FL (ref 7.4–10.4)
POTASSIUM SERPL-SCNC: 4.6 MMOL/L (ref 3.5–5.1)
RBC # BLD: 3.64 M/UL (ref 3.93–5.22)
SODIUM BLD-SCNC: 139 MMOL/L (ref 137–145)
TOTAL PROTEIN: 6.2 G/DL (ref 6.3–8.2)
WBC # BLD: 10.19 K/UL (ref 3.98–10.04)

## 2020-06-03 PROCEDURE — 96417 CHEMO IV INFUS EACH ADDL SEQ: CPT

## 2020-06-03 PROCEDURE — 1123F ACP DISCUSS/DSCN MKR DOCD: CPT | Performed by: INTERNAL MEDICINE

## 2020-06-03 PROCEDURE — 96367 TX/PROPH/DG ADDL SEQ IV INF: CPT

## 2020-06-03 PROCEDURE — 4040F PNEUMOC VAC/ADMIN/RCVD: CPT | Performed by: INTERNAL MEDICINE

## 2020-06-03 PROCEDURE — 96375 TX/PRO/DX INJ NEW DRUG ADDON: CPT

## 2020-06-03 PROCEDURE — 80053 COMPREHEN METABOLIC PANEL: CPT

## 2020-06-03 PROCEDURE — 85025 COMPLETE CBC W/AUTO DIFF WBC: CPT

## 2020-06-03 PROCEDURE — 1090F PRES/ABSN URINE INCON ASSESS: CPT | Performed by: INTERNAL MEDICINE

## 2020-06-03 PROCEDURE — G8417 CALC BMI ABV UP PARAM F/U: HCPCS | Performed by: INTERNAL MEDICINE

## 2020-06-03 PROCEDURE — G8427 DOCREV CUR MEDS BY ELIG CLIN: HCPCS | Performed by: INTERNAL MEDICINE

## 2020-06-03 PROCEDURE — 3017F COLORECTAL CA SCREEN DOC REV: CPT | Performed by: INTERNAL MEDICINE

## 2020-06-03 PROCEDURE — 1036F TOBACCO NON-USER: CPT | Performed by: INTERNAL MEDICINE

## 2020-06-03 PROCEDURE — 99214 OFFICE O/P EST MOD 30 MIN: CPT | Performed by: INTERNAL MEDICINE

## 2020-06-03 PROCEDURE — 2580000003 HC RX 258: Performed by: INTERNAL MEDICINE

## 2020-06-03 PROCEDURE — 6360000002 HC RX W HCPCS: Performed by: INTERNAL MEDICINE

## 2020-06-03 PROCEDURE — G8400 PT W/DXA NO RESULTS DOC: HCPCS | Performed by: INTERNAL MEDICINE

## 2020-06-03 PROCEDURE — 96413 CHEMO IV INFUSION 1 HR: CPT

## 2020-06-03 RX ORDER — HEPARIN SODIUM (PORCINE) LOCK FLUSH IV SOLN 100 UNIT/ML 100 UNIT/ML
500 SOLUTION INTRAVENOUS PRN
Status: DISCONTINUED | OUTPATIENT
Start: 2020-06-03 | End: 2020-06-04 | Stop reason: HOSPADM

## 2020-06-03 RX ORDER — SODIUM CHLORIDE 9 MG/ML
INJECTION, SOLUTION INTRAVENOUS CONTINUOUS
Status: CANCELLED | OUTPATIENT
Start: 2020-06-03

## 2020-06-03 RX ORDER — EPINEPHRINE 1 MG/ML
0.3 INJECTION, SOLUTION, CONCENTRATE INTRAVENOUS PRN
Status: CANCELLED | OUTPATIENT
Start: 2020-06-03

## 2020-06-03 RX ORDER — DIPHENHYDRAMINE HYDROCHLORIDE 50 MG/ML
50 INJECTION INTRAMUSCULAR; INTRAVENOUS ONCE
Status: CANCELLED | OUTPATIENT
Start: 2020-06-03

## 2020-06-03 RX ORDER — SODIUM CHLORIDE 0.9 % (FLUSH) 0.9 %
10 SYRINGE (ML) INJECTION PRN
Status: DISCONTINUED | OUTPATIENT
Start: 2020-06-03 | End: 2020-06-04 | Stop reason: HOSPADM

## 2020-06-03 RX ORDER — SODIUM CHLORIDE 0.9 % (FLUSH) 0.9 %
5 SYRINGE (ML) INJECTION PRN
Status: CANCELLED | OUTPATIENT
Start: 2020-06-03

## 2020-06-03 RX ORDER — SODIUM CHLORIDE 0.9 % (FLUSH) 0.9 %
10 SYRINGE (ML) INJECTION PRN
Status: CANCELLED | OUTPATIENT
Start: 2020-06-03

## 2020-06-03 RX ORDER — OMEPRAZOLE 20 MG/1
20 CAPSULE, DELAYED RELEASE ORAL DAILY
Qty: 60 CAPSULE | Refills: 2 | Status: SHIPPED
Start: 2020-06-03 | End: 2020-09-09 | Stop reason: CLARIF

## 2020-06-03 RX ORDER — HEPARIN SODIUM (PORCINE) LOCK FLUSH IV SOLN 100 UNIT/ML 100 UNIT/ML
500 SOLUTION INTRAVENOUS PRN
Status: CANCELLED | OUTPATIENT
Start: 2020-06-03

## 2020-06-03 RX ORDER — METHYLPREDNISOLONE SODIUM SUCCINATE 125 MG/2ML
125 INJECTION, POWDER, LYOPHILIZED, FOR SOLUTION INTRAMUSCULAR; INTRAVENOUS ONCE
Status: CANCELLED | OUTPATIENT
Start: 2020-06-03

## 2020-06-03 RX ORDER — PALONOSETRON 0.05 MG/ML
0.25 INJECTION, SOLUTION INTRAVENOUS ONCE
Status: COMPLETED | OUTPATIENT
Start: 2020-06-03 | End: 2020-06-03

## 2020-06-03 RX ORDER — 0.9 % SODIUM CHLORIDE 0.9 %
1000 INTRAVENOUS SOLUTION INTRAVENOUS ONCE
Status: COMPLETED | OUTPATIENT
Start: 2020-06-03 | End: 2020-06-03

## 2020-06-03 RX ORDER — SODIUM CHLORIDE 9 MG/ML
20 INJECTION, SOLUTION INTRAVENOUS ONCE
Status: CANCELLED | OUTPATIENT
Start: 2020-06-03

## 2020-06-03 RX ORDER — DEXAMETHASONE SODIUM PHOSPHATE 10 MG/ML
10 INJECTION, SOLUTION INTRAMUSCULAR; INTRAVENOUS ONCE
Status: COMPLETED | OUTPATIENT
Start: 2020-06-03 | End: 2020-06-03

## 2020-06-03 RX ADMIN — HEPARIN 500 UNITS: 100 SYRINGE at 12:11

## 2020-06-03 RX ADMIN — SODIUM CHLORIDE 116 MG: 900 INJECTION, SOLUTION INTRAVENOUS at 10:35

## 2020-06-03 RX ADMIN — DEXAMETHASONE SODIUM PHOSPHATE 10 MG: 10 INJECTION, SOLUTION INTRAMUSCULAR; INTRAVENOUS at 09:28

## 2020-06-03 RX ADMIN — SODIUM CHLORIDE 1000 ML: 9 INJECTION, SOLUTION INTRAVENOUS at 09:28

## 2020-06-03 RX ADMIN — PALONOSETRON HYDROCHLORIDE 0.25 MG: 0.05 INJECTION, SOLUTION INTRAVENOUS at 09:28

## 2020-06-03 RX ADMIN — SODIUM CHLORIDE 150 MG: 900 INJECTION, SOLUTION INTRAVENOUS at 09:33

## 2020-06-03 RX ADMIN — Medication 10 ML: at 12:11

## 2020-06-03 RX ADMIN — CYCLOPHOSPHAMIDE 1160 MG: 1 INJECTION, POWDER, FOR SOLUTION INTRAVENOUS; ORAL at 11:37

## 2020-06-04 ENCOUNTER — HOSPITAL ENCOUNTER (OUTPATIENT)
Dept: INFUSION THERAPY | Age: 70
Discharge: HOME | End: 2020-06-04
Payer: COMMERCIAL

## 2020-06-04 ENCOUNTER — OFFICE VISIT (OUTPATIENT)
Dept: INTERNAL MEDICINE | Age: 70
End: 2020-06-04
Payer: COMMERCIAL

## 2020-06-04 VITALS
WEIGHT: 185 LBS | BODY MASS INDEX: 31.58 KG/M2 | HEART RATE: 92 BPM | OXYGEN SATURATION: 98 % | SYSTOLIC BLOOD PRESSURE: 138 MMHG | DIASTOLIC BLOOD PRESSURE: 54 MMHG | HEIGHT: 64 IN

## 2020-06-04 DIAGNOSIS — E78.00 PURE HYPERCHOLESTEROLEMIA: ICD-10-CM

## 2020-06-04 DIAGNOSIS — Z17.0 MALIGNANT NEOPLASM OF BREAST IN FEMALE, ESTROGEN RECEPTOR POSITIVE, UNSPECIFIED LATERALITY, UNSPECIFIED SITE OF BREAST (HCC): ICD-10-CM

## 2020-06-04 DIAGNOSIS — E66.09 EXOGENOUS OBESITY: ICD-10-CM

## 2020-06-04 DIAGNOSIS — T45.1X5A ADVERSE EFFECT OF ANTINEOPLASTIC AND IMMUNOSUPPRESSIVE DRUGS, INITIAL ENCOUNTER: Primary | ICD-10-CM

## 2020-06-04 DIAGNOSIS — Z00.00 ANNUAL PHYSICAL EXAM: ICD-10-CM

## 2020-06-04 DIAGNOSIS — C50.911 MALIGNANT NEOPLASM OF RIGHT BREAST IN FEMALE, ESTROGEN RECEPTOR NEGATIVE, UNSPECIFIED SITE OF BREAST (HCC): ICD-10-CM

## 2020-06-04 DIAGNOSIS — M85.862 OSTEOPENIA OF LEFT LOWER LEG: ICD-10-CM

## 2020-06-04 DIAGNOSIS — E11.9 TYPE 2 DIABETES MELLITUS WITHOUT COMPLICATION, WITHOUT LONG-TERM CURRENT USE OF INSULIN (HCC): ICD-10-CM

## 2020-06-04 DIAGNOSIS — E55.9 VITAMIN D DEFICIENCY: ICD-10-CM

## 2020-06-04 DIAGNOSIS — C50.919 MALIGNANT NEOPLASM OF BREAST IN FEMALE, ESTROGEN RECEPTOR POSITIVE, UNSPECIFIED LATERALITY, UNSPECIFIED SITE OF BREAST (HCC): ICD-10-CM

## 2020-06-04 DIAGNOSIS — F41.1 GENERALIZED ANXIETY DISORDER: ICD-10-CM

## 2020-06-04 DIAGNOSIS — Z17.1 MALIGNANT NEOPLASM OF RIGHT BREAST IN FEMALE, ESTROGEN RECEPTOR NEGATIVE, UNSPECIFIED SITE OF BREAST (HCC): ICD-10-CM

## 2020-06-04 LAB
BILIRUBIN URINE: NEGATIVE
BLOOD, URINE: NEGATIVE
CLARITY: ABNORMAL
COLOR: YELLOW
CREATININE URINE: 89.7 MG/DL (ref 4.2–622)
GLUCOSE URINE: >=1000 MG/DL
HBA1C MFR BLD: 6.7 %
KETONES, URINE: NEGATIVE MG/DL
LEUKOCYTE ESTERASE, URINE: NEGATIVE
MICROALBUMIN UR-MCNC: <1.2 MG/DL (ref 0–19)
MICROALBUMIN/CREAT UR-RTO: NORMAL MG/G
NITRITE, URINE: NEGATIVE
PH UA: 5.5 (ref 5–8)
PROTEIN UA: NEGATIVE MG/DL
SPECIFIC GRAVITY UA: 1.03 (ref 1–1.03)
TSH SERPL DL<=0.05 MIU/L-ACNC: 0.17 UIU/ML (ref 0.27–4.2)
UROBILINOGEN, URINE: 0.2 E.U./DL
VITAMIN D 25-HYDROXY: 42.8 NG/ML

## 2020-06-04 PROCEDURE — 83036 HEMOGLOBIN GLYCOSYLATED A1C: CPT | Performed by: INTERNAL MEDICINE

## 2020-06-04 PROCEDURE — 6360000002 HC RX W HCPCS: Performed by: INTERNAL MEDICINE

## 2020-06-04 PROCEDURE — 96372 THER/PROPH/DIAG INJ SC/IM: CPT

## 2020-06-04 PROCEDURE — 99397 PER PM REEVAL EST PAT 65+ YR: CPT | Performed by: INTERNAL MEDICINE

## 2020-06-04 RX ADMIN — PEGFILGRASTIM-CBQV 6 MG: 6 INJECTION, SOLUTION SUBCUTANEOUS at 09:45

## 2020-06-04 ASSESSMENT — ENCOUNTER SYMPTOMS
WHEEZING: 0
TROUBLE SWALLOWING: 0
EYE REDNESS: 0
BLOOD IN STOOL: 0
ABDOMINAL PAIN: 0
SORE THROAT: 0
SINUS PRESSURE: 0
NAUSEA: 0
COUGH: 0
VOICE CHANGE: 0
CHEST TIGHTNESS: 0
COLOR CHANGE: 0
DIARRHEA: 0
EYE PAIN: 0
CONSTIPATION: 0
VOMITING: 0

## 2020-06-04 ASSESSMENT — PATIENT HEALTH QUESTIONNAIRE - PHQ9
1. LITTLE INTEREST OR PLEASURE IN DOING THINGS: 0
2. FEELING DOWN, DEPRESSED OR HOPELESS: 0
SUM OF ALL RESPONSES TO PHQ QUESTIONS 1-9: 0
SUM OF ALL RESPONSES TO PHQ QUESTIONS 1-9: 0
SUM OF ALL RESPONSES TO PHQ9 QUESTIONS 1 & 2: 0

## 2020-06-04 NOTE — PROGRESS NOTES
Chief Complaint:   Raul Rodriguez is a 81 y.o. female who presents forcomplete physical exam.    History of Present Illness:      Raul Rodriguez is a 81 y.o. female who presents todayfor wellness visit AND follow up on her chronic medical conditions as noted below.     Diagnosis of breast cancer in March 2020  Currently going through chemotherapy with Dr. Chelly Goddard    Type 2 diabetes mellitus without complication, without long-term current use of insulin ([address]-   Taking Ozempic 0.5 mg weekly and metformin     Vitamin D deficiency- takes vitamin D supplement 2000 every day      Exogenous obesity- has gained weight since has been getting steroids for chemotherapy     Anxiety/ stress- he feels that Lexapro is controlling her symptoms well    Patient Active Problem List    Diagnosis Date Noted    Adverse effect of antineoplastic and immunosuppressive drugs, initial encounter      Malignant neoplasm of right breast in female, estrogen receptor negative ([address] 04/17/2020    Microhematuria 06/07/2019    Bilateral low back pain without sciatica 05/04/2018    Osteopenia of left lower leg 08/16/2017 12/16 lumbar spine normal, left hip neck -1.7      Type 2 diabetes mellitus without complication, without long-term current use of insulin ([address] 08/12/2017    Vitamin D deficiency 08/12/2017    Pure hypercholesterolemia 08/12/2017    GERD (gastroesophageal reflux disease) 08/12/2017    Exogenous obesity 08/12/2017    Generalized anxiety disorder 08/12/2017       Past Medical History:   Diagnosis Date    Anxiety     Cancer [facility])     Breast Cancer    Carpal tunnel syndrome     Cervicalgia     Depression     Esophagitis     Hyperlipidemia     Idiopathic peripheral neuropathy     Insomnia     Menopause     Obesity due to excess calories     Osteoarthritis     Osteopenia     Type 2 diabetes mellitus without complication ([address]     Vitamin D deficiency        Past Surgical History:   Procedure Laterality Date    CHOLECYSTECTOMY      COLONOSCOPY      HERNIA REPAIR      LOBECTOMY Right 5/3/2016    THORACOTOMY, WEDGE RESECTION PULMONARY NODULE RIGHT MIDDLE LOBE performed by Danny Tapia MD at [address] N/A 4/22/2020    PORT INSERTION WITH FLUORO performed by Robel Landers MD at [address]         Current Outpatient Medications   Medication Sig Dispense Refill    omeprazole (PRILOSEC) 20 MG delayed release capsule Take 1 capsule by mouth Daily 60 capsule 2    HYDROcodone-acetaminophen (NORCO) 5-325 MG per tablet Take 1 tablet by mouth every 6 hours as needed for Pain. 10 tablet 0    escitalopram (LEXAPRO) 10 MG tablet Take 10 mg by mouth daily      lovastatin (ALTOPREV) 20 MG extended release tablet Take 20 mg by mouth nightly      OLANZapine (ZYPREXA) 10 MG tablet Take 1 tablet by mouth nightly Start the night of Chemo and take for 4 nights. Do this with each Chemo. 16 tablet 0    ondansetron (ZOFRAN) 4 MG tablet Take 1 tablet by mouth 3 times daily as needed for Nausea or Vomiting 40 tablet 0    Semaglutide 0.25 or 0.5 MG/DOSE SOPN Inject 0.25 mg into the skin once a week (Patient taking differently: Inject 0.5 mg into the skin once a week ) 3 pen 3    aspirin 81 MG tablet Take 81 mg by mouth daily      Cholecalciferol (VITAMIN D) 2000 units CAPS capsule Take by mouth daily        No current facility-administered medications for this visit.       Allergies   Allergen Reactions    Codeine Itching    Other Rash     Chloroprep surgical prep       Social History     Socioeconomic History    Marital status:      Spouse name: None    Number of children: None    Years of education: None    Highest education level: None   Occupational History    None   Social Needs    Financial resource strain: None    Food insecurity     Worry: None     Inability: None    Transportation needs     Medical: None     Non-medical: None   Tobacco Use    Smoking status: Former Smoker     Packs/day: 1.00     Years: 3.00     Pack years: 3.00     Types: Cigarettes     Last attempt to quit: 10/1/1984     Years since quittin.7    Smokeless tobacco: Never Used    Tobacco comment: 3 YEARS PLAYED AROUND AND QUIT IN THE    Substance and Sexual Activity    Alcohol use: No     Alcohol/week: 0.0 standard drinks    Drug use: No    Sexual activity: None   Lifestyle    Physical activity     Days per week: None     Minutes per session: None    Stress: None   Relationships    Social connections     Talks on phone: None     Gets together: None     Attends Druze service: None     Active member of club or organization: None     Attends meetings of clubs or organizations: None     Relationship status: None    Intimate partner violence     Fear of current or ex partner: None     Emotionally abused: None     Physically abused: None     Forced sexual activity: None   Other Topics Concern    None   Social History Narrative    None     Family History   Problem Relation Age of Onset    Cancer Father         bladder ca    Other Sister         MULTIPLE SCLEROSIS    High Blood Pressure Mother     Heart Disease Mother     Stroke Mother           Past Surgical History:   Procedure Laterality Date    CHOLECYSTECTOMY      COLONOSCOPY      HERNIA REPAIR      LOBECTOMY Right 5/3/2016    THORACOTOMY, WEDGE RESECTION PULMONARY NODULE RIGHT MIDDLE LOBE performed by Chris Hillman MD at [address] [place] [address] N/A 2020    PORT INSERTION WITH FLUORO performed by Sharyle Spiro, MD at [address] [place] [address] Outpatient Visit on 2020   Component Date Value    WBC 2020 10.19*    RBC 2020 3.64*    Hemoglobin 2020 11.5     Hematocrit 2020 34.2     MCV 2020 94.0     MCH 2020 31.6     MCHC 2020 33.6     RDW 2020 14.9*    Platelets  191     MPV 06/03/2020 10.1     Neutrophils % 06/03/2020 72.5*    Lymphocytes % 06/03/2020 15.0*    Monocytes % 06/03/2020 10.5     Eosinophils % 06/03/2020 1.5     Basophils % 06/03/2020 0.5     Neutrophils Absolute 06/03/2020 7.39*    Lymphocytes Absolute 06/03/2020 1.53     Monocytes Absolute 06/03/2020 1.07*    Eosinophils Absolute 06/03/2020 0.15     Basophils Absolute 06/03/2020 0.05     Sodium 06/03/2020 139     Potassium 06/03/2020 4.6     Chloride 06/03/2020 105     CO2 06/03/2020 23     Anion Gap 06/03/2020 11     Glucose 06/03/2020 160*    BUN 06/03/2020 19*    CREATININE 06/03/2020 0.6     GFR Non- 06/03/2020 >60     Calcium 06/03/2020 9.0     Total Protein 06/03/2020 6.2*    Alb 06/03/2020 4.2     Total Bilirubin 06/03/2020 <0.2     Alkaline Phosphatase 06/03/2020 130*    ALT 06/03/2020 30     AST 06/03/2020 24     Globulin 06/03/2020 2.0    Hospital Outpatient Visit on 05/20/2020   Component Date Value    Sodium 05/20/2020 138     Potassium 05/20/2020 4.3     Chloride 05/20/2020 104     CO2 05/20/2020 27     Anion Gap 05/20/2020 7     Glucose 05/20/2020 141*    BUN 05/20/2020 19*    CREATININE 05/20/2020 0.6     GFR Non- 05/20/2020 >60     Calcium 05/20/2020 8.7     Total Protein 05/20/2020 6.3     Alb 05/20/2020 4.0     Total Bilirubin 05/20/2020 0.3     Alkaline Phosphatase 05/20/2020 126*    ALT 05/20/2020 37     AST 05/20/2020 35     Globulin 05/20/2020 2.3     WBC 05/20/2020 7.59     RBC 05/20/2020 3.59*    Hemoglobin 05/20/2020 11.3     Hematocrit 05/20/2020 33.5*    MCV 05/20/2020 93.3     MCH 05/20/2020 31.5     MCHC 05/20/2020 33.7     RDW 05/20/2020 13.6     Platelets 43/82/7216 176*    MPV 05/20/2020 10.0     Neutrophils % 05/20/2020 62.9     Lymphocytes % 05/20/2020 22.8     Monocytes % 05/20/2020 10.4     Eosinophils % 05/20/2020 3.4     Basophils % 05/20/2020 0.5     Neutrophils Absolute 05/20/2020 4.77     Lymphocytes Absolute 05/20/2020 1.73     Monocytes Absolute 05/20/2020 0.79     Eosinophils Absolute 05/20/2020 0.26     Basophils Absolute 05/20/2020 0.04    Office Visit on 04/27/2020   Component Date Value    Report 04/27/2020 See report     SARS-CoV-2 04/27/2020 Not Detected     This Test Sent To 04/27/2020 [facility] Outpatient Visit on 04/20/2020   Component Date Value    P-R Interval 04/20/2020 148     QRS Duration 04/20/2020 86     Q-T Interval 04/20/2020 388     QTc Calculation ([facility]azett) 04/20/2020 410     P Axis 04/20/2020 38     T Axis 04/20/2020 51    Admission on 04/22/2020, Discharged on 04/22/2020   Component Date Value    POC Glucose 04/22/2020 122*    Performed on 04/22/2020 [facility]          Review of Systems   Constitutional: Negative for chills, fatigue and fever. HENT: Negative for congestion, ear pain, postnasal drip, sinus pressure, sore throat, trouble swallowing and voice change. Eyes: Negative for pain, redness and visual disturbance. Respiratory: Negative for cough, chest tightness and wheezing. Cardiovascular: Negative for chest pain, palpitations and leg swelling. Gastrointestinal: Negative for abdominal pain, blood in stool, constipation, diarrhea, nausea and vomiting. Endocrine: Negative for polydipsia and polyuria. Genitourinary: Negative for dysuria, enuresis, flank pain, frequency and urgency. Musculoskeletal: Negative for arthralgias, gait problem and joint swelling. Skin: Negative for color change and rash. Neurological: Negative for dizziness, tremors, syncope, facial asymmetry, speech difficulty, weakness, numbness and headaches. Psychiatric/Behavioral: Negative for agitation, behavioral problems, confusion, sleep disturbance and suicidal ideas. The patient is not nervous/anxious.            Vitals:    06/04/20 0729   BP: (!) 138/54   Pulse: 92   SpO2: 98%   Weight: 185 lb (83.9 kg)   Height: 5' 4\" (1.626 m)      Wt Readings from

## 2020-06-05 LAB — T4 FREE: 1.07 NG/DL (ref 0.93–1.7)

## 2020-06-15 RX ORDER — LOVASTATIN 20 MG/1
TABLET ORAL
Qty: 180 TABLET | Refills: 0 | Status: SHIPPED | OUTPATIENT
Start: 2020-06-15 | End: 2020-09-16

## 2020-06-17 ENCOUNTER — OFFICE VISIT (OUTPATIENT)
Dept: HEMATOLOGY | Age: 70
End: 2020-06-17
Payer: COMMERCIAL

## 2020-06-17 ENCOUNTER — HOSPITAL ENCOUNTER (OUTPATIENT)
Dept: INFUSION THERAPY | Age: 70
Discharge: HOME | End: 2020-06-17
Payer: COMMERCIAL

## 2020-06-17 VITALS
BODY MASS INDEX: 31.24 KG/M2 | TEMPERATURE: 98.3 F | HEIGHT: 64 IN | WEIGHT: 183 LBS | SYSTOLIC BLOOD PRESSURE: 140 MMHG | DIASTOLIC BLOOD PRESSURE: 76 MMHG | HEART RATE: 89 BPM | OXYGEN SATURATION: 96 %

## 2020-06-17 DIAGNOSIS — C50.911 MALIGNANT NEOPLASM OF RIGHT BREAST IN FEMALE, ESTROGEN RECEPTOR NEGATIVE, UNSPECIFIED SITE OF BREAST (HCC): ICD-10-CM

## 2020-06-17 DIAGNOSIS — C50.919 MALIGNANT NEOPLASM OF BREAST IN FEMALE, ESTROGEN RECEPTOR POSITIVE, UNSPECIFIED LATERALITY, UNSPECIFIED SITE OF BREAST (HCC): Primary | ICD-10-CM

## 2020-06-17 DIAGNOSIS — Z17.0 MALIGNANT NEOPLASM OF BREAST IN FEMALE, ESTROGEN RECEPTOR POSITIVE, UNSPECIFIED LATERALITY, UNSPECIFIED SITE OF BREAST (HCC): Primary | ICD-10-CM

## 2020-06-17 DIAGNOSIS — T45.1X5A ADVERSE EFFECT OF ANTINEOPLASTIC AND IMMUNOSUPPRESSIVE DRUGS, INITIAL ENCOUNTER: ICD-10-CM

## 2020-06-17 DIAGNOSIS — Z17.1 MALIGNANT NEOPLASM OF RIGHT BREAST IN FEMALE, ESTROGEN RECEPTOR NEGATIVE, UNSPECIFIED SITE OF BREAST (HCC): ICD-10-CM

## 2020-06-17 LAB
ALBUMIN SERPL-MCNC: 4.2 G/DL (ref 3.5–5.2)
ALP BLD-CCNC: 131 U/L (ref 35–104)
ALT SERPL-CCNC: 29 U/L (ref 9–52)
ANION GAP SERPL CALCULATED.3IONS-SCNC: 6 MMOL/L (ref 7–19)
AST SERPL-CCNC: 25 U/L (ref 14–36)
BASOPHILS ABSOLUTE: 0.07 K/UL (ref 0.01–0.08)
BASOPHILS RELATIVE PERCENT: 0.8 % (ref 0.1–1.2)
BILIRUB SERPL-MCNC: 0.3 MG/DL (ref 0.2–1.3)
BUN BLDV-MCNC: 12 MG/DL (ref 7–17)
CALCIUM SERPL-MCNC: 8.6 MG/DL (ref 8.4–10.2)
CHLORIDE BLD-SCNC: 104 MMOL/L (ref 98–111)
CO2: 28 MMOL/L (ref 22–29)
CREAT SERPL-MCNC: 0.5 MG/DL (ref 0.5–1)
EOSINOPHILS ABSOLUTE: 0.16 K/UL (ref 0.04–0.54)
EOSINOPHILS RELATIVE PERCENT: 1.8 % (ref 0.7–7)
GFR NON-AFRICAN AMERICAN: >60
GLOBULIN: 2.3 G/DL
GLUCOSE BLD-MCNC: 99 MG/DL (ref 74–106)
HCT VFR BLD CALC: 31.7 % (ref 34.1–44.9)
HEMOGLOBIN: 10.5 G/DL (ref 11.2–15.7)
LYMPHOCYTES ABSOLUTE: 1.24 K/UL (ref 1.18–3.74)
LYMPHOCYTES RELATIVE PERCENT: 13.6 % (ref 19.3–53.1)
MCH RBC QN AUTO: 31.3 PG (ref 25.6–32.2)
MCHC RBC AUTO-ENTMCNC: 33.1 G/DL (ref 32.3–35.5)
MCV RBC AUTO: 94.6 FL (ref 79.4–94.8)
MONOCYTES ABSOLUTE: 1.39 K/UL (ref 0.24–0.82)
MONOCYTES RELATIVE PERCENT: 15.2 % (ref 4.7–12.5)
NEUTROPHILS ABSOLUTE: 6.28 K/UL (ref 1.56–6.13)
NEUTROPHILS RELATIVE PERCENT: 68.6 % (ref 34–71.1)
PDW BLD-RTO: 16.4 % (ref 11.7–14.4)
PLATELET # BLD: 209 K/UL (ref 182–369)
PMV BLD AUTO: 9.4 FL (ref 7.4–10.4)
POTASSIUM SERPL-SCNC: 4.4 MMOL/L (ref 3.5–5.1)
RBC # BLD: 3.35 M/UL (ref 3.93–5.22)
SODIUM BLD-SCNC: 138 MMOL/L (ref 137–145)
TOTAL PROTEIN: 6.5 G/DL (ref 6.3–8.2)
WBC # BLD: 9.14 K/UL (ref 3.98–10.04)

## 2020-06-17 PROCEDURE — 6360000002 HC RX W HCPCS: Performed by: INTERNAL MEDICINE

## 2020-06-17 PROCEDURE — 80053 COMPREHEN METABOLIC PANEL: CPT

## 2020-06-17 PROCEDURE — 96413 CHEMO IV INFUSION 1 HR: CPT

## 2020-06-17 PROCEDURE — 3017F COLORECTAL CA SCREEN DOC REV: CPT | Performed by: INTERNAL MEDICINE

## 2020-06-17 PROCEDURE — G8417 CALC BMI ABV UP PARAM F/U: HCPCS | Performed by: INTERNAL MEDICINE

## 2020-06-17 PROCEDURE — 2580000003 HC RX 258: Performed by: INTERNAL MEDICINE

## 2020-06-17 PROCEDURE — 96375 TX/PRO/DX INJ NEW DRUG ADDON: CPT

## 2020-06-17 PROCEDURE — 96367 TX/PROPH/DG ADDL SEQ IV INF: CPT

## 2020-06-17 PROCEDURE — 1123F ACP DISCUSS/DSCN MKR DOCD: CPT | Performed by: INTERNAL MEDICINE

## 2020-06-17 PROCEDURE — G8399 PT W/DXA RESULTS DOCUMENT: HCPCS | Performed by: INTERNAL MEDICINE

## 2020-06-17 PROCEDURE — 85025 COMPLETE CBC W/AUTO DIFF WBC: CPT

## 2020-06-17 PROCEDURE — 1090F PRES/ABSN URINE INCON ASSESS: CPT | Performed by: INTERNAL MEDICINE

## 2020-06-17 PROCEDURE — 1036F TOBACCO NON-USER: CPT | Performed by: INTERNAL MEDICINE

## 2020-06-17 PROCEDURE — 99214 OFFICE O/P EST MOD 30 MIN: CPT | Performed by: INTERNAL MEDICINE

## 2020-06-17 PROCEDURE — G8427 DOCREV CUR MEDS BY ELIG CLIN: HCPCS | Performed by: INTERNAL MEDICINE

## 2020-06-17 PROCEDURE — 4040F PNEUMOC VAC/ADMIN/RCVD: CPT | Performed by: INTERNAL MEDICINE

## 2020-06-17 PROCEDURE — 96417 CHEMO IV INFUS EACH ADDL SEQ: CPT

## 2020-06-17 RX ORDER — SODIUM CHLORIDE 9 MG/ML
INJECTION, SOLUTION INTRAVENOUS CONTINUOUS
Status: CANCELLED | OUTPATIENT
Start: 2020-06-17

## 2020-06-17 RX ORDER — HEPARIN SODIUM (PORCINE) LOCK FLUSH IV SOLN 100 UNIT/ML 100 UNIT/ML
500 SOLUTION INTRAVENOUS PRN
Status: DISCONTINUED | OUTPATIENT
Start: 2020-06-17 | End: 2020-06-18 | Stop reason: HOSPADM

## 2020-06-17 RX ORDER — DIPHENHYDRAMINE HYDROCHLORIDE 50 MG/ML
50 INJECTION INTRAMUSCULAR; INTRAVENOUS ONCE
Status: CANCELLED | OUTPATIENT
Start: 2020-06-17

## 2020-06-17 RX ORDER — SODIUM CHLORIDE 9 MG/ML
20 INJECTION, SOLUTION INTRAVENOUS ONCE
Status: CANCELLED | OUTPATIENT
Start: 2020-06-17

## 2020-06-17 RX ORDER — EPINEPHRINE 1 MG/ML
0.3 INJECTION, SOLUTION, CONCENTRATE INTRAVENOUS PRN
Status: CANCELLED | OUTPATIENT
Start: 2020-06-17

## 2020-06-17 RX ORDER — DEXAMETHASONE 4 MG/1
TABLET ORAL
Qty: 40 TABLET | Refills: 0 | Status: SHIPPED
Start: 2020-06-17 | End: 2020-09-09 | Stop reason: CLARIF

## 2020-06-17 RX ORDER — HEPARIN SODIUM (PORCINE) LOCK FLUSH IV SOLN 100 UNIT/ML 100 UNIT/ML
500 SOLUTION INTRAVENOUS PRN
Status: CANCELLED | OUTPATIENT
Start: 2020-06-17

## 2020-06-17 RX ORDER — 0.9 % SODIUM CHLORIDE 0.9 %
1000 INTRAVENOUS SOLUTION INTRAVENOUS ONCE
Status: COMPLETED | OUTPATIENT
Start: 2020-06-17 | End: 2020-06-17

## 2020-06-17 RX ORDER — DEXAMETHASONE SODIUM PHOSPHATE 10 MG/ML
10 INJECTION, SOLUTION INTRAMUSCULAR; INTRAVENOUS ONCE
Status: COMPLETED | OUTPATIENT
Start: 2020-06-17 | End: 2020-06-17

## 2020-06-17 RX ORDER — SODIUM CHLORIDE 0.9 % (FLUSH) 0.9 %
10 SYRINGE (ML) INJECTION PRN
Status: DISCONTINUED | OUTPATIENT
Start: 2020-06-17 | End: 2020-06-18 | Stop reason: HOSPADM

## 2020-06-17 RX ORDER — PALONOSETRON 0.05 MG/ML
0.25 INJECTION, SOLUTION INTRAVENOUS ONCE
Status: COMPLETED | OUTPATIENT
Start: 2020-06-17 | End: 2020-06-17

## 2020-06-17 RX ORDER — SODIUM CHLORIDE 0.9 % (FLUSH) 0.9 %
10 SYRINGE (ML) INJECTION PRN
Status: CANCELLED | OUTPATIENT
Start: 2020-06-17

## 2020-06-17 RX ORDER — SODIUM CHLORIDE 0.9 % (FLUSH) 0.9 %
5 SYRINGE (ML) INJECTION PRN
Status: CANCELLED | OUTPATIENT
Start: 2020-06-17

## 2020-06-17 RX ORDER — PROMETHAZINE HYDROCHLORIDE 12.5 MG/1
12.5 TABLET ORAL EVERY 6 HOURS PRN
Qty: 40 TABLET | Refills: 1 | Status: SHIPPED
Start: 2020-06-17 | End: 2020-09-09 | Stop reason: CLARIF

## 2020-06-17 RX ORDER — METHYLPREDNISOLONE SODIUM SUCCINATE 125 MG/2ML
125 INJECTION, POWDER, LYOPHILIZED, FOR SOLUTION INTRAMUSCULAR; INTRAVENOUS ONCE
Status: CANCELLED | OUTPATIENT
Start: 2020-06-17

## 2020-06-17 RX ADMIN — DOXORUBICIN HYDROCHLORIDE 116 MG: 2 INJECTION, SOLUTION INTRAVENOUS at 11:12

## 2020-06-17 RX ADMIN — PALONOSETRON 0.25 MG: 0.05 INJECTION, SOLUTION INTRAVENOUS at 10:08

## 2020-06-17 RX ADMIN — CYCLOPHOSPHAMIDE 1160 MG: 1 INJECTION, POWDER, FOR SOLUTION INTRAVENOUS; ORAL at 12:15

## 2020-06-17 RX ADMIN — SODIUM CHLORIDE 500 ML: 9 INJECTION, SOLUTION INTRAVENOUS at 10:06

## 2020-06-17 RX ADMIN — SODIUM CHLORIDE, PRESERVATIVE FREE 10 ML: 5 INJECTION INTRAVENOUS at 12:49

## 2020-06-17 RX ADMIN — HEPARIN 500 UNITS: 100 SYRINGE at 12:49

## 2020-06-17 RX ADMIN — SODIUM CHLORIDE 150 MG: 900 INJECTION, SOLUTION INTRAVENOUS at 10:22

## 2020-06-17 RX ADMIN — DEXAMETHASONE SODIUM PHOSPHATE 10 MG: 10 INJECTION, SOLUTION INTRAMUSCULAR; INTRAVENOUS at 10:08

## 2020-06-18 ENCOUNTER — HOSPITAL ENCOUNTER (OUTPATIENT)
Dept: INFUSION THERAPY | Age: 70
End: 2020-06-18
Payer: COMMERCIAL

## 2020-06-18 ENCOUNTER — HOSPITAL ENCOUNTER (OUTPATIENT)
Dept: INFUSION THERAPY | Age: 70
Discharge: HOME | End: 2020-06-18
Payer: COMMERCIAL

## 2020-06-18 DIAGNOSIS — C50.911 MALIGNANT NEOPLASM OF RIGHT BREAST IN FEMALE, ESTROGEN RECEPTOR NEGATIVE, UNSPECIFIED SITE OF BREAST (HCC): ICD-10-CM

## 2020-06-18 DIAGNOSIS — T45.1X5A ADVERSE EFFECT OF ANTINEOPLASTIC AND IMMUNOSUPPRESSIVE DRUGS, INITIAL ENCOUNTER: Primary | ICD-10-CM

## 2020-06-18 DIAGNOSIS — Z17.1 MALIGNANT NEOPLASM OF RIGHT BREAST IN FEMALE, ESTROGEN RECEPTOR NEGATIVE, UNSPECIFIED SITE OF BREAST (HCC): ICD-10-CM

## 2020-06-18 PROCEDURE — 96372 THER/PROPH/DIAG INJ SC/IM: CPT

## 2020-06-18 PROCEDURE — 6360000002 HC RX W HCPCS: Performed by: INTERNAL MEDICINE

## 2020-06-18 RX ADMIN — PEGFILGRASTIM-CBQV 6 MG: 6 INJECTION, SOLUTION SUBCUTANEOUS at 10:50

## 2020-06-30 NOTE — PROGRESS NOTES
MEDICAL ONCOLOGY PROGRESS NOTE    Pt Name: Brandy Celestin  MRN: [id number]  YOB: 1950  Date of evaluation: 7/1/2020    HISTORY OF PRESENT ILLNESS:      Diagnosis  · Invasive ductal carcinoma right breast, March 2020  · ER 4%, IL 0.1%, HER-2/paty IHC 0, Ki-67 86%  · [id number]  · Mammaprint High risk basal    Treatment summary   · 05/06/2020-ddAC with G-CSF support followed by dose dense weekly Taxol x12 weekly cycles. · Anticipated surgery  · Anticipated adjuvant radiation  · Anticipated adjuvant endocrine therapy    Patient has completed 4 cycles of dose dense AC. She is here today for cycle #1 of dose dense Taxol. She tolerated last cycle with complaints of fatigue and some mild nausea. She also had complaint of generalized bone pain and took Advil with improvement. She is also taking Claritin for that. She reports complete alopecia. Cancer history  Grace Nolasco was first seen by me on 4/16/2020 referred by Dr. Skip Donis for diagnosis of breast cancer. This was in a screening detected lesion. The patient has a remote history of hormone replacement. She has quit. She denies any family history of breast cancer. · 3/19/2020-core needle biopsy of suspicious right breast lesion consistent with invasive ductal carcinoma grade 3 measuring 0.7 cm. ER 4%, IL 0%, HER-2/paty IHC 0.  Ki-67 86%. MammaPrint high risk basal type. · 4/1/2020- bilateral breast MRI showed right breast lesion measuring 1.1 x 1.4 x 0.85 cm. Nonenlarged lymph nodes within the upper outer right breast.  · 4/16/2020- she was first seen by me. Recommend neoadjuvant chemotherapy with dose dense AC with G-CSF support followed by dose dense Taxol. · 4/23/2020 CT Chest Mild pulmonary scarring. Known right breast cancer. No metastatic disease evident. CT Abdomen Pelvis No acute pathology int the abdomen or pelvis. No evidence of metastatic disease. 2D Echo Normal with EF 60%.    · 6/9/2020 Bone Density Femur Neck T-Score--1.5. This patient is considered Osteopenic.         Past Medical History:    Past Medical History:   Diagnosis Date    Anxiety     Cancer [facility])     Breast Cancer    Carpal tunnel syndrome     Cervicalgia     Depression     Esophagitis     Hyperlipidemia     Idiopathic peripheral neuropathy     Insomnia     Menopause     Obesity due to excess calories     Osteoarthritis     Osteopenia     Type 2 diabetes mellitus without complication (HCC)     Vitamin D deficiency        Past Surgical History:    Past Surgical History:   Procedure Laterality Date    CHOLECYSTECTOMY      COLONOSCOPY      HERNIA REPAIR      LOBECTOMY Right 5/3/2016    THORACOTOMY, WEDGE RESECTION PULMONARY NODULE RIGHT MIDDLE LOBE performed by Soumya Lozano MD at [address] N/A 2020    PORT INSERTION WITH FLUORO performed by Ruben Montenegro MD at [facility]          Social History:    Social History     Socioeconomic History    Marital status:      Spouse name: Not on file    Number of children: Not on file    Years of education: Not on file    Highest education level: Not on file   Occupational History    Not on file   Social Needs    Financial resource strain: Not on file    Food insecurity     Worry: Not on file     Inability: Not on file    Transportation needs     Medical: Not on file     Non-medical: Not on file   Tobacco Use    Smoking status: Former Smoker     Packs/day: 1.00     Years: 3.00     Pack years: 3.00     Types: Cigarettes     Last attempt to quit: 10/1/1984     Years since quittin.7    Smokeless tobacco: Never Used    Tobacco comment: 3 YEARS PLAYED AROUND AND QUIT IN THE    Substance and Sexual Activity    Alcohol use: No     Alcohol/week: 0.0 standard drinks    Drug use: No    Sexual activity: Not on file   Lifestyle    Physical activity     Days per week: Not on file     Minutes per session: Not on file    Stress: Not on file   Relationships    Social connections     Talks on phone: Not on file     Gets together: Not on file     Attends Pent[employer]ostalism service: Not on file     Active member of club or organization: Not on file     Attends meetings of clubs or organizations: Not on file     Relationship status: Not on file    Intimate partner violence     Fear of current or ex partner: Not on file     Emotionally abused: Not on file     Physically abused: Not on file     Forced sexual activity: Not on file   Other Topics Concern    Not on file   Social History Narrative    Not on file       Family History:   Family History   Problem Relation Age of Onset    Cancer Father         bladder ca    Other Sister         MULTIPLE SCLEROSIS    High Blood Pressure Mother     Heart Disease Mother     Stroke Mother        Current Hospital Medications:    No current facility-administered medications for this visit. Facility-Administered Medications Ordered in Other Visits: pegfilgrastim (NEULASTA) on-body injector 6 mg, 6 mg, Subcutaneous, Once    Allergies: Allergies   Allergen Reactions    Codeine Itching    Other Rash     Chloroprep surgical prep         Subjective   REVIEW OF SYSTEMS:   Constitutional: no fever, no night sweats, fatigue;   HEENT: no blurring of vision, no double vision, no hearing difficulty, no tinnitus,no ulceration, no dysphagia  Lungs: no cough, no shortness of breath, no wheeze;   CVS: no palpitation, no chest pain, no shortness of breath;  GI: Acid reflux, no abdominal pain, persistent nausea , no vomiting, no constipation;   PARVEZ: no dysuria, frequency and urgency, no hematuria, no kidney stones;   Musculoskeletal: no joint pain, swelling , stiffness;   Endocrine: no polyuria, polydypsia, no cold or heat intolerence; Hematology/lymphatic: no easy brusing or bleeding, no hx of clotting disorder; no peripheral adenopathy.   Dermatology: no skin rash, no eczema, no pruritis;   Psychiatry: no depression, no anxiety,no panic attacks, no suicide ideation; Neurology: no syncope, no seizures, no numbness or tingling of hands, no numbness or tingling of feet, no paresis;       Objective   /76   Pulse 111   Temp 97.6 °F (36.4 °C)   Ht 5' 4\" (1.626 m)   Wt 185 lb (83.9 kg)   SpO2 96%   BMI 31.76 kg/m²     PHYSICAL EXAM:  CONSTITUTIONAL: Alert, appropriate, no acute distress,   EYES: Non icteric, EOM intact, pupils equal round and reactive to light and accommodation. ENT: Oral mucus membranes moist, no oral pharyngeal lesions. External inspection of ears and nose are normal.   NECK: Supple, no masses. No palpable thyroid mass    CHEST/LUNGS: CTA bilaterally, normal respiratory effort   CARDIOVASCULAR: RRR, no murmurs. No lower extremity edema   ABDOMEN: soft non-tender, active bowel sounds, no hepatosplenomegaly. No palpable masses. EXTREMITIES: warm, Full ROM of all fours extremities. No focal weakness. SKIN: warm, dry with no rashes or lesions  LYMPH: No cervical, clavicular, axillary, or inguinal lymphadenopathy  NEUROLOGIC: follows commands, non focal.   PSYCH: mood and affect appropriate. Alert and oriented to time and place and person. LABORATORY RESULTS REVIEWED BY ME:  CBC:7/1/2020  WBC-17.74  HGB-10.6  PLT-2000,000  Neut-16.33      RADIOLOGY STUDIES REVIEWED BY ME:  No results found. ASSESSMENT:  #Right breast IDC, triple negative (ER 4%), basal type  MammaPrint  The patient was counseled today about diagnosis, staging, prognosis, diagnostic tests, medications, side effects and disease management. The method of counseling included verbal explanation. The patient verbalized understanding. Essentially, triple negative lesion. Recommended neoadjuvant chemotherapy with dose dense AC followed by Taxol. Proceed with dose dense Taxol cycle #1    The patient has had encouraged to obtain cold glove/cold socks for prevention of neuropathy    #Diabetes type 2-  controlled.     #Therapy-induced

## 2020-07-01 ENCOUNTER — HOSPITAL ENCOUNTER (OUTPATIENT)
Dept: INFUSION THERAPY | Age: 70
Discharge: HOME | End: 2020-07-01
Payer: COMMERCIAL

## 2020-07-01 ENCOUNTER — OFFICE VISIT (OUTPATIENT)
Dept: HEMATOLOGY | Age: 70
End: 2020-07-01
Payer: COMMERCIAL

## 2020-07-01 VITALS
HEIGHT: 64 IN | HEART RATE: 111 BPM | WEIGHT: 185 LBS | OXYGEN SATURATION: 96 % | BODY MASS INDEX: 31.58 KG/M2 | SYSTOLIC BLOOD PRESSURE: 138 MMHG | TEMPERATURE: 97.6 F | DIASTOLIC BLOOD PRESSURE: 76 MMHG

## 2020-07-01 DIAGNOSIS — Z17.0 MALIGNANT NEOPLASM OF BREAST IN FEMALE, ESTROGEN RECEPTOR POSITIVE, UNSPECIFIED LATERALITY, UNSPECIFIED SITE OF BREAST (HCC): Primary | ICD-10-CM

## 2020-07-01 DIAGNOSIS — C50.919 MALIGNANT NEOPLASM OF BREAST IN FEMALE, ESTROGEN RECEPTOR POSITIVE, UNSPECIFIED LATERALITY, UNSPECIFIED SITE OF BREAST (HCC): Primary | ICD-10-CM

## 2020-07-01 DIAGNOSIS — Z17.1 MALIGNANT NEOPLASM OF RIGHT BREAST IN FEMALE, ESTROGEN RECEPTOR NEGATIVE, UNSPECIFIED SITE OF BREAST (HCC): ICD-10-CM

## 2020-07-01 DIAGNOSIS — C50.911 MALIGNANT NEOPLASM OF RIGHT BREAST IN FEMALE, ESTROGEN RECEPTOR NEGATIVE, UNSPECIFIED SITE OF BREAST (HCC): ICD-10-CM

## 2020-07-01 DIAGNOSIS — T45.1X5A ADVERSE EFFECT OF ANTINEOPLASTIC AND IMMUNOSUPPRESSIVE DRUGS, INITIAL ENCOUNTER: ICD-10-CM

## 2020-07-01 LAB
ALBUMIN SERPL-MCNC: 4.2 G/DL (ref 3.5–5.2)
ALP BLD-CCNC: 175 U/L (ref 35–104)
ALT SERPL-CCNC: 34 U/L (ref 9–52)
ANION GAP SERPL CALCULATED.3IONS-SCNC: 10 MMOL/L (ref 7–19)
AST SERPL-CCNC: 27 U/L (ref 14–36)
BASOPHILS ABSOLUTE: 0.04 K/UL (ref 0.01–0.08)
BASOPHILS RELATIVE PERCENT: 0.2 % (ref 0.1–1.2)
BILIRUB SERPL-MCNC: 0.4 MG/DL (ref 0.2–1.3)
BUN BLDV-MCNC: 17 MG/DL (ref 7–17)
CALCIUM SERPL-MCNC: 9.2 MG/DL (ref 8.4–10.2)
CHLORIDE BLD-SCNC: 107 MMOL/L (ref 98–111)
CO2: 22 MMOL/L (ref 22–29)
CREAT SERPL-MCNC: 0.5 MG/DL (ref 0.5–1)
EOSINOPHILS ABSOLUTE: 0.01 K/UL (ref 0.04–0.54)
EOSINOPHILS RELATIVE PERCENT: 0.1 % (ref 0.7–7)
GFR NON-AFRICAN AMERICAN: >60
GLOBULIN: 2.2 G/DL
GLUCOSE BLD-MCNC: 228 MG/DL (ref 74–106)
HCT VFR BLD CALC: 31.3 % (ref 34.1–44.9)
HEMOGLOBIN: 10.6 G/DL (ref 11.2–15.7)
LYMPHOCYTES ABSOLUTE: 0.75 K/UL (ref 1.18–3.74)
LYMPHOCYTES RELATIVE PERCENT: 4.2 % (ref 19.3–53.1)
MCH RBC QN AUTO: 32.8 PG (ref 25.6–32.2)
MCHC RBC AUTO-ENTMCNC: 33.9 G/DL (ref 32.3–35.5)
MCV RBC AUTO: 96.9 FL (ref 79.4–94.8)
MONOCYTES ABSOLUTE: 0.61 K/UL (ref 0.24–0.82)
MONOCYTES RELATIVE PERCENT: 3.4 % (ref 4.7–12.5)
NEUTROPHILS ABSOLUTE: 16.33 K/UL (ref 1.56–6.13)
NEUTROPHILS RELATIVE PERCENT: 92.1 % (ref 34–71.1)
PDW BLD-RTO: 17.4 % (ref 11.7–14.4)
PLATELET # BLD: 200 K/UL (ref 182–369)
PMV BLD AUTO: 9.7 FL (ref 7.4–10.4)
POTASSIUM SERPL-SCNC: 5 MMOL/L (ref 3.5–5.1)
RBC # BLD: 3.23 M/UL (ref 3.93–5.22)
SODIUM BLD-SCNC: 139 MMOL/L (ref 137–145)
TOTAL PROTEIN: 6.4 G/DL (ref 6.3–8.2)
WBC # BLD: 17.74 K/UL (ref 3.98–10.04)

## 2020-07-01 PROCEDURE — G8399 PT W/DXA RESULTS DOCUMENT: HCPCS | Performed by: INTERNAL MEDICINE

## 2020-07-01 PROCEDURE — G8427 DOCREV CUR MEDS BY ELIG CLIN: HCPCS | Performed by: INTERNAL MEDICINE

## 2020-07-01 PROCEDURE — 2500000003 HC RX 250 WO HCPCS: Performed by: INTERNAL MEDICINE

## 2020-07-01 PROCEDURE — G8417 CALC BMI ABV UP PARAM F/U: HCPCS | Performed by: INTERNAL MEDICINE

## 2020-07-01 PROCEDURE — 96413 CHEMO IV INFUSION 1 HR: CPT

## 2020-07-01 PROCEDURE — 1123F ACP DISCUSS/DSCN MKR DOCD: CPT | Performed by: INTERNAL MEDICINE

## 2020-07-01 PROCEDURE — 99214 OFFICE O/P EST MOD 30 MIN: CPT | Performed by: INTERNAL MEDICINE

## 2020-07-01 PROCEDURE — 96367 TX/PROPH/DG ADDL SEQ IV INF: CPT

## 2020-07-01 PROCEDURE — 4040F PNEUMOC VAC/ADMIN/RCVD: CPT | Performed by: INTERNAL MEDICINE

## 2020-07-01 PROCEDURE — 6360000002 HC RX W HCPCS: Performed by: INTERNAL MEDICINE

## 2020-07-01 PROCEDURE — 1090F PRES/ABSN URINE INCON ASSESS: CPT | Performed by: INTERNAL MEDICINE

## 2020-07-01 PROCEDURE — 3017F COLORECTAL CA SCREEN DOC REV: CPT | Performed by: INTERNAL MEDICINE

## 2020-07-01 PROCEDURE — 2580000003 HC RX 258: Performed by: INTERNAL MEDICINE

## 2020-07-01 PROCEDURE — 80053 COMPREHEN METABOLIC PANEL: CPT

## 2020-07-01 PROCEDURE — 85025 COMPLETE CBC W/AUTO DIFF WBC: CPT

## 2020-07-01 PROCEDURE — 96375 TX/PRO/DX INJ NEW DRUG ADDON: CPT

## 2020-07-01 PROCEDURE — 1036F TOBACCO NON-USER: CPT | Performed by: INTERNAL MEDICINE

## 2020-07-01 PROCEDURE — 96415 CHEMO IV INFUSION ADDL HR: CPT

## 2020-07-01 RX ORDER — SODIUM CHLORIDE 0.9 % (FLUSH) 0.9 %
5 SYRINGE (ML) INJECTION PRN
Status: CANCELLED | OUTPATIENT
Start: 2020-07-01

## 2020-07-01 RX ORDER — MEPERIDINE HYDROCHLORIDE 50 MG/ML
12.5 INJECTION INTRAMUSCULAR; INTRAVENOUS; SUBCUTANEOUS ONCE
Status: CANCELLED | OUTPATIENT
Start: 2020-07-01

## 2020-07-01 RX ORDER — SODIUM CHLORIDE 9 MG/ML
20 INJECTION, SOLUTION INTRAVENOUS ONCE
Status: CANCELLED | OUTPATIENT
Start: 2020-07-01

## 2020-07-01 RX ORDER — EPINEPHRINE 1 MG/ML
0.3 INJECTION, SOLUTION, CONCENTRATE INTRAVENOUS PRN
Status: CANCELLED | OUTPATIENT
Start: 2020-07-01

## 2020-07-01 RX ORDER — SODIUM CHLORIDE 0.9 % (FLUSH) 0.9 %
10 SYRINGE (ML) INJECTION PRN
Status: CANCELLED | OUTPATIENT
Start: 2020-07-01

## 2020-07-01 RX ORDER — SODIUM CHLORIDE 9 MG/ML
INJECTION, SOLUTION INTRAVENOUS CONTINUOUS
Status: CANCELLED | OUTPATIENT
Start: 2020-07-01

## 2020-07-01 RX ORDER — DIPHENHYDRAMINE HYDROCHLORIDE 50 MG/ML
50 INJECTION INTRAMUSCULAR; INTRAVENOUS ONCE
Status: COMPLETED | OUTPATIENT
Start: 2020-07-01 | End: 2020-07-01

## 2020-07-01 RX ORDER — METHYLPREDNISOLONE SODIUM SUCCINATE 125 MG/2ML
125 INJECTION, POWDER, LYOPHILIZED, FOR SOLUTION INTRAMUSCULAR; INTRAVENOUS ONCE
Status: CANCELLED | OUTPATIENT
Start: 2020-07-01

## 2020-07-01 RX ORDER — DIPHENHYDRAMINE HYDROCHLORIDE 50 MG/ML
50 INJECTION INTRAMUSCULAR; INTRAVENOUS ONCE
Status: CANCELLED | OUTPATIENT
Start: 2020-07-01

## 2020-07-01 RX ORDER — HEPARIN SODIUM (PORCINE) LOCK FLUSH IV SOLN 100 UNIT/ML 100 UNIT/ML
500 SOLUTION INTRAVENOUS PRN
Status: CANCELLED | OUTPATIENT
Start: 2020-07-01

## 2020-07-01 RX ADMIN — FAMOTIDINE 20 MG: 10 INJECTION, SOLUTION INTRAVENOUS at 09:05

## 2020-07-01 RX ADMIN — DEXAMETHASONE SODIUM PHOSPHATE: 10 INJECTION, SOLUTION INTRAMUSCULAR; INTRAVENOUS at 09:37

## 2020-07-01 RX ADMIN — PACLITAXEL 342 MG: 6 INJECTION, SOLUTION, CONCENTRATE INTRAVENOUS at 10:08

## 2020-07-01 RX ADMIN — DIPHENHYDRAMINE HYDROCHLORIDE 50 MG: 50 INJECTION, SOLUTION INTRAMUSCULAR; INTRAVENOUS at 09:05

## 2020-07-01 NOTE — PROGRESS NOTES
HISTORY OF PRESENT ILLNESS:    Ms. Seema Gallego  is status post ultrasound guided breast biopsy  on the right which revealed a 0.7 cm grade 3 invasive ductal carcinoma. ER is positive at 4%. IN is Negative. Her2 is Negative. Ki67 is High at 86%. Mammaprint is High Risk Basal Type. MRI-4/1/2020     MRI BREAST BILATERAL W WO CONTRAST    4/1/2020 9:26 AM   History: Biopsy-proven right breast carcinoma. Comparison is made with ultrasound imaging performed at the time of   biopsy on 3/19/2020. BILATERAL BREAST MRI WITH AND WITHOUT CONTRAST   Multiplanar MR imaging of the breast was performed before and after   contrast injection. The entire study has been evaluated using the [facility] software   program.   Enhancing right breast lesion within the upper outer quadrant. Mid breast 11:00 position. Diameter = 1.1 x 0.85 x 1.4 cm. Lesion volume = 0.64 mL. Nonenlarged lymph nodes deep within the upper outer right breast   against the chest wall. The lymph nodes show no overt malignant characteristics though there   is mild asymmetry of the number of lymph nodes with regards to the   left axilla and directed ultrasound evaluation is recommended. Ultrasound-guided fine-needle biopsy can be performed at that time if   needed. Asymmetric lymph nodes are within the upper outer quadrant deep   against the pectoralis muscle and in the posterior 11:00 position. There is also a small focus of enhancement within the midportion of   the right breast.   This is within the upper outer quadrant and located within the mid   breast 11:00 position. This finding is slightly more medial and anterior with regards to the   biopsy-proven lesion. Diameter = 0.4 x 0.3 x 0.35 cm. Enhancement kinetics show 40% washout. Directed ultrasound of the right breast is recommended in attempt to   visualize this small enhancing focus. Ultrasound-guided needle biopsy can be performed at that time if   needed.    No left Efforts were made to edit the dictations but occasionally words are mis-transcribed.)  Over 50% of the total visit time of 15 minutes in face to face encounter with the patient, out of which more than 50% of the time was spent in counseling patient or family and coordination of care. Counseling included but was not limited to time spent reviewing labs, imaging studies/ treatment plan and answering questions.

## 2020-07-02 ENCOUNTER — HOSPITAL ENCOUNTER (OUTPATIENT)
Dept: INFUSION THERAPY | Age: 70
Discharge: HOME | End: 2020-07-02
Payer: COMMERCIAL

## 2020-07-02 DIAGNOSIS — Z17.1 MALIGNANT NEOPLASM OF RIGHT BREAST IN FEMALE, ESTROGEN RECEPTOR NEGATIVE, UNSPECIFIED SITE OF BREAST (HCC): ICD-10-CM

## 2020-07-02 DIAGNOSIS — T45.1X5A ADVERSE EFFECT OF ANTINEOPLASTIC AND IMMUNOSUPPRESSIVE DRUGS, INITIAL ENCOUNTER: Primary | ICD-10-CM

## 2020-07-02 DIAGNOSIS — C50.911 MALIGNANT NEOPLASM OF RIGHT BREAST IN FEMALE, ESTROGEN RECEPTOR NEGATIVE, UNSPECIFIED SITE OF BREAST (HCC): ICD-10-CM

## 2020-07-02 PROCEDURE — 96372 THER/PROPH/DIAG INJ SC/IM: CPT

## 2020-07-02 PROCEDURE — 6360000002 HC RX W HCPCS: Performed by: INTERNAL MEDICINE

## 2020-07-02 RX ADMIN — PEGFILGRASTIM-CBQV 6 MG: 6 INJECTION, SOLUTION SUBCUTANEOUS at 12:27

## 2020-07-06 ENCOUNTER — OFFICE VISIT (OUTPATIENT)
Dept: SURGERY | Age: 70
End: 2020-07-06
Payer: COMMERCIAL

## 2020-07-06 ENCOUNTER — HOSPITAL ENCOUNTER (OUTPATIENT)
Dept: ULTRASOUND IMAGING | Age: 70
Discharge: HOME | End: 2020-07-06
Payer: COMMERCIAL

## 2020-07-06 PROCEDURE — 76642 ULTRASOUND BREAST LIMITED: CPT

## 2020-07-06 PROCEDURE — 99213 OFFICE O/P EST LOW 20 MIN: CPT | Performed by: SURGERY

## 2020-07-06 RX ORDER — MONTELUKAST SODIUM 10 MG/1
10 TABLET ORAL DAILY
Qty: 30 TABLET | Refills: 5 | Status: SHIPPED | OUTPATIENT
Start: 2020-07-06 | End: 2020-12-21 | Stop reason: SDUPTHER

## 2020-07-13 NOTE — PROGRESS NOTES
MEDICAL ONCOLOGY PROGRESS NOTE    Pt Name: Tiffanie Gilbert  MRN: [id number]  YOB: 1950  Date of evaluation: 7/15/2020    HISTORY OF PRESENT ILLNESS:      Diagnosis  · Invasive ductal carcinoma right breast, March 2020  · ER 4%, OK 0.1%, HER-2/paty IHC 0, Ki-67 86%  · [id number]  · Mammaprint High risk basal    Treatment summary   · 05/06/2020-ddAC with G-CSF support followed by dose dense weekly Taxol x12 weekly cycles. · Anticipated surgery  · Anticipated adjuvant radiation  · Anticipated adjuvant endocrine therapy    Patient has completed 4 cycles of dose dense AC. She is here today for cycle #2 of dose dense Taxol. She tolerated last cycle with complaints of fatigue and generalized pain. She also had complaint of generalized bone pain and took Advil and Loratadine without. She is also taking Claritin for that. She reports complete alopecia. Cancer history  Astrid Naik was first seen by me on 4/16/2020 referred by Dr. Meghan Lopez for diagnosis of breast cancer. This was in a screening detected lesion. The patient has a remote history of hormone replacement. She has quit. She denies any family history of breast cancer. · 3/19/2020-core needle biopsy of suspicious right breast lesion consistent with invasive ductal carcinoma grade 3 measuring 0.7 cm. ER 4%, OK 0%, HER-2/paty IHC 0.  Ki-67 86%. MammaPrint high risk basal type. · 4/1/2020- bilateral breast MRI showed right breast lesion measuring 1.1 x 1.4 x 0.85 cm. Nonenlarged lymph nodes within the upper outer right breast.  · 4/16/2020- she was first seen by me. Recommend neoadjuvant chemotherapy with dose dense AC with G-CSF support followed by dose dense Taxol. · 4/23/2020 CT Chest Mild pulmonary scarring. Known right breast cancer. No metastatic disease evident. CT Abdomen Pelvis No acute pathology int the abdomen or pelvis. No evidence of metastatic disease. 2D Echo Normal with EF 60%.    · 6/9/2020 Bone Density Femur Neck T-Score--1.5. This patient is considered Osteopenic. · 2020- Us Breast Limited Right Positive response to therapy with decreased size of the known right breast malignancy.        Past Medical History:    Past Medical History:   Diagnosis Date    Anxiety     Cancer [facility])     Breast Cancer    Carpal tunnel syndrome     Cervicalgia     Depression     Esophagitis     Hyperlipidemia     Idiopathic peripheral neuropathy     Insomnia     Menopause     Obesity due to excess calories     Osteoarthritis     Osteopenia     Type 2 diabetes mellitus without complication (HCC)     Vitamin D deficiency        Past Surgical History:    Past Surgical History:   Procedure Laterality Date    CHOLECYSTECTOMY      COLONOSCOPY      HERNIA REPAIR      LOBECTOMY Right 5/3/2016    THORACOTOMY, WEDGE RESECTION PULMONARY NODULE RIGHT MIDDLE LOBE performed by Dipak Anand MD at [address] N/A 2020    PORT INSERTION WITH FLUORO performed by Kassidy Eden MD at [address]         Social History:    Social History     Socioeconomic History    Marital status:      Spouse name: Not on file    Number of children: Not on file    Years of education: Not on file    Highest education level: Not on file   Occupational History    Not on file   Social Needs    Financial resource strain: Not on file    Food insecurity     Worry: Not on file     Inability: Not on file    Transportation needs     Medical: Not on file     Non-medical: Not on file   Tobacco Use    Smoking status: Former Smoker     Packs/day: 1.00     Years: 3.00     Pack years: 3.00     Types: Cigarettes     Last attempt to quit: 10/1/1984     Years since quittin.8    Smokeless tobacco: Never Used    Tobacco comment: 3 YEARS PLAYED AROUND AND QUIT IN THE    Substance and Sexual Activity    Alcohol use: No     Alcohol/week: 0.0 standard drinks    Drug use: No    Sexual activity: Not on file   Lifestyle    Physical activity     Days per week: Not on file     Minutes per session: Not on file    Stress: Not on file   Relationships    Social connections     Talks on phone: Not on file     Gets together: Not on file     Attends Ca[employer] service: Not on file     Active member of club or organization: Not on file     Attends meetings of clubs or organizations: Not on file     Relationship status: Not on file    Intimate partner violence     Fear of current or ex partner: Not on file     Emotionally abused: Not on file     Physically abused: Not on file     Forced sexual activity: Not on file   Other Topics Concern    Not on file   Social History Narrative    Not on file       Family History:   Family History   Problem Relation Age of Onset    Cancer Father         bladder ca    Other Sister         MULTIPLE SCLEROSIS    High Blood Pressure Mother     Heart Disease Mother     Stroke Mother        Current Hospital Medications:    No current facility-administered medications for this visit. Allergies: Allergies   Allergen Reactions    Codeine Itching    Other Rash     Chloroprep surgical prep         Subjective   REVIEW OF SYSTEMS:   Constitutional: no fever, no night sweats,  fatigue;   HEENT: no blurring of vision, no double vision, no hearing difficulty, no tinnitus,no ulceration, no dysphagia  Lungs: no cough, no shortness of breath, no wheeze;   CVS: no palpitation, no chest pain, no shortness of breath;  GI: Acid reflux, no abdominal pain, no nausea , no vomiting, no constipation;   PARVEZ: no dysuria, frequency and urgency, no hematuria, no kidney stones;   Musculoskeletal: no joint pain, swelling , stiffness;   Endocrine: no polyuria, polydypsia, no cold or heat intolerence; Hematology/lymphatic: no easy brusing or bleeding, no hx of clotting disorder; no peripheral adenopathy.   Dermatology: no skin rash, no eczema, no pruritis;   Psychiatry: no depression, no anxiety,no panic attacks, no suicide ideation; Neurology: no syncope, no seizures, no numbness or tingling of hands, no numbness or tingling of feet, no paresis;       Objective   BP (!) 148/78   Pulse 109   Temp 97.6 °F (36.4 °C)   Wt 183 lb 9.6 oz (83.3 kg)   SpO2 96%   BMI 31.51 kg/m²     PHYSICAL EXAM:  CONSTITUTIONAL: Alert, appropriate, no acute distress,   EYES: Non icteric, EOM intact, pupils equal round and reactive to light and accommodation. ENT: Oral mucus membranes moist, no oral pharyngeal lesions. External inspection of ears and nose are normal.   NECK: Supple, no masses. No palpable thyroid mass    CHEST/LUNGS: CTA bilaterally, normal respiratory effort   CARDIOVASCULAR: RRR, no murmurs. No lower extremity edema   ABDOMEN: soft non-tender, active bowel sounds, no hepatosplenomegaly. No palpable masses. EXTREMITIES: warm, Full ROM of all fours extremities. No focal weakness. SKIN: warm, dry with no rashes or lesions  LYMPH: No cervical, clavicular, axillary, or inguinal lymphadenopathy  NEUROLOGIC: follows commands, non focal.   PSYCH: mood and affect appropriate. Alert and oriented to time and place and person. LABORATORY RESULTS REVIEWED BY ME:  CBC:7/15/2020  WBC- 19.26  HGB- 10.5  PLT- 180,000  Neut- 18.34    7/1/2020  Glucose- 228 (H)  BUN- 17  Creat- 0.5  GFR- >60  Alk Phos- 175 (H)  ALT-34  AST-27    RADIOLOGY STUDIES REVIEWED BY ME:  Us Breast Limited Right    Result Date: 7/6/2020  1. Positive response to therapy with decreased size of the known right breast malignancy. Signed by Dr Julieta Villalpando on 7/6/2020 3:25 PM      ASSESSMENT:  #Right breast IDC, triple negative (ER 4%), basal type  MammaPrint  The patient was counseled today about diagnosis, staging, prognosis, diagnostic tests, medications, side effects and disease management. The method of counseling included verbal explanation. The patient verbalized understanding. Essentially, triple negative lesion.   Recommended

## 2020-07-15 ENCOUNTER — OFFICE VISIT (OUTPATIENT)
Dept: HEMATOLOGY | Age: 70
End: 2020-07-15
Payer: COMMERCIAL

## 2020-07-15 ENCOUNTER — HOSPITAL ENCOUNTER (OUTPATIENT)
Dept: INFUSION THERAPY | Age: 70
Discharge: HOME | End: 2020-07-15
Payer: COMMERCIAL

## 2020-07-15 VITALS
TEMPERATURE: 97.6 F | BODY MASS INDEX: 31.51 KG/M2 | OXYGEN SATURATION: 96 % | HEART RATE: 109 BPM | DIASTOLIC BLOOD PRESSURE: 78 MMHG | SYSTOLIC BLOOD PRESSURE: 148 MMHG | WEIGHT: 183.6 LBS

## 2020-07-15 DIAGNOSIS — Z17.0 MALIGNANT NEOPLASM OF BREAST IN FEMALE, ESTROGEN RECEPTOR POSITIVE, UNSPECIFIED LATERALITY, UNSPECIFIED SITE OF BREAST (HCC): Primary | ICD-10-CM

## 2020-07-15 DIAGNOSIS — C50.911 MALIGNANT NEOPLASM OF RIGHT BREAST IN FEMALE, ESTROGEN RECEPTOR NEGATIVE, UNSPECIFIED SITE OF BREAST (HCC): ICD-10-CM

## 2020-07-15 DIAGNOSIS — T45.1X5A ADVERSE EFFECT OF ANTINEOPLASTIC AND IMMUNOSUPPRESSIVE DRUGS, INITIAL ENCOUNTER: ICD-10-CM

## 2020-07-15 DIAGNOSIS — Z17.1 MALIGNANT NEOPLASM OF RIGHT BREAST IN FEMALE, ESTROGEN RECEPTOR NEGATIVE, UNSPECIFIED SITE OF BREAST (HCC): ICD-10-CM

## 2020-07-15 DIAGNOSIS — C50.919 MALIGNANT NEOPLASM OF BREAST IN FEMALE, ESTROGEN RECEPTOR POSITIVE, UNSPECIFIED LATERALITY, UNSPECIFIED SITE OF BREAST (HCC): Primary | ICD-10-CM

## 2020-07-15 LAB
ALBUMIN SERPL-MCNC: 4.4 G/DL (ref 3.5–5.2)
ALP BLD-CCNC: 203 U/L (ref 35–104)
ALT SERPL-CCNC: 46 U/L (ref 9–52)
ANION GAP SERPL CALCULATED.3IONS-SCNC: 10 MMOL/L (ref 7–19)
AST SERPL-CCNC: 41 U/L (ref 14–36)
BASOPHILS ABSOLUTE: 0.09 K/UL (ref 0.01–0.08)
BASOPHILS RELATIVE PERCENT: 0.5 % (ref 0.1–1.2)
BILIRUB SERPL-MCNC: 0.5 MG/DL (ref 0.2–1.3)
BUN BLDV-MCNC: 21 MG/DL (ref 7–17)
CALCIUM SERPL-MCNC: 9.9 MG/DL (ref 8.4–10.2)
CHLORIDE BLD-SCNC: 104 MMOL/L (ref 98–111)
CO2: 22 MMOL/L (ref 22–29)
CREAT SERPL-MCNC: 0.6 MG/DL (ref 0.5–1)
EOSINOPHILS ABSOLUTE: 0.01 K/UL (ref 0.04–0.54)
EOSINOPHILS RELATIVE PERCENT: 0.1 % (ref 0.7–7)
GFR NON-AFRICAN AMERICAN: >60
GLUCOSE BLD-MCNC: 331 MG/DL (ref 74–106)
HCT VFR BLD CALC: 32.5 % (ref 34.1–44.9)
HEMOGLOBIN: 10.5 G/DL (ref 11.2–15.7)
LYMPHOCYTES ABSOLUTE: 0.66 K/UL (ref 1.18–3.74)
LYMPHOCYTES RELATIVE PERCENT: 3.4 % (ref 19.3–53.1)
MCH RBC QN AUTO: 32.7 PG (ref 25.6–32.2)
MCHC RBC AUTO-ENTMCNC: 32.3 G/DL (ref 32.3–35.5)
MCV RBC AUTO: 101.2 FL (ref 79.4–94.8)
MONOCYTES ABSOLUTE: 0.16 K/UL (ref 0.24–0.82)
MONOCYTES RELATIVE PERCENT: 0.8 % (ref 4.7–12.5)
NEUTROPHILS ABSOLUTE: 18.34 K/UL (ref 1.56–6.13)
NEUTROPHILS RELATIVE PERCENT: 95.2 % (ref 34–71.1)
PDW BLD-RTO: 17.6 % (ref 11.7–14.4)
PLATELET # BLD: 180 K/UL (ref 182–369)
PMV BLD AUTO: 10.5 FL (ref 7.4–10.4)
POTASSIUM SERPL-SCNC: 4.3 MMOL/L (ref 3.5–5.1)
RBC # BLD: 3.21 M/UL (ref 3.93–5.22)
SODIUM BLD-SCNC: 136 MMOL/L (ref 137–145)
TOTAL PROTEIN: 7.1 G/DL (ref 6.3–8.2)
WBC # BLD: 19.26 K/UL (ref 3.98–10.04)

## 2020-07-15 PROCEDURE — G8417 CALC BMI ABV UP PARAM F/U: HCPCS | Performed by: INTERNAL MEDICINE

## 2020-07-15 PROCEDURE — 85025 COMPLETE CBC W/AUTO DIFF WBC: CPT

## 2020-07-15 PROCEDURE — 2500000003 HC RX 250 WO HCPCS: Performed by: INTERNAL MEDICINE

## 2020-07-15 PROCEDURE — 96367 TX/PROPH/DG ADDL SEQ IV INF: CPT

## 2020-07-15 PROCEDURE — 80053 COMPREHEN METABOLIC PANEL: CPT

## 2020-07-15 PROCEDURE — 1036F TOBACCO NON-USER: CPT | Performed by: INTERNAL MEDICINE

## 2020-07-15 PROCEDURE — 96413 CHEMO IV INFUSION 1 HR: CPT

## 2020-07-15 PROCEDURE — 3017F COLORECTAL CA SCREEN DOC REV: CPT | Performed by: INTERNAL MEDICINE

## 2020-07-15 PROCEDURE — 6360000002 HC RX W HCPCS: Performed by: INTERNAL MEDICINE

## 2020-07-15 PROCEDURE — 99214 OFFICE O/P EST MOD 30 MIN: CPT | Performed by: INTERNAL MEDICINE

## 2020-07-15 PROCEDURE — 2580000003 HC RX 258: Performed by: INTERNAL MEDICINE

## 2020-07-15 PROCEDURE — 1090F PRES/ABSN URINE INCON ASSESS: CPT | Performed by: INTERNAL MEDICINE

## 2020-07-15 PROCEDURE — G8427 DOCREV CUR MEDS BY ELIG CLIN: HCPCS | Performed by: INTERNAL MEDICINE

## 2020-07-15 PROCEDURE — 96415 CHEMO IV INFUSION ADDL HR: CPT

## 2020-07-15 PROCEDURE — 96375 TX/PRO/DX INJ NEW DRUG ADDON: CPT

## 2020-07-15 PROCEDURE — 1123F ACP DISCUSS/DSCN MKR DOCD: CPT | Performed by: INTERNAL MEDICINE

## 2020-07-15 PROCEDURE — G8399 PT W/DXA RESULTS DOCUMENT: HCPCS | Performed by: INTERNAL MEDICINE

## 2020-07-15 PROCEDURE — 4040F PNEUMOC VAC/ADMIN/RCVD: CPT | Performed by: INTERNAL MEDICINE

## 2020-07-15 RX ORDER — SODIUM CHLORIDE 0.9 % (FLUSH) 0.9 %
10 SYRINGE (ML) INJECTION PRN
Status: DISCONTINUED | OUTPATIENT
Start: 2020-07-15 | End: 2020-07-16 | Stop reason: HOSPADM

## 2020-07-15 RX ORDER — SODIUM CHLORIDE 9 MG/ML
20 INJECTION, SOLUTION INTRAVENOUS ONCE
Status: CANCELLED | OUTPATIENT
Start: 2020-07-15

## 2020-07-15 RX ORDER — SODIUM CHLORIDE 9 MG/ML
INJECTION, SOLUTION INTRAVENOUS CONTINUOUS
Status: CANCELLED | OUTPATIENT
Start: 2020-07-15

## 2020-07-15 RX ORDER — SODIUM CHLORIDE 0.9 % (FLUSH) 0.9 %
5 SYRINGE (ML) INJECTION PRN
Status: CANCELLED | OUTPATIENT
Start: 2020-07-15

## 2020-07-15 RX ORDER — DIPHENHYDRAMINE HYDROCHLORIDE 50 MG/ML
50 INJECTION INTRAMUSCULAR; INTRAVENOUS ONCE
Status: COMPLETED | OUTPATIENT
Start: 2020-07-15 | End: 2020-07-15

## 2020-07-15 RX ORDER — MEPERIDINE HYDROCHLORIDE 50 MG/ML
12.5 INJECTION INTRAMUSCULAR; INTRAVENOUS; SUBCUTANEOUS ONCE
Status: CANCELLED | OUTPATIENT
Start: 2020-07-15

## 2020-07-15 RX ORDER — DIPHENHYDRAMINE HYDROCHLORIDE 50 MG/ML
50 INJECTION INTRAMUSCULAR; INTRAVENOUS ONCE
Status: CANCELLED | OUTPATIENT
Start: 2020-07-15

## 2020-07-15 RX ORDER — EPINEPHRINE 1 MG/ML
0.3 INJECTION, SOLUTION, CONCENTRATE INTRAVENOUS PRN
Status: CANCELLED | OUTPATIENT
Start: 2020-07-15

## 2020-07-15 RX ORDER — HEPARIN SODIUM (PORCINE) LOCK FLUSH IV SOLN 100 UNIT/ML 100 UNIT/ML
500 SOLUTION INTRAVENOUS PRN
Status: CANCELLED | OUTPATIENT
Start: 2020-07-15

## 2020-07-15 RX ORDER — SODIUM CHLORIDE 0.9 % (FLUSH) 0.9 %
10 SYRINGE (ML) INJECTION PRN
Status: CANCELLED | OUTPATIENT
Start: 2020-07-15

## 2020-07-15 RX ORDER — OXYCODONE AND ACETAMINOPHEN 7.5; 325 MG/1; MG/1
1 TABLET ORAL EVERY 6 HOURS PRN
Qty: 40 TABLET | Refills: 0 | Status: SHIPPED | OUTPATIENT
Start: 2020-07-15 | End: 2020-08-14

## 2020-07-15 RX ORDER — METHYLPREDNISOLONE SODIUM SUCCINATE 125 MG/2ML
125 INJECTION, POWDER, LYOPHILIZED, FOR SOLUTION INTRAMUSCULAR; INTRAVENOUS ONCE
Status: CANCELLED | OUTPATIENT
Start: 2020-07-15

## 2020-07-15 RX ORDER — HEPARIN SODIUM (PORCINE) LOCK FLUSH IV SOLN 100 UNIT/ML 100 UNIT/ML
500 SOLUTION INTRAVENOUS PRN
Status: DISCONTINUED | OUTPATIENT
Start: 2020-07-15 | End: 2020-07-16 | Stop reason: HOSPADM

## 2020-07-15 RX ADMIN — DEXAMETHASONE SODIUM PHOSPHATE: 10 INJECTION, SOLUTION INTRAMUSCULAR; INTRAVENOUS at 10:49

## 2020-07-15 RX ADMIN — HEPARIN 500 UNITS: 100 SYRINGE at 14:35

## 2020-07-15 RX ADMIN — PACLITAXEL 342 MG: 6 INJECTION, SOLUTION, CONCENTRATE INTRAVENOUS at 11:22

## 2020-07-15 RX ADMIN — SODIUM CHLORIDE, PRESERVATIVE FREE 10 ML: 5 INJECTION INTRAVENOUS at 14:35

## 2020-07-15 RX ADMIN — FAMOTIDINE 20 MG: 10 INJECTION, SOLUTION INTRAVENOUS at 10:47

## 2020-07-15 RX ADMIN — DIPHENHYDRAMINE HYDROCHLORIDE 50 MG: 50 INJECTION, SOLUTION INTRAMUSCULAR; INTRAVENOUS at 10:47

## 2020-07-15 NOTE — PROGRESS NOTES
Patient presents for C2DD Taxol. She reports having significant bone/joint/muscle pain x  4 days ( Fri-Sun) after tx. Ineffective relief with OTC Aleeve. States that she feels she can tolerate DD regimen if she has something to tx acute pain during that time. (She has taken Percocet in the past after surgery with effective pain relief.)   Patient denies any s/s of infection. Has taken decadron premed as prescribed. Denies any nausea. She is otherwise without complaint.

## 2020-07-16 ENCOUNTER — HOSPITAL ENCOUNTER (OUTPATIENT)
Dept: INFUSION THERAPY | Age: 70
Discharge: HOME | End: 2020-07-16
Payer: COMMERCIAL

## 2020-07-16 DIAGNOSIS — Z17.1 MALIGNANT NEOPLASM OF RIGHT BREAST IN FEMALE, ESTROGEN RECEPTOR NEGATIVE, UNSPECIFIED SITE OF BREAST (HCC): ICD-10-CM

## 2020-07-16 DIAGNOSIS — C50.911 MALIGNANT NEOPLASM OF RIGHT BREAST IN FEMALE, ESTROGEN RECEPTOR NEGATIVE, UNSPECIFIED SITE OF BREAST (HCC): ICD-10-CM

## 2020-07-16 DIAGNOSIS — T45.1X5A ADVERSE EFFECT OF ANTINEOPLASTIC AND IMMUNOSUPPRESSIVE DRUGS, INITIAL ENCOUNTER: Primary | ICD-10-CM

## 2020-07-16 PROCEDURE — 96372 THER/PROPH/DIAG INJ SC/IM: CPT

## 2020-07-16 PROCEDURE — 6360000002 HC RX W HCPCS: Performed by: INTERNAL MEDICINE

## 2020-07-16 RX ADMIN — PEGFILGRASTIM-CBQV 6 MG: 6 INJECTION, SOLUTION SUBCUTANEOUS at 11:26

## 2020-07-28 RX ORDER — DIPHENHYDRAMINE HYDROCHLORIDE 50 MG/ML
50 INJECTION INTRAMUSCULAR; INTRAVENOUS ONCE
Status: CANCELLED | OUTPATIENT
Start: 2020-07-29

## 2020-07-28 RX ORDER — SODIUM CHLORIDE 0.9 % (FLUSH) 0.9 %
5 SYRINGE (ML) INJECTION PRN
Status: CANCELLED | OUTPATIENT
Start: 2020-07-29

## 2020-07-28 RX ORDER — SODIUM CHLORIDE 9 MG/ML
INJECTION, SOLUTION INTRAVENOUS CONTINUOUS
Status: CANCELLED | OUTPATIENT
Start: 2020-07-29

## 2020-07-28 RX ORDER — MEPERIDINE HYDROCHLORIDE 50 MG/ML
12.5 INJECTION INTRAMUSCULAR; INTRAVENOUS; SUBCUTANEOUS ONCE
Status: CANCELLED | OUTPATIENT
Start: 2020-07-29

## 2020-07-28 RX ORDER — METHYLPREDNISOLONE SODIUM SUCCINATE 125 MG/2ML
125 INJECTION, POWDER, LYOPHILIZED, FOR SOLUTION INTRAMUSCULAR; INTRAVENOUS ONCE
Status: CANCELLED | OUTPATIENT
Start: 2020-07-29

## 2020-07-28 RX ORDER — HEPARIN SODIUM (PORCINE) LOCK FLUSH IV SOLN 100 UNIT/ML 100 UNIT/ML
500 SOLUTION INTRAVENOUS PRN
Status: CANCELLED | OUTPATIENT
Start: 2020-07-29

## 2020-07-28 RX ORDER — SODIUM CHLORIDE 0.9 % (FLUSH) 0.9 %
10 SYRINGE (ML) INJECTION PRN
Status: CANCELLED | OUTPATIENT
Start: 2020-07-29

## 2020-07-28 RX ORDER — SODIUM CHLORIDE 9 MG/ML
20 INJECTION, SOLUTION INTRAVENOUS ONCE
Status: CANCELLED | OUTPATIENT
Start: 2020-07-29

## 2020-07-28 RX ORDER — EPINEPHRINE 1 MG/ML
0.3 INJECTION, SOLUTION, CONCENTRATE INTRAVENOUS PRN
Status: CANCELLED | OUTPATIENT
Start: 2020-07-29

## 2020-07-29 ENCOUNTER — HOSPITAL ENCOUNTER (OUTPATIENT)
Dept: INFUSION THERAPY | Age: 70
Discharge: HOME | End: 2020-07-29
Payer: COMMERCIAL

## 2020-07-29 VITALS
TEMPERATURE: 97.6 F | HEIGHT: 64 IN | SYSTOLIC BLOOD PRESSURE: 132 MMHG | WEIGHT: 184 LBS | DIASTOLIC BLOOD PRESSURE: 78 MMHG | OXYGEN SATURATION: 97 % | BODY MASS INDEX: 31.41 KG/M2 | HEART RATE: 111 BPM

## 2020-07-29 DIAGNOSIS — T45.1X5A ADVERSE EFFECT OF ANTINEOPLASTIC AND IMMUNOSUPPRESSIVE DRUGS, INITIAL ENCOUNTER: ICD-10-CM

## 2020-07-29 DIAGNOSIS — C50.911 MALIGNANT NEOPLASM OF RIGHT BREAST IN FEMALE, ESTROGEN RECEPTOR NEGATIVE, UNSPECIFIED SITE OF BREAST (HCC): ICD-10-CM

## 2020-07-29 DIAGNOSIS — Z17.0 MALIGNANT NEOPLASM OF BREAST IN FEMALE, ESTROGEN RECEPTOR POSITIVE, UNSPECIFIED LATERALITY, UNSPECIFIED SITE OF BREAST (HCC): Primary | ICD-10-CM

## 2020-07-29 DIAGNOSIS — C50.919 MALIGNANT NEOPLASM OF BREAST IN FEMALE, ESTROGEN RECEPTOR POSITIVE, UNSPECIFIED LATERALITY, UNSPECIFIED SITE OF BREAST (HCC): Primary | ICD-10-CM

## 2020-07-29 DIAGNOSIS — Z17.1 MALIGNANT NEOPLASM OF RIGHT BREAST IN FEMALE, ESTROGEN RECEPTOR NEGATIVE, UNSPECIFIED SITE OF BREAST (HCC): ICD-10-CM

## 2020-07-29 LAB
ALBUMIN SERPL-MCNC: 4.5 G/DL (ref 3.5–5.2)
ALP BLD-CCNC: 192 U/L (ref 35–104)
ALT SERPL-CCNC: 53 U/L (ref 9–52)
ANION GAP SERPL CALCULATED.3IONS-SCNC: 9 MMOL/L (ref 7–19)
AST SERPL-CCNC: 38 U/L (ref 14–36)
BASOPHILS ABSOLUTE: 0.03 K/UL (ref 0.01–0.08)
BASOPHILS RELATIVE PERCENT: 0.3 % (ref 0.1–1.2)
BILIRUB SERPL-MCNC: 0.5 MG/DL (ref 0.2–1.3)
BUN BLDV-MCNC: 16 MG/DL (ref 7–17)
CALCIUM SERPL-MCNC: 9.2 MG/DL (ref 8.4–10.2)
CHLORIDE BLD-SCNC: 103 MMOL/L (ref 98–111)
CO2: 23 MMOL/L (ref 22–29)
CREAT SERPL-MCNC: 0.5 MG/DL (ref 0.5–1)
EOSINOPHILS ABSOLUTE: 0 K/UL (ref 0.04–0.54)
EOSINOPHILS RELATIVE PERCENT: 0 % (ref 0.7–7)
GFR NON-AFRICAN AMERICAN: >60
GLOBULIN: 2.6 G/DL
GLUCOSE BLD-MCNC: 289 MG/DL (ref 74–106)
HCT VFR BLD CALC: 32.5 % (ref 34.1–44.9)
HEMOGLOBIN: 11.1 G/DL (ref 11.2–15.7)
LYMPHOCYTES ABSOLUTE: 0.58 K/UL (ref 1.18–3.74)
LYMPHOCYTES RELATIVE PERCENT: 5.1 % (ref 19.3–53.1)
MCH RBC QN AUTO: 33.1 PG (ref 25.6–32.2)
MCHC RBC AUTO-ENTMCNC: 34.2 G/DL (ref 32.3–35.5)
MCV RBC AUTO: 97 FL (ref 79.4–94.8)
MONOCYTES ABSOLUTE: 0.06 K/UL (ref 0.24–0.82)
MONOCYTES RELATIVE PERCENT: 0.5 % (ref 4.7–12.5)
NEUTROPHILS ABSOLUTE: 10.79 K/UL (ref 1.56–6.13)
NEUTROPHILS RELATIVE PERCENT: 94.1 % (ref 34–71.1)
PDW BLD-RTO: 16.4 % (ref 11.7–14.4)
PLATELET # BLD: 244 K/UL (ref 182–369)
PMV BLD AUTO: 10 FL (ref 7.4–10.4)
POTASSIUM SERPL-SCNC: 4.3 MMOL/L (ref 3.5–5.1)
RBC # BLD: 3.35 M/UL (ref 3.93–5.22)
SODIUM BLD-SCNC: 135 MMOL/L (ref 137–145)
TOTAL PROTEIN: 7.1 G/DL (ref 6.3–8.2)
WBC # BLD: 11.46 K/UL (ref 3.98–10.04)

## 2020-07-29 PROCEDURE — 80053 COMPREHEN METABOLIC PANEL: CPT

## 2020-07-29 PROCEDURE — 6360000002 HC RX W HCPCS: Performed by: PHYSICIAN ASSISTANT

## 2020-07-29 PROCEDURE — 2580000003 HC RX 258: Performed by: PHYSICIAN ASSISTANT

## 2020-07-29 PROCEDURE — 2500000003 HC RX 250 WO HCPCS: Performed by: PHYSICIAN ASSISTANT

## 2020-07-29 PROCEDURE — 85025 COMPLETE CBC W/AUTO DIFF WBC: CPT

## 2020-07-29 PROCEDURE — 96415 CHEMO IV INFUSION ADDL HR: CPT

## 2020-07-29 PROCEDURE — 96375 TX/PRO/DX INJ NEW DRUG ADDON: CPT

## 2020-07-29 PROCEDURE — 96413 CHEMO IV INFUSION 1 HR: CPT

## 2020-07-29 PROCEDURE — 96367 TX/PROPH/DG ADDL SEQ IV INF: CPT

## 2020-07-29 RX ORDER — DIPHENHYDRAMINE HYDROCHLORIDE 50 MG/ML
50 INJECTION INTRAMUSCULAR; INTRAVENOUS ONCE
Status: COMPLETED | OUTPATIENT
Start: 2020-07-29 | End: 2020-07-29

## 2020-07-29 RX ORDER — HEPARIN SODIUM (PORCINE) LOCK FLUSH IV SOLN 100 UNIT/ML 100 UNIT/ML
500 SOLUTION INTRAVENOUS PRN
Status: DISCONTINUED | OUTPATIENT
Start: 2020-07-29 | End: 2020-07-30 | Stop reason: HOSPADM

## 2020-07-29 RX ORDER — SODIUM CHLORIDE 0.9 % (FLUSH) 0.9 %
10 SYRINGE (ML) INJECTION PRN
Status: DISCONTINUED | OUTPATIENT
Start: 2020-07-29 | End: 2020-07-30 | Stop reason: HOSPADM

## 2020-07-29 RX ADMIN — DEXAMETHASONE SODIUM PHOSPHATE: 10 INJECTION, SOLUTION INTRAMUSCULAR; INTRAVENOUS at 08:57

## 2020-07-29 RX ADMIN — PACLITAXEL 342 MG: 6 INJECTION, SOLUTION, CONCENTRATE INTRAVENOUS at 09:40

## 2020-07-29 RX ADMIN — FAMOTIDINE 20 MG: 10 INJECTION, SOLUTION INTRAVENOUS at 08:55

## 2020-07-29 RX ADMIN — HEPARIN 500 UNITS: 100 SYRINGE at 12:48

## 2020-07-29 RX ADMIN — DIPHENHYDRAMINE HYDROCHLORIDE 50 MG: 50 INJECTION, SOLUTION INTRAMUSCULAR; INTRAVENOUS at 08:55

## 2020-07-29 RX ADMIN — SODIUM CHLORIDE, PRESERVATIVE FREE 10 ML: 5 INJECTION INTRAVENOUS at 12:48

## 2020-07-30 ENCOUNTER — HOSPITAL ENCOUNTER (OUTPATIENT)
Dept: INFUSION THERAPY | Age: 70
Discharge: HOME | End: 2020-07-30
Payer: COMMERCIAL

## 2020-07-30 DIAGNOSIS — C50.911 MALIGNANT NEOPLASM OF RIGHT BREAST IN FEMALE, ESTROGEN RECEPTOR NEGATIVE, UNSPECIFIED SITE OF BREAST (HCC): ICD-10-CM

## 2020-07-30 DIAGNOSIS — T45.1X5A ADVERSE EFFECT OF ANTINEOPLASTIC AND IMMUNOSUPPRESSIVE DRUGS, INITIAL ENCOUNTER: Primary | ICD-10-CM

## 2020-07-30 DIAGNOSIS — Z17.1 MALIGNANT NEOPLASM OF RIGHT BREAST IN FEMALE, ESTROGEN RECEPTOR NEGATIVE, UNSPECIFIED SITE OF BREAST (HCC): ICD-10-CM

## 2020-07-30 PROCEDURE — 96372 THER/PROPH/DIAG INJ SC/IM: CPT

## 2020-07-30 PROCEDURE — 6360000002 HC RX W HCPCS: Performed by: INTERNAL MEDICINE

## 2020-07-30 PROCEDURE — 96377 APPLICATON ON-BODY INJECTOR: CPT

## 2020-07-30 RX ADMIN — PEGFILGRASTIM-CBQV 6 MG: 6 INJECTION, SOLUTION SUBCUTANEOUS at 11:24

## 2020-08-04 NOTE — PROGRESS NOTES
MEDICAL ONCOLOGY PROGRESS NOTE    Pt Name: Nani Thomas  MRN: [id number]  YOB: 1950  Date of evaluation: 8/12/2020    HISTORY OF PRESENT ILLNESS:      Diagnosis  · Invasive ductal carcinoma right breast, March 2020  · ER 4%, NH 0.1%, HER-2/paty IHC 0, Ki-67 86%  · [id number]  · Mammaprint High risk basal    Treatment summary   · 05/06/2020-8/12/2020 ddAC with G-CSF support followed by dose dense Taxol x4 cycles with G-CSF. · Anticipated surgery  · Anticipated adjuvant radiation  · Anticipated adjuvant endocrine therapy    Patient has completed 4 cycles of dose dense AC. She is here today for cycle #4 of dose dense Taxol. She tolerated last cycle with complaints of fatigue and generalized pain. Cancer history  Manny Bingham was first seen by me on 4/16/2020 referred by Dr. Marie Bailey for diagnosis of breast cancer. This was in a screening detected lesion. The patient has a remote history of hormone replacement. She has quit. She denies any family history of breast cancer. · 3/19/2020-core needle biopsy of suspicious right breast lesion consistent with invasive ductal carcinoma grade 3 measuring 0.7 cm. ER 4%, NH 0%, HER-2/paty IHC 0.  Ki-67 86%. MammaPrint high risk basal type. · 4/1/2020- bilateral breast MRI showed right breast lesion measuring 1.1 x 1.4 x 0.85 cm. Nonenlarged lymph nodes within the upper outer right breast.  · 4/16/2020- she was first seen by me. Recommend neoadjuvant chemotherapy with dose dense AC with G-CSF support followed by dose dense Taxol. · 4/23/2020 CT Chest Mild pulmonary scarring. Known right breast cancer. No metastatic disease evident. CT Abdomen Pelvis No acute pathology int the abdomen or pelvis. No evidence of metastatic disease. 2D Echo Normal with EF 60%. · 6/9/2020 Bone Density Femur Neck T-Score--1.5. This patient is considered Osteopenic.   · 7/6/2020- Us Breast Limited Right Positive response to therapy with decreased size of the known right breast malignancy. · 2020-completion of neoadjuvant chemotherapy with dose dense AC followed by 4 cycles of dose dense Taxol with G-CSF support.       Past Medical History:    Past Medical History:   Diagnosis Date    Anxiety     Cancer [facility])     Breast Cancer    Carpal tunnel syndrome     Cervicalgia     Depression     Esophagitis     Hyperlipidemia     Idiopathic peripheral neuropathy     Insomnia     Menopause     Obesity due to excess calories     Osteoarthritis     Osteopenia     Type 2 diabetes mellitus without complication (HCC)     Vitamin D deficiency        Past Surgical History:    Past Surgical History:   Procedure Laterality Date    CHOLECYSTECTOMY      COLONOSCOPY      HERNIA REPAIR      LOBECTOMY Right 5/3/2016    THORACOTOMY, WEDGE RESECTION PULMONARY NODULE RIGHT MIDDLE LOBE performed by Jolie Park MD at [address] N/A 2020    PORT INSERTION WITH FLUORO performed by Ramiro Green MD at [facility] [facility]          Social History:    Social History     Socioeconomic History    Marital status:      Spouse name: Not on file    Number of children: Not on file    Years of education: Not on file    Highest education level: Not on file   Occupational History    Not on file   Social Needs    Financial resource strain: Not on file    Food insecurity     Worry: Not on file     Inability: Not on file    Transportation needs     Medical: Not on file     Non-medical: Not on file   Tobacco Use    Smoking status: Former Smoker     Packs/day: 1.00     Years: 3.00     Pack years: 3.00     Types: Cigarettes     Last attempt to quit: 10/1/1984     Years since quittin.8    Smokeless tobacco: Never Used    Tobacco comment: 3 YEARS PLAYED AROUND AND QUIT IN THE    Substance and Sexual Activity    Alcohol use: No     Alcohol/week: 0.0 standard drinks    Drug use: No    Sexual activity: Not on file   Lifestyle  Physical activity     Days per week: Not on file     Minutes per session: Not on file    Stress: Not on file   Relationships    Social connections     Talks on phone: Not on file     Gets together: Not on file     Attends Synagogue service: Not on file     Active member of club or organization: Not on file     Attends meetings of clubs or organizations: Not on file     Relationship status: Not on file    Intimate partner violence     Fear of current or ex partner: Not on file     Emotionally abused: Not on file     Physically abused: Not on file     Forced sexual activity: Not on file   Other Topics Concern    Not on file   Social History Narrative    Not on file       Family History:   Family History   Problem Relation Age of Onset    Cancer Father         bladder ca    Other Sister         MULTIPLE SCLEROSIS    High Blood Pressure Mother     Heart Disease Mother     Stroke Mother        Current Hospital Medications:    No current facility-administered medications for this visit. Allergies: Allergies   Allergen Reactions    Codeine Itching    Other Rash     Chloroprep surgical prep         Subjective   REVIEW OF SYSTEMS:   Constitutional: no fever, no night sweats, fatigue;   HEENT: no blurring of vision, no double vision, no hearing difficulty, no tinnitus,no ulceration, no dysphagia  Lungs: no cough, no shortness of breath, no wheeze;   CVS: no palpitation, no chest pain, no shortness of breath;  GI: no abdominal pain, no nausea , no vomiting, no constipation;   PARVEZ: no dysuria, frequency and urgency, no hematuria, no kidney stones;   Musculoskeletal: no joint pain, swelling , stiffness;   Endocrine: no polyuria, polydypsia, no cold or heat intolerence; Hematology/lymphatic: no easy brusing or bleeding, no hx of clotting disorder; no peripheral adenopathy.   Dermatology: no skin rash, no eczema, no pruritis;   Psychiatry: no depression, no anxiety,no panic attacks, no suicide ideation; Neurology: no syncope, no seizures, mild numbness or tingling of hands, mild numbness or tingling of feet, no paresis;       Objective   BP (!) 148/76   Pulse 110   Temp 96.9 °F (36.1 °C)   Resp 18   Wt 181 lb 12.8 oz (82.5 kg)   SpO2 97%   BMI 31.21 kg/m²     PHYSICAL EXAM:  CONSTITUTIONAL: Alert, appropriate, no acute distress,   EYES: Non icteric, EOM intact, pupils equal round and reactive to light and accommodation. ENT: Oral mucus membranes moist, no oral pharyngeal lesions. External inspection of ears and nose are normal.   NECK: Supple, no masses. No palpable thyroid mass    CHEST/LUNGS: CTA bilaterally, normal respiratory effort   CARDIOVASCULAR: RRR, no murmurs. No lower extremity edema   ABDOMEN: soft non-tender, active bowel sounds, no hepatosplenomegaly. No palpable masses. EXTREMITIES: warm, Full ROM of all fours extremities. No focal weakness. SKIN: warm, dry with no rashes or lesions  LYMPH: No cervical, clavicular, axillary, or inguinal lymphadenopathy  NEUROLOGIC: follows commands, non focal.   PSYCH: mood and affect appropriate. Alert and oriented to time and place and person. LABORATORY RESULTS REVIEWED BY ME:  CBC:8/12/2020  WBC- 13.81  HGB- 11.4  PLT- 209,000  Neut- 13.12    7/29/2020  Glucose- 289 (H)  BUN- 16  Creat- 0.5  Alk Phos- 192 (H)  ALT-53  AST-38    RADIOLOGY STUDIES REVIEWED BY ME:  No results found. ASSESSMENT:  #Right breast IDC, triple negative (ER 4%), basal type  MammaPrint  The patient was counseled today about diagnosis, staging, prognosis, diagnostic tests, medications, side effects and disease management. The method of counseling included verbal explanation. The patient verbalized understanding. Essentially, triple negative lesion. Recommended neoadjuvant chemotherapy with dose dense AC followed by Taxol. Proceed with dose dense Taxol cycle #4      #Diabetes type 2-  controlled.     #Therapy-induced toxicity-fatigue mild, persistent nausea, acid

## 2020-08-12 ENCOUNTER — HOSPITAL ENCOUNTER (OUTPATIENT)
Dept: INFUSION THERAPY | Age: 70
Discharge: HOME | End: 2020-08-12
Payer: COMMERCIAL

## 2020-08-12 ENCOUNTER — OFFICE VISIT (OUTPATIENT)
Dept: HEMATOLOGY | Age: 70
End: 2020-08-12
Payer: COMMERCIAL

## 2020-08-12 VITALS
RESPIRATION RATE: 18 BRPM | WEIGHT: 181.8 LBS | DIASTOLIC BLOOD PRESSURE: 76 MMHG | BODY MASS INDEX: 31.21 KG/M2 | OXYGEN SATURATION: 97 % | HEART RATE: 110 BPM | SYSTOLIC BLOOD PRESSURE: 148 MMHG | TEMPERATURE: 96.9 F

## 2020-08-12 DIAGNOSIS — C50.911 MALIGNANT NEOPLASM OF RIGHT BREAST IN FEMALE, ESTROGEN RECEPTOR NEGATIVE, UNSPECIFIED SITE OF BREAST (HCC): ICD-10-CM

## 2020-08-12 DIAGNOSIS — Z17.0 MALIGNANT NEOPLASM OF BREAST IN FEMALE, ESTROGEN RECEPTOR POSITIVE, UNSPECIFIED LATERALITY, UNSPECIFIED SITE OF BREAST (HCC): ICD-10-CM

## 2020-08-12 DIAGNOSIS — Z17.1 MALIGNANT NEOPLASM OF RIGHT BREAST IN FEMALE, ESTROGEN RECEPTOR NEGATIVE, UNSPECIFIED SITE OF BREAST (HCC): ICD-10-CM

## 2020-08-12 DIAGNOSIS — T45.1X5A ADVERSE EFFECT OF ANTINEOPLASTIC AND IMMUNOSUPPRESSIVE DRUGS, INITIAL ENCOUNTER: Primary | ICD-10-CM

## 2020-08-12 DIAGNOSIS — C50.919 MALIGNANT NEOPLASM OF BREAST IN FEMALE, ESTROGEN RECEPTOR POSITIVE, UNSPECIFIED LATERALITY, UNSPECIFIED SITE OF BREAST (HCC): ICD-10-CM

## 2020-08-12 LAB
ALBUMIN SERPL-MCNC: 5.2 G/DL (ref 3.5–5.2)
ALP BLD-CCNC: 187 U/L (ref 35–104)
ALT SERPL-CCNC: 52 U/L (ref 9–52)
ANION GAP SERPL CALCULATED.3IONS-SCNC: 8 MMOL/L (ref 7–19)
AST SERPL-CCNC: 41 U/L (ref 14–36)
BASOPHILS ABSOLUTE: 0.03 K/UL (ref 0.01–0.08)
BASOPHILS RELATIVE PERCENT: 0.2 % (ref 0.1–1.2)
BILIRUB SERPL-MCNC: 0.5 MG/DL (ref 0.2–1.3)
BUN BLDV-MCNC: 21 MG/DL (ref 7–17)
CALCIUM SERPL-MCNC: 9.6 MG/DL (ref 8.4–10.2)
CHLORIDE BLD-SCNC: 105 MMOL/L (ref 98–111)
CO2: 24 MMOL/L (ref 22–29)
CREAT SERPL-MCNC: 0.5 MG/DL (ref 0.5–1)
EOSINOPHILS ABSOLUTE: 0 K/UL (ref 0.04–0.54)
EOSINOPHILS RELATIVE PERCENT: 0 % (ref 0.7–7)
GFR NON-AFRICAN AMERICAN: >60
GLOBULIN: 2.4 G/DL
GLUCOSE BLD-MCNC: 230 MG/DL (ref 74–106)
HCT VFR BLD CALC: 33.7 % (ref 34.1–44.9)
HEMOGLOBIN: 11.4 G/DL (ref 11.2–15.7)
LYMPHOCYTES ABSOLUTE: 0.62 K/UL (ref 1.18–3.74)
LYMPHOCYTES RELATIVE PERCENT: 4.5 % (ref 19.3–53.1)
MCH RBC QN AUTO: 32.9 PG (ref 25.6–32.2)
MCHC RBC AUTO-ENTMCNC: 33.8 G/DL (ref 32.3–35.5)
MCV RBC AUTO: 97.4 FL (ref 79.4–94.8)
MONOCYTES ABSOLUTE: 0.04 K/UL (ref 0.24–0.82)
MONOCYTES RELATIVE PERCENT: 0.3 % (ref 4.7–12.5)
NEUTROPHILS ABSOLUTE: 13.12 K/UL (ref 1.56–6.13)
NEUTROPHILS RELATIVE PERCENT: 95 % (ref 34–71.1)
PDW BLD-RTO: 15.3 % (ref 11.7–14.4)
PLATELET # BLD: 209 K/UL (ref 182–369)
PMV BLD AUTO: 10.3 FL (ref 7.4–10.4)
POTASSIUM SERPL-SCNC: 4.3 MMOL/L (ref 3.5–5.1)
RBC # BLD: 3.46 M/UL (ref 3.93–5.22)
SODIUM BLD-SCNC: 137 MMOL/L (ref 137–145)
TOTAL PROTEIN: 7.6 G/DL (ref 6.3–8.2)
WBC # BLD: 13.81 K/UL (ref 3.98–10.04)

## 2020-08-12 PROCEDURE — 80053 COMPREHEN METABOLIC PANEL: CPT

## 2020-08-12 PROCEDURE — 3017F COLORECTAL CA SCREEN DOC REV: CPT | Performed by: INTERNAL MEDICINE

## 2020-08-12 PROCEDURE — 85025 COMPLETE CBC W/AUTO DIFF WBC: CPT

## 2020-08-12 PROCEDURE — 2500000003 HC RX 250 WO HCPCS: Performed by: INTERNAL MEDICINE

## 2020-08-12 PROCEDURE — 1123F ACP DISCUSS/DSCN MKR DOCD: CPT | Performed by: INTERNAL MEDICINE

## 2020-08-12 PROCEDURE — 4040F PNEUMOC VAC/ADMIN/RCVD: CPT | Performed by: INTERNAL MEDICINE

## 2020-08-12 PROCEDURE — 1036F TOBACCO NON-USER: CPT | Performed by: INTERNAL MEDICINE

## 2020-08-12 PROCEDURE — G8417 CALC BMI ABV UP PARAM F/U: HCPCS | Performed by: INTERNAL MEDICINE

## 2020-08-12 PROCEDURE — G8399 PT W/DXA RESULTS DOCUMENT: HCPCS | Performed by: INTERNAL MEDICINE

## 2020-08-12 PROCEDURE — 2580000003 HC RX 258: Performed by: INTERNAL MEDICINE

## 2020-08-12 PROCEDURE — 96375 TX/PRO/DX INJ NEW DRUG ADDON: CPT

## 2020-08-12 PROCEDURE — 96367 TX/PROPH/DG ADDL SEQ IV INF: CPT

## 2020-08-12 PROCEDURE — G8427 DOCREV CUR MEDS BY ELIG CLIN: HCPCS | Performed by: INTERNAL MEDICINE

## 2020-08-12 PROCEDURE — 1090F PRES/ABSN URINE INCON ASSESS: CPT | Performed by: INTERNAL MEDICINE

## 2020-08-12 PROCEDURE — 6360000002 HC RX W HCPCS: Performed by: INTERNAL MEDICINE

## 2020-08-12 PROCEDURE — 96413 CHEMO IV INFUSION 1 HR: CPT

## 2020-08-12 PROCEDURE — 99214 OFFICE O/P EST MOD 30 MIN: CPT | Performed by: INTERNAL MEDICINE

## 2020-08-12 PROCEDURE — 96415 CHEMO IV INFUSION ADDL HR: CPT

## 2020-08-12 RX ORDER — METHYLPREDNISOLONE SODIUM SUCCINATE 125 MG/2ML
125 INJECTION, POWDER, LYOPHILIZED, FOR SOLUTION INTRAMUSCULAR; INTRAVENOUS ONCE
Status: CANCELLED | OUTPATIENT
Start: 2020-08-12

## 2020-08-12 RX ORDER — EPINEPHRINE 1 MG/ML
0.3 INJECTION, SOLUTION, CONCENTRATE INTRAVENOUS PRN
Status: CANCELLED | OUTPATIENT
Start: 2020-08-12

## 2020-08-12 RX ORDER — SODIUM CHLORIDE 0.9 % (FLUSH) 0.9 %
10 SYRINGE (ML) INJECTION PRN
Status: DISCONTINUED | OUTPATIENT
Start: 2020-08-12 | End: 2020-08-13 | Stop reason: HOSPADM

## 2020-08-12 RX ORDER — DIPHENHYDRAMINE HYDROCHLORIDE 50 MG/ML
50 INJECTION INTRAMUSCULAR; INTRAVENOUS ONCE
Status: CANCELLED | OUTPATIENT
Start: 2020-08-12

## 2020-08-12 RX ORDER — SODIUM CHLORIDE 0.9 % (FLUSH) 0.9 %
10 SYRINGE (ML) INJECTION PRN
Status: CANCELLED | OUTPATIENT
Start: 2020-08-12

## 2020-08-12 RX ORDER — MEPERIDINE HYDROCHLORIDE 50 MG/ML
12.5 INJECTION INTRAMUSCULAR; INTRAVENOUS; SUBCUTANEOUS ONCE
Status: CANCELLED | OUTPATIENT
Start: 2020-08-12

## 2020-08-12 RX ORDER — SODIUM CHLORIDE 9 MG/ML
INJECTION, SOLUTION INTRAVENOUS CONTINUOUS
Status: CANCELLED | OUTPATIENT
Start: 2020-08-12

## 2020-08-12 RX ORDER — SODIUM CHLORIDE 0.9 % (FLUSH) 0.9 %
5 SYRINGE (ML) INJECTION PRN
Status: CANCELLED | OUTPATIENT
Start: 2020-08-12

## 2020-08-12 RX ORDER — HEPARIN SODIUM (PORCINE) LOCK FLUSH IV SOLN 100 UNIT/ML 100 UNIT/ML
500 SOLUTION INTRAVENOUS PRN
Status: CANCELLED | OUTPATIENT
Start: 2020-08-12

## 2020-08-12 RX ORDER — SODIUM CHLORIDE 9 MG/ML
20 INJECTION, SOLUTION INTRAVENOUS ONCE
Status: CANCELLED | OUTPATIENT
Start: 2020-08-12

## 2020-08-12 RX ORDER — HEPARIN SODIUM (PORCINE) LOCK FLUSH IV SOLN 100 UNIT/ML 100 UNIT/ML
500 SOLUTION INTRAVENOUS PRN
Status: DISCONTINUED | OUTPATIENT
Start: 2020-08-12 | End: 2020-08-13 | Stop reason: HOSPADM

## 2020-08-12 RX ORDER — DIPHENHYDRAMINE HYDROCHLORIDE 50 MG/ML
50 INJECTION INTRAMUSCULAR; INTRAVENOUS ONCE
Status: COMPLETED | OUTPATIENT
Start: 2020-08-12 | End: 2020-08-12

## 2020-08-12 RX ADMIN — PACLITAXEL 342 MG: 6 INJECTION, SOLUTION, CONCENTRATE INTRAVENOUS at 10:58

## 2020-08-12 RX ADMIN — FAMOTIDINE 20 MG: 10 INJECTION, SOLUTION INTRAVENOUS at 10:22

## 2020-08-12 RX ADMIN — DEXAMETHASONE SODIUM PHOSPHATE: 10 INJECTION, SOLUTION INTRAMUSCULAR; INTRAVENOUS at 10:22

## 2020-08-12 RX ADMIN — HEPARIN 500 UNITS: 100 SYRINGE at 14:02

## 2020-08-12 RX ADMIN — SODIUM CHLORIDE, PRESERVATIVE FREE 10 ML: 5 INJECTION INTRAVENOUS at 14:02

## 2020-08-12 RX ADMIN — DIPHENHYDRAMINE HYDROCHLORIDE 50 MG: 50 INJECTION, SOLUTION INTRAMUSCULAR; INTRAVENOUS at 10:22

## 2020-08-13 ENCOUNTER — OFFICE VISIT (OUTPATIENT)
Dept: SURGERY | Age: 70
End: 2020-08-13
Payer: COMMERCIAL

## 2020-08-13 ENCOUNTER — HOSPITAL ENCOUNTER (OUTPATIENT)
Dept: INFUSION THERAPY | Age: 70
Discharge: HOME | End: 2020-08-13
Payer: COMMERCIAL

## 2020-08-13 VITALS — DIASTOLIC BLOOD PRESSURE: 68 MMHG | HEART RATE: 80 BPM | SYSTOLIC BLOOD PRESSURE: 124 MMHG

## 2020-08-13 DIAGNOSIS — C50.911 MALIGNANT NEOPLASM OF RIGHT BREAST IN FEMALE, ESTROGEN RECEPTOR NEGATIVE, UNSPECIFIED SITE OF BREAST (HCC): ICD-10-CM

## 2020-08-13 DIAGNOSIS — Z17.1 MALIGNANT NEOPLASM OF RIGHT BREAST IN FEMALE, ESTROGEN RECEPTOR NEGATIVE, UNSPECIFIED SITE OF BREAST (HCC): ICD-10-CM

## 2020-08-13 DIAGNOSIS — T45.1X5A ADVERSE EFFECT OF ANTINEOPLASTIC AND IMMUNOSUPPRESSIVE DRUGS, INITIAL ENCOUNTER: Primary | ICD-10-CM

## 2020-08-13 PROCEDURE — 99213 OFFICE O/P EST LOW 20 MIN: CPT | Performed by: SURGERY

## 2020-08-13 PROCEDURE — 96372 THER/PROPH/DIAG INJ SC/IM: CPT

## 2020-08-13 PROCEDURE — 6360000002 HC RX W HCPCS: Performed by: INTERNAL MEDICINE

## 2020-08-13 RX ADMIN — PEGFILGRASTIM-CBQV 6 MG: 6 INJECTION, SOLUTION SUBCUTANEOUS at 10:58

## 2020-08-13 NOTE — Clinical Note
Save space around September 15 for right lumpectomy and sentinel node biopsy .   more details to follow at her next appointment

## 2020-08-13 NOTE — PROGRESS NOTES
breast abnormality is seen.       Impression   1. Right breast lesion 1 represents biopsy-proven invasive ductal   carcinoma. 2. Right breast lesion 2 represents nonenlarged though asymmetric   lymph nodes deep within the right breast adjacent to the pectoralis   muscle. 3. Right breast lesion 3 represents a 4 mm enhancing focus within the   central right breast.   4. Directed ultrasound evaluation with potential ultrasound-guided   biopsy is recommended with regards to the right side lymph nodes and   the tiny enhancing focus within the central right breast separate from   the previously biopsied site. 7/6/2020-Unilateral Right Ultrasound  Irregular hypoechoic nodule representing residual disease and   measuring 7 x 6 x 6 mm compared with 11 x 11 x 8 mm on 3/19/2020.       Impression   1. Positive response to therapy with decreased size of the known right   breast malignancy. Signed by Dr Luli Kinney on 7/6/2020 3:25 PM       She has not noticed any changes in her breasts. She completed her chemotherapy treatments on 8/12/2020  : Start taking Singulair. BREAST EXAM:  On examination, she has fibrocystic changes throughout both breasts,There Is no palpable abnormality at this point in time,, no skin or nipple changes and no axillary adenopathy. PLAN: We will get a Bilateral Breast MRI, unilateral Right Mammogram and Ultrasound. I will see her a weeks or so after these test to schedule surgery. I have seen, examined and reviewed this patient medication list, appropriate labs and imaging studies. I reviewed relevant medical records and others physicians notes. I discussed the plans of care with the patient. I answered all the questions to the patients satisfaction. I, Dr Eldon Cruz, personally performed the services described in this documentation as scribed by Adryan Castaneda MA in my presence and is both accurate and complete.      (Please note that portions of this note were completed with a voice recognition program. Efforts were made to edit the dictations but occasionally words are mis-transcribed.)  Over 50% of the total visit time of 15 minutes in face to face encounter with the patient, out of which more than 50% of the time was spent in counseling patient or family and coordination of care. Counseling included but was not limited to time spent reviewing labs, imaging studies/ treatment plan and answering questions.

## 2020-08-17 ENCOUNTER — TELEPHONE (OUTPATIENT)
Dept: SURGERY | Age: 70
End: 2020-08-17

## 2020-08-17 NOTE — TELEPHONE ENCOUNTER
Confirmed with patient/Left message    Mri scheduled at the [facility] on [email or web address]  All instructions were given to patient     DOUG/US scheduled at [facility] on [email or web address]    Will see Dr. Juan Amaro on 9/3/2020 @ 3:45 Pm

## 2020-08-21 ENCOUNTER — PATIENT MESSAGE (OUTPATIENT)
Dept: INTERNAL MEDICINE | Age: 70
End: 2020-08-21

## 2020-08-21 NOTE — TELEPHONE ENCOUNTER
From: Vasyl Multani  To: Cristofer Fletcher MD  Sent: 8/21/2020 8:29 AM CDT  Subject: Prescription Question    I need my ozempic refilled. It was not on the list to pick for refill. Thank you.

## 2020-09-01 ENCOUNTER — HOSPITAL ENCOUNTER (OUTPATIENT)
Dept: WOMENS IMAGING | Age: 70
Discharge: HOME | End: 2020-09-01
Payer: COMMERCIAL

## 2020-09-01 ENCOUNTER — HOSPITAL ENCOUNTER (OUTPATIENT)
Dept: MRI IMAGING | Age: 70
Discharge: HOME | End: 2020-09-01
Payer: COMMERCIAL

## 2020-09-01 PROCEDURE — 76642 ULTRASOUND BREAST LIMITED: CPT

## 2020-09-01 PROCEDURE — G0279 TOMOSYNTHESIS, MAMMO: HCPCS

## 2020-09-01 PROCEDURE — A9577 INJ MULTIHANCE: HCPCS | Performed by: SURGERY

## 2020-09-01 PROCEDURE — 77049 MRI BREAST C-+ W/CAD BI: CPT

## 2020-09-01 PROCEDURE — 6360000004 HC RX CONTRAST MEDICATION: Performed by: SURGERY

## 2020-09-01 RX ADMIN — GADOBENATE DIMEGLUMINE 17.5 ML: 529 INJECTION, SOLUTION INTRAVENOUS at 09:30

## 2020-09-01 NOTE — PROGRESS NOTES
HISTORY OF PRESENT ILLNESS:    Ms. Mary Coombs  is here today to go over results of imaging and to discuss surgery. She underwent an ultrasound guided breast biopsy  on the right which revealed a 1.1 cm grade 3 invasive ductal carcinoma. ER is positive at 4%. MD is Negative. Her2 is Negative. Ki67 is High at 86%. Mammaprint is High Risk Basal Type. She has not noticed any changes in her breasts. She completed her chemotherapy treatments on 8/12/2020 9/1/2020-Bilateral MRI Breast  FINDINGS:  Biopsy clip with surrounding hydration cavity in the RIGHT upper outer    quadrant is noted. The mass present on the prior MRI is no longer    visible. No suspicious soft tissue mass within the region of the    biopsy clip. The satellite lesion identified on prior breast MR is    also no longer visualized. No new suspicious mass or abnormal enhancement in either breast. LEFT    breast ductal ectasia with T1 hyperintense proteinaceous debris within    the ducts is again noted. No axillary or internal mammary    lymphadenopathy. LEFT chest port catheter is noted. No suspicious bone    lesions identified. No acute finding in the anterior liver.         Impression    No residual soft tissue mass at the site of RIGHT upper outer quadrant    biopsy clip, correlating with site of biopsy-proven neoplasm. No    evidence of residual disease by MR. The small satellite nodule    identified on prior breast MR is also no longer identified. No    axillary or internal mammary lymphadenopathy. BI-RADS CATEGORY 6: KNOWN BIOPSY-PROVEN MALIGNANCY. Signed by Dr Dustin Shirley on 9/1/2020 1:51 PM      9/1/2020-Unilateral Right Mammogram/Ultrasound  FINDINGS:  RIGHT breast upper outer quadrant biopsy clip marker at the site of    biopsy-proven neoplasm is identified. 5 x 12 mm focal density    surrounds a biopsy clip marker, which likely represents hydrated    biopsy cavity surrounding hydromark clip.  Some faint calcification adjacent to the biopsy clip marker has developed. There is a small 3    mm asymmetry in the lateral RIGHT breast anteriorly 2 cm which likely    represented summation artifact. Targeted ultrasound of the RIGHT breast outer quadrant was performed. The biopsy clip marker is identified with surrounding hydration    cavity. No residual soft tissue mass visible. The lateral RIGHT breast    was also imaged. No suspicious nodule or mass is identified in the    lateral RIGHT breast.         Impression    No definite residual mass at the biopsy clip marker, marking site of    biopsy-proven neoplasm. Some new faint calcification within this    region on mammogram is present, which may represent fat necrosis. BI-RADS CATEGORY 6: KNOWN BIOPSY-PROVEN MALIGNANCY. Signed by Dr Christal Loza on 9/1/2020 11:57 AM          BREAST EXAM:  On examination, she has fibrocystic changes throughout both breasts,There Is no palpable abnormality at this point in time,, no skin or nipple changes and no axillary adenopathy. PLAN: Right lumpectomy and sentinel node biopsy on 9/15    I have seen, examined and reviewed this patient medication list, appropriate labs and imaging studies. I reviewed relevant medical records and others physicians notes. I discussed the plans of care with the patient. I answered all the questions to the patients satisfaction. I, Dr González Garcia, personally performed the services described in this documentation as scribed by Kristal Aguilera MA in my presence and is both accurate and complete. (Please note that portions of this note were completed with a voice recognition program. Efforts were made to edit the dictations but occasionally words are mis-transcribed.)  Over 50% of the total visit time of 15 minutes in face to face encounter with the patient, out of which more than 50% of the time was spent in counseling patient or family and coordination of care.  Counseling included but was not limited to time spent reviewing labs, imaging studies/ treatment plan and answering questions.

## 2020-09-03 ENCOUNTER — OFFICE VISIT (OUTPATIENT)
Dept: SURGERY | Age: 70
End: 2020-09-03
Payer: COMMERCIAL

## 2020-09-03 VITALS — SYSTOLIC BLOOD PRESSURE: 128 MMHG | HEART RATE: 80 BPM | DIASTOLIC BLOOD PRESSURE: 80 MMHG

## 2020-09-03 PROCEDURE — 99213 OFFICE O/P EST LOW 20 MIN: CPT | Performed by: SURGERY

## 2020-09-08 DIAGNOSIS — Z00.00 ANNUAL PHYSICAL EXAM: ICD-10-CM

## 2020-09-08 DIAGNOSIS — E11.9 TYPE 2 DIABETES MELLITUS WITHOUT COMPLICATION, WITHOUT LONG-TERM CURRENT USE OF INSULIN (HCC): ICD-10-CM

## 2020-09-08 DIAGNOSIS — M85.862 OSTEOPENIA OF LEFT LOWER LEG: ICD-10-CM

## 2020-09-08 DIAGNOSIS — E78.00 PURE HYPERCHOLESTEROLEMIA: ICD-10-CM

## 2020-09-08 DIAGNOSIS — E05.90 SUBCLINICAL HYPERTHYROIDISM: ICD-10-CM

## 2020-09-08 DIAGNOSIS — F41.1 GENERALIZED ANXIETY DISORDER: ICD-10-CM

## 2020-09-08 DIAGNOSIS — E55.9 VITAMIN D DEFICIENCY: ICD-10-CM

## 2020-09-08 LAB
ALBUMIN SERPL-MCNC: 4.8 G/DL (ref 3.5–5.2)
ALP BLD-CCNC: 128 U/L (ref 35–104)
ALT SERPL-CCNC: 57 U/L (ref 5–33)
ANION GAP SERPL CALCULATED.3IONS-SCNC: 16 MMOL/L (ref 7–19)
AST SERPL-CCNC: 33 U/L (ref 5–32)
BILIRUB SERPL-MCNC: 0.3 MG/DL (ref 0.2–1.2)
BUN BLDV-MCNC: 11 MG/DL (ref 8–23)
CALCIUM SERPL-MCNC: 9.6 MG/DL (ref 8.8–10.2)
CHLORIDE BLD-SCNC: 103 MMOL/L (ref 98–111)
CHOLESTEROL, TOTAL: 186 MG/DL (ref 160–199)
CO2: 22 MMOL/L (ref 22–29)
CREAT SERPL-MCNC: 0.6 MG/DL (ref 0.5–0.9)
GFR AFRICAN AMERICAN: >59
GFR NON-AFRICAN AMERICAN: >60
GLUCOSE BLD-MCNC: 120 MG/DL (ref 74–109)
HBA1C MFR BLD: 6 % (ref 4–6)
HDLC SERPL-MCNC: 49 MG/DL (ref 65–121)
LDL CHOLESTEROL CALCULATED: 110 MG/DL
POTASSIUM SERPL-SCNC: 4.4 MMOL/L (ref 3.5–5)
SODIUM BLD-SCNC: 141 MMOL/L (ref 136–145)
T4 FREE: 0.99 NG/DL (ref 0.93–1.7)
TOTAL PROTEIN: 7.4 G/DL (ref 6.6–8.7)
TRIGL SERPL-MCNC: 137 MG/DL (ref 0–149)
TSH SERPL DL<=0.05 MIU/L-ACNC: 0.93 UIU/ML (ref 0.27–4.2)

## 2020-09-09 ENCOUNTER — OFFICE VISIT (OUTPATIENT)
Dept: INTERNAL MEDICINE | Age: 70
End: 2020-09-09
Payer: COMMERCIAL

## 2020-09-09 VITALS
BODY MASS INDEX: 30.9 KG/M2 | DIASTOLIC BLOOD PRESSURE: 64 MMHG | SYSTOLIC BLOOD PRESSURE: 122 MMHG | HEART RATE: 76 BPM | WEIGHT: 180 LBS | OXYGEN SATURATION: 98 %

## 2020-09-09 PROCEDURE — 99214 OFFICE O/P EST MOD 30 MIN: CPT | Performed by: INTERNAL MEDICINE

## 2020-09-09 ASSESSMENT — ENCOUNTER SYMPTOMS
CONSTIPATION: 0
CHEST TIGHTNESS: 0
ABDOMINAL PAIN: 0
WHEEZING: 0
COUGH: 0
SORE THROAT: 0

## 2020-09-09 ASSESSMENT — PATIENT HEALTH QUESTIONNAIRE - PHQ9
1. LITTLE INTEREST OR PLEASURE IN DOING THINGS: 0
SUM OF ALL RESPONSES TO PHQ9 QUESTIONS 1 & 2: 0
SUM OF ALL RESPONSES TO PHQ QUESTIONS 1-9: 0
SUM OF ALL RESPONSES TO PHQ QUESTIONS 1-9: 0
2. FEELING DOWN, DEPRESSED OR HOPELESS: 0

## 2020-09-09 NOTE — PROGRESS NOTES
for abdominal pain and constipation. Genitourinary: Negative for dysuria and urgency. Musculoskeletal: Negative. Negative for arthralgias. Skin: Negative for rash. Neurological: Negative for dizziness and headaches. Psychiatric/Behavioral: Negative. Vitals:    09/09/20 0805   BP: 122/64   Pulse: 76   SpO2: 98%   Weight: 180 lb (81.6 kg)     Body mass index is 30.9 kg/m². Physical Exam  Constitutional:       Appearance: She is well-developed. HENT:      Right Ear: External ear normal.      Left Ear: External ear normal.      Mouth/Throat:      Pharynx: No oropharyngeal exudate. Eyes:      Conjunctiva/sclera: Conjunctivae normal.      Pupils: Pupils are equal, round, and reactive to light. Neck:      Musculoskeletal: Neck supple. Thyroid: No thyromegaly. Vascular: No JVD. Cardiovascular:      Rate and Rhythm: Normal rate. Heart sounds: Normal heart sounds. No murmur. Pulmonary:      Effort: No respiratory distress. Breath sounds: Normal breath sounds. No wheezing or rales. Chest:      Chest wall: No tenderness. Abdominal:      General: Bowel sounds are normal.      Palpations: Abdomen is soft. Lymphadenopathy:      Cervical: No cervical adenopathy. Skin:     General: Skin is warm. Findings: No rash. Neurological:      Mental Status: She is oriented to person, place, and time.          Lab Review   Orders Only on 09/08/2020   Component Date Value    T4 Free 09/08/2020 0.99     TSH 09/08/2020 0.933     Cholesterol, Total 09/08/2020 186     Triglycerides 09/08/2020 137     HDL 09/08/2020 49*    LDL Calculated 09/08/2020 110     Hemoglobin A1C 09/08/2020 6.0     Sodium 09/08/2020 141     Potassium 09/08/2020 4.4     Chloride 09/08/2020 103     CO2 09/08/2020 22     Anion Gap 09/08/2020 16     Glucose 09/08/2020 120*    BUN 09/08/2020 11     CREATININE 09/08/2020 0.6     GFR Non- 09/08/2020 >60     GFR  09/08/2020 >59     Calcium 09/08/2020 9.6     Total Protein 09/08/2020 7.4     Alb 09/08/2020 4.8     Total Bilirubin 09/08/2020 0.3     Alkaline Phosphatase 09/08/2020 128*    ALT 09/08/2020 57*    AST 09/08/2020 33*   Hospital Outpatient Visit on 08/12/2020   Component Date Value    Sodium 08/12/2020 137     Potassium 08/12/2020 4.3     Chloride 08/12/2020 105     CO2 08/12/2020 24     Anion Gap 08/12/2020 8     Glucose 08/12/2020 230*    BUN 08/12/2020 21*    CREATININE 08/12/2020 0.5     GFR Non- 08/12/2020 >60     Calcium 08/12/2020 9.6     Total Protein 08/12/2020 7.6     Alb 08/12/2020 5.2     Total Bilirubin 08/12/2020 0.5     Alkaline Phosphatase 08/12/2020 187*    ALT 08/12/2020 52     AST 08/12/2020 41*    Globulin 08/12/2020 2.4     WBC 08/12/2020 13.81*    RBC 08/12/2020 3.46*    Hemoglobin 08/12/2020 11.4     Hematocrit 08/12/2020 33.7*    MCV 08/12/2020 97.4*    MCH 08/12/2020 32.9*    MCHC 08/12/2020 33.8     RDW 08/12/2020 15.3*    Platelets 95/28/7657 209     MPV 08/12/2020 10.3     Neutrophils % 08/12/2020 95.0*    Lymphocytes % 08/12/2020 4.5*    Monocytes % 08/12/2020 0.3*    Eosinophils % 08/12/2020 0.0*    Basophils % 08/12/2020 0.2     Neutrophils Absolute 08/12/2020 13.12*    Lymphocytes Absolute 08/12/2020 0.62*    Monocytes Absolute 08/12/2020 0.04*    Eosinophils Absolute 08/12/2020 0.00*    Basophils Absolute 08/12/2020 0.03    Hospital Outpatient Visit on 07/29/2020   Component Date Value    WBC 07/29/2020 11.46*    RBC 07/29/2020 3.35*    Hemoglobin 07/29/2020 11.1*    Hematocrit 07/29/2020 32.5*    MCV 07/29/2020 97.0*    MCH 07/29/2020 33.1*    MCHC 07/29/2020 34.2     RDW 07/29/2020 16.4*    Platelets 95/49/3734 244     MPV 07/29/2020 10.0     Neutrophils % 07/29/2020 94.1*    Lymphocytes % 07/29/2020 5.1*    Monocytes % 07/29/2020 0.5*    Eosinophils % 07/29/2020 0.0*    Basophils % 07/29/2020 0.3     Neutrophils Absolute 07/29/2020 10.79*    Lymphocytes Absolute 07/29/2020 0.58*    Monocytes Absolute 07/29/2020 0.06*    Eosinophils Absolute 07/29/2020 0.00*    Basophils Absolute 07/29/2020 0.03     Sodium 07/29/2020 135*    Potassium 07/29/2020 4.3     Chloride 07/29/2020 103     CO2 07/29/2020 23     Anion Gap 07/29/2020 9     Glucose 07/29/2020 289*    BUN 07/29/2020 16     CREATININE 07/29/2020 0.5     GFR Non- 07/29/2020 >60     Calcium 07/29/2020 9.2     Total Protein 07/29/2020 7.1     Alb 07/29/2020 4.5     Total Bilirubin 07/29/2020 0.5     Alkaline Phosphatase 07/29/2020 192*    ALT 07/29/2020 53*    AST 07/29/2020 38*    Globulin 07/29/2020 2.6    Hospital Outpatient Visit on 07/15/2020   Component Date Value    WBC 07/15/2020 19.26*    RBC 07/15/2020 3.21*    Hemoglobin 07/15/2020 10.5*    Hematocrit 07/15/2020 32.5*    MCV 07/15/2020 101.2*    MCH 07/15/2020 32.7*    MCHC 07/15/2020 32.3     RDW 07/15/2020 17.6*    Platelets 84/27/3324 180*    MPV 07/15/2020 10.5*    Neutrophils % 07/15/2020 95.2*    Lymphocytes % 07/15/2020 3.4*    Monocytes % 07/15/2020 0.8*    Eosinophils % 07/15/2020 0.1*    Basophils % 07/15/2020 0.5     Neutrophils Absolute 07/15/2020 18.34*    Lymphocytes Absolute 07/15/2020 0.66*    Monocytes Absolute 07/15/2020 0.16*    Eosinophils Absolute 07/15/2020 0.01*    Basophils Absolute 07/15/2020 0.09*    Sodium 07/15/2020 136*    Potassium 07/15/2020 4.3     Chloride 07/15/2020 104     CO2 07/15/2020 22     Anion Gap 07/15/2020 10     Glucose 07/15/2020 331*    BUN 07/15/2020 21*    CREATININE 07/15/2020 0.6     GFR Non- 07/15/2020 >60     Calcium 07/15/2020 9.9     Total Protein 07/15/2020 7.1     Alb 07/15/2020 4.4     Total Bilirubin 07/15/2020 0.5     Alkaline Phosphatase 07/15/2020 203*    ALT 07/15/2020 46     AST 07/15/2020 41*           ASSESSMENT/PLAN:    Recent DX RT breast cancer  Per dr Yvette Mercado  (following highlighted text has been copied from this specialist note)  Diagnosis  · Invasive ductal carcinoma right breast, March 2020  · ER 4%, NH 0.1%, HER-2/paty IHC 0, Ki-67 86%  · [id number]  · Mammaprint High risk basal     Treatment summary   · 05/06/2020-ddAC with G-CSF support followed by dose dense weekly Taxol x12 weekly cycles. · Anticipated surgery  · Anticipated adjuvant radiation  · Anticipated adjuvant endocrine therapy     Type 2 diabetes mellitus without complication, without long-term current use of insulin (HCC)  Her A1c is 6.0 (6.7( 5.9)( 6.1)( 8.0) ( 6.6) (6.8) ( 6.6)( 6.8) she was unable to tolerate metformin. Strict diet and wtl oss recommneded  ozempic cont 0.5     Vitamin D deficiency- her level was good at 43- 6/2020 ( 73( 35);   cont same 2000 daily/ check in 4/19      Generalized anxiety disorder-  She takes 1/2 tablet of 10 mg      (106)  - cont lovastatin    TFT's now normal    Mild alt/ ast elevation  Improved compared to July 2020 labs     Obesity  Pt was given approximately 5 minutes of counseling about diet and exercise including education on what calories are, where calories come from, the need for portion control,following lower carbohydrate dietary regimen and healthy snacks along side an active lifestyle with supplementary exercise of approx 30 minutes a week, 5 days a week of exercise for weight loss.  The patient voiced increased understanding of the topics discussed.         Orders Placed This Encounter   Procedures    Hemoglobin A1C    Comprehensive Metabolic Panel    Lipid Panel    TSH without Reflex    Vitamin D 25 Hydroxy     New Prescriptions    No medications on file         Return in about 4 months (around 1/9/2021) for Medication check. There are no Patient Instructions on file for this visit.   EMR Dragon/transcription disclaimer:Significant part of this  encounter note is electronic transcription/translationof spoken language to printed text. The electronic translation of spoken language may be erroneous, or at times, nonsensical words or phrases may be inadvertently transcribed.  Although I have reviewed the note for sucherrors, some may still exist.

## 2020-09-10 ENCOUNTER — TELEPHONE (OUTPATIENT)
Dept: SURGERY | Age: 70
End: 2020-09-10

## 2020-09-11 ENCOUNTER — HOSPITAL ENCOUNTER (OUTPATIENT)
Dept: PREADMISSION TESTING | Age: 70
Discharge: HOME | End: 2020-09-15
Payer: COMMERCIAL

## 2020-09-11 VITALS — WEIGHT: 180 LBS | HEIGHT: 64 IN | BODY MASS INDEX: 30.73 KG/M2

## 2020-09-11 PROCEDURE — 93005 ELECTROCARDIOGRAM TRACING: CPT | Performed by: SURGERY

## 2020-09-11 PROCEDURE — U0003 INFECTIOUS AGENT DETECTION BY NUCLEIC ACID (DNA OR RNA); SEVERE ACUTE RESPIRATORY SYNDROME CORONAVIRUS 2 (SARS-COV-2) (CORONAVIRUS DISEASE [COVID-19]), AMPLIFIED PROBE TECHNIQUE, MAKING USE OF HIGH THROUGHPUT TECHNOLOGIES AS DESCRIBED BY CMS-2020-01-R: HCPCS

## 2020-09-11 RX ORDER — CELECOXIB 200 MG/1
200 CAPSULE ORAL ONCE
Status: CANCELLED | OUTPATIENT
Start: 2020-09-15

## 2020-09-11 RX ORDER — GABAPENTIN 300 MG/1
300 CAPSULE ORAL ONCE
Status: CANCELLED | OUTPATIENT
Start: 2020-09-15

## 2020-09-11 RX ORDER — ACETAMINOPHEN 325 MG/1
975 TABLET ORAL ONCE
Status: CANCELLED | OUTPATIENT
Start: 2020-09-15

## 2020-09-12 LAB
REPORT: NORMAL
SARS-COV-2: NOT DETECTED
THIS TEST SENT TO: NORMAL

## 2020-09-13 LAB
EKG P AXIS: 41 DEGREES
EKG P-R INTERVAL: 144 MS
EKG Q-T INTERVAL: 384 MS
EKG QRS DURATION: 82 MS
EKG QTC CALCULATION (BAZETT): 420 MS
EKG T AXIS: 20 DEGREES

## 2020-09-14 ENCOUNTER — HOSPITAL ENCOUNTER (OUTPATIENT)
Dept: WOMENS IMAGING | Age: 70
Discharge: HOME | End: 2020-09-14
Payer: COMMERCIAL

## 2020-09-14 PROCEDURE — 19285 PERQ DEV BREAST 1ST US IMAG: CPT

## 2020-09-15 ENCOUNTER — HOSPITAL ENCOUNTER (OUTPATIENT)
Dept: ULTRASOUND IMAGING | Age: 70
Discharge: HOME | End: 2020-09-15
Payer: COMMERCIAL

## 2020-09-15 ENCOUNTER — ANESTHESIA (OUTPATIENT)
Dept: OPERATING ROOM | Age: 70
End: 2020-09-15
Payer: COMMERCIAL

## 2020-09-15 ENCOUNTER — HOSPITAL ENCOUNTER (OUTPATIENT)
Dept: NUCLEAR MEDICINE | Age: 70
Discharge: HOME | End: 2020-09-17
Payer: COMMERCIAL

## 2020-09-15 ENCOUNTER — ANESTHESIA EVENT (OUTPATIENT)
Dept: OPERATING ROOM | Age: 70
End: 2020-09-15
Payer: COMMERCIAL

## 2020-09-15 ENCOUNTER — HOSPITAL ENCOUNTER (OUTPATIENT)
Age: 70
Setting detail: OUTPATIENT SURGERY
Discharge: HOME | End: 2020-09-15
Attending: SURGERY | Admitting: SURGERY
Payer: COMMERCIAL

## 2020-09-15 VITALS
WEIGHT: 180 LBS | DIASTOLIC BLOOD PRESSURE: 52 MMHG | TEMPERATURE: 97 F | RESPIRATION RATE: 16 BRPM | HEIGHT: 64 IN | HEART RATE: 82 BPM | BODY MASS INDEX: 30.73 KG/M2 | SYSTOLIC BLOOD PRESSURE: 109 MMHG | OXYGEN SATURATION: 93 %

## 2020-09-15 VITALS
OXYGEN SATURATION: 92 % | DIASTOLIC BLOOD PRESSURE: 57 MMHG | SYSTOLIC BLOOD PRESSURE: 96 MMHG | RESPIRATION RATE: 1 BRPM | TEMPERATURE: 97 F

## 2020-09-15 LAB
GLUCOSE BLD-MCNC: 103 MG/DL (ref 70–99)
PERFORMED ON: ABNORMAL

## 2020-09-15 PROCEDURE — 6370000000 HC RX 637 (ALT 250 FOR IP): Performed by: SURGERY

## 2020-09-15 PROCEDURE — 88329 PATH CONSLTJ DRG SURG: CPT

## 2020-09-15 PROCEDURE — 7100000010 HC PHASE II RECOVERY - FIRST 15 MIN: Performed by: SURGERY

## 2020-09-15 PROCEDURE — 2580000003 HC RX 258: Performed by: ANESTHESIOLOGY

## 2020-09-15 PROCEDURE — 6360000002 HC RX W HCPCS: Performed by: ANESTHESIOLOGY

## 2020-09-15 PROCEDURE — 6360000002 HC RX W HCPCS: Performed by: NURSE ANESTHETIST, CERTIFIED REGISTERED

## 2020-09-15 PROCEDURE — 3600000005 HC SURGERY LEVEL 5 BASE: Performed by: SURGERY

## 2020-09-15 PROCEDURE — 14301 TIS TRNFR ANY 30.1-60 SQ CM: CPT | Performed by: PHYSICIAN ASSISTANT

## 2020-09-15 PROCEDURE — 19301 PARTIAL MASTECTOMY: CPT | Performed by: SURGERY

## 2020-09-15 PROCEDURE — 14302 TIS TRNFR ADDL 30 SQ CM: CPT | Performed by: SURGERY

## 2020-09-15 PROCEDURE — 3700000001 HC ADD 15 MINUTES (ANESTHESIA): Performed by: SURGERY

## 2020-09-15 PROCEDURE — 7100000000 HC PACU RECOVERY - FIRST 15 MIN: Performed by: SURGERY

## 2020-09-15 PROCEDURE — 3430000000 HC RX DIAGNOSTIC RADIOPHARMACEUTICAL: Performed by: SURGERY

## 2020-09-15 PROCEDURE — 7100000011 HC PHASE II RECOVERY - ADDTL 15 MIN: Performed by: SURGERY

## 2020-09-15 PROCEDURE — 3700000000 HC ANESTHESIA ATTENDED CARE: Performed by: SURGERY

## 2020-09-15 PROCEDURE — 6360000002 HC RX W HCPCS: Performed by: SURGERY

## 2020-09-15 PROCEDURE — 14302 TIS TRNFR ADDL 30 SQ CM: CPT | Performed by: PHYSICIAN ASSISTANT

## 2020-09-15 PROCEDURE — 88307 TISSUE EXAM BY PATHOLOGIST: CPT

## 2020-09-15 PROCEDURE — 38525 BIOPSY/REMOVAL LYMPH NODES: CPT | Performed by: SURGERY

## 2020-09-15 PROCEDURE — 19301 PARTIAL MASTECTOMY: CPT | Performed by: PHYSICIAN ASSISTANT

## 2020-09-15 PROCEDURE — A9520 TC99 TILMANOCEPT DIAG 0.5MCI: HCPCS | Performed by: SURGERY

## 2020-09-15 PROCEDURE — 2709999900 HC NON-CHARGEABLE SUPPLY: Performed by: SURGERY

## 2020-09-15 PROCEDURE — 6370000000 HC RX 637 (ALT 250 FOR IP): Performed by: ANESTHESIOLOGY

## 2020-09-15 PROCEDURE — 38792 RA TRACER ID OF SENTINL NODE: CPT

## 2020-09-15 PROCEDURE — 76998 US GUIDE INTRAOP: CPT | Performed by: SURGERY

## 2020-09-15 PROCEDURE — 82947 ASSAY GLUCOSE BLOOD QUANT: CPT

## 2020-09-15 PROCEDURE — 19285 PERQ DEV BREAST 1ST US IMAG: CPT | Performed by: SURGERY

## 2020-09-15 PROCEDURE — 2500000003 HC RX 250 WO HCPCS: Performed by: NURSE ANESTHETIST, CERTIFIED REGISTERED

## 2020-09-15 PROCEDURE — 38900 IO MAP OF SENT LYMPH NODE: CPT | Performed by: SURGERY

## 2020-09-15 PROCEDURE — 7100000001 HC PACU RECOVERY - ADDTL 15 MIN: Performed by: SURGERY

## 2020-09-15 PROCEDURE — 19285 PERQ DEV BREAST 1ST US IMAG: CPT

## 2020-09-15 PROCEDURE — 19294 PREPJ TUM CAV IORT PRTL MAST: CPT | Performed by: SURGERY

## 2020-09-15 PROCEDURE — 2580000003 HC RX 258: Performed by: SURGERY

## 2020-09-15 PROCEDURE — 19294 PREPJ TUM CAV IORT PRTL MAST: CPT | Performed by: PHYSICIAN ASSISTANT

## 2020-09-15 PROCEDURE — 3600000015 HC SURGERY LEVEL 5 ADDTL 15MIN: Performed by: SURGERY

## 2020-09-15 PROCEDURE — 14301 TIS TRNFR ANY 30.1-60 SQ CM: CPT | Performed by: SURGERY

## 2020-09-15 PROCEDURE — A4648 IMPLANTABLE TISSUE MARKER: HCPCS | Performed by: SURGERY

## 2020-09-15 DEVICE — THE MARKER IS A RADIOGRAPHIC IMPLANTABLE MARKER USED TO MARK SOFT TISSUE.IT IS COMPRISED OF A BIOABSORBABLE SPACER THAT HOLDS RADIOPAQUE MARKER CLIPS.
Type: IMPLANTABLE DEVICE | Site: BREAST | Status: FUNCTIONAL
Brand: BIOZORB MARKER

## 2020-09-15 RX ORDER — CELECOXIB 200 MG/1
200 CAPSULE ORAL ONCE
Status: COMPLETED | OUTPATIENT
Start: 2020-09-15 | End: 2020-09-15

## 2020-09-15 RX ORDER — LIDOCAINE HYDROCHLORIDE 10 MG/ML
INJECTION, SOLUTION INFILTRATION; PERINEURAL PRN
Status: DISCONTINUED | OUTPATIENT
Start: 2020-09-15 | End: 2020-09-15 | Stop reason: SDUPTHER

## 2020-09-15 RX ORDER — DIPHENHYDRAMINE HYDROCHLORIDE 50 MG/ML
INJECTION INTRAMUSCULAR; INTRAVENOUS PRN
Status: DISCONTINUED | OUTPATIENT
Start: 2020-09-15 | End: 2020-09-15 | Stop reason: SDUPTHER

## 2020-09-15 RX ORDER — LABETALOL HYDROCHLORIDE 5 MG/ML
5 INJECTION, SOLUTION INTRAVENOUS EVERY 10 MIN PRN
Status: DISCONTINUED | OUTPATIENT
Start: 2020-09-15 | End: 2020-09-15 | Stop reason: HOSPADM

## 2020-09-15 RX ORDER — SCOLOPAMINE TRANSDERMAL SYSTEM 1 MG/1
1 PATCH, EXTENDED RELEASE TRANSDERMAL
Status: DISCONTINUED | OUTPATIENT
Start: 2020-09-15 | End: 2020-09-15 | Stop reason: HOSPADM

## 2020-09-15 RX ORDER — SODIUM CHLORIDE, SODIUM LACTATE, POTASSIUM CHLORIDE, CALCIUM CHLORIDE 600; 310; 30; 20 MG/100ML; MG/100ML; MG/100ML; MG/100ML
INJECTION, SOLUTION INTRAVENOUS CONTINUOUS
Status: DISCONTINUED | OUTPATIENT
Start: 2020-09-15 | End: 2020-09-15 | Stop reason: HOSPADM

## 2020-09-15 RX ORDER — MIDAZOLAM HYDROCHLORIDE 1 MG/ML
2 INJECTION INTRAMUSCULAR; INTRAVENOUS
Status: DISCONTINUED | OUTPATIENT
Start: 2020-09-15 | End: 2020-09-15 | Stop reason: HOSPADM

## 2020-09-15 RX ORDER — FENTANYL CITRATE 50 UG/ML
25 INJECTION, SOLUTION INTRAMUSCULAR; INTRAVENOUS
Status: DISCONTINUED | OUTPATIENT
Start: 2020-09-15 | End: 2020-09-15 | Stop reason: HOSPADM

## 2020-09-15 RX ORDER — HYDROCODONE BITARTRATE AND ACETAMINOPHEN 5; 325 MG/1; MG/1
1 TABLET ORAL EVERY 6 HOURS PRN
Qty: 12 TABLET | Refills: 0 | Status: SHIPPED | OUTPATIENT
Start: 2020-09-15 | End: 2020-09-23 | Stop reason: ALTCHOICE

## 2020-09-15 RX ORDER — METOCLOPRAMIDE HYDROCHLORIDE 5 MG/ML
10 INJECTION INTRAMUSCULAR; INTRAVENOUS
Status: DISCONTINUED | OUTPATIENT
Start: 2020-09-15 | End: 2020-09-15 | Stop reason: HOSPADM

## 2020-09-15 RX ORDER — DEXAMETHASONE SODIUM PHOSPHATE 10 MG/ML
INJECTION, SOLUTION INTRAMUSCULAR; INTRAVENOUS PRN
Status: DISCONTINUED | OUTPATIENT
Start: 2020-09-15 | End: 2020-09-15 | Stop reason: SDUPTHER

## 2020-09-15 RX ORDER — HYDRALAZINE HYDROCHLORIDE 20 MG/ML
5 INJECTION INTRAMUSCULAR; INTRAVENOUS EVERY 10 MIN PRN
Status: DISCONTINUED | OUTPATIENT
Start: 2020-09-15 | End: 2020-09-15 | Stop reason: HOSPADM

## 2020-09-15 RX ORDER — PROMETHAZINE HYDROCHLORIDE 25 MG/ML
6.25 INJECTION, SOLUTION INTRAMUSCULAR; INTRAVENOUS
Status: DISCONTINUED | OUTPATIENT
Start: 2020-09-15 | End: 2020-09-15 | Stop reason: HOSPADM

## 2020-09-15 RX ORDER — ENALAPRILAT 2.5 MG/2ML
1.25 INJECTION INTRAVENOUS
Status: DISCONTINUED | OUTPATIENT
Start: 2020-09-15 | End: 2020-09-15 | Stop reason: HOSPADM

## 2020-09-15 RX ORDER — PROPOFOL 10 MG/ML
INJECTION, EMULSION INTRAVENOUS PRN
Status: DISCONTINUED | OUTPATIENT
Start: 2020-09-15 | End: 2020-09-15 | Stop reason: SDUPTHER

## 2020-09-15 RX ORDER — ONDANSETRON 2 MG/ML
INJECTION INTRAMUSCULAR; INTRAVENOUS PRN
Status: DISCONTINUED | OUTPATIENT
Start: 2020-09-15 | End: 2020-09-15 | Stop reason: SDUPTHER

## 2020-09-15 RX ORDER — ACETAMINOPHEN 325 MG/1
975 TABLET ORAL ONCE
Status: COMPLETED | OUTPATIENT
Start: 2020-09-15 | End: 2020-09-15

## 2020-09-15 RX ORDER — FENTANYL CITRATE 50 UG/ML
50 INJECTION, SOLUTION INTRAMUSCULAR; INTRAVENOUS
Status: DISCONTINUED | OUTPATIENT
Start: 2020-09-15 | End: 2020-09-15 | Stop reason: HOSPADM

## 2020-09-15 RX ORDER — SODIUM CHLORIDE 0.9 % (FLUSH) 0.9 %
10 SYRINGE (ML) INJECTION PRN
Status: DISCONTINUED | OUTPATIENT
Start: 2020-09-15 | End: 2020-09-15 | Stop reason: HOSPADM

## 2020-09-15 RX ORDER — DIPHENHYDRAMINE HYDROCHLORIDE 50 MG/ML
12.5 INJECTION INTRAMUSCULAR; INTRAVENOUS
Status: DISCONTINUED | OUTPATIENT
Start: 2020-09-15 | End: 2020-09-15 | Stop reason: HOSPADM

## 2020-09-15 RX ORDER — MORPHINE SULFATE 4 MG/ML
4 INJECTION, SOLUTION INTRAMUSCULAR; INTRAVENOUS EVERY 5 MIN PRN
Status: DISCONTINUED | OUTPATIENT
Start: 2020-09-15 | End: 2020-09-15 | Stop reason: HOSPADM

## 2020-09-15 RX ORDER — SODIUM CHLORIDE 0.9 % (FLUSH) 0.9 %
10 SYRINGE (ML) INJECTION EVERY 12 HOURS SCHEDULED
Status: DISCONTINUED | OUTPATIENT
Start: 2020-09-15 | End: 2020-09-15 | Stop reason: HOSPADM

## 2020-09-15 RX ORDER — GABAPENTIN 300 MG/1
300 CAPSULE ORAL ONCE
Status: COMPLETED | OUTPATIENT
Start: 2020-09-15 | End: 2020-09-15

## 2020-09-15 RX ORDER — LIDOCAINE HYDROCHLORIDE 10 MG/ML
1 INJECTION, SOLUTION EPIDURAL; INFILTRATION; INTRACAUDAL; PERINEURAL
Status: DISCONTINUED | OUTPATIENT
Start: 2020-09-15 | End: 2020-09-15 | Stop reason: HOSPADM

## 2020-09-15 RX ORDER — MORPHINE SULFATE 4 MG/ML
2 INJECTION, SOLUTION INTRAMUSCULAR; INTRAVENOUS EVERY 5 MIN PRN
Status: DISCONTINUED | OUTPATIENT
Start: 2020-09-15 | End: 2020-09-15 | Stop reason: HOSPADM

## 2020-09-15 RX ORDER — HYDROMORPHONE HYDROCHLORIDE 1 MG/ML
0.5 INJECTION, SOLUTION INTRAMUSCULAR; INTRAVENOUS; SUBCUTANEOUS EVERY 5 MIN PRN
Status: DISCONTINUED | OUTPATIENT
Start: 2020-09-15 | End: 2020-09-15 | Stop reason: HOSPADM

## 2020-09-15 RX ORDER — APREPITANT 40 MG/1
40 CAPSULE ORAL ONCE
Status: COMPLETED | OUTPATIENT
Start: 2020-09-15 | End: 2020-09-15

## 2020-09-15 RX ORDER — HEPARIN SODIUM (PORCINE) LOCK FLUSH IV SOLN 100 UNIT/ML 100 UNIT/ML
100 SOLUTION INTRAVENOUS PRN
Status: DISCONTINUED | OUTPATIENT
Start: 2020-09-15 | End: 2020-09-15 | Stop reason: HOSPADM

## 2020-09-15 RX ORDER — KETOROLAC TROMETHAMINE 30 MG/ML
INJECTION, SOLUTION INTRAMUSCULAR; INTRAVENOUS PRN
Status: DISCONTINUED | OUTPATIENT
Start: 2020-09-15 | End: 2020-09-15 | Stop reason: SDUPTHER

## 2020-09-15 RX ORDER — ISOSULFAN BLUE 50 MG/5ML
INJECTION, SOLUTION SUBCUTANEOUS PRN
Status: DISCONTINUED | OUTPATIENT
Start: 2020-09-15 | End: 2020-09-15 | Stop reason: ALTCHOICE

## 2020-09-15 RX ORDER — MEPERIDINE HYDROCHLORIDE 50 MG/ML
12.5 INJECTION INTRAMUSCULAR; INTRAVENOUS; SUBCUTANEOUS EVERY 5 MIN PRN
Status: DISCONTINUED | OUTPATIENT
Start: 2020-09-15 | End: 2020-09-15 | Stop reason: HOSPADM

## 2020-09-15 RX ORDER — HYDROMORPHONE HYDROCHLORIDE 1 MG/ML
0.25 INJECTION, SOLUTION INTRAMUSCULAR; INTRAVENOUS; SUBCUTANEOUS EVERY 5 MIN PRN
Status: DISCONTINUED | OUTPATIENT
Start: 2020-09-15 | End: 2020-09-15 | Stop reason: HOSPADM

## 2020-09-15 RX ORDER — FENTANYL CITRATE 50 UG/ML
INJECTION, SOLUTION INTRAMUSCULAR; INTRAVENOUS PRN
Status: DISCONTINUED | OUTPATIENT
Start: 2020-09-15 | End: 2020-09-15 | Stop reason: SDUPTHER

## 2020-09-15 RX ORDER — SODIUM CHLORIDE 9 MG/ML
INJECTION, SOLUTION INTRAVENOUS CONTINUOUS
Status: DISCONTINUED | OUTPATIENT
Start: 2020-09-15 | End: 2020-09-15 | Stop reason: HOSPADM

## 2020-09-15 RX ADMIN — FAMOTIDINE 20 MG: 10 INJECTION INTRAVENOUS at 11:38

## 2020-09-15 RX ADMIN — SODIUM CHLORIDE, SODIUM LACTATE, POTASSIUM CHLORIDE, AND CALCIUM CHLORIDE: 600; 310; 30; 20 INJECTION, SOLUTION INTRAVENOUS at 11:35

## 2020-09-15 RX ADMIN — LIDOCAINE HYDROCHLORIDE 50 MG: 10 INJECTION, SOLUTION INFILTRATION; PERINEURAL at 11:38

## 2020-09-15 RX ADMIN — DIPHENHYDRAMINE HYDROCHLORIDE 25 MG: 50 INJECTION, SOLUTION INTRAMUSCULAR; INTRAVENOUS at 11:38

## 2020-09-15 RX ADMIN — Medication 2 G: at 11:38

## 2020-09-15 RX ADMIN — TILMANOCEPT 0.5 MILLICURIE: KIT at 10:05

## 2020-09-15 RX ADMIN — KETOROLAC TROMETHAMINE 15 MG: 30 INJECTION, SOLUTION INTRAMUSCULAR; INTRAVENOUS at 13:14

## 2020-09-15 RX ADMIN — APREPITANT 40 MG: 40 CAPSULE ORAL at 10:04

## 2020-09-15 RX ADMIN — GABAPENTIN 300 MG: 300 CAPSULE ORAL at 10:04

## 2020-09-15 RX ADMIN — SODIUM CHLORIDE, SODIUM LACTATE, POTASSIUM CHLORIDE, AND CALCIUM CHLORIDE: 600; 310; 30; 20 INJECTION, SOLUTION INTRAVENOUS at 09:58

## 2020-09-15 RX ADMIN — CELECOXIB 200 MG: 200 CAPSULE ORAL at 10:04

## 2020-09-15 RX ADMIN — ONDANSETRON HYDROCHLORIDE 4 MG: 2 INJECTION, SOLUTION INTRAMUSCULAR; INTRAVENOUS at 13:14

## 2020-09-15 RX ADMIN — HYDROMORPHONE HYDROCHLORIDE 0.25 MG: 1 INJECTION, SOLUTION INTRAMUSCULAR; INTRAVENOUS; SUBCUTANEOUS at 13:19

## 2020-09-15 RX ADMIN — DEXAMETHASONE SODIUM PHOSPHATE 10 MG: 10 INJECTION, SOLUTION INTRAMUSCULAR; INTRAVENOUS at 11:38

## 2020-09-15 RX ADMIN — SODIUM CHLORIDE, SODIUM LACTATE, POTASSIUM CHLORIDE, AND CALCIUM CHLORIDE: 600; 310; 30; 20 INJECTION, SOLUTION INTRAVENOUS at 11:54

## 2020-09-15 RX ADMIN — PROPOFOL 140 MG: 10 INJECTION, EMULSION INTRAVENOUS at 11:38

## 2020-09-15 RX ADMIN — HYDROMORPHONE HYDROCHLORIDE 0.25 MG: 1 INJECTION, SOLUTION INTRAMUSCULAR; INTRAVENOUS; SUBCUTANEOUS at 13:08

## 2020-09-15 RX ADMIN — ACETAMINOPHEN 975 MG: 325 TABLET, FILM COATED ORAL at 10:03

## 2020-09-15 RX ADMIN — FENTANYL CITRATE 50 MCG: 50 INJECTION INTRAMUSCULAR; INTRAVENOUS at 11:38

## 2020-09-15 ASSESSMENT — PAIN DESCRIPTION - ONSET
ONSET: AWAKENED FROM SLEEP
ONSET: AWAKENED FROM SLEEP

## 2020-09-15 ASSESSMENT — LIFESTYLE VARIABLES: SMOKING_STATUS: 0

## 2020-09-15 ASSESSMENT — PAIN DESCRIPTION - PAIN TYPE
TYPE: SURGICAL PAIN
TYPE: SURGICAL PAIN

## 2020-09-15 ASSESSMENT — PAIN DESCRIPTION - ORIENTATION
ORIENTATION: RIGHT
ORIENTATION: RIGHT

## 2020-09-15 ASSESSMENT — PAIN - FUNCTIONAL ASSESSMENT
PAIN_FUNCTIONAL_ASSESSMENT: PREVENTS OR INTERFERES SOME ACTIVE ACTIVITIES AND ADLS
PAIN_FUNCTIONAL_ASSESSMENT: PREVENTS OR INTERFERES SOME ACTIVE ACTIVITIES AND ADLS

## 2020-09-15 ASSESSMENT — PAIN DESCRIPTION - FREQUENCY
FREQUENCY: INTERMITTENT
FREQUENCY: INTERMITTENT

## 2020-09-15 ASSESSMENT — PAIN DESCRIPTION - DESCRIPTORS
DESCRIPTORS: TENDER
DESCRIPTORS: TENDER

## 2020-09-15 ASSESSMENT — PAIN DESCRIPTION - LOCATION
LOCATION: BREAST
LOCATION: BREAST

## 2020-09-15 ASSESSMENT — PAIN SCALES - GENERAL
PAINLEVEL_OUTOF10: 3
PAINLEVEL_OUTOF10: 0
PAINLEVEL_OUTOF10: 3

## 2020-09-15 ASSESSMENT — PAIN DESCRIPTION - PROGRESSION
CLINICAL_PROGRESSION: NOT CHANGED
CLINICAL_PROGRESSION: NOT CHANGED

## 2020-09-15 NOTE — ANESTHESIA PRE PROCEDURE
Department of Anesthesiology  Preprocedure Note       Name:  Mykel Agosto   Age:  78 y.o.  :  1950                                          MRN:  [id number]         Date:  9/15/2020      Surgeon: Keyla Saab):  Catrina James MD    Procedure: RIGHT LUMPECTOMY WITH SNB, PREOP AND INTRAOP US GUIDED NL, FLAPS, BIOZORB, MARGIN PROBE,PEC BLOCK (Right )    Medications prior to admission:   Prior to Admission medications    Medication Sig Start Date End Date Taking? Authorizing Provider   mupirocin (BACTROBAN) 2 % ointment Apply to bilateral nares for 5 days. 9/10/20 9/17/20  Emery Martinez PA-C   Semaglutide,0.25 or 0.5MG/DOS, 2 MG/1.5ML SOPN Inject 0.5 mg into the skin once a week 20   Juan East MD   montelukast (SINGULAIR) 10 MG tablet Take 1 tablet by mouth daily 20   Catrina James MD   lovastatin (MEVACOR) 20 MG tablet TAKE TWO TABLETS BY MOUTH DAILY 6/15/20   Juan East MD   escitalopram (LEXAPRO) 10 MG tablet Take 5 mg by mouth daily     Historical Provider, MD   Cholecalciferol (VITAMIN D) 2000 units CAPS capsule Take by mouth daily     Historical Provider, MD       Current medications:    No current facility-administered medications for this visit. No current outpatient medications on file. Facility-Administered Medications Ordered in Other Visits   Medication Dose Route Frequency Provider Last Rate Last Dose    acetaminophen (TYLENOL) tablet 975 mg  975 mg Oral Once Catrina James MD        celecoxib (CELEBREX) capsule 200 mg  200 mg Oral Once Catrina James MD        gabapentin (NEURONTIN) capsule 300 mg  300 mg Oral Once Catrina James MD           Allergies:     Allergies   Allergen Reactions    Codeine Itching    Other Rash     Chloroprep surgical prep       Problem List:    Patient Active Problem List   Diagnosis Code    Type 2 diabetes mellitus without complication, without long-term current use of insulin (HCC) E11.9    Vitamin D deficiency E55.9    Pure hypercholesterolemia E78.00    GERD (gastroesophageal reflux disease) K21.9    Exogenous obesity E66.09    Generalized anxiety disorder F41.1    Osteopenia of left lower leg M85.862    Bilateral low back pain without sciatica M54.5    Microhematuria R31.29    Malignant neoplasm of right breast in female, estrogen receptor negative (HCC) C50.911, Z17.1    Adverse effect of antineoplastic and immunosuppressive drugs, initial encounter  T45.1X5A       Past Medical History:        Diagnosis Date    Anxiety     Cancer ([facility] [address]     Breast Cancer    Carpal tunnel syndrome     Cervicalgia     Depression     Esophagitis     Hyperlipidemia     Idiopathic peripheral neuropathy     Insomnia     Menopause     Obesity due to excess calories     Osteoarthritis     Osteopenia     Type 2 diabetes mellitus without complication ([facility] [address]     Vitamin D deficiency        Past Surgical History:        Procedure Laterality Date    CHOLECYSTECTOMY      COLONOSCOPY      HERNIA REPAIR      LOBECTOMY Right 5/3/2016    THORACOTOMY, WEDGE RESECTION PULMONARY NODULE RIGHT MIDDLE LOBE performed by Lissa Swanson MD at [address] N/A 2020    PORT INSERTION WITH FLUORO performed by Sissy Montgomery MD at [address] [facility]         Social History:    Social History     Tobacco Use    Smoking status: Former Smoker     Packs/day: 1.00     Years: 3.00     Pack years: 3.00     Types: Cigarettes     Last attempt to quit: 10/1/1984     Years since quittin.9    Smokeless tobacco: Never Used    Tobacco comment: 3 YEARS PLAYED AROUND AND QUIT IN THE    Substance Use Topics    Alcohol use: No     Alcohol/week: 0.0 standard drinks                                Counseling given: Not Answered  Comment: 3 YEARS PLAYED AROUND AND QUIT IN THE       Vital Signs (Current): There were no vitals filed for this visit.                                            BP Readings from Last 3 Encounters:   09/09/20 122/64   09/03/20 128/80   08/13/20 124/68       NPO Status:                                                                                 BMI:   Wt Readings from Last 3 Encounters:   09/11/20 180 lb (81.6 kg)   09/09/20 180 lb (81.6 kg)   08/12/20 181 lb 12.8 oz (82.5 kg)     There is no height or weight on file to calculate BMI.    CBC:   Lab Results   Component Value Date    WBC 13.81 08/12/2020    RBC 3.46 08/12/2020    HGB 11.4 08/12/2020    HCT 33.7 08/12/2020    MCV 97.4 08/12/2020    RDW 15.3 08/12/2020     08/12/2020       CMP:   Lab Results   Component Value Date     09/08/2020    K 4.4 09/08/2020     09/08/2020    CO2 22 09/08/2020    BUN 11 09/08/2020    CREATININE 0.6 09/08/2020    GFRAA >59 09/08/2020    LABGLOM >60 09/08/2020    GLUCOSE 120 09/08/2020    PROT 7.4 09/08/2020    CALCIUM 9.6 09/08/2020    BILITOT 0.3 09/08/2020    ALKPHOS 128 09/08/2020    AST 33 09/08/2020    ALT 57 09/08/2020       POC Tests: No results for input(s): POCGLU, POCNA, POCK, POCCL, POCBUN, POCHEMO, POCHCT in the last 72 hours.     Coags: No results found for: PROTIME, INR, APTT    HCG (If Applicable): No results found for: PREGTESTUR, PREGSERUM, HCG, HCGQUANT     ABGs: No results found for: PHART, PO2ART, BKQ5KUG, GHC1XNF, BEART, P3UKLOCJ     Type & Screen (If Applicable):  No results found for: [facility]    Anesthesia Evaluation  Patient summary reviewed and Nursing notes reviewed no history of anesthetic complications:   Airway: Mallampati: II  TM distance: >3 FB   Neck ROM: full  Mouth opening: > = 3 FB Dental:          Pulmonary:normal exam        (-) not a current smoker                          ROS comment: S/p wedge resection   Cardiovascular:  Exercise tolerance: good (>4 METS),   (+) hyperlipidemia    (-) pacemaker, past MI, CAD and CABG/stent    ECG reviewed               Beta Blocker:  Not on Beta Blocker         Neuro/Psych:   (+) psychiatric history:depression/anxiety    (-) seizures and CVA           GI/Hepatic/Renal:   (+) GERD: well controlled,      (-) liver disease and no renal disease       Endo/Other:    (+) DiabetesType II DM, well controlled, , : arthritis:., malignancy/cancer (breast cancer). Abdominal:           Vascular: negative vascular ROS. - DVT and PE. Anesthesia Plan      general     ASA 3     (Scop and emend in preop.)  Induction: intravenous. BIS  MIPS: Postoperative opioids intended and Prophylactic antiemetics administered. Anesthetic plan and risks discussed with patient and spouse. Plan discussed with CRNA.                   Dre Ma DO   9/15/2020

## 2020-09-15 NOTE — H&P
adjacent to the biopsy clip marker has developed. There is a small 3    mm asymmetry in the lateral RIGHT breast anteriorly 2 cm which likely    represented summation artifact. Targeted ultrasound of the RIGHT breast outer quadrant was performed. The biopsy clip marker is identified with surrounding hydration    cavity. No residual soft tissue mass visible. The lateral RIGHT breast    was also imaged. No suspicious nodule or mass is identified in the    lateral RIGHT breast.         Impression    No definite residual mass at the biopsy clip marker, marking site of    biopsy-proven neoplasm. Some new faint calcification within this    region on mammogram is present, which may represent fat necrosis. BI-RADS CATEGORY 6: KNOWN BIOPSY-PROVEN MALIGNANCY.     Signed by Dr Julissa Silva on 9/1/2020 11:57 AM          Noel Crespo is a 78 y.o. female with the following history as recorded in [facility]:  Patient Active Problem List    Diagnosis Date Noted    Adverse effect of antineoplastic and immunosuppressive drugs, initial encounter      Malignant neoplasm of right breast in female, estrogen receptor negative ([facility] [address] 04/17/2020    Microhematuria 06/07/2019    Bilateral low back pain without sciatica 05/04/2018    Osteopenia of left lower leg 08/16/2017    Type 2 diabetes mellitus without complication, without long-term current use of insulin ([facility] [address] 08/12/2017    Vitamin D deficiency 08/12/2017    Pure hypercholesterolemia 08/12/2017    GERD (gastroesophageal reflux disease) 08/12/2017    Exogenous obesity 08/12/2017    Generalized anxiety disorder 08/12/2017     Current Facility-Administered Medications   Medication Dose Route Frequency Provider Last Rate Last Dose    0.9 % sodium chloride infusion   Intravenous Continuous Cordella Gambles, DO        lactated ringers infusion   Intravenous Continuous Cordella Gambles,  mL/hr at 09/15/20 0958      sodium chloride flush 0.9 % injection 10 mL  10 (LYMPHOSEEK) injection 0.5 millicurie  520 micro curie Subcutaneous ONCE PRN Komal Davis MD   0.5 millicurie at 11 8032     Allergies: Codeine and Other  Past Medical History:   Diagnosis Date    Anxiety     Cancer [facility])     Breast Cancer    Carpal tunnel syndrome     pt denies having carpal tunnel syndrome    Cervicalgia     Depression     Esophagitis     Hyperlipidemia     Idiopathic peripheral neuropathy     Insomnia     Menopause     Obesity due to excess calories     Osteoarthritis     Osteopenia     Type 2 diabetes mellitus without complication (HCC)     Vitamin D deficiency      Past Surgical History:   Procedure Laterality Date    CHOLECYSTECTOMY      COLONOSCOPY      HERNIA REPAIR      LOBECTOMY Right 5/3/2016    THORACOTOMY, WEDGE RESECTION PULMONARY NODULE RIGHT MIDDLE LOBE performed by Penelope Montes MD at [address] N/A 2020    PORT INSERTION WITH FLUORO performed by Komal Davis MD at [address]       Family History   Problem Relation Age of Onset    Cancer Father         bladder ca    Other Sister         MULTIPLE SCLEROSIS    High Blood Pressure Mother     Heart Disease Mother     Stroke Mother      Social History     Tobacco Use    Smoking status: Former Smoker     Packs/day: 1.00     Years: 3.00     Pack years: 3.00     Types: Cigarettes     Last attempt to quit: 10/1/1984     Years since quittin.9    Smokeless tobacco: Never Used    Tobacco comment: 3 YEARS PLAYED AROUND AND QUIT IN THE    Substance Use Topics    Alcohol use: No     Alcohol/week: 0.0 standard drinks       ROS:  14 point review of systems is negative except for the above. PHYSICAL EXAM:    The patient is a 78 y.o. female  in no acute distress. She is alert oriented and cooperative. Mood and affect are appropriate. Skin is warm and dry without rashes.     /66   Pulse 67   Temp 97.4 °F (36.3 °C) (Temporal)   Resp 16   Ht 5' 4\" (1.626 m)   Wt 180 lb (81.6 kg)   SpO2 98%   BMI 30.90 kg/m²       HEENT: Normocephalic and atraumatic. EOMs intact. Pupils equal and round and reactive to light and accommodation. External ears and nose are normal.  Sclera nonicteric. Conjunctiva normal  Oropharynx without masses or lesions. Neck: Neck is supple without masses or thyromegaly    Chest: Lungs are clear to auscultation. Respiratory effort normal    Cardiac: Regular rate and rhythm without rubs, murmurs, or gallops    Breasts: On examination, she has fibrocystic changes throughout both breasts,There Is no palpable abnormality at this point in time,, no skin or nipple changes and no axillary adenopathy. Abdomen: The abdomen is soft and nontender with no hepatosplenomegaly. There are no abdominal hernias noted. Extremities: The extremities are normal. There are no signs of clubbing, cyanosis, or edema. IMPRESSION: Right breast cancer status post neoadjuvant chemotherapy    PLAN:  Right lumpectomy and sentinel node biopsy      I have seen, examined and reviewed this patient medication list, appropriate labs and imaging studies. I reviewed relevant medical records and others physicians notes. I discussed the plans of care with the patient. I answered all the questions to the patients satisfaction. I, Dr Christos Arias, personally performed the services described in this documentation as scribed by Gerber Nash MA in my presence and is both accurate and complete.     (Please note that portions of this note were completed with a voice recognition program. Efforts were made to edit the dictations but occasionally words are mis-transcribed.)  Over 50% of the total visit time of 15 minutes in face to face encounter with the patient, out of which more than 50% of the time was spent in counseling patient or family and coordination of care.  Counseling included but was not limited to time spent reviewing labs, imaging studies/ treatment plan and answering questions.

## 2020-09-15 NOTE — BRIEF OP NOTE
Brief Postoperative Note      DATE OF PROCEDURE: 9/15/2020     SURGEON: Susy Vazquez MD    PREOPERATIVE DIAGNOSIS:  C50.811    POSTOPERATIVE DIAGNOSIS: Same     OPERATION: Procedure(s):  RIGHT LUMPECTOMY WITH SNB, PREOP AND INTRAOP US GUIDED NL, FLAPS, BIOZORB, MARGIN PROBE,PEC BLOCK    ANESTHESIA: General    ESTIMATED BLOOD LOSS: Minimal    COMPLICATIONS: None. SPECIMENS:   ID Type Source Tests Collected by Time Destination   A : right breast tissue Tissue Breast SURGICAL PATHOLOGY Susy Vazquez MD 9/15/2020 1226    B : right breast tissue-additional cranial margins Tissue Breast SURGICAL PATHOLOGY Susy Vazquez MD 9/15/2020 1231    C : right breast tissue-additional anterior/center margins Tissue Breast SURGICAL PATHOLOGY Susy Vazquez MD 9/15/2020 1233    D : right sentinel node  Tissue Breast SURGICAL PATHOLOGY Susy Vazquez MD 9/15/2020 1234        DRAINS: RAMONITA    The patient tolerated the procedure well.     Electronically signed by Susy Vazquez MD  on 9/15/2020 at 1:20 PM

## 2020-09-15 NOTE — ANESTHESIA POSTPROCEDURE EVALUATION
Department of Anesthesiology  Postprocedure Note    Patient: Joseph Barry  MRN: [id number]  YOB: 1950  Date of evaluation: 9/15/2020  Time:  1:38 PM     Procedure Summary     Date:  09/15/20 Room / Location:  [address]    Anesthesia Start:  7825 Anesthesia Stop:  6006    Procedure:  RIGHT LUMPECTOMY WITH SNB, PREOP AND INTRAOP US GUIDED NL, FLAPS, BIOZORB, MARGIN PROBE,PEC BLOCK (Right ) Diagnosis:  (B16.450)    Surgeon:  Nikolai Arriola MD Responsible Provider:  ASHWIN Callahan CRNA    Anesthesia Type:  general ASA Status:  3          Anesthesia Type: general    Andra Phase I: Andra Score: 4    Andra Phase II:      Last vitals: Reviewed and per EMR flowsheets. Anesthesia Post Evaluation    Patient location during evaluation: PACU  Patient participation: complete - patient participated  Level of consciousness: awake and alert  Pain score: 0  Airway patency: patent  Nausea & Vomiting: no nausea and no vomiting  Complications: no  Cardiovascular status: hemodynamically stable and blood pressure returned to baseline  Respiratory status: acceptable and nasal cannula  Hydration status: stable  Comments: Vital Signs Stable. Exchanging well. PACU RN received care.

## 2020-09-16 RX ORDER — LOVASTATIN 20 MG/1
TABLET ORAL
Qty: 180 TABLET | Refills: 1 | Status: SHIPPED | OUTPATIENT
Start: 2020-09-16 | End: 2021-01-12 | Stop reason: SDUPTHER

## 2020-09-16 NOTE — TELEPHONE ENCOUNTER
July Sessions called requesting a refill of the below medication which has been pended for you:     Requested Prescriptions     Pending Prescriptions Disp Refills    lovastatin (MEVACOR) 20 MG tablet [Pharmacy Med Name: LOVASTATIN 20 MG TABLET] 180 tablet 0     Sig: TAKE TWO TABLETS BY MOUTH DAILY       Last Appointment Date: 9/9/2020  Next Appointment Date: 1/12/2021    Allergies   Allergen Reactions    Codeine Itching    Other Rash     Chloroprep surgical prep

## 2020-09-16 NOTE — OP NOTE
Dictated
then  irrigated copiously, made sure we had good hemostasis, and then turned  our attention to the axilla. We utilized a Neoprobe to identify the hot  area. The lymph node tissue was hot at about 4000 counts. Grossly, it  was somewhat firm, but not obviously containing tumor. There was no  significant residual radiation in the axilla. We utilized a combination  of hemoclips and Bovie electrocautery for dissection. We obtained good  hemostasis, irrigated copiously and made sure everything looked good in  the axilla. We then returned to the breast parenchyma. We placed a  three-dimensional implant into the tumor bed, both for tissue filling as  well as tumor site marking and radiation guidance. We sutured in place  with 3-0 Vicryl sutures. We then rotated the breast parenchyma and we  closed our primary defect, which measured approximately 5 x 8 cm. This  was closed with 2-0 and 3-0 Vicryl and then the secondary defect was  closed over a large round Jay-Henry drain. This was very large at  approximately 13 x 14 cm for a total of 222 cm2. As I said, a single  Jay-Henry drain was placed. A Prineo dressing was applied. Estimated blood loss, minimal.  Complications, none. She tolerated this  quite well.         Lena Vera MD    D: 09/15/2020 14:41:45      T: 09/15/2020 16:06:21     [email or web address]  Job#: [id number]     Doc#: [id number]    CC:

## 2020-09-21 ENCOUNTER — OFFICE VISIT (OUTPATIENT)
Dept: SURGERY | Age: 70
End: 2020-09-21

## 2020-09-21 VITALS — DIASTOLIC BLOOD PRESSURE: 60 MMHG | SYSTOLIC BLOOD PRESSURE: 118 MMHG | HEART RATE: 88 BPM

## 2020-09-21 PROCEDURE — 99024 POSTOP FOLLOW-UP VISIT: CPT | Performed by: SURGERY

## 2020-09-21 NOTE — Clinical Note
She will need to see Adan Nicholson in one week for fluid check. She will need an appointment with Dr. Jarrell aYnez in 3 weeks. I will want to see her back in one month. She will call if anything changes.

## 2020-09-21 NOTE — PROGRESS NOTES
HISTORY OF PRESENT ILLNESS:    Ms. Lonny Jeff  is status post ultrasound guided breast biopsy  on the right which revealed a 1.1 cm grade 3 invasive ductal carcinoma. ER is positive at 4%. OR is Negative. Her2 is Negative. Ki67 is High at 86%. Mammaprint is High Risk Basal Type. 9/1/2020-Bilateral Breast MRI  No suspicious soft tissue mass within the region of the    biopsy clip. The satellite lesion identified on prior breast MR is    also no longer visualized. No new suspicious mass or abnormal enhancement in either breast. LEFT    breast ductal ectasia with T1 hyperintense proteinaceous debris within    the ducts is again noted. No axillary or internal mammary    lymphadenopathy. LEFT chest port catheter is noted. No suspicious bone    lesions identified. No acute finding in the anterior liver.         Impression    No residual soft tissue mass at the site of RIGHT upper outer quadrant    biopsy clip, correlating with site of biopsy-proven neoplasm. No    evidence of residual disease by MR. The small satellite nodule    identified on prior breast MR is also no longer identified. No    axillary or internal mammary lymphadenopathy. BI-RADS CATEGORY 6: KNOWN BIOPSY-PROVEN MALIGNANCY. Signed by Dr Jm Rush on 9/1/2020 1:51 PM          She is now status post Right lumpectomy with Right sentinel lymph node biopsy on 9/15/2020. PATHOLOGY REVEALS:  FINAL DIAGNOSIS:     A.  Breast, right lumpectomy:    1.  Prior biopsy site identified, negative for residual carcinoma or   carcinoma in situ.    2.  Focal atypical ductal hyperplasia, margins negative.    3.  Extensive fibrosis present adjacent to prior biopsy site consistent   with complete therapeutic response.    4.  Surgical excision margins are negative for prior biopsy site. B.  Breast, excision of additional right breast anterior margin: Benign   breast parenchyma.      C.  Breast, excision of additional right breast cranial margin: Benign   breast parenchyma. D.  Lymph node, right [place] lymph node biopsy: One lymph node,   negative for evidence of malignancy. PHYSICAL EXAM:  The  wounds look good with no evidence of infection, fluid accumulation, or skin necrosis. RAMONITA drain was removed without incident    IMPRESSION: Path CR    Doing well s/p Right lumpectomy with Right sentinel lymph node biopsy      PLAN: Return in one week for a fluid check. See Dr. Munir Holt as scheduled and I will see her in one month. She will call with any changes. I have seen, examined and reviewed this patient medication list, appropriate labs and imaging studies. I reviewed relevant medical records and others physicians notes. I discussed the plans of care with the patient. I answered all the questions to the patients satisfaction. I, Dr Adi Lucero, personally performed the services described in this documentation as scribed by Rachel Mckenna MA in my presence and is both accurate and complete. (Please note that portions of this note were completed with a voice recognition program. Efforts were made to edit the dictations but occasionally words are mis-transcribed.)  Over 50% of the total visit time of 15 minutes in face to face encounter with the patient, out of which more than 50% of the time was spent in counseling patient or family and coordination of care. Counseling included but was not limited to time spent reviewing labs, imaging studies/ treatment plan and answering questions.

## 2020-09-22 PROBLEM — Z98.890 S/P LUMPECTOMY, RIGHT BREAST: Status: ACTIVE | Noted: 2020-09-22

## 2020-09-23 ENCOUNTER — HOSPITAL ENCOUNTER (OUTPATIENT)
Dept: INFUSION THERAPY | Age: 70
Discharge: HOME | End: 2020-09-23
Payer: COMMERCIAL

## 2020-09-23 ENCOUNTER — OFFICE VISIT (OUTPATIENT)
Dept: HEMATOLOGY | Age: 70
End: 2020-09-23
Payer: COMMERCIAL

## 2020-09-23 VITALS
BODY MASS INDEX: 30.35 KG/M2 | WEIGHT: 177.8 LBS | HEIGHT: 64 IN | HEART RATE: 92 BPM | TEMPERATURE: 96.9 F | SYSTOLIC BLOOD PRESSURE: 132 MMHG | OXYGEN SATURATION: 98 % | DIASTOLIC BLOOD PRESSURE: 80 MMHG

## 2020-09-23 DIAGNOSIS — Z17.0 MALIGNANT NEOPLASM OF BREAST IN FEMALE, ESTROGEN RECEPTOR POSITIVE, UNSPECIFIED LATERALITY, UNSPECIFIED SITE OF BREAST (HCC): ICD-10-CM

## 2020-09-23 DIAGNOSIS — C50.919 MALIGNANT NEOPLASM OF BREAST IN FEMALE, ESTROGEN RECEPTOR POSITIVE, UNSPECIFIED LATERALITY, UNSPECIFIED SITE OF BREAST (HCC): ICD-10-CM

## 2020-09-23 LAB
BASOPHILS ABSOLUTE: 0.02 K/UL (ref 0.01–0.08)
BASOPHILS RELATIVE PERCENT: 0.3 % (ref 0.1–1.2)
EOSINOPHILS ABSOLUTE: 0.98 K/UL (ref 0.04–0.54)
EOSINOPHILS RELATIVE PERCENT: 12.8 % (ref 0.7–7)
HCT VFR BLD CALC: 39.2 % (ref 34.1–44.9)
HEMOGLOBIN: 12.2 G/DL (ref 11.2–15.7)
LYMPHOCYTES ABSOLUTE: 1.35 K/UL (ref 1.18–3.74)
LYMPHOCYTES RELATIVE PERCENT: 17.6 % (ref 19.3–53.1)
MCH RBC QN AUTO: 31.9 PG (ref 25.6–32.2)
MCHC RBC AUTO-ENTMCNC: 31.1 G/DL (ref 32.3–35.5)
MCV RBC AUTO: 102.6 FL (ref 79.4–94.8)
MONOCYTES ABSOLUTE: 0.76 K/UL (ref 0.24–0.82)
MONOCYTES RELATIVE PERCENT: 9.9 % (ref 4.7–12.5)
NEUTROPHILS ABSOLUTE: 4.57 K/UL (ref 1.56–6.13)
NEUTROPHILS RELATIVE PERCENT: 59.4 % (ref 34–71.1)
PDW BLD-RTO: 12.6 % (ref 11.7–14.4)
PLATELET # BLD: 236 K/UL (ref 182–369)
PMV BLD AUTO: 10.4 FL (ref 7.4–10.4)
RBC # BLD: 3.82 M/UL (ref 3.93–5.22)
WBC # BLD: 7.68 K/UL (ref 3.98–10.04)

## 2020-09-23 PROCEDURE — 85025 COMPLETE CBC W/AUTO DIFF WBC: CPT

## 2020-09-23 PROCEDURE — 3017F COLORECTAL CA SCREEN DOC REV: CPT | Performed by: INTERNAL MEDICINE

## 2020-09-23 PROCEDURE — G8399 PT W/DXA RESULTS DOCUMENT: HCPCS | Performed by: INTERNAL MEDICINE

## 2020-09-23 PROCEDURE — 99212 OFFICE O/P EST SF 10 MIN: CPT

## 2020-09-23 PROCEDURE — 4040F PNEUMOC VAC/ADMIN/RCVD: CPT | Performed by: INTERNAL MEDICINE

## 2020-09-23 PROCEDURE — G8417 CALC BMI ABV UP PARAM F/U: HCPCS | Performed by: INTERNAL MEDICINE

## 2020-09-23 PROCEDURE — 1123F ACP DISCUSS/DSCN MKR DOCD: CPT | Performed by: INTERNAL MEDICINE

## 2020-09-23 PROCEDURE — 1090F PRES/ABSN URINE INCON ASSESS: CPT | Performed by: INTERNAL MEDICINE

## 2020-09-23 PROCEDURE — 1036F TOBACCO NON-USER: CPT | Performed by: INTERNAL MEDICINE

## 2020-09-23 PROCEDURE — G8427 DOCREV CUR MEDS BY ELIG CLIN: HCPCS | Performed by: INTERNAL MEDICINE

## 2020-09-23 PROCEDURE — 99214 OFFICE O/P EST MOD 30 MIN: CPT | Performed by: INTERNAL MEDICINE

## 2020-09-23 RX ORDER — LETROZOLE 2.5 MG/1
2.5 TABLET, FILM COATED ORAL DAILY
Qty: 30 TABLET | Refills: 5 | Status: SHIPPED | OUTPATIENT
Start: 2020-09-23 | End: 2021-06-01

## 2020-09-23 NOTE — PROGRESS NOTES
60%.   · 6/9/2020 Bone Density Femur Neck T-Score--1.5. This patient is considered Osteopenic. · 7/6/2020- Us Breast Limited Right Positive response to therapy with decreased size of the known right breast malignancy. · 8/12/2020-completion of neoadjuvant chemotherapy with dose dense AC followed by 4 cycles of dose dense Taxol with G-CSF support. 9/1/2020 Mri Breast Bilateral- No residual soft tissue mass at the site of RIGHT upper outer quadrant biopsy clip, correlating with site of biopsy-proven neoplasm. No evidence of residual disease by MR. The small satellite nodule identified on prior breast MR is also no longer identified. No axillary or internal mammary lymphadenopathy. 9/15/2020-patient underwent a right lumpectomy/sentinel lymph node biopsy revealing no residual disease. Focal atypical ductal hyperplasia. Margins negative.  AJCC STAGE: ypT0, (sn)pN0, pMx       Past Medical History:    Past Medical History:   Diagnosis Date    Anxiety     Cancer ([facility] [address]     Breast Cancer    Carpal tunnel syndrome     pt denies having carpal tunnel syndrome    Cervicalgia     Depression     Esophagitis     Hyperlipidemia     Idiopathic peripheral neuropathy     Insomnia     Menopause     Obesity due to excess calories     Osteoarthritis     Osteopenia     Type 2 diabetes mellitus without complication ([facility] [address]     Vitamin D deficiency        Past Surgical History:    Past Surgical History:   Procedure Laterality Date    BREAST LUMPECTOMY Right 9/15/2020    RIGHT LUMPECTOMY WITH SNB, PREOP AND INTRAOP US GUIDED NL, FLAPS, BIOZORB, MARGIN PROBE,PEC BLOCK performed by Marialuisa Power MD at [facility] Right 5/3/2016    THORACOTOMY, WEDGE RESECTION PULMONARY NODULE RIGHT MIDDLE LOBE performed by Orion Oreilly MD at [address] N/A 4/22/2020    PORT INSERTION WITH FLUORO performed by Marialuisa Power MD at [address] [facility] THORACOTOMY      TONSILLECTOMY      US GUIDED NEEDLE LOC OF RIGHT BREAST  2020    US GUIDED NEEDLE LOC OF RIGHT BREAST 2020 [facility] [address]       Social History:    Social History     Socioeconomic History    Marital status:      Spouse name: Not on file    Number of children: Not on file    Years of education: Not on file    Highest education level: Not on file   Occupational History    Not on file   Social Needs    Financial resource strain: Not on file    Food insecurity     Worry: Not on file     Inability: Not on file   [employer] needs     Medical: Not on file     Non-medical: Not on file   Tobacco Use    Smoking status: Former Smoker     Packs/day: 1.00     Years: 3.00     Pack years: 3.00     Types: Cigarettes     Last attempt to quit: 10/1/1984     Years since quittin.0    Smokeless tobacco: Never Used    Tobacco comment: 3 YEARS PLAYED AROUND AND QUIT IN THE    Substance and Sexual Activity    Alcohol use: No     Alcohol/week: 0.0 standard drinks    Drug use: No    Sexual activity: Not on file   Lifestyle    Physical activity     Days per week: Not on file     Minutes per session: Not on file    Stress: Not on file   Relationships    Social connections     Talks on phone: Not on file     Gets together: Not on file     Attends [employer] service: Not on file     Active member of club or organization: Not on file     Attends meetings of clubs or organizations: Not on file     Relationship status: Not on file    Intimate partner violence     Fear of current or ex partner: Not on file     Emotionally abused: Not on file     Physically abused: Not on file     Forced sexual activity: Not on file   Other Topics Concern    Not on file   Social History Narrative    Not on file       Family History:   Family History   Problem Relation Age of Onset    Cancer Father         bladder ca    Other Sister         MULTIPLE SCLEROSIS    High Blood Pressure Mother extremities. No focal weakness. SKIN: warm, dry with no rashes or lesions  LYMPH: No cervical, clavicular, axillary, or inguinal lymphadenopathy  NEUROLOGIC: follows commands, non focal.   PSYCH: mood and affect appropriate. Alert and oriented to time and place and person. LABORATORY RESULTS REVIEWED BY ME:  Lab Results   Component Value Date    WBC 7.68 09/23/2020    HGB 12.2 09/23/2020    HCT 39.2 09/23/2020    .6 (H) 09/23/2020     09/23/2020     Lab Results   Component Value Date    NEUTROABS 4.57 09/23/2020       RADIOLOGY STUDIES REVIEWED BY ME:  Mri Breast Bilateral W Wo Contrast    Result Date: 9/1/2020  No residual soft tissue mass at the site of RIGHT upper outer quadrant biopsy clip, correlating with site of biopsy-proven neoplasm. No evidence of residual disease by MR. The small satellite nodule identified on prior breast MR is also no longer identified. No axillary or internal mammary lymphadenopathy. BI-RADS CATEGORY 6: KNOWN BIOPSY-PROVEN MALIGNANCY. Signed by Dr Jackie Shanonn on 9/1/2020 1:51 PM    Us Breast Limited Right    Result Date: 9/1/2020  No definite residual mass at the biopsy clip marker, marking site of biopsy-proven neoplasm. Some new faint calcification within this region on mammogram is present, which may represent fat necrosis. BI-RADS CATEGORY 6: KNOWN BIOPSY-PROVEN MALIGNANCY. Signed by Dr Jackie Shannon on 9/1/2020 11:57 AM    [facility] Unilateral Right    Result Date: 9/1/2020  No definite residual mass at the biopsy clip marker, marking site of biopsy-proven neoplasm. Some new faint calcification within this region on mammogram is present, which may represent fat necrosis. BI-RADS CATEGORY 6: KNOWN BIOPSY-PROVEN MALIGNANCY.  Signed by Dr Jackie Shannon on 9/1/2020 11:57 AM      ASSESSMENT:  #Right breast IDC, triple negative (ER 4%), basal type  MammaPrint  Post dose dense AC/dose dense Taxol  Right lumpectomy and sentinel lymph node biopsy-complete

## 2020-09-24 ENCOUNTER — TELEPHONE (OUTPATIENT)
Dept: RADIATION ONCOLOGY | Facility: HOSPITAL | Age: 70
End: 2020-09-24

## 2020-09-30 ENCOUNTER — OFFICE VISIT (OUTPATIENT)
Dept: SURGERY | Age: 70
End: 2020-09-30

## 2020-09-30 VITALS
WEIGHT: 178 LBS | TEMPERATURE: 98.4 F | SYSTOLIC BLOOD PRESSURE: 118 MMHG | BODY MASS INDEX: 30.39 KG/M2 | HEIGHT: 64 IN | DIASTOLIC BLOOD PRESSURE: 74 MMHG

## 2020-09-30 PROCEDURE — 99024 POSTOP FOLLOW-UP VISIT: CPT | Performed by: PHYSICIAN ASSISTANT

## 2020-09-30 NOTE — LETTER
[facility]  [address]  Phone: [phone]  Fax: [phone]          September 30, 2020     Patient: Analisa Foster   YOB: 1950   Date of Visit: 9/30/2020       To Whom It May Concern: It is my medical opinion that Jolynn Mckeon may return to work on 10/8/20 without restrictions. If you have any questions or concerns, please don't hesitate to call.     Sincerely,        Andi Rodriguez PA-C

## 2020-10-01 NOTE — PROGRESS NOTES
Halima Laws today for her follow-up breast exam.    She is status post right partial mastectomy with sentinel lymph node biopsy done by Dr. Kenya Casiano on September 15, 2020. She is doing well. She presents today for fluid accumulation check. Of note her pathology report from the surgery reviewed revealed:  A.  Breast, right lumpectomy:    1.  Prior biopsy site identified, negative for residual carcinoma or   carcinoma in situ.    2.  Focal atypical ductal hyperplasia, margins negative.    3.  Extensive fibrosis present adjacent to prior biopsy site consistent   with complete therapeutic response.    4.  Surgical excision margins are negative for prior biopsy site. B.  Breast, excision of additional right breast anterior margin: Benign   breast parenchyma. C.  Breast, excision of additional right breast cranial margin: Benign   breast parenchyma. D.  Lymph node, right [place] lymph node biopsy: One lymph node,   negative for evidence of malignancy.      Patient Active Problem List    Diagnosis Date Noted    S/P lumpectomy, right breast 9/15/2020 09/22/2020    Adverse effect of antineoplastic and immunosuppressive drugs, initial encounter      Malignant neoplasm of right breast in female, estrogen receptor negative ([facility] [address] 04/17/2020    Microhematuria 06/07/2019    Bilateral low back pain without sciatica 05/04/2018    Osteopenia of left lower leg 08/16/2017    Type 2 diabetes mellitus without complication, without long-term current use of insulin ([facility] 08/12/2017    Vitamin D deficiency 08/12/2017    Pure hypercholesterolemia 08/12/2017    GERD (gastroesophageal reflux disease) 08/12/2017    Exogenous obesity 08/12/2017    Generalized anxiety disorder 08/12/2017       Current Outpatient Medications   Medication Sig Dispense Refill    letrozole (FEMARA) 2.5 MG tablet Take 1 tablet by mouth daily 30 tablet 5    lovastatin (MEVACOR) 20 MG tablet TAKE TWO TABLETS BY MOUTH DAILY 180 tablet 1    Semaglutide,0.25 or 0.5MG/DOS, 2 MG/1.5ML SOPN Inject 0.5 mg into the skin once a week 3 pen 3    montelukast (SINGULAIR) 10 MG tablet Take 1 tablet by mouth daily 30 tablet 5    escitalopram (LEXAPRO) 10 MG tablet Take 5 mg by mouth daily       Cholecalciferol (VITAMIN D) 2000 units CAPS capsule Take by mouth daily        No current facility-administered medications for this visit.         Allergies: Codeine and Other    Past Medical History:   Diagnosis Date    Anxiety     Cancer ([facility] [address]     Breast Cancer    Carpal tunnel syndrome     pt denies having carpal tunnel syndrome    Cervicalgia     Depression     Esophagitis     Hyperlipidemia     Idiopathic peripheral neuropathy     Insomnia     Menopause     Obesity due to excess calories     Osteoarthritis     Osteopenia     Type 2 diabetes mellitus without complication ([facility] [address]     Vitamin D deficiency        Past Surgical History:   Procedure Laterality Date    BREAST LUMPECTOMY Right 9/15/2020    RIGHT LUMPECTOMY WITH SNB, PREOP AND INTRAOP US GUIDED NL, FLAPS, BIOZORB, MARGIN PROBE,PEC BLOCK performed by Sandoval Vieira MD at [facility] Right 5/3/2016    THORACOTOMY, WEDGE RESECTION PULMONARY NODULE RIGHT MIDDLE LOBE performed by Shan Arreguin MD at [address] [place] [address] N/A 4/22/2020    PORT INSERTION WITH FLUORO performed by Sandoval Vieira MD at [facility] LOC OF RIGHT BREAST  9/14/2020    US GUIDED NEEDLE LOC OF RIGHT BREAST 9/14/2020 [facility] [address] [address]       Family History   Problem Relation Age of Onset    Cancer Father         bladder ca    Other Sister         MULTIPLE SCLEROSIS    High Blood Pressure Mother     Heart Disease Mother     Stroke Mother        Social History     Tobacco Use    Smoking status: Former Smoker     Packs/day: 1.00     Years: 3.00     Pack years: 3.00     Types: Cigarettes     Last attempt to quit: 10/1/1984     Years since quittin.0    Smokeless tobacco: Never Used    Tobacco comment: 3 YEARS PLAYED AROUND AND QUIT IN THE    Substance Use Topics    Alcohol use: No     Alcohol/week: 0.0 standard drinks          ROS:  review of system reviewed and positive for the above all other systems noted to be negative      Physical Exam  Blood pressure 118/74, temperature 98.4 °F (36.9 °C), temperature source Temporal, height 5' 4\" (1.626 m), weight 178 lb (80.7 kg). Constitutional:  This is a 78 y. o.female that appears to be in no acute distress. She is pleasant and answers questions appropriately. Breast:  In examination to her breast, she has some postoperative changes to the right breast.  There is no fluid accumulation. There is no evidence of infection. Impression:  Status post right lumpectomy with sentinel node biopsy no appreciable evidence of seroma or infection        PLAN  We will see her back in the office with Dr. Shayy Zuniga in 2 to 3 weeks and with her medical oncologist as prescribed.

## 2020-10-05 PROBLEM — C50.911 MALIGNANT NEOPLASM OF RIGHT BREAST IN FEMALE, ESTROGEN RECEPTOR NEGATIVE: Status: ACTIVE | Noted: 2020-04-17

## 2020-10-05 PROBLEM — Z17.1 MALIGNANT NEOPLASM OF RIGHT BREAST IN FEMALE, ESTROGEN RECEPTOR NEGATIVE (HCC): Status: ACTIVE | Noted: 2020-04-17

## 2020-10-06 PROBLEM — Z98.890 S/P LYMPH NODE BIOPSY: Status: ACTIVE | Noted: 2020-10-06

## 2020-10-06 PROBLEM — Z98.890 S/P LUMPECTOMY, RIGHT BREAST: Status: ACTIVE | Noted: 2020-10-06

## 2020-10-06 PROBLEM — Z87.891 FORMER SMOKER: Status: ACTIVE | Noted: 2020-10-06

## 2020-10-06 NOTE — PROGRESS NOTES
[facility], P.SRevaCMD Daniela Alfonso BSN, PA-C  _______________________________________________  [facility]  Department of Radiation Oncology  [address] [place] [id number]  Office: [phone]  Fax: [phone]    DATE:  10/07/2020  PATIENT: Chely Venegas  1950                         MEDICAL RECORD #:  [id number]                                                       REASON FOR CONSULTATION:  Chely Venegas is a very pleasant female that has been referred to our office by Alexi Ochoa MD to discuss radiotherapy recommendations. Reports fatigue. Taking Letrozole with hot flashes noted. Denies activity change, appetite change, unexpected weight change, nausea/vomiting, diarrhea, light-headedness, weakness, and headaches. She follows  and .     History of Present Illness:  Diagnosed in March 2020 with Stage (T1,cN0, cM0, G3) Triple Negative Invasive Ductal Carcinoma of overlapping sites of right breast, largest 1.4 cm with MRI clinical asymmetric lymph nodes are within the upper outer quadrant deep against the pectoralis muscle and axilla. ER 4%, [place] 0.1%, HER-2/emperatriz IHC 0, Ki-67 86%. Mammaprint High risk basal. Follows Dr. Ochoa who treated with neoadjuvant chemotherapy, initiated AC/Taxol on 05/06/2020 with completion on 08/12/2020 with a complete pathologic response.   Right breast lumpectomy and lymph node biopsy on 09/15/2020, pathology was negative for residual carcinoma or carcinoma in situ. 1/1 right [place] lymph node negative for evidence of malignancy.    09/21/2020 - Appointment with :  PLAN:   • Return in one week for a fluid check.   • See Dr. Ochoa as scheduled and I will see her in one month.   • She will call with any changes.    09/23/2020 - Appointment with :  ASSESSMENT:  • Right breast IDC, triple negative (ER 4%), basal type MammaPrint  • Post dose dense AC/dose dense  Taxol  • Right lumpectomy and sentinel lymph node biopsy-complete pathologic response  • Findings of atypical ductal hyperplasia  • Recommended adjuvant radiation  • Recommended adjuvant endocrine therapy with letrozole x5 years    03/19/2020 - Breast, right breast needle core biopsies at 10:00 position:  • Invasive ductal carcinoma, no special type, grade 3.  • Invasive carcinoma measures 0.7 cm in greatest linear dimension and is present in all cores.  • ER 4%, WI 0% Her2/emperatriz neg  IHC 0  Ki67  86%  • MammaPrint High Risk Basal type    04/01/2020 - MRI Breast bilateral with and without contrast:  • Enhancing right breast lesion within the upper outer quadrant. Mid breast 11:00 position. Diameter = 1.1 x 0.85 x 1.4 cm. Lesion volume = 0.64 mL.  • Nonenlarged lymph nodes deep within the upper outer right breast against the chest wall.  • The lymph nodes show no overt malignant characteristics though there is mild asymmetry of the number of lymph nodes with regards to the left axilla and directed ultrasound evaluation is recommended. Ultrasound-guided fine-needle biopsy can be performed at that time if needed.  • Asymmetric lymph nodes are within the upper outer quadrant deep against the pectoralis muscle and in the posterior 11:00 position.  • There is also a small focus of enhancement within the midportion of the right breast. This is within the upper outer quadrant and located within the mid breast 11:00 position. This finding is slightly more medial and anterior with regards to the biopsy-proven lesion. Diameter = 0.4 x 0.3 x 0.35 cm. Enhancement kinetics show 40% washout. Directed ultrasound of the right breast is recommended in attempt to visualize this small enhancing focus. Ultrasound-guided needle biopsy can be performed at that time if needed.  • No left breast abnormality is seen.  Impression:  • Right breast lesion 1 represents biopsy-proven invasive ductal carcinoma.  • Right breast lesion 2 represents  nonenlarged though asymmetric lymph nodes deep within the right breast adjacent to the pectoralis muscle.  • Right breast lesion 3 represents a 4 mm enhancing focus within the central right breast.  • Directed ultrasound evaluation with potential ultrasound-guided biopsy is recommended with regards to the right side lymph nodes and the tiny enhancing focus within the central right breast separate from the previously biopsied site.    04/15/2020 - US breast limited right:  • In the right breast at 10:00, approximately 6 cm the nipple, there is a biopsy clip adjacent to a small irregular mass that measures approximately 10 mm, compatible with the known malignancy.   • On recent breast MRI, there is a satellite nodule within the central right breast that measures 4 mm. This is not visualized by ultrasound.   • Also on the previous MRI, there were normal sized lymph nodes deep in the right breast anterior to the pectoralis muscle, one of these is noted is visualized and has a normal size and morphology on today's ultrasound.  Impression:  • Post biopsy changes of the known malignancy in the right breast at 10:00, essentially unchanged compared with the previous ultrasound. This measures about 1 cm.  • A small 4 mm satellite nodule seen in the right breast on recent MRI is not visualized sonographically.  • Normal sized lymph node seen in the deep right breast    05/06/2020 - 08/12/2020 - Chemotherapy course:  • ddAC with G-CSF support followed by dose dense Taxol x4 cycles with G-CSF.    07/06/2020 - US breast limited right:  • Irregular hypoechoic nodule representing residual disease and measuring 7 x 6 x 6 mm compared with 11 x 11 x 8 mm on 3/19/2020.  Impression:  • Positive response to therapy with decreased size of the known right breast malignancy.    09/01/2020 - Unilateral right breast mammogram:  • Category C: The breast tissue is heterogeneously dense, which may obscure small masses.  • RIGHT breast upper  outer quadrant biopsy clip marker at the site of biopsy-proven neoplasm is identified. 5 x 12 mm focal density surrounds a biopsy clip marker, which likely represents hydrated biopsy cavity surrounding hydromark clip.   • Some faint calcification adjacent to the biopsy clip marker has developed.   • There is a small 3 mm asymmetry in the lateral RIGHT breast anteriorly 2 cm which likely represented summation artifact.  Targeted ultrasound of the RIGHT breast outer quadrant was performed.  • The biopsy clip marker is identified with surrounding hydration cavity.   • No residual soft tissue mass visible.   • The lateral RIGHT breast was also imaged. No suspicious nodule or mass is identified in the lateral RIGHT breast.  Impression:  • No definite residual mass at the biopsy clip marker, marking site of biopsy-proven neoplasm.   • Some new faint calcification within this region on mammogram is present, which may represent fat necrosis.  • BI-RADS CATEGORY 6: KNOWN BIOPSY-PROVEN MALIGNANCY.    09/01/2020 - US Breast right limited:  • No definite residual mass at the biopsy clip marker, marking site of biopsy-proven neoplasm.   • Some new faint calcification within this region on mammogram is present, which may represent fat necrosis.  • BI-RADS CATEGORY 6: KNOWN BIOPSY-PROVEN MALIGNANCY.    09/01/2020 - MR Breast bilateral with and without contrast:  • No residual soft tissue mass at the site of RIGHT upper outer quadrant biopsy clip, correlating with site of biopsy-proven neoplasm.   • No evidence of residual disease by MR.   • The small satellite nodule identified on prior breast MR is also no longer identified.   • No axillary or internal mammary lymphadenopathy.  • BI-RADS CATEGORY 6: KNOWN BIOPSY-PROVEN MALIGNANCY.    09/15/2020 - Right breast lumpectomy and lymph node biopsy per :  • Breast, right lumpectomy:  o Prior biopsy site identified, negative for residual carcinoma or carcinoma in situ.  o Focal  atypical ductal hyperplasia, margins negative.  o Extensive fibrosis present adjacent to prior biopsy site consistent with complete therapeutic response.  o Surgical excision margins are negative for prior biopsy site.  • Breast, excision of additional right breast anterior margin:   o Benign breast parenchyma.  • Breast, excision of additional right breast cranial margin:   o Benign breast parenchyma.  • Lymph node, right [place] lymph node biopsy:   o One lymph node, negative for evidence of malignancy.    09/21/2020 - Appointment with :  PLAN:   • Return in one week for a fluid check.   • See Dr. Ochoa as scheduled and I will see her in one month.   • She will call with any changes.    09/23/2020 - Appointment with :  ASSESSMENT:  • Right breast IDC, triple negative (ER 4%), basal type MammaPrint  • Post dose dense AC/dose dense Taxol  • Right lumpectomy and sentinel lymph node biopsy-complete pathologic response  • Findings of atypical ductal hyperplasia  • Recommended adjuvant radiation  • Recommended adjuvant endocrine therapy with letrozole x5 years  PLAN:  • RTC with MD 4 months  • Continue follow-up with Dr. Florian to consider port removal  • Refer to Dr. Jeffrey regarding right breast XRT  • Start Femara 2.5 mg daily  • Recommend Calcium and Vitamin D daily for osteopenia  • Bone density July 2021    History obtained from  PATIENT and CHART    PAST MEDICAL HISTORY  Past Medical History:   Diagnosis Date   • Anxiety    • Arthritis    • Breast cancer (CMS/HCC)    • Depression    • Esophagitis    • Hyperlipidemia       PAST SURGICAL HISTORY  Past Surgical History:   Procedure Laterality Date   • BREAST LUMPECTOMY     • CHOLECYSTECTOMY     • COLONOSCOPY  07/21/2006    normal   • COLONOSCOPY N/A 11/16/2018    Procedure: COLONOSCOPY WITH ANESTHESIA;  Surgeon: Joe Dougherty MD;  Location: [facility] ENDOSCOPY;  Service: Gastroenterology   • ENDOSCOPY N/A 11/16/2018    Procedure:  ESOPHAGOGASTRODUODENOSCOPY WITH ANESTHESIA;  Surgeon: Joe Dougherty MD;  Location: [facility] ENDOSCOPY;  Service: Gastroenterology   • HERNIA REPAIR     • LUNG LOBECTOMY     • THORACOTOMY     • TONSILLECTOMY        FAMILY HISTORY  family history includes Cancer in her father; Stroke in her mother.     SOCIAL HISTORY  Social History     Tobacco Use   • Smoking status: Former Smoker     Years: 3.00     Types: Cigarettes     Quit date: 10/7/1983     Years since quittin.0   • Smokeless tobacco: Never Used   Substance Use Topics   • Alcohol use: No   • Drug use: No     ALLERGIES  Codeine and Other     MEDICATIONS    Current Outpatient Medications:   •  aspirin 81 MG EC tablet, Take 81 mg by mouth Daily., Disp: , Rfl:   •  Cholecalciferol (VITAMIN D) 50 MCG ( [place]) capsule, Take  by mouth., Disp: , Rfl:   •  escitalopram (LEXAPRO) 5 MG tablet, Take 5 mg by mouth Daily., Disp: , Rfl:   •  letrozole (FEMARA) 2.5 MG tablet, Take 2.5 mg by mouth Daily., Disp: , Rfl:   •  lovastatin (MEVACOR) 20 MG tablet, Take 40 mg by mouth Every Night., Disp: , Rfl:   •  montelukast (SINGULAIR) 10 MG tablet, Take 10 mg by mouth Daily., Disp: , Rfl:   •  OZEMPIC, 0.25 OR 0.5 MG/DOSE, 2 MG/1.5ML solution pen-injector, , Disp: , Rfl:   •  azithromycin (ZITHROMAX Z-PANKAJ) 250 MG tablet, Take 2 tablets the first day, then 1 tablet daily for 4 days., Disp: 6 tablet, Rfl: 0  •  triamcinolone (KENALOG) 0.1 % cream, Apply  topically to the appropriate area as directed 2 (Two) Times a Day., Disp: , Rfl:     The following portions of the patient's history were reviewed and updated as appropriate: allergies, current medications, past family history, past medical history, past social history, past surgical history and problem list.    Current outpatient and discharge medications have been reconciled for the patient.  Reviewed by: Titi Jeffrey III, MD    REVIEW OF SYSTEMS  Review of Systems   Constitutional: Positive for fatigue. Negative for  "activity change, appetite change, chills, diaphoresis, fever and unexpected weight change.   HENT: Negative.    Eyes: Negative.    Respiratory: Negative.    Cardiovascular: Negative.    Gastrointestinal: Negative.    Endocrine: Negative.         Letrozole  Some hot flashes   Genitourinary: Negative.    Musculoskeletal: Negative.    Skin: Negative.    Allergic/Immunologic: Negative.    Neurological: Negative.    Hematological: Negative.    Psychiatric/Behavioral: Negative.      PHYSICAL EXAM  VITAL SIGNS:   Vitals:    10/07/20 0956   BP: 125/65   Pulse: 89   Weight: 80.7 kg (178 lb)   Height: 162.6 cm (64\")   PainSc: 0-No pain     General:  Alert and oriented, no acute distress, well developed, Vitals reviewed.  Head:  Normocephalic, without obvious abnormality    Nose/Sinuses:  Nares normal externally, no sinus tenderness.  Mouth/Throat:  Mucosa moist, pharynx without erythema  Neck:  supple, No evidence of adenopathy in the cervical or supraclavicular areas.  Eyes: No gross abnormalities   Ears: Ears intact with no external abnormalities noted  Chest:  Respiratory efforts are normal and unlabored, chest is clear to auscultation.  Cardiovascular: Regular rate and rhythm without murmurs, rubs, or gallops.   Abdomen:  Soft, non-tender, normal bowel sounds; no CVA tenderness   Breast: s/p right breast lumpectomy, left breast normal without mass, skin or nipple changes or axillary nodes, surgical scars noted right breast, risk and benefit of breast self-exam was discussed.  Extremities:  PARIS well, warm to touch, no evidence of cyanosis or edema.  Skin: No suspicious lesions or rashes of concern  Neurologic:  Alert and oriented, non focal exam, strength and sensation grossly normal  Psych: Mood and affect are appropriate    Performance Status: ECOG (0) Fully active, able to carry on all predisease performance without restriction    Clinical Quality Measures  -Pain Documented by Standardized Tool, BRYN Venegas " reports a pain score of 0. Given her pain assessment as noted, treatment options were discussed and the following options were decided upon as a follow-up plan to address the patient's pain: No pain, no plan given.  Pain Medications             aspirin 81 MG EC tablet Take 81 mg by mouth Daily.    escitalopram (LEXAPRO) 5 MG tablet Take 5 mg by mouth Daily.        -Advanced Care Planning Advance Care Planning   ACP discussion was held with the patient during this visit. Patient does not have an advance directive, information provided.     -Body Mass Index Screening and Follow-Up Plan Patient's Body mass index is 30.55 kg/m². BMI is above normal parameters. Recommendations include: educational material.  -Tobacco Use: Screening and Cessation Intervention Social History    Tobacco Use      Smoking status: Former Smoker        Years: 3.00        Types: Cigarettes        Quit date: 10/7/1983        Years since quittin.0      Smokeless tobacco: Never Used    -HER-2 nue Results: are not overexpressed by FISH    ASSESSMENT AND PLAN  1. Malignant neoplasm of right breast in female, estrogen receptor negative, unspecified site of breast (CMS/HCC)    2. S/P lumpectomy, right breast    3. S/P lymph node biopsy    4. Former smoker      Orders Placed This Encounter   Procedures   • Ambulatory Referral to Physical Therapy Evaluate and treat, Bioimpedance, Lymphedema     RECOMMENDATIONS: Chely Venegas is a very pleasant 70 y.o. female that has been referred to our clinic by Alexi Ochoa MD to discuss radiotherapy recommendations for breast carcinoma.     Diagnosed in 2020 with Stage (T1,cN0, cM0, G3) Triple Negative Invasive Ductal Carcinoma of overlapping sites of right breast, largest 1.4 cm with MRI clinical asymmetric lymph nodes are within the upper outer quadrant deep against the pectoralis muscle and axilla. ER 4%, [place] 0.1%, HER-2/emperatriz IHC 0, Ki-67 86%. Mammaprint High risk basal. Follows Dr. Ochoa  who treated with neoadjuvant chemotherapy, initiated AC/Taxol on 05/06/2020 with completion on 08/12/2020 with a complete pathologic response.   Right breast lumpectomy and lymph node biopsy on 09/15/2020, pathology was negative for residual carcinoma or carcinoma in situ. 1/1 right [place] lymph node negative for evidence of malignancy.    We have discussed the role of radiation therapy with this diagnosis as well as the iIndications and rationale of adjuvant radiation therapy according to the NCCN Guidelines. I have extensively reviewed the risks, benefits and alternatives of therapy for cancer of the breast.  Risks of radiation therapy includes but is not limited to radiation induced red, dry, tender, or itchy sunburn-type skin irritation of the targeted area, which may range from mild to intense, skin, breast heaviness,  redness, bruised appearance or discoloration of the skin, cumulative general fatigue and the risk of progression of disease in spite of therapy with either local or systemic failure.      I have seen, examined and reviewed this patient's medication list, appropriate labs and imaging studies as well as other physician notes. We discussed the goals and plans of care with the patient and family and answered all questions.     Since the patient is at risk for lymphedema post surgery and radiation, I will ask physical therapy for lymphedema baseline evaluation.     The patient has verbalized understanding of the risk of therapy and agrees to the treatment recommendations for postoperative radiation therapy to the right breast and nodes.  We will simulate treatment fields with dose and final number of treatment fractions to be determined during the treatment planning process,  I anticipate a dose of 5040 cGy with 1000 cGy boost to the tumor bed for a total of 6040 cGy/33 fractions with plans to start in the near future.    Todays appointment time was spent in counseling and coordination of care as  follows: diagnosis, intent of treatment discussing radiation therapy specifics: logistics, possible and probable side effects and after effects, staging of cancer, standard of care in for this stage of this cancer and treatment options   Titi Jeffrey III, MD  10/07/2020

## 2020-10-07 ENCOUNTER — CONSULT (OUTPATIENT)
Dept: RADIATION ONCOLOGY | Facility: HOSPITAL | Age: 70
End: 2020-10-07

## 2020-10-07 ENCOUNTER — HOSPITAL ENCOUNTER (OUTPATIENT)
Dept: RADIATION ONCOLOGY | Facility: HOSPITAL | Age: 70
Setting detail: RADIATION/ONCOLOGY SERIES
End: 2020-10-07

## 2020-10-07 VITALS
HEIGHT: 64 IN | DIASTOLIC BLOOD PRESSURE: 65 MMHG | HEART RATE: 89 BPM | SYSTOLIC BLOOD PRESSURE: 125 MMHG | WEIGHT: 178 LBS | BODY MASS INDEX: 30.39 KG/M2

## 2020-10-07 DIAGNOSIS — Z87.891 FORMER SMOKER: ICD-10-CM

## 2020-10-07 DIAGNOSIS — Z98.890 S/P LYMPH NODE BIOPSY: ICD-10-CM

## 2020-10-07 DIAGNOSIS — C50.911 MALIGNANT NEOPLASM OF RIGHT BREAST IN FEMALE, ESTROGEN RECEPTOR NEGATIVE, UNSPECIFIED SITE OF BREAST (HCC): Primary | ICD-10-CM

## 2020-10-07 DIAGNOSIS — Z17.1 MALIGNANT NEOPLASM OF RIGHT BREAST IN FEMALE, ESTROGEN RECEPTOR NEGATIVE, UNSPECIFIED SITE OF BREAST (HCC): Primary | ICD-10-CM

## 2020-10-07 DIAGNOSIS — Z98.890 S/P LUMPECTOMY, RIGHT BREAST: ICD-10-CM

## 2020-10-07 PROCEDURE — 77334 RADIATION TREATMENT AID(S): CPT | Performed by: RADIOLOGY

## 2020-10-07 PROCEDURE — 77290 THER RAD SIMULAJ FIELD CPLX: CPT | Performed by: RADIOLOGY

## 2020-10-07 RX ORDER — LETROZOLE 2.5 MG/1
2.5 TABLET, FILM COATED ORAL DAILY
COMMUNITY
Start: 2020-09-23

## 2020-10-07 RX ORDER — MONTELUKAST SODIUM 10 MG/1
10 TABLET ORAL DAILY
COMMUNITY
Start: 2020-07-06

## 2020-10-07 NOTE — PATIENT INSTRUCTIONS
Breast Cancer, Female    Breast cancer is a malignant growth of tissue (tumor) in the breast. Unlike noncancerous (benign) tumors, malignant tumors are cancerous and can spread to other parts of the body. The two most common types of breast cancer start in the milk ducts (ductal carcinoma) or in the lobules where milk is made in the breast (lobular carcinoma). Breast cancer is one of the most common types of cancer in women.  What are the causes?  The exact cause of female breast cancer is unknown.  What increases the risk?  The following factors may make you more likely to develop this condition:  · Being older than 55 years of age.  · Race and ethnicity.  women generally have an increased risk, but -American women are more likely to develop the disease before age 45.  · Having a family history of breast cancer.  · Having had breast cancer in the past.  · Having certain noncancerous conditions of the breast, such as dense breast tissue.  · Having the BRCA1 and BRCA2 genes.  · Having a history of radiation exposure.  · Obesity.  · Starting menopause after age 55.  · Starting your menstrual periods before age 12.  · Having never been pregnant or having your first child after age 30.  · Having never .  · Using hormone therapy after menopause.  · Using birth control pills.  · Drinking more than one alcoholic drink a day.  · Exposure to the drug JAYCE, which was given to pregnant women from the 1940s to the 1970s.  What are the signs or symptoms?  Symptoms of this condition include:  · A painless lump or thickening in your breast.  · Changes in the size or shape of your breast.  · Breast skin changes, such as puckering or dimpling.  · Nipple abnormalities, such as scaling, crustiness, redness, or pulling in (retraction).  · Nipple discharge that is bloody or clear.  How is this diagnosed?  This condition may be diagnosed by:  · Taking your medical history and doing a physical exam. During the  exam, your health care provider will feel the tissue around your breast and under your arms.  · Taking a sample of nipple discharge. The sample will be examined under a microscope.  · Performing imaging tests, such as breast X-rays (mammogram), breast ultrasound exams, or an MRI.  · Taking a tissue sample (biopsy) from the breast. The sample will be examined under a microscope to look for cancer cells.  · Taking a sample from the lymph nodes near the affected breast (sentinel node biopsy).  Your cancer will be staged to determine its severity and extent. Staging is a careful attempt to find out the size of the tumor, whether the cancer has spread, and if so, to what parts of the body. Staging also includes testing your tumor for certain receptors, such as estrogen, progesterone, and human epidermal growth factor receptor 2 (HER2). This will help your cancer care team decide on a treatment that will work best for you. You may need to have more tests to determine the stage of your cancer. Stages include the following:  · Stage 0--The tumor has not spread to other breast tissue.  · Stage I--The cancer is only found in the breast or may be in the lymph nodes. The tumor may be up to ¾ in (2 cm) wide.  · Stage II--The cancer has spread to nearby lymph nodes. The tumor may be up to 2 in (5 cm) wide.  · Stage III--The cancer has spread to more distant lymph nodes. The tumor may be larger than 2 in (5 cm) wide.  · Stage IV--The cancer has spread to other parts of the body, such as the bones, brain, liver, or lungs.  How is this treated?  Treatment for this condition depends on the type and stage of the breast cancer. It may be treated with:  · Surgery. This may involve breast-conserving surgery (lumpectomy or partial mastectomy) in which only the part of the breast containing the cancer is removed. Some normal tissue surrounding this area may also be removed. In some cases, surgery may be done to remove the entire breast  (mastectomy) and nipple. Lymph nodes may also be removed.  · Radiation therapy, which uses high-energy rays to kill cancer cells.  · Chemotherapy, which is the use of drugs to kill cancer cells.  · Hormone therapy, which involves taking medicine to adjust the hormone levels in your body. You may take medicine to decrease your estrogen levels. This can help stop cancer cells from growing.  · Targeted therapy, in which drugs are used to block the growth and spread of cancer cells. These drugs target a specific part of the cancer cell and usually cause fewer side effects than chemotherapy. Targeted therapy may be used alone or in combination with chemotherapy.  · A combination of surgery, radiation, chemotherapy, or hormone therapy may be needed to treat breast cancer.  Follow these instructions at home:  · Take over-the-counter and prescription medicines only as told by your health care provider.  · Eat a healthy diet. A healthy diet includes lots of fruits and vegetables, low-fat dairy products, lean meats, and fiber.  ? Make sure half your plate is filled with fruits or vegetables.  ? Choose high-fiber foods such as whole-grain breads and cereals.  · Consider joining a support group. This may help you learn to cope with the stress of having breast cancer.  · Talk to your health care team about exercise and physical activity. The right exercise program can:  ? Help prevent or reduce symptoms such as fatigue or depression.  ? Improve overall health and survival rates.  · Keep all follow-up visits as told by your health care provider. This is important.  Where to find more information  · American Cancer Society: www.cancer.org  · [place] Cancer [place]: www.cancer.gov  Contact a health care provider if:  · You have a sudden increase in pain.  · You have any symptoms or changes that concern you.  · You lose weight without trying.  · You notice a new lump in either breast or under your arm.  · You develop swelling in  either arm or hand.  · You have a fever.  · You notice new fatigue or weakness.  Get help right away if:  · You have chest pain or trouble breathing.  · You faint.  Summary  · Breast cancer is a malignant growth of tissue (tumor) in the breast.  · Your cancer will be staged to determine its severity and extent.  · Treatment for this condition depends on the type and stage of the breast cancer.  This information is not intended to replace advice given to you by your health care provider. Make sure you discuss any questions you have with your health care provider.  Document Released: 03/28/2007 Document Revised: 11/30/2018 Document Reviewed: 08/13/2018  [employer] Patient Education © 2020 [employer]    External Beam Radiation Therapy  External beam radiation therapy is a type of radiation treatment. This type of radiation therapy can deliver radiation to a fairly large area. This is the most common type of radiation therapy for cancer. This therapy may be done to:  · Treat cancer by:  ? Destroying cancer cells. Radiation delivered during the treatment damages cancer cells. It may also damage normal cells, but normal cells have the DNA to repair themselves while cancer cells do not.  ? Helping with symptoms of your cancer.  ? Stopping the growth of any remaining cancer cells after surgery.  ? Preventing cancer cells from growing in areas that do not have cancer (prophylactic radiation therapy).  · Treat or shrink a tumor.  · Reduce pain (palliative therapy).  The amount of radiation you will receive and the length of therapy depend on your medical condition. You should not feel the radiation being delivered or any pain during your therapy.  Let your health care provider know about:  · Any allergies you have.  · All medicines you are taking, including vitamins, herbs, eye drops, creams, and over-the-counter medicines.  · Any problems you or family members have had with anesthetic medicines.  · Any blood disorders you  have.  · Any surgeries you have had.  · Any medical conditions you have.  · Whether you are pregnant or may be pregnant.  What are the risks?  Generally, this is a safe procedure. However, radiation therapy can place a person at a higher risk for a second cancer later in life.  Most people experience side effects from the therapy. Side effects depend on the amount of radiation and the part of the body that was exposed to radiation. The most common side effects include:  · Skin changes.  · Hair loss.  · Fatigue.  · Nausea and vomiting.  What happens before the procedure?  · There will be a planning session (simulation). During the session:  ? Your health care provider will plan exactly where the radiation will be delivered (treatment field).  ? You will be positioned for your therapy. The goal is to have a position that can be reproduced for each therapy session.  ? Temporary marks may be drawn on your body. Permanent marks may also be drawn on your body in order for you to be positioned the same way for each therapy session.  ? A tool that holds a body part in place (immobilization device) may be used to keep the area of treatment in the correct position.  · Follow instructions from your health care provider about eating or drinking restrictions.  · Ask your health care provider about changing or stopping your regular medicines. This is especially important if you are taking diabetes medicines or blood thinners.  What happens during the procedure?    · You will either lie on a table or sit in a chair in the position determined for your therapy.  · You may have a heavy shield placed on you to protect tissues and organs that are not being treated.  · The radiation machine (linear accelerator) will move around you to deliver the radiation in exact doses from different angles. The machine will not touch you.  The procedure may vary among health care providers and hospitals.  What happens after the procedure?  · You may  return to your normal routine including diet, activities, and medicines as told by your health care provider.  · You may want to have someone take you home from the hospital or clinic.  Summary  · External beam radiation therapy is a type of radiation treatment for cancer.  · The amount of radiation you will receive and the length of therapy depend on your medical condition.  · Most people experience side effects from the therapy. Side effects depend on the amount of radiation and the part of the body that was exposed to radiation.  This information is not intended to replace advice given to you by your health care provider. Make sure you discuss any questions you have with your health care provider.  Document Released: 05/06/2010 Document Revised: 12/19/2018 Document Reviewed: 12/18/2017  Elsevier Patient Education © 2020 [employer]vier Inc.

## 2020-10-12 PROCEDURE — 77300 RADIATION THERAPY DOSE PLAN: CPT | Performed by: RADIOLOGY

## 2020-10-12 PROCEDURE — 77295 3-D RADIOTHERAPY PLAN: CPT | Performed by: RADIOLOGY

## 2020-10-12 PROCEDURE — 77334 RADIATION TREATMENT AID(S): CPT | Performed by: RADIOLOGY

## 2020-10-16 ENCOUNTER — TREATMENT (OUTPATIENT)
Dept: PHYSICAL THERAPY | Facility: CLINIC | Age: 70
End: 2020-10-16

## 2020-10-16 DIAGNOSIS — Z91.89 AT RISK FOR LYMPHEDEMA: Primary | ICD-10-CM

## 2020-10-16 DIAGNOSIS — Z98.890 S/P LYMPH NODE BIOPSY: ICD-10-CM

## 2020-10-16 DIAGNOSIS — Z98.890 S/P LUMPECTOMY, RIGHT BREAST: ICD-10-CM

## 2020-10-16 PROCEDURE — 97161 PT EVAL LOW COMPLEX 20 MIN: CPT | Performed by: PHYSICAL THERAPIST

## 2020-10-16 NOTE — PROGRESS NOTES
Physical Therapy Lymphedema Initial Evaluation       Patient Name: Chely Venegas  : 1950  MRN: [id number]  Today's Date: 10/16/2020      Visit Date: 10/16/2020    Visit Dx:    ICD-10-CM ICD-9-CM   1. At risk for lymphedema  Z91.89 V49.89   2. S/P lumpectomy, right breast  Z98.890 V45.89   3. S/P lymph node biopsy  Z98.890 V45.89       Patient Active Problem List   Diagnosis   • Esophageal dysphagia   • BRBPR (bright red blood per rectum)   • Bilateral low back pain without sciatica   • Exogenous obesity   • Generalized anxiety disorder   • Microhematuria   • Osteopenia of left lower leg   • Pure hypercholesterolemia   • Type 2 diabetes mellitus without complication, without long-term current use of insulin (CMS/HCC)   • Vitamin D deficiency   • Malignant neoplasm of right breast in female, estrogen receptor negative (CMS/HCC)   • S/P lumpectomy, right breast   • S/P lymph node biopsy   • Former smoker        Past Medical History:   Diagnosis Date   • Anxiety    • Arthritis    • Breast cancer (CMS/HCC)    • Depression    • Esophagitis    • Hyperlipidemia         Past Surgical History:   Procedure Laterality Date   • BREAST LUMPECTOMY     • CHOLECYSTECTOMY     • COLONOSCOPY  2006    normal   • COLONOSCOPY N/A 2018    Procedure: COLONOSCOPY WITH ANESTHESIA;  Surgeon: Joe Dougherty MD;  Location: [facility] ENDOSCOPY;  Service: Gastroenterology   • ENDOSCOPY N/A 2018    Procedure: ESOPHAGOGASTRODUODENOSCOPY WITH ANESTHESIA;  Surgeon: Joe Dougherty MD;  Location: [facility] ENDOSCOPY;  Service: Gastroenterology   • HERNIA REPAIR     • LUNG LOBECTOMY     • THORACOTOMY     • TONSILLECTOMY         Visit Dx:    ICD-10-CM ICD-9-CM   1. At risk for lymphedema  Z91.89 V49.89   2. S/P lumpectomy, right breast  Z98.890 V45.89   3. S/P lymph node biopsy  Z98.890 V45.89           Lymphedema     Row Name 10/16/20 1500             Subjective Pain    Able to rate subjective pain?  yes  -HR       Pre-Treatment Pain Level  0  -HR         Subjective Comments    Subjective Comments  She should start radiation next Wednesday  -HR         Lymphedema Assessment    Lymphedema Classification  RUE:;Trunk:;at risk/stage 0  -HR      Lymphedema Cancer Related Sx  right;lumpectomy;sentinel node biopsy  -HR      Lymphedema Surgery Comments  Dr. Florian 9/15/2020  -HR      Lymph Nodes Removed #  1  -HR      Positive Lymph Nodes #  0  -HR      Cancer Comments  triple negative  -HR      Chemo Received  yes  -HR      Chemo Treatments #/Timeframe  April-August  -HR      Radiation Therapy Received  yes  -HR      Radiation Treatments #/Timeframe  starts next week  -HR      Infections or Cellulitis?  no  -HR         Lymphedema Measurements    Measurement Type(s)  Quick Girth  -HR      Quick Girth Areas  Upper extremities  -HR         LUE Quick Girth (cm)    Axilla  33 cm  -HR      Mid upper arm  31.2 cm  -HR      Elbow  25.3 cm  -HR      Mid forearm  21.5 cm  -HR      Wrist crease  15.5 cm  -HR      Web space  17.2 cm  -HR      LUE Quick Girth Total  143.7  -HR         RUE Quick Girth (cm)    Axilla  34.5 cm  -HR      Mid upper arm  32.5 cm  -HR      Elbow  26.2 cm  -HR      Mid forearm  22.5 cm  -HR      Wrist crease  15.8 cm  -HR      Web space  17.4 cm  -HR      RUE Quick Girth Total  148.9  -HR        User Key  (r) = Recorded By, (t) = Taken By, (c) = Cosigned By    Initials Name Provider Type    HR Candice Gallagher, PT, DPT, CLT-DAVID Physical Therapist                          Therapy Education  Education Details: reviewed lymphedema education folder and gave to patient.       OP Exercises     Row Name 10/16/20 1500             Subjective Comments    Subjective Comments  She should start radiation next Wednesday  -HR         Subjective Pain    Able to rate subjective pain?  yes  -HR      Pre-Treatment Pain Level  0  -HR        User Key  (r) = Recorded By, (t) = Taken By, (c) = Cosigned By    Initials Name Provider Type     HR Candice Gallagher, PT, DPT, CLGUERDA Physical Therapist                          PT Assessment/Plan     Row Name 10/16/20 1500          PT Assessment    Impairments  Impaired lymphatic circulation;Integumentary integrity  -HR     Assessment Comments  Mrs. Venegas is about 4 weeks post-op from her R lumpectomy and SNB. She is not having any complaints and incision line is closed well. I was able to review the risk for lymphedema and the suggested guidelines to reduce the risk. She has a 5 cm difference between arms today, but I would expect her to have some mild post-op edema still present. She is asymptomatic, so I think that is what the difference is from. She has no further needs at this time. We will probably follow up once she has completed radiation.  -HR     Rehab Potential  Excellent  -HR     Patient/caregiver participated in establishment of treatment plan and goals  Yes  -HR        PT Plan    PT Frequency  One time visit  -HR     PT Plan Comments  Reassess as needed  -HR       User Key  (r) = Recorded By, (t) = Taken By, (c) = Cosigned By    Initials Name Provider Type    HR Candice Gallagher, PT, DPT, CLGUERDA Physical Therapist                       Time Calculation:                     Candice Gallagher, PT, DPT, CLGUERDA  10/16/2020

## 2020-10-19 NOTE — PROGRESS NOTES
HISTORY OF PRESENT ILLNESS:    Ms. Sadiq Frederick presents today for a post op exam from undergoing a Right lumpectomy with Right sentinel lymph node biopsy on 9/15/2020. On 3/19/2020 she underwent an ultrasound guided breast biopsy  on the right which revealed a 1.1 cm grade 3 invasive ductal carcinoma. ER is positive at 4%. VT is Negative. Her2 is Negative. Ki67 is High at 86%. Mammaprint is High Risk Basal Type. 9/1/2020-Bilateral Breast MRI  No suspicious soft tissue mass within the region of the    biopsy clip. The satellite lesion identified on prior breast MR is    also no longer visualized. No new suspicious mass or abnormal enhancement in either breast. LEFT    breast ductal ectasia with T1 hyperintense proteinaceous debris within    the ducts is again noted. No axillary or internal mammary    lymphadenopathy. LEFT chest port catheter is noted. No suspicious bone    lesions identified. No acute finding in the anterior liver.         Impression    No residual soft tissue mass at the site of RIGHT upper outer quadrant    biopsy clip, correlating with site of biopsy-proven neoplasm. No    evidence of residual disease by MR. The small satellite nodule    identified on prior breast MR is also no longer identified. No    axillary or internal mammary lymphadenopathy. BI-RADS CATEGORY 6: KNOWN BIOPSY-PROVEN MALIGNANCY. Signed by Dr Mikaela Trujillo on 9/1/2020 1:51 PM          She is recently status post Right lumpectomy with Right sentinel lymph node biopsy on 9/15/2020. PATHOLOGY REVEALS:  FINAL DIAGNOSIS:     A.  Breast, right lumpectomy:    1.  Prior biopsy site identified, negative for residual carcinoma or carcinoma in situ.    2.  Focal atypical ductal hyperplasia, margins negative.    3.  Extensive fibrosis present adjacent to prior biopsy site consistent with complete therapeutic response.    4.  Surgical excision margins are negative for prior biopsy site.      Gerry Mcclendon, excision of additional right breast anterior margin: Benign breast parenchyma. C.  Breast, excision of additional right breast cranial margin: Benign breast parenchyma. D.  Lymph node, right [place] lymph node biopsy: One lymph node, negative for evidence of malignancy. PHYSICAL EXAM:  The  wounds look good with no evidence of infection, fluid accumulation, or skin necrosis. She has seen Dr. Jameson Desir and has started radiation treatments on 10/21/2020. She will be getting a 3-week course. She is also ready to get her port out. We will arrange this at her convenience at [address] [facility] [address]: [address]    Doing well s/p Right lumpectomy with Right sentinel lymph node biopsy      PLAN: I will see her back in March 2021 with a bilateral mammogram. She will call with any concerns or problems. I have seen, examined and reviewed this patient medication list, appropriate labs and imaging studies. I reviewed relevant medical records and others physicians notes. I discussed the plans of care with the patient. I answered all the questions to the patients satisfaction. I, Dr Teresa Muhammad, personally performed the services described in this documentation as scribed by Courtney Bocanegra MA in my presence and is both accurate and complete. (Please note that portions of this note were completed with a voice recognition program. Efforts were made to edit the dictations but occasionally words are mis-transcribed.)  Over 50% of the total visit time of 15 minutes in face to face encounter with the patient, out of which more than 50% of the time was spent in counseling patient or family and coordination of care. Counseling included but was not limited to time spent reviewing labs, imaging studies/ treatment plan and answering questions.

## 2020-10-21 ENCOUNTER — DOCUMENTATION (OUTPATIENT)
Dept: RADIATION ONCOLOGY | Facility: HOSPITAL | Age: 70
End: 2020-10-21

## 2020-10-21 ENCOUNTER — OFFICE VISIT (OUTPATIENT)
Dept: SURGERY | Age: 70
End: 2020-10-21

## 2020-10-21 ENCOUNTER — HOSPITAL ENCOUNTER (OUTPATIENT)
Dept: RADIATION ONCOLOGY | Facility: HOSPITAL | Age: 70
Setting detail: RADIATION/ONCOLOGY SERIES
Discharge: HOME | End: 2020-10-21

## 2020-10-21 VITALS — DIASTOLIC BLOOD PRESSURE: 72 MMHG | SYSTOLIC BLOOD PRESSURE: 112 MMHG | HEART RATE: 76 BPM

## 2020-10-21 PROCEDURE — 77412 RADIATION TX DELIVERY LVL 3: CPT | Performed by: RADIOLOGY

## 2020-10-21 PROCEDURE — 99024 POSTOP FOLLOW-UP VISIT: CPT | Performed by: SURGERY

## 2020-10-21 PROCEDURE — 77280 THER RAD SIMULAJ FIELD SMPL: CPT | Performed by: RADIOLOGY

## 2020-10-21 NOTE — PROGRESS NOTES
LOVE met with Ms. Venegas today. She is a 70 year old female who started radiation treatment today. She denies needing any assistance and does not have any financial or transportation concerns. She has a strong support system. LOVE provided her with contact information and encouraged her to call this writer if any assistance is needed in the future.

## 2020-10-22 ENCOUNTER — HOSPITAL ENCOUNTER (OUTPATIENT)
Dept: RADIATION ONCOLOGY | Facility: HOSPITAL | Age: 70
Setting detail: RADIATION/ONCOLOGY SERIES
Discharge: HOME | End: 2020-10-22

## 2020-10-22 PROCEDURE — 77417 THER RADIOLOGY PORT IMAGE(S): CPT | Performed by: RADIOLOGY

## 2020-10-22 PROCEDURE — 77412 RADIATION TX DELIVERY LVL 3: CPT | Performed by: RADIOLOGY

## 2020-10-23 ENCOUNTER — HOSPITAL ENCOUNTER (OUTPATIENT)
Dept: RADIATION ONCOLOGY | Facility: HOSPITAL | Age: 70
Setting detail: RADIATION/ONCOLOGY SERIES
Discharge: HOME | End: 2020-10-23

## 2020-10-23 PROCEDURE — 77412 RADIATION TX DELIVERY LVL 3: CPT | Performed by: RADIOLOGY

## 2020-10-25 ENCOUNTER — PATIENT MESSAGE (OUTPATIENT)
Dept: INTERNAL MEDICINE | Age: 70
End: 2020-10-25

## 2020-10-26 ENCOUNTER — HOSPITAL ENCOUNTER (OUTPATIENT)
Dept: RADIATION ONCOLOGY | Facility: HOSPITAL | Age: 70
Setting detail: RADIATION/ONCOLOGY SERIES
Discharge: HOME | End: 2020-10-26

## 2020-10-26 DIAGNOSIS — E78.00 PURE HYPERCHOLESTEROLEMIA: ICD-10-CM

## 2020-10-26 DIAGNOSIS — M85.862 OSTEOPENIA OF LEFT LOWER LEG: ICD-10-CM

## 2020-10-26 DIAGNOSIS — R30.0 DYSURIA: ICD-10-CM

## 2020-10-26 DIAGNOSIS — E55.9 VITAMIN D DEFICIENCY: ICD-10-CM

## 2020-10-26 DIAGNOSIS — C50.919 MALIGNANT NEOPLASM OF BREAST IN FEMALE, ESTROGEN RECEPTOR POSITIVE, UNSPECIFIED LATERALITY, UNSPECIFIED SITE OF BREAST (HCC): ICD-10-CM

## 2020-10-26 DIAGNOSIS — E05.90 SUBCLINICAL HYPERTHYROIDISM: ICD-10-CM

## 2020-10-26 DIAGNOSIS — E11.9 TYPE 2 DIABETES MELLITUS WITHOUT COMPLICATION, WITHOUT LONG-TERM CURRENT USE OF INSULIN (HCC): ICD-10-CM

## 2020-10-26 DIAGNOSIS — Z17.0 MALIGNANT NEOPLASM OF BREAST IN FEMALE, ESTROGEN RECEPTOR POSITIVE, UNSPECIFIED LATERALITY, UNSPECIFIED SITE OF BREAST (HCC): ICD-10-CM

## 2020-10-26 LAB
BACTERIA: ABNORMAL /HPF
BILIRUBIN URINE: NEGATIVE
BLOOD, URINE: ABNORMAL
CLARITY: ABNORMAL
COLOR: YELLOW
CRYSTALS, UA: ABNORMAL /HPF
EPITHELIAL CELLS, UA: 0 /HPF (ref 0–5)
GLUCOSE URINE: NEGATIVE MG/DL
HYALINE CASTS: 7 /HPF (ref 0–8)
KETONES, URINE: NEGATIVE MG/DL
LEUKOCYTE ESTERASE, URINE: ABNORMAL
NITRITE, URINE: POSITIVE
PH UA: 5 (ref 5–8)
PROTEIN UA: 30 MG/DL
RBC UA: 6 /HPF (ref 0–4)
SPECIFIC GRAVITY UA: 1.02 (ref 1–1.03)
UROBILINOGEN, URINE: 0.2 E.U./DL
WBC UA: 47 /HPF (ref 0–5)

## 2020-10-26 PROCEDURE — 77412 RADIATION TX DELIVERY LVL 3: CPT | Performed by: RADIOLOGY

## 2020-10-26 RX ORDER — PHENAZOPYRIDINE HYDROCHLORIDE 200 MG/1
200 TABLET, FILM COATED ORAL 3 TIMES DAILY PRN
Qty: 6 TABLET | Refills: 0 | Status: SHIPPED | OUTPATIENT
Start: 2020-10-26 | End: 2020-10-28

## 2020-10-26 RX ORDER — CIPROFLOXACIN 250 MG/1
250 TABLET, FILM COATED ORAL 2 TIMES DAILY
Qty: 10 TABLET | Refills: 0 | Status: SHIPPED | OUTPATIENT
Start: 2020-10-26 | End: 2021-06-29 | Stop reason: SDUPTHER

## 2020-10-26 NOTE — TELEPHONE ENCOUNTER
From: Charisma Son  To: Peg Grove MD  Sent: 10/25/2020 6:44 PM CDT  Subject: Non-Urgent Medical Question    I think i have a UTI infection. It is painfil to urinate and i feel like i have to pee a lot but very little comes out. Can i get something called in or what do you want me to do?

## 2020-10-26 NOTE — TELEPHONE ENCOUNTER
Her urinalysis is showing possible infection  At this time culture is pending/will be back on Wednesday  Prescription for Cipro 250 twice daily x5 days and Pyridium 200- 3 times daily x2 days

## 2020-10-27 ENCOUNTER — HOSPITAL ENCOUNTER (OUTPATIENT)
Dept: RADIATION ONCOLOGY | Facility: HOSPITAL | Age: 70
Setting detail: RADIATION/ONCOLOGY SERIES
Discharge: HOME | End: 2020-10-27

## 2020-10-27 PROCEDURE — 77412 RADIATION TX DELIVERY LVL 3: CPT | Performed by: RADIOLOGY

## 2020-10-28 ENCOUNTER — HOSPITAL ENCOUNTER (OUTPATIENT)
Dept: RADIATION ONCOLOGY | Facility: HOSPITAL | Age: 70
Setting detail: RADIATION/ONCOLOGY SERIES
Discharge: HOME | End: 2020-10-28

## 2020-10-28 LAB
ORGANISM: ABNORMAL
URINE CULTURE, ROUTINE: ABNORMAL
URINE CULTURE, ROUTINE: ABNORMAL

## 2020-10-28 PROCEDURE — 77412 RADIATION TX DELIVERY LVL 3: CPT | Performed by: RADIOLOGY

## 2020-10-29 ENCOUNTER — HOSPITAL ENCOUNTER (OUTPATIENT)
Dept: RADIATION ONCOLOGY | Facility: HOSPITAL | Age: 70
Setting detail: RADIATION/ONCOLOGY SERIES
Discharge: HOME | End: 2020-10-29

## 2020-10-29 PROCEDURE — 77336 RADIATION PHYSICS CONSULT: CPT | Performed by: RADIOLOGY

## 2020-10-29 PROCEDURE — 77412 RADIATION TX DELIVERY LVL 3: CPT | Performed by: RADIOLOGY

## 2020-10-30 ENCOUNTER — HOSPITAL ENCOUNTER (OUTPATIENT)
Dept: RADIATION ONCOLOGY | Facility: HOSPITAL | Age: 70
Setting detail: RADIATION/ONCOLOGY SERIES
Discharge: HOME | End: 2020-10-30

## 2020-10-30 PROCEDURE — 77412 RADIATION TX DELIVERY LVL 3: CPT | Performed by: RADIOLOGY

## 2020-11-02 ENCOUNTER — HOSPITAL ENCOUNTER (OUTPATIENT)
Dept: RADIATION ONCOLOGY | Facility: HOSPITAL | Age: 70
Setting detail: RADIATION/ONCOLOGY SERIES
End: 2020-11-02

## 2020-11-02 ENCOUNTER — HOSPITAL ENCOUNTER (OUTPATIENT)
Dept: RADIATION ONCOLOGY | Facility: HOSPITAL | Age: 70
Setting detail: RADIATION/ONCOLOGY SERIES
Discharge: HOME | End: 2020-11-02

## 2020-11-02 PROCEDURE — 77412 RADIATION TX DELIVERY LVL 3: CPT | Performed by: RADIOLOGY

## 2020-11-03 ENCOUNTER — HOSPITAL ENCOUNTER (OUTPATIENT)
Dept: RADIATION ONCOLOGY | Facility: HOSPITAL | Age: 70
Setting detail: RADIATION/ONCOLOGY SERIES
Discharge: HOME | End: 2020-11-03

## 2020-11-03 PROCEDURE — 77412 RADIATION TX DELIVERY LVL 3: CPT | Performed by: RADIOLOGY

## 2020-11-04 ENCOUNTER — HOSPITAL ENCOUNTER (OUTPATIENT)
Dept: RADIATION ONCOLOGY | Facility: HOSPITAL | Age: 70
Setting detail: RADIATION/ONCOLOGY SERIES
Discharge: HOME | End: 2020-11-04

## 2020-11-04 PROCEDURE — 77412 RADIATION TX DELIVERY LVL 3: CPT | Performed by: RADIOLOGY

## 2020-11-04 PROCEDURE — 77336 RADIATION PHYSICS CONSULT: CPT | Performed by: RADIOLOGY

## 2020-11-05 ENCOUNTER — HOSPITAL ENCOUNTER (OUTPATIENT)
Dept: RADIATION ONCOLOGY | Facility: HOSPITAL | Age: 70
Setting detail: RADIATION/ONCOLOGY SERIES
Discharge: HOME | End: 2020-11-05

## 2020-11-05 PROCEDURE — 77412 RADIATION TX DELIVERY LVL 3: CPT | Performed by: RADIOLOGY

## 2020-11-05 PROCEDURE — 77417 THER RADIOLOGY PORT IMAGE(S): CPT | Performed by: RADIOLOGY

## 2020-11-06 ENCOUNTER — HOSPITAL ENCOUNTER (OUTPATIENT)
Dept: RADIATION ONCOLOGY | Facility: HOSPITAL | Age: 70
Setting detail: RADIATION/ONCOLOGY SERIES
Discharge: HOME | End: 2020-11-06

## 2020-11-06 PROCEDURE — 77412 RADIATION TX DELIVERY LVL 3: CPT | Performed by: RADIOLOGY

## 2020-11-07 ENCOUNTER — OFFICE VISIT (OUTPATIENT)
Age: 70
End: 2020-11-07

## 2020-11-07 VITALS — HEART RATE: 85 BPM | TEMPERATURE: 97.5 F | OXYGEN SATURATION: 96 %

## 2020-11-07 LAB — SARS-COV-2, PCR: NOT DETECTED

## 2020-11-07 PROCEDURE — 99999 PR OFFICE/OUTPT VISIT,PROCEDURE ONLY: CPT | Performed by: NURSE PRACTITIONER

## 2020-11-09 ENCOUNTER — HOSPITAL ENCOUNTER (OUTPATIENT)
Dept: RADIATION ONCOLOGY | Facility: HOSPITAL | Age: 70
Setting detail: RADIATION/ONCOLOGY SERIES
Discharge: HOME | End: 2020-11-09

## 2020-11-09 PROCEDURE — 77412 RADIATION TX DELIVERY LVL 3: CPT | Performed by: RADIOLOGY

## 2020-11-10 ENCOUNTER — HOSPITAL ENCOUNTER (OUTPATIENT)
Dept: RADIATION ONCOLOGY | Facility: HOSPITAL | Age: 70
Setting detail: RADIATION/ONCOLOGY SERIES
Discharge: HOME | End: 2020-11-10

## 2020-11-10 ENCOUNTER — ANESTHESIA EVENT (OUTPATIENT)
Dept: OPERATING ROOM | Age: 70
End: 2020-11-10

## 2020-11-10 PROCEDURE — 77412 RADIATION TX DELIVERY LVL 3: CPT | Performed by: RADIOLOGY

## 2020-11-11 ENCOUNTER — HOSPITAL ENCOUNTER (OUTPATIENT)
Dept: RADIATION ONCOLOGY | Facility: HOSPITAL | Age: 70
Setting detail: RADIATION/ONCOLOGY SERIES
Discharge: HOME | End: 2020-11-11

## 2020-11-11 PROCEDURE — 77336 RADIATION PHYSICS CONSULT: CPT | Performed by: RADIOLOGY

## 2020-11-11 PROCEDURE — 77412 RADIATION TX DELIVERY LVL 3: CPT | Performed by: RADIOLOGY

## 2020-11-12 ENCOUNTER — ANESTHESIA (OUTPATIENT)
Dept: OPERATING ROOM | Age: 70
End: 2020-11-12

## 2020-11-12 ENCOUNTER — HOSPITAL ENCOUNTER (OUTPATIENT)
Age: 70
Setting detail: OUTPATIENT SURGERY
Discharge: HOME | End: 2020-11-12
Attending: SURGERY | Admitting: SURGERY
Payer: COMMERCIAL

## 2020-11-12 VITALS
HEIGHT: 64 IN | DIASTOLIC BLOOD PRESSURE: 75 MMHG | RESPIRATION RATE: 18 BRPM | WEIGHT: 180 LBS | SYSTOLIC BLOOD PRESSURE: 126 MMHG | OXYGEN SATURATION: 98 % | BODY MASS INDEX: 30.73 KG/M2 | TEMPERATURE: 97.4 F | HEART RATE: 75 BPM

## 2020-11-12 VITALS — DIASTOLIC BLOOD PRESSURE: 73 MMHG | SYSTOLIC BLOOD PRESSURE: 116 MMHG | OXYGEN SATURATION: 95 %

## 2020-11-12 PROCEDURE — 36590 REMOVAL TUNNELED CV CATH: CPT

## 2020-11-12 PROCEDURE — 36593 DECLOT VASCULAR DEVICE: CPT

## 2020-11-12 PROCEDURE — 36590 REMOVAL TUNNELED CV CATH: CPT | Performed by: SURGERY

## 2020-11-12 PROCEDURE — G8907 PT DOC NO EVENTS ON DISCHARG: HCPCS

## 2020-11-12 PROCEDURE — G8918 PT W/O PREOP ORDER IV AB PRO: HCPCS

## 2020-11-12 RX ORDER — LIDOCAINE HYDROCHLORIDE 10 MG/ML
1 INJECTION, SOLUTION EPIDURAL; INFILTRATION; INTRACAUDAL; PERINEURAL
Status: DISCONTINUED | OUTPATIENT
Start: 2020-11-12 | End: 2020-11-12 | Stop reason: HOSPADM

## 2020-11-12 RX ORDER — FENTANYL CITRATE 50 UG/ML
INJECTION, SOLUTION INTRAMUSCULAR; INTRAVENOUS PRN
Status: DISCONTINUED | OUTPATIENT
Start: 2020-11-12 | End: 2020-11-12 | Stop reason: SDUPTHER

## 2020-11-12 RX ORDER — SODIUM CHLORIDE, SODIUM LACTATE, POTASSIUM CHLORIDE, CALCIUM CHLORIDE 600; 310; 30; 20 MG/100ML; MG/100ML; MG/100ML; MG/100ML
INJECTION, SOLUTION INTRAVENOUS CONTINUOUS
Status: DISCONTINUED | OUTPATIENT
Start: 2020-11-12 | End: 2020-11-12 | Stop reason: HOSPADM

## 2020-11-12 RX ORDER — LIDOCAINE HYDROCHLORIDE 10 MG/ML
INJECTION, SOLUTION INFILTRATION; PERINEURAL PRN
Status: DISCONTINUED | OUTPATIENT
Start: 2020-11-12 | End: 2020-11-12 | Stop reason: SDUPTHER

## 2020-11-12 RX ORDER — MIDAZOLAM HYDROCHLORIDE 1 MG/ML
INJECTION INTRAMUSCULAR; INTRAVENOUS PRN
Status: DISCONTINUED | OUTPATIENT
Start: 2020-11-12 | End: 2020-11-12 | Stop reason: SDUPTHER

## 2020-11-12 RX ORDER — PROPOFOL 10 MG/ML
INJECTION, EMULSION INTRAVENOUS PRN
Status: DISCONTINUED | OUTPATIENT
Start: 2020-11-12 | End: 2020-11-12 | Stop reason: SDUPTHER

## 2020-11-12 RX ADMIN — LIDOCAINE HYDROCHLORIDE 30 MG: 10 INJECTION, SOLUTION INFILTRATION; PERINEURAL at 09:31

## 2020-11-12 RX ADMIN — SODIUM CHLORIDE, SODIUM LACTATE, POTASSIUM CHLORIDE, CALCIUM CHLORIDE: 600; 310; 30; 20 INJECTION, SOLUTION INTRAVENOUS at 08:36

## 2020-11-12 RX ADMIN — PROPOFOL 150 MG: 10 INJECTION, EMULSION INTRAVENOUS at 09:31

## 2020-11-12 RX ADMIN — FENTANYL CITRATE 50 MCG: 50 INJECTION, SOLUTION INTRAMUSCULAR; INTRAVENOUS at 09:25

## 2020-11-12 RX ADMIN — MIDAZOLAM HYDROCHLORIDE 1 MG: 1 INJECTION INTRAMUSCULAR; INTRAVENOUS at 09:25

## 2020-11-12 NOTE — BRIEF OP NOTE
Brief Postoperative Note      DATE OF PROCEDURE: 11/12/2020     SURGEON: Emeli Zamudio MD    PREOPERATIVE DIAGNOSIS:  Z45.2    POSTOPERATIVE DIAGNOSIS: Same     OPERATION: Procedure(s):  PORT REMOVAL WITH FLUOROSCOPY    ANESTHESIA: Monitor Anesthesia Care    ESTIMATED BLOOD LOSS: Minimal    COMPLICATIONS: None. SPECIMENS: * No specimens in log *    DRAINS: None    The patient tolerated the procedure well.     Electronically signed by Emeli Zamudio MD  on 11/12/2020 at 9:53 AM
standing/walking

## 2020-11-12 NOTE — ANESTHESIA POSTPROCEDURE EVALUATION
Department of Anesthesiology  Postprocedure Note    Patient: Garrison Sahni  MRN: [id number]  YOB: 1950  Date of evaluation: 11/12/2020  Time:  9:48 AM     Procedure Summary     Date:  11/12/20 Room / Location:  [facility] OR [address] [place] [address]    Anesthesia Start:  6794 Anesthesia Stop:      Procedure:  PORT REMOVAL WITH FLUOROSCOPY (N/A ) Diagnosis:  (Z45.2)    Surgeon:  Josue Charles MD Responsible Provider: ASHWIN Lance CRNA    Anesthesia Type:  MAC ASA Status:  2          Anesthesia Type: MAC    Andra Phase I:      Andra Phase II:      Last vitals: Reviewed and per EMR flowsheets.        Anesthesia Post Evaluation    Patient location during evaluation: bedside  Patient participation: complete - patient participated  Level of consciousness: sleepy but conscious  Pain score: 0  Airway patency: patent  Nausea & Vomiting: no nausea and no vomiting  Complications: no  Cardiovascular status: blood pressure returned to baseline  Respiratory status: acceptable, room air and spontaneous ventilation  Hydration status: euvolemic

## 2020-11-12 NOTE — ANESTHESIA PRE PROCEDURE
Department of Anesthesiology  Preprocedure Note       Name:  Pippa Carrillo   Age:  78 y.o.  :  1950                                          MRN:  [id number]         Date:  2020      Surgeon: Miah Dodd):  Ciera Pleitez MD    Procedure: Procedure(s):  PORT REMOVAL WITH FLUOROSCOPY    Medications prior to admission:   Prior to Admission medications    Medication Sig Start Date End Date Taking? Authorizing Provider   mupirocin (BACTROBAN) 2 % ointment Apply to each nostril BID 5 days prior to surgery 10/22/20  Yes Ciera Pleitez MD   letrozole [facility]) 2.5 MG tablet Take 1 tablet by mouth daily 20  Yes Leopold Georgis, MD   lovastatin (MEVACOR) 20 MG tablet TAKE TWO TABLETS BY MOUTH DAILY 20  Yes Angelita Ward MD   Semaglutide,0.25 or 0.5MG/DOS, 2 MG/1.5ML SOPN Inject 0.5 mg into the skin once a week 20  Yes Angelita Ward MD   montelukast (SINGULAIR) 10 MG tablet Take 1 tablet by mouth daily 20  Yes Ciera Pleitez MD   escitalopram (LEXAPRO) 10 MG tablet Take 5 mg by mouth daily    Yes Historical Provider, MD   Cholecalciferol (VITAMIN D) 2000 units CAPS capsule Take by mouth daily    Yes Historical Provider, MD       Current medications:    Current Facility-Administered Medications   Medication Dose Route Frequency Provider Last Rate Last Dose    lactated ringers infusion   Intravenous Continuous ASHWIN Hanley CRNA        lidocaine PF 1 % injection 1 mL  1 mL Intradermal Once PRN ASWHIN Hanley CRNA           Allergies:     Allergies   Allergen Reactions    Codeine Itching    Other Rash     Chloroprep surgical prep       Problem List:    Patient Active Problem List   Diagnosis Code    Type 2 diabetes mellitus without complication, without long-term current use of insulin (HCC) E11.9    Vitamin D deficiency E55.9    Pure hypercholesterolemia E78.00    GERD (gastroesophageal reflux disease) K21.9    Exogenous obesity E66.09    Generalized anxiety disorder F41.1    Osteopenia of left lower leg M85.862    Bilateral low back pain without sciatica M54.5    Microhematuria R31.29    Malignant neoplasm of right breast in female, estrogen receptor negative ([facility] [address] C50.911, Z17.1    Adverse effect of antineoplastic and immunosuppressive drugs, initial encounter  T45.1X5A    S/P lumpectomy, right breast 9/15/2020 Z98.890       Past Medical History:        Diagnosis Date    Anxiety     Cancer [facility])     Breast Cancer    Carpal tunnel syndrome     pt denies having carpal tunnel syndrome    Cervicalgia     Depression     Esophagitis     Hyperlipidemia     Idiopathic peripheral neuropathy     Insomnia     Menopause     Obesity due to excess calories     Osteoarthritis     Osteopenia     Type 2 diabetes mellitus without complication ([facility] [address]     Vitamin D deficiency        Past Surgical History:        Procedure Laterality Date    BREAST LUMPECTOMY Right 9/15/2020    RIGHT LUMPECTOMY WITH SNB, PREOP AND INTRAOP US GUIDED NL, FLAPS, BIOZORB, MARGIN PROBE,PEC BLOCK performed by Daniele Shelton MD at [facility] Right 5/3/2016    THORACOTOMY, WEDGE RESECTION PULMONARY NODULE RIGHT MIDDLE LOBE performed by Matthew Dumont MD at [address] [place] [address] N/A 2020    PORT INSERTION WITH FLUORO performed by Daniele Shelton MD at [facility] LOC OF RIGHT BREAST  2020    US GUIDED NEEDLE LOC OF RIGHT BREAST [facility]       Social History:    Social History     Tobacco Use    Smoking status: Former Smoker     Packs/day: 1.00     Years: 3.00     Pack years: 3.00     Types: Cigarettes     Last attempt to quit: 10/1/1984     Years since quittin.1    Smokeless tobacco: Never Used    Tobacco comment: 3 YEARS PLAYED AROUND AND QUIT IN THE    Substance Use Topics    Alcohol use: No     Alcohol/week: 0.0 standard drinks                                Counseling given: Not Answered  Comment: 3 YEARS PLAYED AROUND AND QUIT IN THE 1980's      Vital Signs (Current): There were no vitals filed for this visit. BP Readings from Last 3 Encounters:   10/21/20 112/72   09/30/20 118/74   09/23/20 132/80       NPO Status: Time of last liquid consumption: 2300                        Time of last solid consumption: 2300                        Date of last liquid consumption: 11/11/20                        Date of last solid food consumption: 11/11/20    BMI:   Wt Readings from Last 3 Encounters:   09/30/20 178 lb (80.7 kg)   09/23/20 177 lb 12.8 oz (80.6 kg)   09/15/20 180 lb (81.6 kg)     There is no height or weight on file to calculate BMI.    CBC:   Lab Results   Component Value Date    WBC 7.68 09/23/2020    RBC 3.82 09/23/2020    HGB 12.2 09/23/2020    HCT 39.2 09/23/2020    .6 09/23/2020    RDW 12.6 09/23/2020     09/23/2020       CMP:   Lab Results   Component Value Date     09/08/2020    K 4.4 09/08/2020     09/08/2020    CO2 22 09/08/2020    BUN 11 09/08/2020    CREATININE 0.6 09/08/2020    GFRAA >59 09/08/2020    LABGLOM >60 09/08/2020    GLUCOSE 120 09/08/2020    PROT 7.4 09/08/2020    CALCIUM 9.6 09/08/2020    BILITOT 0.3 09/08/2020    ALKPHOS 128 09/08/2020    AST 33 09/08/2020    ALT 57 09/08/2020       POC Tests: No results for input(s): POCGLU, POCNA, POCK, POCCL, POCBUN, POCHEMO, POCHCT in the last 72 hours.     Coags: No results found for: PROTIME, INR, APTT    HCG (If Applicable): No results found for: PREGTESTUR, PREGSERUM, HCG, HCGQUANT     ABGs: No results found for: PHART, PO2ART, DGM9OTW, SVB1WIC, BEART, S8MRFAHS     Type & Screen (If Applicable):  No results found for: LABABO, LABRH    Drug/Infectious Status (If Applicable):  No results found for: HIV, HEPCAB    COVID-19 Screening (If Applicable):   Lab Results   Component Value Date COVID19 Not Detected 11/07/2020         Anesthesia Evaluation  Patient summary reviewed and Nursing notes reviewed  Airway: Mallampati: II  TM distance: >3 FB   Neck ROM: full  Mouth opening: > = 3 FB Dental: normal exam         Pulmonary:Negative Pulmonary ROS and normal exam                               Cardiovascular:Negative CV ROS                      Neuro/Psych:   (+) neuromuscular disease:, psychiatric history:            GI/Hepatic/Renal:             Endo/Other:    (+) DiabetesType II DM, , .                 Abdominal:           Vascular: negative vascular ROS. Anesthesia Plan      MAC     ASA 2       Induction: intravenous. Anesthetic plan and risks discussed with patient. Plan discussed with CRNA.                   ASHWIN Sotomayor - CRNA   11/12/2020

## 2020-11-12 NOTE — H&P
HISTORY OF PRESENT ILLNESS:     Ms. Vanesa Eldridge presents today for a post op exam from undergoing a Right lumpectomy with Right sentinel lymph node biopsy on 9/15/2020.          On 3/19/2020 she underwent an ultrasound guided breast biopsy  on the right which revealed a 1.1 cm grade 3 invasive ductal carcinoma. ER is positive at 4%. ND is Negative. Her2 is Negative. Ki67 is High at 86%.      Mammaprint is High Risk Basal Type.      9/1/2020-Bilateral Breast MRI  No suspicious soft tissue mass within the region of the    biopsy clip. The satellite lesion identified on prior breast MR is    also no longer visualized. No new suspicious mass or abnormal enhancement in either breast. LEFT    breast ductal ectasia with T1 hyperintense proteinaceous debris within    the ducts is again noted. No axillary or internal mammary    lymphadenopathy. LEFT chest port catheter is noted. No suspicious bone    lesions identified. No acute finding in the anterior liver.         Impression    No residual soft tissue mass at the site of RIGHT upper outer quadrant    biopsy clip, correlating with site of biopsy-proven neoplasm. No    evidence of residual disease by MR. The small satellite nodule    identified on prior breast MR is also no longer identified. No    axillary or internal mammary lymphadenopathy. BI-RADS CATEGORY 6: KNOWN BIOPSY-PROVEN MALIGNANCY. Signed by Dr Suzi Roberto on 9/1/2020 1:51 PM             She is recently status post Right lumpectomy with Right sentinel lymph node biopsy on 9/15/2020.       PATHOLOGY REVEALS:  FINAL DIAGNOSIS:     A.  Breast, right lumpectomy:    1.  Prior biopsy site identified, negative for residual carcinoma or carcinoma in situ.    2.  Focal atypical ductal hyperplasia, margins negative.    3.  Extensive fibrosis present adjacent to prior biopsy site consistent with complete therapeutic response.    4.  Surgical excision margins are negative for prior biopsy site.      Jese Leon excision of additional right breast anterior margin: Benign breast parenchyma. C.  Breast, excision of additional right breast cranial margin: Benign breast parenchyma. D.  Lymph node, right [place] lymph node biopsy: One lymph node, negative for evidence of malignancy.      Antonia Bocanegra is a 78 y.o. female with the following history as [facility] in [facility]:  Patient Active Problem List    Diagnosis Date Noted    S/P lumpectomy, right breast 9/15/2020 09/22/2020    Adverse effect of antineoplastic and immunosuppressive drugs, initial encounter      Malignant neoplasm of right breast in female, estrogen receptor negative ([address] 04/17/2020    Microhematuria 06/07/2019    Bilateral low back pain without sciatica 05/04/2018    Osteopenia of left lower leg 08/16/2017    Type 2 diabetes mellitus without complication, without long-term current use of insulin ([address] 08/12/2017    Vitamin D deficiency 08/12/2017    Pure hypercholesterolemia 08/12/2017    GERD (gastroesophageal reflux disease) 08/12/2017    Exogenous obesity 08/12/2017    Generalized anxiety disorder 08/12/2017     Current Facility-Administered Medications   Medication Dose Route Frequency Provider Last Rate Last Dose    lactated ringers infusion   Intravenous Continuous ASHWIN Cohn CRNA        lidocaine PF 1 % injection 1 mL  1 mL Intradermal Once PRN ASHWIN Collins CRNA         Allergies: Codeine and Other  Past Medical History:   Diagnosis Date    Anxiety     Cancer ([address]     Breast Cancer    Carpal tunnel syndrome     pt denies having carpal tunnel syndrome    Cervicalgia     Depression     Esophagitis     Hyperlipidemia     Idiopathic peripheral neuropathy     Insomnia     Menopause     Obesity due to excess calories     Osteoarthritis     Osteopenia     Type 2 diabetes mellitus without complication ([address]     Vitamin D deficiency      Past Surgical History:   Procedure Laterality Date    BREAST LUMPECTOMY Right 9/15/2020    RIGHT LUMPECTOMY WITH SNB, PREOP AND INTRAOP US GUIDED NL, FLAPS, BIOZORB, MARGIN PROBE,PEC BLOCK performed by Milana Rice MD at [facility] Right 5/3/2016    THORACOTOMY, WEDGE RESECTION PULMONARY NODULE RIGHT MIDDLE LOBE performed by Markel Newton MD at [address] N/A 2020    PORT INSERTION WITH FLUORO performed by Milana Rice MD at [address] NEEDLE LOC OF RIGHT BREAST  2020    US GUIDED NEEDLE LOC OF RIGHT BREAST 2020 [facility] [address]     Family History   Problem Relation Age of Onset    Cancer Father         bladder ca    Other Sister         MULTIPLE SCLEROSIS    High Blood Pressure Mother     Heart Disease Mother     Stroke Mother      Social History     Tobacco Use    Smoking status: Former Smoker     Packs/day: 1.00     Years: 3.00     Pack years: 3.00     Types: Cigarettes     Last attempt to quit: 10/1/1984     Years since quittin.1    Smokeless tobacco: Never Used    Tobacco comment: 3 YEARS PLAYED AROUND AND QUIT IN THE    Substance Use Topics    Alcohol use: No     Alcohol/week: 0.0 standard drinks       ROS:  14 point review of systems is negative except for the above. PHYSICAL EXAM:    The patient is a 78 y.o. female  in no acute distress. She is alert oriented and cooperative. Mood and affect are appropriate. Skin is warm and dry without rashes. /63   Pulse 77   Temp 97.4 °F (36.3 °C)   Resp 18   Ht 5' 4\" (1.626 m)   Wt 180 lb (81.6 kg)   SpO2 97%   BMI 30.90 kg/m²       HEENT: Normocephalic and atraumatic. EOMs intact. Pupils equal and round and reactive to light and accommodation. External ears and nose are normal.  Sclera nonicteric. Conjunctiva normal  Oropharynx without masses or lesions.     Neck: Neck is supple without masses or thyromegaly    Chest: Lungs are clear to auscultation. Respiratory effort normal    Cardiac: Regular rate and rhythm without rubs, murmurs, or gallops    Breasts: The  wounds look good with no evidence of infection, fluid accumulation, or skin necrosis. Abdomen: The abdomen is soft and nontender with no hepatosplenomegaly. There are no abdominal hernias noted. Extremities: The extremities are normal. There are no signs of clubbing, cyanosis, or edema. IMPRESSION:Right breast cancer with path CR     She has seen Dr. Mery Bourne and has started radiation treatments on 10/21/2020. She will be getting a 3-week course.     She is also ready to get her port out. We will arrange this at her convenience at [facility]: Path CR     Doing well s/p Right lumpectomy with Right sentinel lymph node biopsy       PLAN: I will see her back in March 2021 with a bilateral mammogram. She will call with any concerns or problems.         I have seen, examined and reviewed this patient medication list, appropriate labs and imaging studies. I reviewed relevant medical records and others physicians notes. I discussed the plans of care with the patient. I answered all the questions to the patients satisfaction. I, Dr Gudelia Kaye, personally performed the services described in this documentation as scribed by Yvette Ritter MA in my presence and is both accurate and complete.     (Please note that portions of this note were completed with a voice recognition program. Efforts were made to edit the dictations but occasionally words are mis-transcribed.)  Over 50% of the total visit time of 15 minutes in face to face encounter with the patient, out of which more than 50% of the time was spent in counseling patient or family and coordination of care.  Counseling included but was not limited to time spent reviewing labs, imaging studies/ treatment plan and answering questions.

## 2020-11-13 NOTE — PROCEDURES
[address]  . [place]ota Sparrow [address]                               [phone]                                 PROCEDURE NOTE    PATIENT NAME: Jaxon Duque                  :             1950  MED REC NO:   [id number]                               LOCATION:        [facility]  ACCOUNT NO:   [de-identified]                            ADMISSION DATE:  2020  PROVIDER:     Puma Delaney MD    PREOPERATIVE DIAGNOSIS:  Breast cancer, completion of chemotherapy. POSTOPERATIVE DIAGNOSIS:  Breast cancer, completion of chemotherapy. PROCEDURE:  Removal of LifePort catheter. SURGEON:  Puma Delaney MD    ASSISTANT:  Judah Trinidad PA-C    ANESTHESIA:  Local with sedation. INDICATIONS:  The patient is a 71-year-old lady who has completed her  chemotherapy for breast cancer. She has had her surgery. She had a  pathologic complete response. She is now ready to have her port  removed. We discussed the risks and benefits. She understood and was  agreeable. OPERATIVE PROCEDURE:  Today, she was brought to the operating room,  adequately sedated, and prepped and draped in sterile fashion. The area  around the port was anesthetized with 1/16% Xylocaine. The old  transverse incision was opened. We dissected down to the catheter. We  placed a 3-0 Vicryl pursestring around the insertion site, withdrew the  catheter, and tied down our pursestring. We then excised the port and  its capsule. We irrigated copiously, obtained good hemostasis, and  closed with 3-0 Vicryl for the subcu and 4-0 Stratafix for the skin. A  Dermabond dressing was applied. Estimated blood loss, minimal.   Complications, none. She tolerated the procedure well.         David Betts MD    D: 2020 11:08:46      T: 2020 11:55:42     [email or web address]  Job#: [id number]     Doc#: [id number]    CC:

## 2020-12-03 PROCEDURE — 87635 SARS-COV-2 COVID-19 AMP PRB: CPT | Performed by: NURSE PRACTITIONER

## 2020-12-23 ENCOUNTER — PATIENT MESSAGE (OUTPATIENT)
Dept: INTERNAL MEDICINE | Age: 70
End: 2020-12-23

## 2020-12-26 ENCOUNTER — PATIENT MESSAGE (OUTPATIENT)
Dept: INTERNAL MEDICINE | Age: 70
End: 2020-12-26

## 2020-12-28 RX ORDER — ESCITALOPRAM OXALATE 10 MG/1
5 TABLET ORAL DAILY
Qty: 45 TABLET | Refills: 1 | Status: SHIPPED | OUTPATIENT
Start: 2020-12-28 | End: 2021-01-12 | Stop reason: SDUPTHER

## 2020-12-28 NOTE — TELEPHONE ENCOUNTER
Baron Adamson called requesting a refill of the below medication which has been pended for you:     Requested Prescriptions     Pending Prescriptions Disp Refills    escitalopram (LEXAPRO) 10 MG tablet 45 tablet 1     Sig: Take 0.5 tablets by mouth daily       Last Appointment Date: 9/9/2020  Next Appointment Date: 1/12/2021    Allergies   Allergen Reactions    Codeine Itching    Other Rash     Chloroprep surgical prep

## 2020-12-28 NOTE — TELEPHONE ENCOUNTER
From: Lita Heath  To: Emmanuel Pedraza MD  Sent: 12/26/2020 7:36 AM CST  Subject: Prescription Question    10 mg and i take half. [facility] on [address]      ----- Message -----   From:FARRUKH LINN   Sent:12/23/2020 9:19 AM CST   To:Cinthya Agarwal   Subject:RE: Prescription Question    What MG are you taking and are you taking 1/2 tablet or 1 tablet? Which pharmacy are you wanting this sent to?      ----- Message -----   From:Cinthya Agarwal   Sent:12/23/2020 6:15 AM CST   To:Desiree Lock MD   Subject:Prescription Question    I have decided to start my Amparo Carverara again and i have no refills left. Joo linda let me ask for a refill. Could i get a script be sent to my pharmacy please? Thank you.

## 2021-01-12 ENCOUNTER — OFFICE VISIT (OUTPATIENT)
Dept: INTERNAL MEDICINE | Age: 71
End: 2021-01-12
Payer: COMMERCIAL

## 2021-01-12 VITALS
BODY MASS INDEX: 30.39 KG/M2 | OXYGEN SATURATION: 98 % | HEART RATE: 78 BPM | SYSTOLIC BLOOD PRESSURE: 128 MMHG | DIASTOLIC BLOOD PRESSURE: 78 MMHG | HEIGHT: 64 IN | RESPIRATION RATE: 18 BRPM | WEIGHT: 178 LBS

## 2021-01-12 DIAGNOSIS — E55.9 VITAMIN D DEFICIENCY: ICD-10-CM

## 2021-01-12 DIAGNOSIS — Z11.59 NEED FOR HEPATITIS C SCREENING TEST: ICD-10-CM

## 2021-01-12 DIAGNOSIS — E11.9 TYPE 2 DIABETES MELLITUS WITHOUT COMPLICATION, WITHOUT LONG-TERM CURRENT USE OF INSULIN (HCC): ICD-10-CM

## 2021-01-12 DIAGNOSIS — E05.90 SUBCLINICAL HYPERTHYROIDISM: ICD-10-CM

## 2021-01-12 DIAGNOSIS — E78.00 PURE HYPERCHOLESTEROLEMIA: ICD-10-CM

## 2021-01-12 DIAGNOSIS — R73.9 HYPERGLYCEMIA: ICD-10-CM

## 2021-01-12 DIAGNOSIS — E11.9 TYPE 2 DIABETES MELLITUS WITHOUT COMPLICATION, WITHOUT LONG-TERM CURRENT USE OF INSULIN (HCC): Primary | ICD-10-CM

## 2021-01-12 LAB
HBA1C MFR BLD: 6.1 %
SARS-COV-2 ANTIBODY, TOTAL: NEGATIVE

## 2021-01-12 PROCEDURE — 83036 HEMOGLOBIN GLYCOSYLATED A1C: CPT | Performed by: INTERNAL MEDICINE

## 2021-01-12 PROCEDURE — 99214 OFFICE O/P EST MOD 30 MIN: CPT | Performed by: INTERNAL MEDICINE

## 2021-01-12 RX ORDER — LOVASTATIN 20 MG/1
TABLET ORAL
Qty: 180 TABLET | Refills: 1 | Status: SHIPPED | OUTPATIENT
Start: 2021-01-12 | End: 2021-06-02 | Stop reason: SDUPTHER

## 2021-01-12 RX ORDER — ESCITALOPRAM OXALATE 10 MG/1
5 TABLET ORAL DAILY
Qty: 45 TABLET | Refills: 1 | Status: SHIPPED | OUTPATIENT
Start: 2021-01-12 | End: 2021-06-02 | Stop reason: SDUPTHER

## 2021-01-12 RX ORDER — MONTELUKAST SODIUM 10 MG/1
10 TABLET ORAL NIGHTLY
Qty: 90 TABLET | Refills: 3 | Status: SHIPPED | OUTPATIENT
Start: 2021-01-12 | End: 2021-06-02 | Stop reason: SDUPTHER

## 2021-01-12 ASSESSMENT — ENCOUNTER SYMPTOMS
ABDOMINAL PAIN: 0
CONSTIPATION: 0
SORE THROAT: 0
CHEST TIGHTNESS: 0
WHEEZING: 0
COUGH: 0

## 2021-01-12 ASSESSMENT — PATIENT HEALTH QUESTIONNAIRE - PHQ9
2. FEELING DOWN, DEPRESSED OR HOPELESS: 0
SUM OF ALL RESPONSES TO PHQ QUESTIONS 1-9: 0
SUM OF ALL RESPONSES TO PHQ QUESTIONS 1-9: 0
SUM OF ALL RESPONSES TO PHQ9 QUESTIONS 1 & 2: 0

## 2021-01-12 NOTE — PROGRESS NOTES
diabetes mellitus without complication ([facility] [address]     Vitamin D deficiency       Past Surgical History:   Procedure Laterality Date    BREAST LUMPECTOMY Right 9/15/2020    RIGHT LUMPECTOMY WITH SNB, PREOP AND INTRAOP US GUIDED NL, FLAPS, BIOZORB, MARGIN PROBE,PEC BLOCK performed by Chelsea Vidal MD at [facility] Right 5/3/2016    THORACOTOMY, WEDGE RESECTION PULMONARY NODULE RIGHT MIDDLE LOBE performed by Deena Gold MD at [address] N/A 2020    PORT INSERTION WITH FLUORO performed by Chelsea Vidal MD at [address] N/A 2020    PORT REMOVAL WITH FLUOROSCOPY performed by Chelsea Vidal MD at [address] [place] [address] LOC OF RIGHT BREAST  2020    US GUIDED NEEDLE LOC OF RIGHT BREAST 2020 [facility] [address]     Current Outpatient Medications   Medication Sig Dispense Refill    Semaglutide,0.25 or 0.5MG/DOS, 2 MG/1.5ML SOPN Inject 0.5 mg into the skin once a week 3 pen 3    montelukast (SINGULAIR) 10 MG tablet Take 1 tablet by mouth nightly 90 tablet 3    lovastatin (MEVACOR) 20 MG tablet TAKE TWO TABLETS BY MOUTH DAILY 180 tablet 1    escitalopram (LEXAPRO) 10 MG tablet Take 0.5 tablets by mouth daily 45 tablet 1    letrozole (FEMARA) 2.5 MG tablet Take 1 tablet by mouth daily 30 tablet 5    Cholecalciferol (VITAMIN D) 2000 units CAPS capsule Take by mouth daily        No current facility-administered medications for this visit.       Allergies   Allergen Reactions    Codeine Itching    Other Rash     Chloroprep surgical prep     Social History     Tobacco Use    Smoking status: Former Smoker     Packs/day: 1.00     Years: 3.00     Pack years: 3.00     Types: Cigarettes     Quit date: 10/1/1984     Years since quittin.3    Smokeless tobacco: Never Used    Tobacco comment: 3 YEARS PLAYED AROUND AND QUIT IN THE    Substance Use Topics    Alcohol use: No     Alcohol/week: 0.0 standard drinks      Family History   Problem Relation Age of Onset    Cancer Father         bladder ca    Other Sister         MULTIPLE SCLEROSIS    High Blood Pressure Mother     Heart Disease Mother     Stroke Mother        Review of Systems   Constitutional: Positive for fatigue. Negative for chills and fever. HENT: Negative for congestion, ear pain, nosebleeds, postnasal drip and sore throat. Respiratory: Negative for cough, chest tightness and wheezing. Cardiovascular: Negative for chest pain, palpitations and leg swelling. Gastrointestinal: Negative for abdominal pain and constipation. Genitourinary: Negative for dysuria and urgency. Musculoskeletal: Negative. Negative for arthralgias. Skin: Negative for rash. Neurological: Negative for dizziness and headaches. Psychiatric/Behavioral: Negative. Vitals:    01/12/21 0905   BP: 128/78   Site: Left Upper Arm   Position: Sitting   Cuff Size: Large Adult   Pulse: 78   Resp: 18   SpO2: 98%   Weight: 178 lb (80.7 kg)   Height: 5' 4\" (1.626 m)     Body mass index is 30.55 kg/m². Physical Exam  Constitutional:       Appearance: She is well-developed. HENT:      Right Ear: External ear normal.      Left Ear: External ear normal.      Mouth/Throat:      Pharynx: No oropharyngeal exudate. Eyes:      Conjunctiva/sclera: Conjunctivae normal.      Pupils: Pupils are equal, round, and reactive to light. Neck:      Musculoskeletal: Neck supple. Thyroid: No thyromegaly. Vascular: No JVD. Cardiovascular:      Rate and Rhythm: Normal rate. Heart sounds: Normal heart sounds. No murmur. Pulmonary:      Effort: No respiratory distress. Breath sounds: Normal breath sounds. No wheezing or rales. Chest:      Chest wall: No tenderness. Abdominal:      General: Bowel sounds are normal.      Palpations: Abdomen is soft.    Lymphadenopathy:      Cervical: No cervical adenopathy. Skin:     General: Skin is warm. Findings: No rash. Neurological:      Mental Status: She is oriented to person, place, and time. Lab Review   No visits with results within 2 Month(s) from this visit. Latest known visit with results is:   Office Visit on 11/07/2020   Component Date Value    SARS-CoV-2, PCR 11/07/2020 Not Detected            ASSESSMENT/PLAN:     Type 2 diabetes mellitus without complication, without long-term current use of insulin (HCC)  Her A1c is 6.1 (6.0 (6.7( 5.9)( 6.1)( 8.0) ( 6.6) (6.8) ( 6.6)( 6.8)   she was unable to tolerate metformin. Strict diet and wtl oss recommneded  RX ozempic  0.5     Vitamin D deficiency-   her level was good at 35( 08( 26)  RX vit D 2000 daily     Generalized anxiety disorder-  RX Lexapro 5 mg daily      (106)  - RX lovastatin 20 mg daily     TFT's now normal     Mild alt/ ast elevation  Improved compared to July 2020 labs     Obesity  Pt was given approximately 5 minutes of counseling about diet and exercise including education on what calories are, where calories come from, the need for portion control,following lower carbohydrate dietary regimen and healthy snacks along side an active lifestyle with supplementary exercise of approx 30 minutes a week, 5 days a week of exercise for weight loss.  The patient voiced increased understanding of the topics discussed.       DX RT breast cancer  Per dr King Kiran This Encounter   Procedures    Hepatitis C Antibody    Covid-19, Antibody, Total    CBC Auto Differential    Comprehensive Metabolic Panel    Hemoglobin A1C    Lipid Panel    Microalbumin / Creatinine Urine Ratio    Urinalysis    TSH without Reflex    T4, Free    Vitamin D 25 Hydroxy    POCT glycosylated hemoglobin (Hb A1C)     New Prescriptions    No medications on file         No follow-ups on file. There are no Patient Instructions on file for this visit.   EMR Dragon/transcription disclaimer:Significant part of this  encounter note is electronic transcription/translationof spoken language to printed text. The electronic translation of spoken language may be erroneous, or at times, nonsensical words or phrases may be inadvertently transcribed.  Although I have reviewed the note for sucherrors, some may still exist.

## 2021-01-18 DIAGNOSIS — R30.0 DYSURIA: ICD-10-CM

## 2021-01-18 LAB
BACTERIA: NEGATIVE /HPF
BILIRUBIN URINE: NEGATIVE
BLOOD, URINE: NEGATIVE
CLARITY: CLEAR
COLOR: YELLOW
CRYSTALS, UA: ABNORMAL /HPF
EPITHELIAL CELLS, UA: 1 /HPF (ref 0–5)
GLUCOSE URINE: NEGATIVE MG/DL
HYALINE CASTS: 0 /HPF (ref 0–8)
KETONES, URINE: NEGATIVE MG/DL
LEUKOCYTE ESTERASE, URINE: ABNORMAL
NITRITE, URINE: NEGATIVE
PH UA: 7 (ref 5–8)
PROTEIN UA: NEGATIVE MG/DL
RBC UA: 2 /HPF (ref 0–4)
SPECIFIC GRAVITY UA: 1.02 (ref 1–1.03)
UROBILINOGEN, URINE: 0.2 E.U./DL
WBC UA: 4 /HPF (ref 0–5)

## 2021-01-20 LAB — URINE CULTURE, ROUTINE: NORMAL

## 2021-01-21 NOTE — PROGRESS NOTES
MEDICAL ONCOLOGY PROGRESS NOTE    Pt Name: Xavi Sofia  MRN: [id number]  YOB: 1950  Date of evaluation: 1/22/2021    HISTORY OF PRESENT ILLNESS:    Diagnosis  · Invasive ductal carcinoma right breast, March 2020  · ER 4%, IA 0.1%, HER-2/paty IHC 0, Ki-67 86%  · [id number]   · Mammaprint High risk basal    Treatment summary   · 05/06/2020-8/12/2020 ddAC with G-CSF support followed by dose dense Taxol x4 cycles with G-CSF. · 9/15/20-Right breast lumpectomy with 1 sentinel lymph node negative  · 9/23/20- Adjuvant endocrine therapy-Femara 2.5 mg daily  · 10/21/20 - 11/11/20 4256 cGY radiation therapy to right breast    Patient is status completion of neoadjuvant chemotherapy with dose dense Adriamycin/cyclophosphamide followed by dose dense Taxol. She underwent a right lumpectomy and sentinel lymph node biopsy on 9/15/2020. She had a complete pathologic response. She still has residual peripheral neuropathy from her Taxol. She has been started on adjuvant letrozole. She is tolerating treatment with complaints of mild morning stiffness symptoms of  no major arthralgia or hot flashes. Cancer history  Severiano Avery was first seen by me on 4/16/2020 referred by Dr. Howie Read for diagnosis of breast cancer. This was in a screening detected lesion. The patient has a remote history of hormone replacement. She has quit. She denies any family history of breast cancer. · 3/19/2020-core needle biopsy of suspicious right breast lesion consistent with invasive ductal carcinoma grade 3 measuring 0.7 cm. ER 4%, IA 0%, HER-2/paty IHC 0.  Ki-67 86%. MammaPrint high risk basal type. · 4/1/2020- bilateral breast MRI showed right breast lesion measuring 1.1 x 1.4 x 0.85 cm. Nonenlarged lymph nodes within the upper outer right breast.  · 4/16/2020- she was first seen by me. Recommend neoadjuvant chemotherapy with dose dense AC with G-CSF support followed by dose dense Taxol.   · 4/23/2020 CT Chest Mild pulmonary scarring. Known right breast cancer. No metastatic disease evident. CT Abdomen Pelvis No acute pathology int the abdomen or pelvis. No evidence of metastatic disease. 2D Echo Normal with EF 60%. · 6/9/2020 Bone Density Femur Neck T-Score--1.5. This patient is considered Osteopenic. · 7/6/2020- Us Breast Limited Right Positive response to therapy with decreased size of the known right breast malignancy. · 8/12/2020-completion of neoadjuvant chemotherapy with dose dense AC followed by 4 cycles of dose dense Taxol with G-CSF support. 9/1/2020 Mri Breast Bilateral- No residual soft tissue mass at the site of RIGHT upper outer quadrant biopsy clip, correlating with site of biopsy-proven neoplasm. No evidence of residual disease by MR. The small satellite nodule identified on prior breast MR is also no longer identified. No axillary or internal mammary lymphadenopathy. 9/15/2020-patient underwent a right lumpectomy/sentinel lymph node biopsy revealing no residual disease. Focal atypical ductal hyperplasia. Margins negative.  AJCC STAGE: ypT0, (sn)pN0, pMx   · 9/23/20- Adjuvant endocrine therapy-Femara 2.5 mg daily  · 10/21/20 - 11/11/20 4256 cGY radiation therapy to right breast    Past Medical History:    Past Medical History:   Diagnosis Date    Anxiety     Cancer [facility])     Breast Cancer    Carpal tunnel syndrome     pt denies having carpal tunnel syndrome    Cervicalgia     Depression     Esophagitis     Hyperlipidemia     Idiopathic peripheral neuropathy     Insomnia     Menopause     Obesity due to excess calories     Osteoarthritis     Osteopenia     Type 2 diabetes mellitus without complication ([facility] [address]     Vitamin D deficiency        Past Surgical History:    Past Surgical History:   Procedure Laterality Date    BREAST LUMPECTOMY Right 9/15/2020    RIGHT LUMPECTOMY WITH SNB, PREOP AND INTRAOP US GUIDED NL, FLAPS, BIOZORB, MARGIN PROBE,PEC BLOCK performed by Katherine Reeves MD at [address] [place] [address] OR    CHOLECYSTECTOMY      COLONOSCOPY      HERNIA REPAIR      LOBECTOMY Right 5/3/2016    THORACOTOMY, WEDGE RESECTION PULMONARY NODULE RIGHT MIDDLE LOBE performed by Renuka Marte MD at [address] N/A 2020    PORT INSERTION WITH FLUORO performed by Liam Mello MD at [address] [facility] [address] N/A 2020    PORT REMOVAL WITH FLUOROSCOPY performed by Liam Mello MD at [facility] GUIDED NEEDLE LOC OF RIGHT BREAST  2020    US GUIDED NEEDLE LOC OF RIGHT BREAST [facility]       Social History:    Social History     Socioeconomic History    Marital status:      Spouse name: Not on file    Number of children: Not on file    Years of education: Not on file    Highest education level: Not on file   Occupational History    Not on file   Social Needs    Financial resource strain: Not on file    Food insecurity     Worry: Not on file     Inability: Not on file   [employer] needs     Medical: Not on file     Non-medical: Not on file   Tobacco Use    Smoking status: Former Smoker     Packs/day: 1.00     Years: 3.00     Pack years: 3.00     Types: Cigarettes     Quit date: 10/1/1984     Years since quittin.3    Smokeless tobacco: Never Used    Tobacco comment: 3 YEARS PLAYED AROUND AND QUIT IN THE    Substance and Sexual Activity    Alcohol use: No     Alcohol/week: 0.0 standard drinks    Drug use: No    Sexual activity: Not on file   Lifestyle    Physical activity     Days per week: Not on file     Minutes per session: Not on file    Stress: Not on file   Relationships    Social connections     Talks on phone: Not on file     Gets together: Not on file     Attends Taoism service: Not on file     Active member of club or organization: Not on file     Attends meetings of clubs or organizations: Not on file     Relationship status: Not on file    Intimate partner violence     Fear of current or ex partner: Not on file     Emotionally abused: Not on file     Physically abused: Not on file     Forced sexual activity: Not on file   Other Topics Concern    Not on file   Social History Narrative    Not on file       Family History:   Family History   Problem Relation Age of Onset    Cancer Father         bladder ca    Other Sister         MULTIPLE SCLEROSIS    High Blood Pressure Mother     Heart Disease Mother     Stroke Mother        Current Hospital Medications:    No current facility-administered medications for this visit. Allergies: Allergies   Allergen Reactions    Codeine Itching    Other Rash     Chloroprep surgical prep         Subjective   REVIEW OF SYSTEMS:   Constitutional: no fever, no night sweats, improved fatigue;   HEENT: no blurring of vision, no double vision, no hearing difficulty, no tinnitus,no ulceration, no dysphagia  Lungs: no cough, no shortness of breath, no wheeze;   CVS: no palpitation, no chest pain, no shortness of breath;  GI: no abdominal pain, no nausea , no vomiting, no constipation;   PARVEZ: no dysuria, frequency and urgency, no hematuria, no kidney stones;   Musculoskeletal: no joint pain, swelling , stiffness;   Endocrine: no polyuria, polydypsia, no cold or heat intolerence; Hematology/lymphatic: no easy brusing or bleeding, no hx of clotting disorder; no peripheral adenopathy. Dermatology: no skin rash, no eczema, no pruritis;   Psychiatry: no depression, no anxiety,no panic attacks, no suicide ideation; Neurology: no syncope, no seizures, improved mild numbness or tingling of hands, mild numbness or tingling of feet, no paresis;       Objective   /80   Pulse 89   Temp 97.1 °F (36.2 °C)   Ht 5' 4\" (1.626 m)   Wt 176 lb 9.6 oz (80.1 kg)   SpO2 98%   BMI 30.31 kg/m²     PHYSICAL EXAM:  CONSTITUTIONAL: Alert, appropriate, no acute distress,   EYES: Non icteric, EOM intact, pupils equal round and reactive to light and accommodation. ENT: Oral mucus membranes moist, no oral pharyngeal lesions. External inspection of ears and nose are normal.   NECK: Supple, no masses. No palpable thyroid mass    CHEST/LUNGS: CTA bilaterally, normal respiratory effort   CARDIOVASCULAR: RRR, no murmurs. No lower extremity edema   ABDOMEN: soft non-tender, active bowel sounds, no hepatosplenomegaly. No palpable masses. EXTREMITIES: warm, Full ROM of all fours extremities. No focal weakness. SKIN: warm, dry with no rashes or lesions  LYMPH: No cervical, clavicular, axillary, or inguinal lymphadenopathy  NEUROLOGIC: follows commands, non focal.   PSYCH: mood and affect appropriate. Alert and oriented to time and place and person. LABORATORY RESULTS REVIEWED BY ME:    Lab Results   Component Value Date    NEUTROABS 3.75 01/22/2021     Lab Results   Component Value Date    WBC 6.43 01/22/2021    HGB 13.2 01/22/2021    HCT 40.8 01/22/2021    MCV 99.5 (H) 01/22/2021     01/22/2021     My interpretation: CBC showed a normal WBC, normalization of hemoglobin and normal platelet counts. Mild elevated MCV at99.5    RADIOLOGY STUDIES REVIEWED BY ME:  No results found. ASSESSMENT:  #Right breast IDC, triple negative (ER 4%), basal type MammaPrint  She received neoadjuvant dose dense AC/dose dense Taxol  Right lumpectomy and sentinel lymph node biopsy-complete pathologic response  Findings of atypical ductal hyperplasia  10/21/20 - 11/11/20 4256 cGY radiation therapy to right breast  9/23/20 adjuvant endocrine therapy with letrozole x5 years until September 2025    #Diabetes type 2-  controlled. #Therapy-induced toxicity- induced neuropathy grade 1.    6/5/20 BMD Osteopenia T score -1.5.   Repeat BMD after 6/5/22  Continue calcium/vitamin D    PLAN:  RTC with MD 6 months with ASHWIN Green  Continue Femara 2.5 mg daily until September 2025  Continue Calcium and Vitamin D daily for osteopenia  Bone density after 6/5/22  Continue follow-up with  Mari Null for mammogram      I, Venus Rosa, am scribing for Nehemias Mckenna MD. Electronically signed by Venus Rosa RN on 1/22/2021 at 8:05 AM [facility]. I, Dr Gregorio Ashley, personally performed the services described in this documentation as scribed by eVnus Rosa RN in my presence and is both accurate and complete. I have seen, examined and reviewed this patient medication list, appropriate labs and imaging studies. I reviewed relevant medical records and others physicians notes. I discussed the plans of care with the patient. I answered all the questions to the patients satisfaction. I have also reviewed the chief complaint (CC) and part of the history (History of Present Illness (HPI), Past Family Social History [facility]), or Review of Systems (ROS) and made changes when appropriated.        Nehemias Mckenna MD    01/22/21  11:26 AM

## 2021-01-22 ENCOUNTER — OFFICE VISIT (OUTPATIENT)
Dept: HEMATOLOGY | Age: 71
End: 2021-01-22
Payer: COMMERCIAL

## 2021-01-22 ENCOUNTER — HOSPITAL ENCOUNTER (OUTPATIENT)
Dept: INFUSION THERAPY | Age: 71
Discharge: HOME | End: 2021-01-22
Payer: COMMERCIAL

## 2021-01-22 VITALS
DIASTOLIC BLOOD PRESSURE: 80 MMHG | SYSTOLIC BLOOD PRESSURE: 130 MMHG | WEIGHT: 176.6 LBS | BODY MASS INDEX: 30.15 KG/M2 | HEART RATE: 89 BPM | HEIGHT: 64 IN | TEMPERATURE: 97.1 F | OXYGEN SATURATION: 98 %

## 2021-01-22 DIAGNOSIS — Z17.0 MALIGNANT NEOPLASM OF BREAST IN FEMALE, ESTROGEN RECEPTOR POSITIVE, UNSPECIFIED LATERALITY, UNSPECIFIED SITE OF BREAST (HCC): Primary | ICD-10-CM

## 2021-01-22 DIAGNOSIS — Z17.0 MALIGNANT NEOPLASM OF BREAST IN FEMALE, ESTROGEN RECEPTOR POSITIVE, UNSPECIFIED LATERALITY, UNSPECIFIED SITE OF BREAST (HCC): ICD-10-CM

## 2021-01-22 DIAGNOSIS — C50.919 MALIGNANT NEOPLASM OF BREAST IN FEMALE, ESTROGEN RECEPTOR POSITIVE, UNSPECIFIED LATERALITY, UNSPECIFIED SITE OF BREAST (HCC): Primary | ICD-10-CM

## 2021-01-22 DIAGNOSIS — G62.0 CHEMOTHERAPY-INDUCED NEUROPATHY (HCC): ICD-10-CM

## 2021-01-22 DIAGNOSIS — Z51.81 ENCOUNTER FOR MONITORING AROMATASE INHIBITOR THERAPY: ICD-10-CM

## 2021-01-22 DIAGNOSIS — Z79.811 ENCOUNTER FOR MONITORING AROMATASE INHIBITOR THERAPY: ICD-10-CM

## 2021-01-22 DIAGNOSIS — T45.1X5A CHEMOTHERAPY-INDUCED NEUROPATHY (HCC): ICD-10-CM

## 2021-01-22 DIAGNOSIS — C50.919 MALIGNANT NEOPLASM OF BREAST IN FEMALE, ESTROGEN RECEPTOR POSITIVE, UNSPECIFIED LATERALITY, UNSPECIFIED SITE OF BREAST (HCC): ICD-10-CM

## 2021-01-22 DIAGNOSIS — Z71.89 CARE PLAN DISCUSSED WITH PATIENT: ICD-10-CM

## 2021-01-22 LAB
BASOPHILS ABSOLUTE: 0.03 K/UL (ref 0.01–0.08)
BASOPHILS RELATIVE PERCENT: 0.5 % (ref 0.1–1.2)
EOSINOPHILS ABSOLUTE: 0.27 K/UL (ref 0.04–0.54)
EOSINOPHILS RELATIVE PERCENT: 4.2 % (ref 0.7–7)
HCT VFR BLD CALC: 40.8 % (ref 34.1–44.9)
HEMOGLOBIN: 13.2 G/DL (ref 11.2–15.7)
LYMPHOCYTES ABSOLUTE: 1.61 K/UL (ref 1.18–3.74)
LYMPHOCYTES RELATIVE PERCENT: 25 % (ref 19.3–53.1)
MCH RBC QN AUTO: 32.2 PG (ref 25.6–32.2)
MCHC RBC AUTO-ENTMCNC: 32.4 G/DL (ref 32.3–35.5)
MCV RBC AUTO: 99.5 FL (ref 79.4–94.8)
MONOCYTES ABSOLUTE: 0.77 K/UL (ref 0.24–0.82)
MONOCYTES RELATIVE PERCENT: 12 % (ref 4.7–12.5)
NEUTROPHILS ABSOLUTE: 3.75 K/UL (ref 1.56–6.13)
NEUTROPHILS RELATIVE PERCENT: 58.3 % (ref 34–71.1)
PDW BLD-RTO: 14.4 % (ref 11.7–14.4)
PLATELET # BLD: 223 K/UL (ref 182–369)
PMV BLD AUTO: 10.6 FL (ref 7.4–10.4)
RBC # BLD: 4.1 M/UL (ref 3.93–5.22)
WBC # BLD: 6.43 K/UL (ref 3.98–10.04)

## 2021-01-22 PROCEDURE — 99213 OFFICE O/P EST LOW 20 MIN: CPT | Performed by: INTERNAL MEDICINE

## 2021-01-22 PROCEDURE — 99211 OFF/OP EST MAY X REQ PHY/QHP: CPT

## 2021-01-22 PROCEDURE — 85025 COMPLETE CBC W/AUTO DIFF WBC: CPT

## 2021-01-25 ENCOUNTER — PATIENT MESSAGE (OUTPATIENT)
Dept: INTERNAL MEDICINE | Age: 71
End: 2021-01-25

## 2021-01-25 NOTE — TELEPHONE ENCOUNTER
From: Pineda Sharma  To: Beata Martin MD  Sent: 1/25/2021 9:41 AM CST  Subject: Visit Follow-Up Question    On Doug[facility] ins. Dr. Deandra Wright told [facility] Pass his ins. Would not pay for his upcoming MRI. I talked with asad and they said they had not had a referral from her. Also they said its cheaper to go through [address] [place] [address]. So can he go through them?

## 2021-02-02 ENCOUNTER — PATIENT MESSAGE (OUTPATIENT)
Dept: SURGERY | Age: 71
End: 2021-02-02

## 2021-02-03 NOTE — TELEPHONE ENCOUNTER
Vedia Curling,  It looks like your appt's were being scheduled and that was made in error but was corrected., You are scheduled same day for your mammogram and follow up 3/10/2021. Sorry for any confusion.      Ruba Shirley

## 2021-02-03 NOTE — TELEPHONE ENCOUNTER
From: Susi Agarwal  To: Tomasa Maldonado MD  Sent: 2/2/2021 6:57 PM CST  Subject: Non-Urgent Medical Question    I just received my appt with dr. Josephine Reyes and i dont understand why i see dr. Josephine Reyes 2 days before my mammogram.

## 2021-03-09 NOTE — PROGRESS NOTES
HISTORY OF PRESENT ILLNESS:    Ms. Chayo Talley presents today for a 5 month breast exam following a mammogram.    On 3/19/2020 she underwent an ultrasound guided breast biopsy  on the right which revealed a 1.1 cm grade 3 invasive ductal carcinoma. ER is positive at 4%. WI is Negative. Her2 is Negative. Ki67 is High at 86%. Mammaprint is High Risk Basal Type. 9/1/2020-Bilateral Breast MRI  No suspicious soft tissue mass within the region of the    biopsy clip. The satellite lesion identified on prior breast MR is    also no longer visualized. No new suspicious mass or abnormal enhancement in either breast. LEFT    breast ductal ectasia with T1 hyperintense proteinaceous debris within    the ducts is again noted. No axillary or internal mammary    lymphadenopathy. LEFT chest port catheter is noted. No suspicious bone    lesions identified. No acute finding in the anterior liver.         Impression    No residual soft tissue mass at the site of RIGHT upper outer quadrant    biopsy clip, correlating with site of biopsy-proven neoplasm. No    evidence of residual disease by MR. The small satellite nodule    identified on prior breast MR is also no longer identified. No    axillary or internal mammary lymphadenopathy. BI-RADS CATEGORY 6: KNOWN BIOPSY-PROVEN MALIGNANCY. Signed by Dr Cee Handy on 9/1/2020 1:51 PM      She is status post Right lumpectomy with Right sentinel lymph node biopsy on 9/15/2020. PATHOLOGY REVEALS:  FINAL DIAGNOSIS:     A.  Breast, right lumpectomy:    1.  Prior biopsy site identified, negative for residual carcinoma or carcinoma in situ.    2.  Focal atypical ductal hyperplasia, margins negative.    3.  Extensive fibrosis present adjacent to prior biopsy site consistent with complete therapeutic response.    4.  Surgical excision margins are negative for prior biopsy site. B.  Breast, excision of additional right breast anterior margin: Benign breast parenchyma. C.  Breast, excision of additional right breast cranial margin: Benign breast parenchyma. D.  Lymph node, right [place] lymph node biopsy: One lymph node, negative for evidence of malignancy. Mammogram-3/10/2021    FINDINGS: There are no suspicious findings. Posttreatment changes to   the right breast.       Impression   1. No mammographic evidence of malignancy.  Recommend routine annual   screening mammography. 2. Posttreatment changes to the right breast.     PHYSICAL EXAM:  The  wounds look good with no evidence of infection, fluid accumulation, or skin necrosis. She has fibrocystic changes to both breasts and minimal scarring and minimal radiation changes. She has no dominant masses, no skin or nipple changes, and no axillary adenopathy. There is no evidence of new or recurrent neoplasm    She has seen Dr. Emilee Rangel and has completed radiation treatments 11/2020. She received a 3-week course. IMPRESSION:     Right lumpectomy with Right sentinel lymph node biopsy      PLAN: I will see her back in 6 months for a physical exam. She will call me with any new concerns. I have seen, examined and reviewed this patient medication list, appropriate labs and imaging studies. I reviewed relevant medical records and others physicians notes. I discussed the plans of care with the patient. I answered all the questions to the patients satisfaction. I, Dr Jonathan Rea, personally performed the services described in this documentation as scribed by Dori Priest MA in my presence and is both accurate and complete. (Please note that portions of this note were completed with a voice recognition program. Efforts were made to edit the dictations but occasionally words are mis-transcribed.)  Over 50% of the total visit time of 15 minutes in face to face encounter with the patient, out of which more than 50% of the time was spent in counseling patient or family and coordination of care. Counseling included but was not limited to time spent reviewing labs, imaging studies/ treatment plan and answering questions.

## 2021-03-10 ENCOUNTER — HOSPITAL ENCOUNTER (OUTPATIENT)
Dept: WOMENS IMAGING | Age: 71
Discharge: HOME | End: 2021-03-10
Payer: COMMERCIAL

## 2021-03-10 ENCOUNTER — OFFICE VISIT (OUTPATIENT)
Dept: SURGERY | Age: 71
End: 2021-03-10
Payer: COMMERCIAL

## 2021-03-10 VITALS — HEART RATE: 80 BPM | SYSTOLIC BLOOD PRESSURE: 134 MMHG | DIASTOLIC BLOOD PRESSURE: 82 MMHG

## 2021-03-10 DIAGNOSIS — Z85.3 PERSONAL HISTORY OF BREAST CANCER: ICD-10-CM

## 2021-03-10 DIAGNOSIS — Z17.1 MALIGNANT NEOPLASM OF OVERLAPPING SITES OF RIGHT BREAST IN FEMALE, ESTROGEN RECEPTOR NEGATIVE (HCC): Primary | ICD-10-CM

## 2021-03-10 DIAGNOSIS — Z98.890 S/P LUMPECTOMY, RIGHT BREAST: ICD-10-CM

## 2021-03-10 DIAGNOSIS — C50.811 MALIGNANT NEOPLASM OF OVERLAPPING SITES OF RIGHT BREAST IN FEMALE, ESTROGEN RECEPTOR NEGATIVE (HCC): Primary | ICD-10-CM

## 2021-03-10 PROCEDURE — 99213 OFFICE O/P EST LOW 20 MIN: CPT | Performed by: SURGERY

## 2021-03-10 PROCEDURE — G0279 TOMOSYNTHESIS, MAMMO: HCPCS

## 2021-05-27 DIAGNOSIS — E55.9 VITAMIN D DEFICIENCY: ICD-10-CM

## 2021-05-27 DIAGNOSIS — E78.00 PURE HYPERCHOLESTEROLEMIA: ICD-10-CM

## 2021-05-27 DIAGNOSIS — Z11.59 NEED FOR HEPATITIS C SCREENING TEST: ICD-10-CM

## 2021-05-27 DIAGNOSIS — E05.90 SUBCLINICAL HYPERTHYROIDISM: ICD-10-CM

## 2021-05-27 DIAGNOSIS — E11.9 TYPE 2 DIABETES MELLITUS WITHOUT COMPLICATION, WITHOUT LONG-TERM CURRENT USE OF INSULIN (HCC): ICD-10-CM

## 2021-05-27 DIAGNOSIS — R73.9 HYPERGLYCEMIA: ICD-10-CM

## 2021-05-27 LAB
ALBUMIN SERPL-MCNC: 4.5 G/DL (ref 3.5–5.2)
ALP BLD-CCNC: 125 U/L (ref 35–104)
ALT SERPL-CCNC: 23 U/L (ref 5–33)
ANION GAP SERPL CALCULATED.3IONS-SCNC: 9 MMOL/L (ref 7–19)
AST SERPL-CCNC: 20 U/L (ref 5–32)
BACTERIA: ABNORMAL /HPF
BASOPHILS ABSOLUTE: 0 K/UL (ref 0–0.2)
BASOPHILS RELATIVE PERCENT: 0.5 % (ref 0–1)
BILIRUB SERPL-MCNC: 0.3 MG/DL (ref 0.2–1.2)
BILIRUBIN URINE: NEGATIVE
BLOOD, URINE: NEGATIVE
BUN BLDV-MCNC: 19 MG/DL (ref 8–23)
CALCIUM SERPL-MCNC: 9.7 MG/DL (ref 8.8–10.2)
CHLORIDE BLD-SCNC: 104 MMOL/L (ref 98–111)
CHOLESTEROL, TOTAL: 182 MG/DL (ref 160–199)
CLARITY: ABNORMAL
CO2: 27 MMOL/L (ref 22–29)
COLOR: YELLOW
CREAT SERPL-MCNC: 0.6 MG/DL (ref 0.5–0.9)
CREATININE URINE: 227.4 MG/DL (ref 4.2–622)
EOSINOPHILS ABSOLUTE: 0.2 K/UL (ref 0–0.6)
EOSINOPHILS RELATIVE PERCENT: 3.9 % (ref 0–5)
EPITHELIAL CELLS, UA: ABNORMAL /HPF
GFR AFRICAN AMERICAN: >59
GFR NON-AFRICAN AMERICAN: >60
GLUCOSE BLD-MCNC: 99 MG/DL (ref 74–109)
GLUCOSE URINE: NEGATIVE MG/DL
HBA1C MFR BLD: 5.8 % (ref 4–6)
HCT VFR BLD CALC: 39.3 % (ref 37–47)
HDLC SERPL-MCNC: 48 MG/DL (ref 65–121)
HEMOGLOBIN: 12.4 G/DL (ref 12–16)
HEPATITIS C ANTIBODY INTERPRETATION: NORMAL
IMMATURE GRANULOCYTES #: 0 K/UL
KETONES, URINE: ABNORMAL MG/DL
LDL CHOLESTEROL CALCULATED: 107 MG/DL
LEUKOCYTE ESTERASE, URINE: ABNORMAL
LYMPHOCYTES ABSOLUTE: 1.3 K/UL (ref 1.1–4.5)
LYMPHOCYTES RELATIVE PERCENT: 22.9 % (ref 20–40)
MCH RBC QN AUTO: 30.7 PG (ref 27–31)
MCHC RBC AUTO-ENTMCNC: 31.6 G/DL (ref 33–37)
MCV RBC AUTO: 97.3 FL (ref 81–99)
MICROALBUMIN UR-MCNC: 2.3 MG/DL (ref 0–19)
MICROALBUMIN/CREAT UR-RTO: 10.1 MG/G
MONOCYTES ABSOLUTE: 0.6 K/UL (ref 0–0.9)
MONOCYTES RELATIVE PERCENT: 10.6 % (ref 0–10)
NEUTROPHILS ABSOLUTE: 3.6 K/UL (ref 1.5–7.5)
NEUTROPHILS RELATIVE PERCENT: 61.9 % (ref 50–65)
NITRITE, URINE: NEGATIVE
PDW BLD-RTO: 13.2 % (ref 11.5–14.5)
PH UA: 5 (ref 5–8)
PLATELET # BLD: 251 K/UL (ref 130–400)
PMV BLD AUTO: 10.9 FL (ref 9.4–12.3)
POTASSIUM SERPL-SCNC: 4.4 MMOL/L (ref 3.5–5)
PROTEIN UA: NEGATIVE MG/DL
RBC # BLD: 4.04 M/UL (ref 4.2–5.4)
RBC UA: ABNORMAL /HPF (ref 0–2)
SODIUM BLD-SCNC: 140 MMOL/L (ref 136–145)
SPECIFIC GRAVITY UA: 1.03 (ref 1–1.03)
T4 FREE: 0.9 NG/DL (ref 0.93–1.7)
TOTAL PROTEIN: 7.3 G/DL (ref 6.6–8.7)
TRIGL SERPL-MCNC: 134 MG/DL (ref 0–149)
TSH SERPL DL<=0.05 MIU/L-ACNC: 2.08 UIU/ML (ref 0.27–4.2)
UROBILINOGEN, URINE: 1 E.U./DL
VITAMIN D 25-HYDROXY: 37.8 NG/ML
WBC # BLD: 5.9 K/UL (ref 4.8–10.8)
WBC UA: ABNORMAL /HPF (ref 0–5)

## 2021-05-31 DIAGNOSIS — Z17.0 MALIGNANT NEOPLASM OF BREAST IN FEMALE, ESTROGEN RECEPTOR POSITIVE, UNSPECIFIED LATERALITY, UNSPECIFIED SITE OF BREAST (HCC): ICD-10-CM

## 2021-05-31 DIAGNOSIS — C50.919 MALIGNANT NEOPLASM OF BREAST IN FEMALE, ESTROGEN RECEPTOR POSITIVE, UNSPECIFIED LATERALITY, UNSPECIFIED SITE OF BREAST (HCC): ICD-10-CM

## 2021-06-01 RX ORDER — LETROZOLE 2.5 MG/1
TABLET, FILM COATED ORAL
Qty: 60 TABLET | Refills: 4 | Status: SHIPPED | OUTPATIENT
Start: 2021-06-01 | End: 2022-09-12

## 2021-06-02 ENCOUNTER — OFFICE VISIT (OUTPATIENT)
Dept: INTERNAL MEDICINE | Age: 71
End: 2021-06-02
Payer: COMMERCIAL

## 2021-06-02 VITALS
WEIGHT: 177 LBS | RESPIRATION RATE: 18 BRPM | BODY MASS INDEX: 30.22 KG/M2 | DIASTOLIC BLOOD PRESSURE: 70 MMHG | SYSTOLIC BLOOD PRESSURE: 108 MMHG | HEIGHT: 64 IN | HEART RATE: 82 BPM | OXYGEN SATURATION: 96 %

## 2021-06-02 DIAGNOSIS — F41.1 GENERALIZED ANXIETY DISORDER: ICD-10-CM

## 2021-06-02 DIAGNOSIS — E55.9 VITAMIN D DEFICIENCY: ICD-10-CM

## 2021-06-02 DIAGNOSIS — Z00.00 ANNUAL PHYSICAL EXAM: ICD-10-CM

## 2021-06-02 DIAGNOSIS — R73.9 HYPERGLYCEMIA: ICD-10-CM

## 2021-06-02 DIAGNOSIS — E05.90 SUBCLINICAL HYPERTHYROIDISM: Primary | ICD-10-CM

## 2021-06-02 DIAGNOSIS — E11.9 TYPE 2 DIABETES MELLITUS WITHOUT COMPLICATION, WITHOUT LONG-TERM CURRENT USE OF INSULIN (HCC): ICD-10-CM

## 2021-06-02 PROCEDURE — 99397 PER PM REEVAL EST PAT 65+ YR: CPT | Performed by: INTERNAL MEDICINE

## 2021-06-02 RX ORDER — MONTELUKAST SODIUM 10 MG/1
10 TABLET ORAL NIGHTLY
Qty: 90 TABLET | Refills: 1 | Status: SHIPPED
Start: 2021-06-02 | End: 2022-06-02 | Stop reason: CLARIF

## 2021-06-02 RX ORDER — ESCITALOPRAM OXALATE 10 MG/1
5 TABLET ORAL DAILY
Qty: 45 TABLET | Refills: 1 | Status: SHIPPED | OUTPATIENT
Start: 2021-06-02 | End: 2021-12-20 | Stop reason: SDUPTHER

## 2021-06-02 RX ORDER — LOVASTATIN 20 MG/1
TABLET ORAL
Qty: 180 TABLET | Refills: 1 | Status: SHIPPED | OUTPATIENT
Start: 2021-06-02 | End: 2021-12-20 | Stop reason: SDUPTHER

## 2021-06-02 SDOH — ECONOMIC STABILITY: FOOD INSECURITY: WITHIN THE PAST 12 MONTHS, THE FOOD YOU BOUGHT JUST DIDN'T LAST AND YOU DIDN'T HAVE MONEY TO GET MORE.: NEVER TRUE

## 2021-06-02 SDOH — ECONOMIC STABILITY: FOOD INSECURITY: WITHIN THE PAST 12 MONTHS, YOU WORRIED THAT YOUR FOOD WOULD RUN OUT BEFORE YOU GOT MONEY TO BUY MORE.: NEVER TRUE

## 2021-06-02 ASSESSMENT — SOCIAL DETERMINANTS OF HEALTH (SDOH): HOW HARD IS IT FOR YOU TO PAY FOR THE VERY BASICS LIKE FOOD, HOUSING, MEDICAL CARE, AND HEATING?: NOT HARD AT ALL

## 2021-06-02 ASSESSMENT — ENCOUNTER SYMPTOMS
SORE THROAT: 0
COUGH: 0
WHEEZING: 0
ABDOMINAL PAIN: 0
CHEST TIGHTNESS: 0
CONSTIPATION: 0

## 2021-06-02 NOTE — PROGRESS NOTES
neuropathy     Insomnia     Menopause     Obesity due to excess calories     Osteoarthritis     Osteopenia     Type 2 diabetes mellitus without complication ([facility] [address]     Vitamin D deficiency       Past Surgical History:   Procedure Laterality Date    BREAST BIOPSY Right 2020    infiltrating ductal carcinoma    BREAST LUMPECTOMY Right 9/15/2020    RIGHT LUMPECTOMY WITH SNB, PREOP AND INTRAOP US GUIDED NL, FLAPS, BIOZORB, MARGIN PROBE,PEC BLOCK performed by Lisette Rangel MD at [facility] Right 5/3/2016    THORACOTOMY, WEDGE RESECTION PULMONARY NODULE RIGHT MIDDLE LOBE performed by Fior Groves MD at [address] N/A 4/22/2020    PORT INSERTION WITH FLUORO performed by Lisette Rangel MD at [address] N/A 11/12/2020    PORT REMOVAL WITH FLUOROSCOPY performed by Lisette Rangel MD at [address] [place] [address] LOC OF RIGHT BREAST  9/14/2020    US GUIDED NEEDLE LOC OF RIGHT BREAST 9/14/2020 [facility] [address] [facility]     Current Outpatient Medications   Medication Sig Dispense Refill    Semaglutide,0.25 or 0.5MG/DOS, 2 MG/1.5ML SOPN Inject 0.5 mg into the skin once a week 3 pen 3    montelukast (SINGULAIR) 10 MG tablet Take 1 tablet by mouth nightly 90 tablet 1    lovastatin (MEVACOR) 20 MG tablet TAKE TWO TABLETS BY MOUTH DAILY 180 tablet 1    escitalopram (LEXAPRO) 10 MG tablet Take 0.5 tablets by mouth daily 45 tablet 1    letrozole (FEMARA) 2.5 MG tablet TAKE ONE TABLET BY MOUTH DAILY 60 tablet 4    Cholecalciferol (VITAMIN D) 2000 units CAPS capsule Take by mouth daily        No current facility-administered medications for this visit.      Allergies   Allergen Reactions    Codeine Itching    Other Rash     Chloroprep surgical prep     Social History     Tobacco Use    Smoking status: Former Smoker     Packs/day: 1.00     Years: 3.00     Pack years: 3.00     Types: Cigarettes     Quit date: 10/1/1984     Years since quittin.6    Smokeless tobacco: Never Used    Tobacco comment: 3 YEARS PLAYED AROUND AND QUIT IN THE    Substance Use Topics    Alcohol use: No     Alcohol/week: 0.0 standard drinks      Family History   Problem Relation Age of Onset    Cancer Father         bladder ca    Other Sister         MULTIPLE SCLEROSIS    High Blood Pressure Mother     Heart Disease Mother     Stroke Mother        Review of Systems   Constitutional: Negative for chills, fatigue and fever. HENT: Negative for congestion, ear pain, nosebleeds, postnasal drip and sore throat. Respiratory: Negative for cough, chest tightness and wheezing. Cardiovascular: Negative for chest pain, palpitations and leg swelling. Gastrointestinal: Negative for abdominal pain and constipation. Genitourinary: Negative for dysuria and urgency. Musculoskeletal: Negative. Negative for arthralgias. Skin: Negative for rash. Neurological: Negative for dizziness and headaches. Psychiatric/Behavioral: Negative. Vitals:    21 1046   BP: 108/70   Site: Left Upper Arm   Position: Sitting   Cuff Size: Large Adult   Pulse: 82   Resp: 18   SpO2: 96%   Weight: 177 lb (80.3 kg)   Height: 5' 4\" (1.626 m)     Body mass index is 30.38 kg/m². Physical Exam  Constitutional:       Appearance: She is well-developed. HENT:      Right Ear: External ear normal.      Left Ear: External ear normal.      Mouth/Throat:      Pharynx: No oropharyngeal exudate. Eyes:      Conjunctiva/sclera: Conjunctivae normal.      Pupils: Pupils are equal, round, and reactive to light. Neck:      Thyroid: No thyromegaly. Vascular: No JVD. Cardiovascular:      Rate and Rhythm: Normal rate. Heart sounds: Normal heart sounds. No murmur heard. Pulmonary:      Effort: No respiratory distress. Breath sounds: Normal breath sounds. No wheezing or rales.    Chest:      Chest wall: No tenderness. Abdominal:      General: Bowel sounds are normal.      Palpations: Abdomen is soft. Musculoskeletal:      Cervical back: Neck supple. Lymphadenopathy:      Cervical: No cervical adenopathy. Skin:     Findings: No rash.          Lab Review   Orders Only on 05/27/2021   Component Date Value    Vit D, 25-Hydroxy 05/27/2021 37.8     T4 Free 05/27/2021 0.90*    TSH 05/27/2021 2.080     Color, UA 05/27/2021 YELLOW     Clarity, UA 05/27/2021 CLOUDY*    Glucose, Ur 05/27/2021 Negative     Bilirubin Urine 05/27/2021 Negative     Ketones, Urine 05/27/2021 TRACE*    Specific Gravity, UA 05/27/2021 1.028     Blood, Urine 05/27/2021 Negative     pH, UA 05/27/2021 5.0     Protein, UA 05/27/2021 Negative     Urobilinogen, Urine 05/27/2021 1.0     Nitrite, Urine 05/27/2021 Negative     Leukocyte Esterase, Urine 05/27/2021 SMALL*    Microalbumin, Random Uri* 05/27/2021 2.30     Creatinine, Ur 05/27/2021 227.4     Microalbumin Creatinine * 05/27/2021 10.1     Cholesterol, Total 05/27/2021 182     Triglycerides 05/27/2021 134     HDL 05/27/2021 48*    LDL Calculated 05/27/2021 107     Hemoglobin A1C 05/27/2021 5.8     Sodium 05/27/2021 140     Potassium 05/27/2021 4.4     Chloride 05/27/2021 104     CO2 05/27/2021 27     Anion Gap 05/27/2021 9     Glucose 05/27/2021 99     BUN 05/27/2021 19     CREATININE 05/27/2021 0.6     GFR Non- 05/27/2021 >60     GFR  05/27/2021 >59     Calcium 05/27/2021 9.7     Total Protein 05/27/2021 7.3     Albumin 05/27/2021 4.5     Total Bilirubin 05/27/2021 0.3     Alkaline Phosphatase 05/27/2021 125*    ALT 05/27/2021 23     AST 05/27/2021 20     WBC 05/27/2021 5.9     RBC 05/27/2021 4.04*    Hemoglobin 05/27/2021 12.4     Hematocrit 05/27/2021 39.3     MCV 05/27/2021 97.3     MCH 05/27/2021 30.7     MCHC 05/27/2021 31.6*    RDW 05/27/2021 13.2     Platelets 45/62/0756 251     MPV 05/27/2021 10.9     Prescriptions    No medications on file         Return in about 4 months (around 10/2/2021) for Medication check. There are no Patient Instructions on file for this visit. EMR Dragon/transcription disclaimer:Significant part of this  encounter note is electronic transcription/translationof spoken language to printed text. The electronic translation of spoken language may be erroneous, or at times, nonsensical words or phrases may be inadvertently transcribed.  Although I have reviewed the note for sucherrors, some may still exist.

## 2021-06-29 DIAGNOSIS — R10.9 FLANK PAIN: ICD-10-CM

## 2021-06-29 LAB
BACTERIA: NEGATIVE /HPF
BILIRUBIN URINE: NEGATIVE
BLOOD, URINE: NEGATIVE
CLARITY: CLEAR
COLOR: YELLOW
CRYSTALS, UA: ABNORMAL /HPF
EPITHELIAL CELLS, UA: 0 /HPF (ref 0–5)
GLUCOSE URINE: NEGATIVE MG/DL
HYALINE CASTS: 2 /HPF (ref 0–8)
KETONES, URINE: NEGATIVE MG/DL
LEUKOCYTE ESTERASE, URINE: ABNORMAL
NITRITE, URINE: NEGATIVE
PH UA: 5.5 (ref 5–8)
PROTEIN UA: NEGATIVE MG/DL
RBC UA: 3 /HPF (ref 0–4)
SPECIFIC GRAVITY UA: 1.02 (ref 1–1.03)
UROBILINOGEN, URINE: 0.2 E.U./DL
WBC UA: 3 /HPF (ref 0–5)

## 2021-06-30 ENCOUNTER — PATIENT MESSAGE (OUTPATIENT)
Dept: INTERNAL MEDICINE | Age: 71
End: 2021-06-30

## 2021-06-30 LAB — URINE CULTURE, ROUTINE: NORMAL

## 2021-07-01 NOTE — TELEPHONE ENCOUNTER
From: Rhett Ewing  To: Franco Mcdonough MD  Sent: 6/30/2021 9:26 PM CDT  Subject: Test Results Question    So do i not have a kidney infection? Nii still having pain on my right side.

## 2021-07-20 PROCEDURE — 87635 SARS-COV-2 COVID-19 AMP PRB: CPT | Performed by: NURSE PRACTITIONER

## 2021-08-12 DIAGNOSIS — Z17.1 MALIGNANT NEOPLASM OF OVERLAPPING SITES OF RIGHT BREAST IN FEMALE, ESTROGEN RECEPTOR NEGATIVE (HCC): Primary | ICD-10-CM

## 2021-08-12 DIAGNOSIS — C50.811 MALIGNANT NEOPLASM OF OVERLAPPING SITES OF RIGHT BREAST IN FEMALE, ESTROGEN RECEPTOR NEGATIVE (HCC): Primary | ICD-10-CM

## 2021-08-13 ENCOUNTER — HOSPITAL ENCOUNTER (OUTPATIENT)
Dept: INFUSION THERAPY | Age: 71
End: 2021-08-13
Payer: COMMERCIAL

## 2021-08-27 ENCOUNTER — OFFICE VISIT (OUTPATIENT)
Dept: HEMATOLOGY | Age: 71
End: 2021-08-27
Payer: COMMERCIAL

## 2021-08-27 ENCOUNTER — HOSPITAL ENCOUNTER (OUTPATIENT)
Dept: INFUSION THERAPY | Age: 71
Discharge: HOME | End: 2021-08-27
Payer: COMMERCIAL

## 2021-08-27 VITALS
BODY MASS INDEX: 29.72 KG/M2 | DIASTOLIC BLOOD PRESSURE: 70 MMHG | WEIGHT: 174.1 LBS | HEIGHT: 64 IN | SYSTOLIC BLOOD PRESSURE: 120 MMHG | OXYGEN SATURATION: 95 % | HEART RATE: 89 BPM

## 2021-08-27 DIAGNOSIS — Z79.811 ENCOUNTER FOR MONITORING AROMATASE INHIBITOR THERAPY: ICD-10-CM

## 2021-08-27 DIAGNOSIS — Z17.0 MALIGNANT NEOPLASM OF BREAST IN FEMALE, ESTROGEN RECEPTOR POSITIVE, UNSPECIFIED LATERALITY, UNSPECIFIED SITE OF BREAST (HCC): Primary | ICD-10-CM

## 2021-08-27 DIAGNOSIS — G62.0 CHEMOTHERAPY-INDUCED NEUROPATHY (HCC): ICD-10-CM

## 2021-08-27 DIAGNOSIS — Z51.81 ENCOUNTER FOR MONITORING AROMATASE INHIBITOR THERAPY: ICD-10-CM

## 2021-08-27 DIAGNOSIS — T50.905S TOXICITY, LATE EFFECT, DUE TO DRUG, MEDICINE, OR BIOLOGICAL SUBSTANCE: ICD-10-CM

## 2021-08-27 DIAGNOSIS — M85.80 OSTEOPENIA, UNSPECIFIED LOCATION: ICD-10-CM

## 2021-08-27 DIAGNOSIS — Z71.89 CARE PLAN DISCUSSED WITH PATIENT: ICD-10-CM

## 2021-08-27 DIAGNOSIS — C50.919 MALIGNANT NEOPLASM OF BREAST IN FEMALE, ESTROGEN RECEPTOR POSITIVE, UNSPECIFIED LATERALITY, UNSPECIFIED SITE OF BREAST (HCC): Primary | ICD-10-CM

## 2021-08-27 DIAGNOSIS — T45.1X5A CHEMOTHERAPY-INDUCED NEUROPATHY (HCC): ICD-10-CM

## 2021-08-27 DIAGNOSIS — C50.811 MALIGNANT NEOPLASM OF OVERLAPPING SITES OF RIGHT BREAST IN FEMALE, ESTROGEN RECEPTOR NEGATIVE (HCC): ICD-10-CM

## 2021-08-27 DIAGNOSIS — Z17.1 MALIGNANT NEOPLASM OF OVERLAPPING SITES OF RIGHT BREAST IN FEMALE, ESTROGEN RECEPTOR NEGATIVE (HCC): ICD-10-CM

## 2021-08-27 LAB
BASOPHILS ABSOLUTE: 0.02 K/UL (ref 0.01–0.08)
BASOPHILS RELATIVE PERCENT: 0.3 % (ref 0.1–1.2)
EOSINOPHILS ABSOLUTE: 0.18 K/UL (ref 0.04–0.54)
EOSINOPHILS RELATIVE PERCENT: 2.7 % (ref 0.7–7)
HCT VFR BLD CALC: 39.2 % (ref 34.1–44.9)
HEMOGLOBIN: 12.4 G/DL (ref 11.2–15.7)
LYMPHOCYTES ABSOLUTE: 1.58 K/UL (ref 1.18–3.74)
LYMPHOCYTES RELATIVE PERCENT: 23.9 % (ref 19.3–53.1)
MCH RBC QN AUTO: 31.7 PG (ref 25.6–32.2)
MCHC RBC AUTO-ENTMCNC: 31.6 G/DL (ref 32.3–35.5)
MCV RBC AUTO: 100.3 FL (ref 79.4–94.8)
MONOCYTES ABSOLUTE: 0.56 K/UL (ref 0.24–0.82)
MONOCYTES RELATIVE PERCENT: 8.5 % (ref 4.7–12.5)
NEUTROPHILS ABSOLUTE: 4.28 K/UL (ref 1.56–6.13)
NEUTROPHILS RELATIVE PERCENT: 64.6 % (ref 34–71.1)
PDW BLD-RTO: 13 % (ref 11.7–14.4)
PLATELET # BLD: 182 K/UL (ref 182–369)
PMV BLD AUTO: 10.9 FL (ref 7.4–10.4)
RBC # BLD: 3.91 M/UL (ref 3.93–5.22)
WBC # BLD: 6.62 K/UL (ref 3.98–10.04)

## 2021-08-27 PROCEDURE — 85025 COMPLETE CBC W/AUTO DIFF WBC: CPT

## 2021-08-27 PROCEDURE — 99211 OFF/OP EST MAY X REQ PHY/QHP: CPT

## 2021-08-27 PROCEDURE — 99204 OFFICE O/P NEW MOD 45 MIN: CPT | Performed by: NURSE PRACTITIONER

## 2021-08-27 NOTE — PROGRESS NOTES
Progress Note      Pt Name: Reggie Contreras  YOB: 1950  MRN: [id number]    Date of evaluation: 8/27/2021  History Obtained From:  patient, electronic medical record    CHIEF COMPLAINT:    Chief Complaint   Patient presents with    Follow-up    Breast Cancer     Femara     HISTORY OF PRESENT ILLNESS:    Reggie Contreras is a 73 y.o.  female who is currently being followed for invasive ductal carcinoma of the right breast, triple negative, ER 4%. She is status post neoadjuvant chemotherapy, right lumpectomy with sentinel lymph node biopsy, adjuvant radiation therapy and currently taking adjuvant endocrine therapy with letrozole. [facility] returns today in scheduled follow-up for evaluation, lab monitoring, side effect monitoring and further treatment recommendations. She reports being compliant with the Femara and tolerating without significant difficulty other than mild arthralgias. She denies any new breast complaints. 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 [facility] had bilateral mammogram on 3/10/2021 that reported no mammographic evidence of malignancy. She was also seen in follow-up by Dr. Spike Johnson on 3/10/2021, I reviewed his office note that reported no evidence of new or recurrent disease. ONCOLOGIC HISTORY:     Diagnosis  · Invasive ductal carcinoma right breast, March 2020  · ER 4%, TX 0.1%, HER-2/paty IHC 0, Ki-67 86%  · [id number]   · Mammaprint High risk basal     Treatment summary   · 05/06/2020-8/12/2020 ddAC with G-CSF support followed by dose dense Taxol x4 cycles with G-CSF. · 9/15/20-Right breast lumpectomy with 1 sentinel lymph node negative  · 9/23/20- Adjuvant endocrine therapy-Femara 2.5 mg daily  · 10/21/20 - 11/11/20 4256 cGY radiation therapy to right breast     Roger Weiss was first seen by Dr. Justine Gudino on 4/16/2020 referred by Dr. Lindy Mckeon for diagnosis of breast cancer.   This was in a screening evidence of malignancy.          Age-appropriate health screening:    Past Medical History:    Past Medical History:   Diagnosis Date    Anxiety     Breast cancer [facility] 2020    infiltrating ductal carcinoma     Cancer ([facility] [address]     Breast Cancer    Carpal tunnel syndrome     pt denies having carpal tunnel syndrome    Cervicalgia     Depression     Esophagitis     History of therapeutic radiation 2020    Hx antineoplastic chemo 2020    Hyperlipidemia     Idiopathic peripheral neuropathy     Insomnia     Menopause     Obesity due to excess calories     Osteoarthritis     Osteopenia     Type 2 diabetes mellitus without complication ([facility] [address]     Vitamin D deficiency        Past Surgical History:    Past Surgical History:   Procedure Laterality Date    BREAST BIOPSY Right 2020    infiltrating ductal carcinoma    BREAST LUMPECTOMY Right 9/15/2020    RIGHT LUMPECTOMY WITH SNB, PREOP AND INTRAOP US GUIDED NL, FLAPS, BIOZORB, MARGIN PROBE,PEC BLOCK performed by Lisa Perez MD at [facility] Right 5/3/2016    THORACOTOMY, WEDGE RESECTION PULMONARY NODULE RIGHT MIDDLE LOBE performed by Dereck Arceo MD at [address] N/A 4/22/2020    PORT INSERTION WITH FLUORO performed by Lisa Perez MD at [address] N/A 11/12/2020    PORT REMOVAL WITH FLUOROSCOPY performed by Lisa Perez MD at [address] [place] [address] LOC OF RIGHT BREAST  9/14/2020    US GUIDED NEEDLE LOC OF RIGHT BREAST 9/14/2020 [facility] [address]       Current Medications:    Current Outpatient Medications   Medication Sig Dispense Refill    Semaglutide,0.25 or 0.5MG/DOS, 2 MG/1.5ML SOPN Inject 0.5 mg into the skin once a week 3 pen 3    montelukast (SINGULAIR) 10 MG tablet Take 1 tablet by mouth nightly 90 tablet 1    lovastatin (MEVACOR) 20 MG tablet TAKE TWO TABLETS BY MOUTH DAILY 180 tablet 1    escitalopram (LEXAPRO) 10 MG tablet Take 0.5 tablets by mouth daily 45 tablet 1    letrozole (FEMARA) 2.5 MG tablet TAKE ONE TABLET BY MOUTH DAILY 60 tablet 4    Cholecalciferol (VITAMIN D) 2000 units CAPS capsule Take by mouth daily        No current facility-administered medications for this visit. Allergies: Allergies   Allergen Reactions    Codeine Itching    Other Rash     Chloroprep surgical prep       Social History:    Social History     Tobacco Use    Smoking status: Former Smoker     Packs/day: 1.00     Years: 3.00     Pack years: 3.00     Types: Cigarettes     Quit date: 10/1/1984     Years since quittin.9    Smokeless tobacco: Never Used    Tobacco comment: 3 YEARS PLAYED AROUND AND QUIT IN THE    Vaping Use    Vaping Use: Never used   Substance Use Topics    Alcohol use: No     Alcohol/week: 0.0 standard drinks    Drug use: No       Family History:   Family History   Problem Relation Age of Onset    Cancer Father         bladder ca    Other Sister         MULTIPLE SCLEROSIS    High Blood Pressure Mother     Heart Disease Mother     Stroke Mother        Vitals:  Vitals:    21 1419   BP: 120/70   Pulse: 89   SpO2: 95%   Weight: 174 lb 1.6 oz (79 kg)   Height: 5' 4\" (1.626 m)        Subjective   REVIEW OF SYSTEMS:   Review of Systems   Constitutional: Positive for fatigue. Negative for chills, diaphoresis and fever. HENT: Negative. Negative for congestion, ear pain, hearing loss, nosebleeds, sore throat and tinnitus. Eyes: Negative. Negative for pain, discharge and redness. Respiratory: Negative. Negative for cough, shortness of breath and wheezing. Cardiovascular: Negative. Negative for chest pain, palpitations and leg swelling. Gastrointestinal: Negative. Negative for abdominal pain, blood in stool, constipation, diarrhea, nausea and vomiting. Endocrine: Negative for polydipsia.    Genitourinary: Negative for dysuria, flank pain, frequency, hematuria and urgency. Musculoskeletal: Positive for arthralgias (Mild and tolerable). Negative for back pain, myalgias and neck pain. Skin: Negative. Negative for rash. Neurological: Negative. Negative for dizziness, tremors, seizures, weakness and headaches. Hematological: Does not bruise/bleed easily. Psychiatric/Behavioral: Negative. The patient is not nervous/anxious. Objective   PHYSICAL EXAM:  Physical Exam  Vitals reviewed. Constitutional:       General: She is not in acute distress. Appearance: She is well-developed. She is not diaphoretic. HENT:      Head: Normocephalic and atraumatic. Mouth/Throat:      Pharynx: Uvula midline. Tonsils: No tonsillar exudate. Eyes:      General: Lids are normal. No scleral icterus. Right eye: No discharge. Left eye: No discharge. Conjunctiva/sclera: Conjunctivae normal.      Pupils: Pupils are equal, round, and reactive to light. Neck:      Thyroid: No thyroid mass or thyromegaly. Vascular: No JVD. Trachea: Trachea normal. No tracheal deviation. Cardiovascular:      Rate and Rhythm: Normal rate and regular rhythm. Heart sounds: Normal heart sounds. No murmur heard. No friction rub. No gallop. Pulmonary:      Effort: Pulmonary effort is normal. No respiratory distress. Breath sounds: Normal breath sounds. No wheezing or rales. Chest:      Chest wall: No tenderness. Abdominal:      General: Bowel sounds are normal. There is no distension. Palpations: Abdomen is soft. There is no mass. Tenderness: There is no abdominal tenderness. There is no guarding or rebound. Hernia: No hernia is present. Musculoskeletal:         General: No tenderness or deformity. Cervical back: Normal range of motion and neck supple. Comments: Range of motion within normal limits x4 extremities   Skin:     General: Skin is warm. Coloration: Skin is not pale.       Findings: No erythema or rash. Neurological:      Mental Status: She is alert and oriented to person, place, and time. Cranial Nerves: No cranial nerve deficit. Coordination: Coordination normal.   Psychiatric:         Behavior: Behavior normal.         Thought Content: Thought content normal.         Labs reviewed today:  Lab Results   Component Value Date    WBC 6.62 08/27/2021    HGB 12.4 08/27/2021    HCT 39.2 08/27/2021    .3 (H) 08/27/2021     08/27/2021     Lab Results   Component Value Date    NEUTROABS 4.28 08/27/2021     Lab Results   Component Value Date     05/27/2021    K 4.4 05/27/2021     05/27/2021    CO2 27 05/27/2021    BUN 19 05/27/2021    CREATININE 0.6 05/27/2021    GLUCOSE 99 05/27/2021    CALCIUM 9.7 05/27/2021    PROT 7.3 05/27/2021    LABALBU 4.5 05/27/2021    BILITOT 0.3 05/27/2021    ALKPHOS 125 (H) 05/27/2021    AST 20 05/27/2021    ALT 23 05/27/2021    LABGLOM >60 05/27/2021    GFRAA >59 05/27/2021    GLOB 2.4 08/12/2020         ASSESSMENT/PLAN:      1. Invasive ductal carcinoma of the right breast, triple negative, ER 4%. Currently taking adjuvant endocrine therapy with Femara 2.5 mg, anticipate 5-year dosing through September 2025. Bilateral mammogram on 3/10/2021 that reported no mammographic evidence of malignancy. She was also seen in follow-up by Dr. Carmelina Carvajal on 3/10/2021, I reviewed his office note that reported no evidence of new or recurrent disease.      -Continue letrozole  -Encourage self breast examinations  -Keep follow-up appointment with Dr. Carmelina Carvajal on 9/13/2021    I discussed the importance of compliance with Femara/letrozole. Decreased compliance with adjuvant endocrine therapy has been linked to decreased survival. We discussed the various barriers and side effects of endocrine therapy and ways to improve compliance. She acknowledged understanding.     2. Encounter for monitoring aromatase inhibitor therapy  -Denies hot flashes or vaginal dryness  -Mild increase in arthralgias, currently tolerable    3. Chemotherapy-induced neuropathy, secondary to Taxol  -Mild numbness and discomfort    4. Osteopenia, bone mineral density study on 6/5/2020 reported a T score of -1.5. Vitamin D level 37.8 on 5/27/2021. Reports compliant with calcium supplement and vitamin D replacement    -Repeat bone mineral density study after 6/5/2022 if T score decreases any further will need to consider biphosphonate therapy    I discussed all of the above findings included in the assessment and plan with the patient and the patient is in agreement to move forward with current recommendations/treatment. I have addressed all of their questions and concerns that were verbalized.     FOLLOW UP:  Follow-up appointment given for 6 months, sooner if needed  Continue to follow with other medical providers as recommended. Discussed precautions related to [address] and being at increased risk. Discussed proper handwashing to be done frequently, limit exposure to other individuals and maintain social distancing of 6 feet. Recommend contacting primary care provider if having respiratory symptoms for further recommendations and consideration for testing. EMR Dragon/Transcription disclaimer:   Much of this encounter note is an electronic transcription/translation of spoken language to printed text. The electronic translation of spoken language may permit erroneous, or at times, nonsensical words or phrases to be inadvertently transcribed; although attempts have made to review the note for such errors, some may still exist. Also, portions of this note have been copied forward, however, changed to reflect the most current clinical status of this patient. Ian Darby am scribing for ASHWIN Akers. Electronically signed by Jack Blanca RN on 8/13/2021 at [address] [facility].     IRosana APRN personally performed the services described in this documentation as scribed by Jack Blanca RN in my presence and is both accurate and complete.       Electronically signed by ASHWIN Gold on 9/7/2021 at 12:11 PM

## 2021-09-07 ASSESSMENT — ENCOUNTER SYMPTOMS
WHEEZING: 0
COUGH: 0
EYE PAIN: 0
ABDOMINAL PAIN: 0
EYES NEGATIVE: 1
CONSTIPATION: 0
SHORTNESS OF BREATH: 0
BLOOD IN STOOL: 0
GASTROINTESTINAL NEGATIVE: 1
VOMITING: 0
EYE REDNESS: 0
NAUSEA: 0
SORE THROAT: 0
EYE DISCHARGE: 0
BACK PAIN: 0
RESPIRATORY NEGATIVE: 1
DIARRHEA: 0

## 2021-09-07 NOTE — PROGRESS NOTES
HISTORY OF PRESENT ILLNESS:    Ms. Mandie Chahal presents today for a six month breast exam.    On 3/19/2020 she underwent an ultrasound guided breast biopsy  on the right which revealed a 1.1 cm grade 3 invasive ductal carcinoma. ER is positive at 4%. FL is Negative. Her2 is Negative. Ki67 is High at 86%. Mammaprint is High Risk Basal Type. 9/1/2020-Bilateral Breast MRI  No suspicious soft tissue mass within the region of the    biopsy clip. The satellite lesion identified on prior breast MR is    also no longer visualized. No new suspicious mass or abnormal enhancement in either breast. LEFT    breast ductal ectasia with T1 hyperintense proteinaceous debris within    the ducts is again noted. No axillary or internal mammary    lymphadenopathy. LEFT chest port catheter is noted. No suspicious bone    lesions identified. No acute finding in the anterior liver.         Impression    No residual soft tissue mass at the site of RIGHT upper outer quadrant    biopsy clip, correlating with site of biopsy-proven neoplasm. No    evidence of residual disease by MR. The small satellite nodule    identified on prior breast MR is also no longer identified. No    axillary or internal mammary lymphadenopathy. BI-RADS CATEGORY 6: KNOWN BIOPSY-PROVEN MALIGNANCY. Signed by Dr Yuliana Dee on 9/1/2020 1:51 PM      She is status post Right lumpectomy with Right sentinel lymph node biopsy on 9/15/2020. PATHOLOGY REVEALS:  FINAL DIAGNOSIS:     A.  Breast, right lumpectomy:    1.  Prior biopsy site identified, negative for residual carcinoma or carcinoma in situ.    2.  Focal atypical ductal hyperplasia, margins negative.    3.  Extensive fibrosis present adjacent to prior biopsy site consistent with complete therapeutic response.    4.  Surgical excision margins are negative for prior biopsy site. B.  Breast, excision of additional right breast anterior margin: Benign breast parenchyma.      Antwan Enoc, excision was not limited to time spent reviewing labs, imaging studies/ treatment plan and answering questions.

## 2021-09-13 ENCOUNTER — OFFICE VISIT (OUTPATIENT)
Dept: SURGERY | Age: 71
End: 2021-09-13
Payer: COMMERCIAL

## 2021-09-13 VITALS — DIASTOLIC BLOOD PRESSURE: 72 MMHG | HEART RATE: 76 BPM | SYSTOLIC BLOOD PRESSURE: 110 MMHG

## 2021-09-13 DIAGNOSIS — Z17.1 MALIGNANT NEOPLASM OF OVERLAPPING SITES OF RIGHT BREAST IN FEMALE, ESTROGEN RECEPTOR NEGATIVE (HCC): ICD-10-CM

## 2021-09-13 DIAGNOSIS — C50.811 MALIGNANT NEOPLASM OF OVERLAPPING SITES OF RIGHT BREAST IN FEMALE, ESTROGEN RECEPTOR NEGATIVE (HCC): ICD-10-CM

## 2021-09-13 DIAGNOSIS — Z85.3 PERSONAL HISTORY OF BREAST CANCER: Primary | ICD-10-CM

## 2021-09-13 DIAGNOSIS — Z98.890 S/P LUMPECTOMY, RIGHT BREAST: ICD-10-CM

## 2021-09-13 PROCEDURE — 99213 OFFICE O/P EST LOW 20 MIN: CPT | Performed by: SURGERY

## 2021-10-23 NOTE — TELEPHONE ENCOUNTER
Spoke to pt offered appt and she is going to wait she is thinking it may be a kidney stone she is drinking lots of water and if does not get better will go to urgent care Yes...

## 2021-11-26 ENCOUNTER — OFFICE VISIT (OUTPATIENT)
Dept: URGENT CARE | Age: 71
End: 2021-11-26
Payer: COMMERCIAL

## 2021-11-26 VITALS
HEART RATE: 99 BPM | RESPIRATION RATE: 16 BRPM | BODY MASS INDEX: 30.01 KG/M2 | TEMPERATURE: 98.9 F | DIASTOLIC BLOOD PRESSURE: 70 MMHG | WEIGHT: 175.8 LBS | SYSTOLIC BLOOD PRESSURE: 125 MMHG | HEIGHT: 64 IN | OXYGEN SATURATION: 93 %

## 2021-11-26 DIAGNOSIS — J01.00 ACUTE NON-RECURRENT MAXILLARY SINUSITIS: Primary | ICD-10-CM

## 2021-11-26 LAB — SARS-COV-2, PCR: NOT DETECTED

## 2021-11-26 PROCEDURE — 99213 OFFICE O/P EST LOW 20 MIN: CPT | Performed by: NURSE PRACTITIONER

## 2021-11-26 RX ORDER — AMOXICILLIN 500 MG/1
500 CAPSULE ORAL 2 TIMES DAILY
Qty: 14 CAPSULE | Refills: 0 | Status: SHIPPED
Start: 2021-11-26 | End: 2021-12-01 | Stop reason: CLARIF

## 2021-11-26 RX ORDER — METHYLPREDNISOLONE 4 MG/1
TABLET ORAL
Qty: 1 KIT | Refills: 0 | Status: SHIPPED
Start: 2021-11-26 | End: 2021-12-01 | Stop reason: CLARIF

## 2021-11-26 ASSESSMENT — ENCOUNTER SYMPTOMS
SINUS PRESSURE: 0
COUGH: 1
SORE THROAT: 0

## 2021-11-26 NOTE — PROGRESS NOTES
[address] [place] [address]   [address], [id number]     Phone:  [phone]  Fax:  [phone]      Rishabh Dahl is a 73 y.o. female who presents today for her medical conditions/complaints as noted below. Rishabh Dahl is c/o of Cough (x2 weeks), Congestion (x2weeks), Nasal Congestion (x2weeks), and Headache (x2weeks)      Chief Complaint   Patient presents with    Cough     x2 weeks    Congestion     x2weeks    Nasal Congestion     x2weeks    Headache     x2weeks       HPI:       Rishabh Dahl presents today for   Sinusitis  This is a new problem. The current episode started 1 to 4 weeks ago (2 weeks). The problem has been gradually worsening since onset. There has been no fever. Associated symptoms include congestion, coughing and headaches. Pertinent negatives include no sinus pressure or sore throat. Past treatments include nothing. The treatment provided no relief. She has been vaccinated against COVID. She has not had any ill contacts.      Past Medical History:   Diagnosis Date    Anxiety     Breast cancer ([address] 2020    infiltrating ductal carcinoma     Cancer ([address]     Breast Cancer    Carpal tunnel syndrome     pt denies having carpal tunnel syndrome    Cervicalgia     Depression     Esophagitis     History of therapeutic radiation 2020    Hx antineoplastic chemo 2020    Hyperlipidemia     Idiopathic peripheral neuropathy     Insomnia     Menopause     Obesity due to excess calories     Osteoarthritis     Osteopenia     Type 2 diabetes mellitus without complication ([address]     Vitamin D deficiency         Past Surgical History:   Procedure Laterality Date    BREAST BIOPSY Right 2020    infiltrating ductal carcinoma    BREAST LUMPECTOMY Right 9/15/2020    RIGHT LUMPECTOMY WITH SNB, PREOP AND INTRAOP US GUIDED NL, FLAPS, BIOZORB, MARGIN PROBE,PEC BLOCK performed by Erasto Painter MD at [address] [place] [address]  LOBECTOMY Right 5/3/2016    THORACOTOMY, WEDGE RESECTION PULMONARY NODULE RIGHT MIDDLE LOBE performed by Lianey Read MD at [address] [facility] N/A 2020    PORT INSERTION WITH FLUORO performed by Aliza Murdock MD at [address] [facility] [address] N/A 2020    PORT REMOVAL WITH FLUOROSCOPY performed by Aliza Murdock MD at . [address] NEEDLE LOC OF RIGHT BREAST  2020    US GUIDED NEEDLE LOC OF RIGHT BREAST 2020 [facility] [address]       Social History     Tobacco Use    Smoking status: Former Smoker     Packs/day: 1.00     Years: 3.00     Pack years: 3.00     Types: Cigarettes     Quit date: 10/1/1984     Years since quittin.1    Smokeless tobacco: Never Used    Tobacco comment: 3 YEARS PLAYED AROUND AND QUIT IN THE    Substance Use Topics    Alcohol use: No     Alcohol/week: 0.0 standard drinks        Current Outpatient Medications   Medication Sig Dispense Refill    amoxicillin (AMOXIL) 500 MG capsule Take 1 capsule by mouth 2 times daily for 7 days 14 capsule 0    methylPREDNISolone (MEDROL DOSEPACK) 4 MG tablet Take by mouth. 1 kit 0    Semaglutide,0.25 or 0.5MG/DOS, 2 MG/1.5ML SOPN Inject 0.5 mg into the skin once a week 3 pen 3    montelukast (SINGULAIR) 10 MG tablet Take 1 tablet by mouth nightly 90 tablet 1    lovastatin (MEVACOR) 20 MG tablet TAKE TWO TABLETS BY MOUTH DAILY 180 tablet 1    escitalopram (LEXAPRO) 10 MG tablet Take 0.5 tablets by mouth daily 45 tablet 1    letrozole (FEMARA) 2.5 MG tablet TAKE ONE TABLET BY MOUTH DAILY 60 tablet 4    Cholecalciferol (VITAMIN D) 2000 units CAPS capsule Take by mouth daily        No current facility-administered medications for this visit.        Allergies   Allergen Reactions    Codeine Itching    Other Rash     Chloroprep surgical prep       Family History   Problem Relation Age of Onset    Cancer Father         bladder ca    Other Sister         MULTIPLE SCLEROSIS    High Blood Pressure Mother     Heart Disease Mother     Stroke Mother                Review of Systems   Constitutional: Negative for fever. HENT: Positive for congestion. Negative for sinus pressure and sore throat. Respiratory: Positive for cough. Neurological: Positive for headaches. Objective:     Physical Exam  Vitals and nursing note reviewed. Constitutional:       General: She is not in acute distress. Appearance: Normal appearance. She is well-developed. She is not ill-appearing, toxic-appearing or diaphoretic. HENT:      Head: Normocephalic and atraumatic. Right Ear: Tympanic membrane, ear canal and external ear normal. There is no impacted cerumen. Left Ear: Tympanic membrane, ear canal and external ear normal. There is no impacted cerumen. Nose: Congestion present. Right Sinus: Maxillary sinus tenderness present. Left Sinus: Maxillary sinus tenderness present. Mouth/Throat:      Dentition: Normal dentition. Pharynx: Oropharynx is clear. No oropharyngeal exudate or posterior oropharyngeal erythema. Eyes:      General:         Right eye: No discharge. Left eye: No discharge. Conjunctiva/sclera: Conjunctivae normal.      Pupils: Pupils are equal, round, and reactive to light. Cardiovascular:      Rate and Rhythm: Normal rate and regular rhythm. Pulses: Normal pulses. Heart sounds: Normal heart sounds. No murmur heard. Pulmonary:      Effort: Pulmonary effort is normal. No respiratory distress. Breath sounds: Normal breath sounds. No stridor. No wheezing, rhonchi or rales. Musculoskeletal:         General: Normal range of motion. Cervical back: Normal range of motion and neck supple. Lumbar back: Normal range of motion. Lymphadenopathy:      Cervical: No cervical adenopathy. Skin:     General: Skin is warm and dry. Capillary Refill: Capillary refill takes less than 2 seconds. Neurological:      Mental Status: She is alert and oriented to person, place, and time. Psychiatric:         Mood and Affect: Mood normal.         Behavior: Behavior normal.         /70   Pulse 99   Temp 98.9 °F (37.2 °C)   Resp 16   Ht 5' 4\" (1.626 m)   Wt 175 lb 12.8 oz (79.7 kg)   SpO2 93%   BMI 30.18 kg/m²     Assessment:      Diagnosis Orders   1. Acute non-recurrent maxillary sinusitis  amoxicillin (AMOXIL) 500 MG capsule    methylPREDNISolone (MEDROL DOSEPACK) 4 MG tablet    COVID-19       No results found for this visit on 11/26/21. Plan:     Start steroid pack and Amoxil    COVID test today    Quarantine until results return    Return if symptoms worsen or fail to improve. Orders Placed This Encounter   Procedures    COVID-19     Scheduling Instructions:      1) Due to current limited availability of the COVID-19 test, tests will be prioritized based on responses to questions above. Testing may be delayed due to volume. 2) Print and instruct patient to adhere to CDC home isolation program. (Link Above)              3) Set up or refer patient for a monitoring program.              4) Have patient sign up for and leverage MyChart (if not previously done). Order Specific Question:   Is this test for diagnosis or screening? Answer:   Diagnosis of ill patient     Order Specific Question:   Symptomatic for COVID-19 as defined by CDC? Answer:   Yes     Order Specific Question:   Date of Symptom Onset     Answer:   11/12/2021     Order Specific Question:   Hospitalized for COVID-19? Answer:   No     Order Specific Question:   Admitted to ICU for COVID-19? Answer:   No     Order Specific Question:   Employed in healthcare setting? Answer:   No     Order Specific Question:   Resident in a congregate (group) care setting? Answer:   No     Order Specific Question:   Pregnant? Answer:   No     Order Specific Question:   Previously tested for COVID-19? Answer:   No       Orders Placed This Encounter   Medications    amoxicillin (AMOXIL) 500 MG capsule     Sig: Take 1 capsule by mouth 2 times daily for 7 days     Dispense:  14 capsule     Refill:  0    methylPREDNISolone (MEDROL DOSEPACK) 4 MG tablet     Sig: Take by mouth. Dispense:  1 kit     Refill:  0        Patient offered educational materials - see patient instructions for any instruction needed. Discussed use, benefit, and side effects of prescribed medications. All patient questions answered. Instructed to continue current medications, diet and exercise. Patient agreed with treatment plan. Follow up as directed. Patient was advised to go to the ED if condition ever becomes emergent.        Electronically signed by Lisa Anthony on 11/26/2021 at 9:19 AM

## 2021-11-29 DIAGNOSIS — F41.1 GENERALIZED ANXIETY DISORDER: ICD-10-CM

## 2021-11-29 DIAGNOSIS — R73.9 HYPERGLYCEMIA: ICD-10-CM

## 2021-11-29 DIAGNOSIS — E05.90 SUBCLINICAL HYPERTHYROIDISM: ICD-10-CM

## 2021-11-29 DIAGNOSIS — E55.9 VITAMIN D DEFICIENCY: ICD-10-CM

## 2021-11-29 DIAGNOSIS — Z00.00 ANNUAL PHYSICAL EXAM: ICD-10-CM

## 2021-11-29 DIAGNOSIS — E11.9 TYPE 2 DIABETES MELLITUS WITHOUT COMPLICATION, WITHOUT LONG-TERM CURRENT USE OF INSULIN (HCC): ICD-10-CM

## 2021-11-29 LAB
ALBUMIN SERPL-MCNC: 4.7 G/DL (ref 3.5–5.2)
ALP BLD-CCNC: 153 U/L (ref 35–104)
ALT SERPL-CCNC: 25 U/L (ref 5–33)
ANION GAP SERPL CALCULATED.3IONS-SCNC: 14 MMOL/L (ref 7–19)
AST SERPL-CCNC: 14 U/L (ref 5–32)
BILIRUB SERPL-MCNC: <0.2 MG/DL (ref 0.2–1.2)
BUN BLDV-MCNC: 13 MG/DL (ref 8–23)
CALCIUM SERPL-MCNC: 9.7 MG/DL (ref 8.8–10.2)
CHLORIDE BLD-SCNC: 100 MMOL/L (ref 98–111)
CHOLESTEROL, TOTAL: 217 MG/DL (ref 160–199)
CO2: 25 MMOL/L (ref 22–29)
CREAT SERPL-MCNC: 0.6 MG/DL (ref 0.5–0.9)
GFR AFRICAN AMERICAN: >59
GFR NON-AFRICAN AMERICAN: >60
GLUCOSE BLD-MCNC: 108 MG/DL (ref 74–109)
HBA1C MFR BLD: 6 % (ref 4–6)
HDLC SERPL-MCNC: 60 MG/DL (ref 65–121)
LDL CHOLESTEROL CALCULATED: 132 MG/DL
POTASSIUM SERPL-SCNC: 3.8 MMOL/L (ref 3.5–5)
SODIUM BLD-SCNC: 139 MMOL/L (ref 136–145)
TOTAL PROTEIN: 7.9 G/DL (ref 6.6–8.7)
TRIGL SERPL-MCNC: 124 MG/DL (ref 0–149)
VITAMIN D 25-HYDROXY: 35.5 NG/ML

## 2021-12-01 ENCOUNTER — OFFICE VISIT (OUTPATIENT)
Dept: INTERNAL MEDICINE | Age: 71
End: 2021-12-01
Payer: COMMERCIAL

## 2021-12-01 VITALS
HEART RATE: 77 BPM | SYSTOLIC BLOOD PRESSURE: 110 MMHG | WEIGHT: 172 LBS | OXYGEN SATURATION: 97 % | HEIGHT: 64 IN | DIASTOLIC BLOOD PRESSURE: 70 MMHG | BODY MASS INDEX: 29.37 KG/M2 | RESPIRATION RATE: 18 BRPM

## 2021-12-01 DIAGNOSIS — E05.90 SUBCLINICAL HYPERTHYROIDISM: ICD-10-CM

## 2021-12-01 DIAGNOSIS — E11.9 TYPE 2 DIABETES MELLITUS WITHOUT COMPLICATION, WITHOUT LONG-TERM CURRENT USE OF INSULIN (HCC): Primary | ICD-10-CM

## 2021-12-01 DIAGNOSIS — E55.9 VITAMIN D DEFICIENCY: ICD-10-CM

## 2021-12-01 DIAGNOSIS — E78.00 PURE HYPERCHOLESTEROLEMIA: ICD-10-CM

## 2021-12-01 DIAGNOSIS — F41.1 GENERALIZED ANXIETY DISORDER: ICD-10-CM

## 2021-12-01 DIAGNOSIS — J00 COMMON COLD: ICD-10-CM

## 2021-12-01 PROCEDURE — 99214 OFFICE O/P EST MOD 30 MIN: CPT | Performed by: INTERNAL MEDICINE

## 2021-12-01 ASSESSMENT — ENCOUNTER SYMPTOMS
ABDOMINAL PAIN: 0
SORE THROAT: 0
SINUS PRESSURE: 1
CONSTIPATION: 0
CHEST TIGHTNESS: 0
WHEEZING: 0
COUGH: 0

## 2021-12-01 NOTE — PROGRESS NOTES
Chief Complaint   Patient presents with    6 Month Follow-Up     History of presenting illness:  Niki La is a80 y.o. female who presents today for follow up on her chronic medical conditions as noted below.       Patient Active Problem List    Diagnosis Date Noted    S/P lumpectomy, right breast 9/15/2020 09/22/2020    Adverse effect of antineoplastic and immunosuppressive drugs, initial encounter      Malignant neoplasm of right breast in female, estrogen receptor negative ([address] 04/17/2020    Microhematuria 06/07/2019    Bilateral low back pain without sciatica 05/04/2018    Osteopenia of left lower leg 08/16/2017     Overview Note:     12/16 lumbar spine normal, left hip neck -1.7  6/2020 lumbar spine -1.4, hip neck -1.5/-1.3      Type 2 diabetes mellitus without complication, without long-term current use of insulin ([address] 08/12/2017    Vitamin D deficiency 08/12/2017    Pure hypercholesterolemia 08/12/2017    GERD (gastroesophageal reflux disease) 08/12/2017    Exogenous obesity 08/12/2017    Generalized anxiety disorder 08/12/2017     Past Medical History:   Diagnosis Date    Anxiety     Breast cancer ([address] 2020    infiltrating ductal carcinoma     Cancer ([address]     Breast Cancer    Carpal tunnel syndrome     pt denies having carpal tunnel syndrome    Cervicalgia     Depression     Esophagitis     History of therapeutic radiation 2020    Hx antineoplastic chemo 2020    Hyperlipidemia     Idiopathic peripheral neuropathy     Insomnia     Menopause     Obesity due to excess calories     Osteoarthritis     Osteopenia     Type 2 diabetes mellitus without complication ([address]     Vitamin D deficiency       Past Surgical History:   Procedure Laterality Date    BREAST BIOPSY Right 2020    infiltrating ductal carcinoma    BREAST LUMPECTOMY Right 9/15/2020    RIGHT LUMPECTOMY WITH SNB, PREOP AND INTRAOP US GUIDED NL, FLAPS, BIOZORB, MARGIN PROBE,PEC BLOCK performed by Marija Fatima Faby Wright MD at [facility] Right 5/3/2016    THORACOTOMY, WEDGE RESECTION PULMONARY NODULE RIGHT MIDDLE LOBE performed by Shira Perez MD at [address] N/A 2020    PORT INSERTION WITH FLUORO performed by Mario Alberto Vasquez MD at [address] N/A 2020    PORT REMOVAL WITH FLUOROSCOPY performed by Mario Alberto Vasquez MD at [address] [place] [address] LOC OF RIGHT BREAST  2020    US GUIDED NEEDLE LOC OF RIGHT BREAST 2020 [facility] [place] [address]     Current Outpatient Medications   Medication Sig Dispense Refill    Semaglutide,0.25 or 0.5MG/DOS, 2 MG/1.5ML SOPN Inject 0.5 mg into the skin once a week 3 pen 3    montelukast (SINGULAIR) 10 MG tablet Take 1 tablet by mouth nightly 90 tablet 1    lovastatin (MEVACOR) 20 MG tablet TAKE TWO TABLETS BY MOUTH DAILY 180 tablet 1    escitalopram (LEXAPRO) 10 MG tablet Take 0.5 tablets by mouth daily 45 tablet 1    letrozole (FEMARA) 2.5 MG tablet TAKE ONE TABLET BY MOUTH DAILY 60 tablet 4    Cholecalciferol (VITAMIN D) 2000 units CAPS capsule Take by mouth daily        No current facility-administered medications for this visit.      Allergies   Allergen Reactions    Codeine Itching    Other Rash     Chloroprep surgical prep     Social History     Tobacco Use    Smoking status: Former Smoker     Packs/day: 1.00     Years: 3.00     Pack years: 3.00     Types: Cigarettes     Quit date: 10/1/1984     Years since quittin.1    Smokeless tobacco: Never Used    Tobacco comment: 3 YEARS PLAYED AROUND AND QUIT IN THE    Substance Use Topics    Alcohol use: No     Alcohol/week: 0.0 standard drinks      Family History   Problem Relation Age of Onset    Cancer Father         bladder ca    Other Sister         MULTIPLE SCLEROSIS    High Blood Pressure Mother     Heart Disease Mother     Stroke Mother        Review of Systems   Constitutional: Negative for chills, fatigue and fever. HENT: Positive for congestion, postnasal drip and sinus pressure. Negative for ear pain, nosebleeds and sore throat. Respiratory: Negative for cough, chest tightness and wheezing. Cardiovascular: Negative for chest pain, palpitations and leg swelling. Gastrointestinal: Negative for abdominal pain and constipation. Genitourinary: Negative for dysuria and urgency. Musculoskeletal: Negative. Negative for arthralgias. Skin: Negative for rash. Neurological: Negative for dizziness and headaches. Psychiatric/Behavioral: Negative. Vitals:    12/01/21 1018   BP: 110/70   Site: Left Upper Arm   Position: Sitting   Cuff Size: Large Adult   Pulse: 77   Resp: 18   SpO2: 97%   Weight: 172 lb (78 kg)   Height: 5' 4\" (1.626 m)     Body mass index is 29.52 kg/m². Physical Exam  Constitutional:       Appearance: She is well-developed. HENT:      Right Ear: External ear normal.      Left Ear: External ear normal.      Nose: Congestion present. Mouth/Throat:      Pharynx: No oropharyngeal exudate. Eyes:      Conjunctiva/sclera: Conjunctivae normal.      Pupils: Pupils are equal, round, and reactive to light. Neck:      Thyroid: No thyromegaly. Vascular: No JVD. Cardiovascular:      Rate and Rhythm: Normal rate. Heart sounds: Normal heart sounds. No murmur heard. Pulmonary:      Effort: No respiratory distress. Breath sounds: Normal breath sounds. No wheezing or rales. Chest:      Chest wall: No tenderness. Abdominal:      General: Bowel sounds are normal.      Palpations: Abdomen is soft. Musculoskeletal:      Cervical back: Neck supple. Lymphadenopathy:      Cervical: No cervical adenopathy. Skin:     General: Skin is warm. Findings: No rash. Neurological:      Mental Status: She is oriented to person, place, and time.          Lab Review   Orders Only on 11/29/2021   Component Date Value    Vit D, 25-Hydroxy 11/29/2021 35.5     Cholesterol, Total 11/29/2021 217*    Triglycerides 11/29/2021 124     HDL 11/29/2021 60*    LDL Calculated 11/29/2021 132     Hemoglobin A1C 11/29/2021 6.0     Sodium 11/29/2021 139     Potassium 11/29/2021 3.8     Chloride 11/29/2021 100     CO2 11/29/2021 25     Anion Gap 11/29/2021 14     Glucose 11/29/2021 108     BUN 11/29/2021 13     CREATININE 11/29/2021 0.6     GFR Non- 11/29/2021 >60     GFR  11/29/2021 >59     Calcium 11/29/2021 9.7     Total Protein 11/29/2021 7.9     Albumin 11/29/2021 4.7     Total Bilirubin 11/29/2021 <0.2     Alkaline Phosphatase 11/29/2021 153*    ALT 11/29/2021 25     AST 11/29/2021 14    Office Visit on 11/26/2021   Component Date Value    SARS-CoV-2, PCR 11/26/2021 Not Detected            ASSESSMENT/PLAN:    Type 2 diabetes mellitus without complication, without long-term current use of insulin (HCC)  Her A1c is 6.0 in 11/2021   5.8 in 5/2021   she was unable to tolerate metformin.   Strict diet and wtl oss recommneded  RX ozempic  0.5     Vitamin D deficiency-   her level was good at 35 in 11/2021   RX vit D 2000 daily     Generalized anxiety disorder-  RX Lexapro 5 mg daily      in 11/2021 ( 110 (106)  - RX lovastatin 20 mg daily  Healthy, mostly fiber rich nonstarchy plant-based diet recommended  Recommend to decrease intake of processed foods, simple carbohydrates and animal-based products that high in saturated fats     TFT's now normal     Mild alt/ ast elevation  Now normal     Obesity  Pt was given approximately 5 minutes of counseling about diet and exercise including education on what calories are, where calories come from, the need for portion control,following lower carbohydrate dietary regimen and healthy snacks along side an active lifestyle with supplementary exercise of approx 30 minutes a week, 5 days a week of exercise for weight loss.  The patient voiced increased understanding of the topics discussed.       DX RT breast cancer  Per dr Liane Thomas     Congestion/ sinus sx  Ongoing 3 weeks  On amoxicilllin + finished MDP  Take xyzal + flonase      Orders Placed This Encounter   Procedures    Hemoglobin A1C    Comprehensive Metabolic Panel    CBC Auto Differential    Lipid Panel    Microalbumin / Creatinine Urine Ratio    Vitamin D 25 Hydroxy    Urinalysis    TSH without Reflex     New Prescriptions    No medications on file         Return in about 6 months (around 6/1/2022) for Annual Physical.   There are no Patient Instructions on file for this visit. EMR Dragon/transcription disclaimer:Significant part of this  encounter note is electronic transcription/translationof spoken language to printed text. The electronic translation of spoken language may be erroneous, or at times, nonsensical words or phrases may be inadvertently transcribed.  Although I have reviewed the note for sucherrors, some may still exist.

## 2021-12-03 ENCOUNTER — OFFICE VISIT (OUTPATIENT)
Dept: HEMATOLOGY | Age: 71
End: 2021-12-03
Payer: COMMERCIAL

## 2021-12-03 ENCOUNTER — HOSPITAL ENCOUNTER (OUTPATIENT)
Dept: INFUSION THERAPY | Age: 71
Discharge: HOME | End: 2021-12-03
Payer: COMMERCIAL

## 2021-12-03 VITALS
WEIGHT: 174.9 LBS | HEART RATE: 86 BPM | SYSTOLIC BLOOD PRESSURE: 120 MMHG | HEIGHT: 64 IN | DIASTOLIC BLOOD PRESSURE: 80 MMHG | BODY MASS INDEX: 29.86 KG/M2 | OXYGEN SATURATION: 97 %

## 2021-12-03 DIAGNOSIS — C50.811 MALIGNANT NEOPLASM OF OVERLAPPING SITES OF RIGHT BREAST IN FEMALE, ESTROGEN RECEPTOR NEGATIVE (HCC): ICD-10-CM

## 2021-12-03 DIAGNOSIS — T45.1X5A CHEMOTHERAPY-INDUCED NEUROPATHY (HCC): ICD-10-CM

## 2021-12-03 DIAGNOSIS — Z79.811 ENCOUNTER FOR MONITORING AROMATASE INHIBITOR THERAPY: ICD-10-CM

## 2021-12-03 DIAGNOSIS — G62.0 CHEMOTHERAPY-INDUCED NEUROPATHY (HCC): ICD-10-CM

## 2021-12-03 DIAGNOSIS — Z17.1 MALIGNANT NEOPLASM OF OVERLAPPING SITES OF RIGHT BREAST IN FEMALE, ESTROGEN RECEPTOR NEGATIVE (HCC): ICD-10-CM

## 2021-12-03 DIAGNOSIS — C50.919 MALIGNANT NEOPLASM OF BREAST IN FEMALE, ESTROGEN RECEPTOR POSITIVE, UNSPECIFIED LATERALITY, UNSPECIFIED SITE OF BREAST (HCC): Primary | ICD-10-CM

## 2021-12-03 DIAGNOSIS — Z17.0 MALIGNANT NEOPLASM OF BREAST IN FEMALE, ESTROGEN RECEPTOR POSITIVE, UNSPECIFIED LATERALITY, UNSPECIFIED SITE OF BREAST (HCC): Primary | ICD-10-CM

## 2021-12-03 DIAGNOSIS — Z51.81 ENCOUNTER FOR MONITORING AROMATASE INHIBITOR THERAPY: ICD-10-CM

## 2021-12-03 DIAGNOSIS — Z71.89 CARE PLAN DISCUSSED WITH PATIENT: ICD-10-CM

## 2021-12-03 LAB
BASOPHILS ABSOLUTE: 0.02 K/UL (ref 0.01–0.08)
BASOPHILS RELATIVE PERCENT: 0.3 % (ref 0.1–1.2)
EOSINOPHILS ABSOLUTE: 0.19 K/UL (ref 0.04–0.54)
EOSINOPHILS RELATIVE PERCENT: 2.6 % (ref 0.7–7)
HCT VFR BLD CALC: 41.4 % (ref 34.1–44.9)
HEMOGLOBIN: 13.1 G/DL (ref 11.2–15.7)
LYMPHOCYTES ABSOLUTE: 2.1 K/UL (ref 1.18–3.74)
LYMPHOCYTES RELATIVE PERCENT: 29 % (ref 19.3–53.1)
MCH RBC QN AUTO: 31.8 PG (ref 25.6–32.2)
MCHC RBC AUTO-ENTMCNC: 31.6 G/DL (ref 32.3–35.5)
MCV RBC AUTO: 100.5 FL (ref 79.4–94.8)
MONOCYTES ABSOLUTE: 0.68 K/UL (ref 0.24–0.82)
MONOCYTES RELATIVE PERCENT: 9.4 % (ref 4.7–12.5)
NEUTROPHILS ABSOLUTE: 4.26 K/UL (ref 1.56–6.13)
NEUTROPHILS RELATIVE PERCENT: 58.7 % (ref 34–71.1)
PDW BLD-RTO: 13 % (ref 11.7–14.4)
PLATELET # BLD: 289 K/UL (ref 182–369)
PMV BLD AUTO: 9.8 FL (ref 7.4–10.4)
RBC # BLD: 4.12 M/UL (ref 3.93–5.22)
WBC # BLD: 7.25 K/UL (ref 3.98–10.04)

## 2021-12-03 PROCEDURE — 85025 COMPLETE CBC W/AUTO DIFF WBC: CPT

## 2021-12-03 PROCEDURE — 99213 OFFICE O/P EST LOW 20 MIN: CPT | Performed by: NURSE PRACTITIONER

## 2021-12-03 PROCEDURE — 99212 OFFICE O/P EST SF 10 MIN: CPT

## 2021-12-03 ASSESSMENT — ENCOUNTER SYMPTOMS
VOMITING: 0
ABDOMINAL PAIN: 0
EYE PAIN: 0
WHEEZING: 0
SHORTNESS OF BREATH: 0
SORE THROAT: 0
BACK PAIN: 0
NAUSEA: 0
CONSTIPATION: 0
GASTROINTESTINAL NEGATIVE: 1
COUGH: 0
EYE REDNESS: 0
RESPIRATORY NEGATIVE: 1
DIARRHEA: 0
EYE DISCHARGE: 0
BLOOD IN STOOL: 0
EYES NEGATIVE: 1

## 2021-12-03 NOTE — PROGRESS NOTES
Progress Note      Pt Name: Polina Condon: 1950  MRN: [id number]    Date of evaluation: 12/03/2021  History Obtained From:  patient, electronic medical record    CHIEF COMPLAINT:    Chief Complaint   Patient presents with    Breast Cancer    Other     Osteopenia    Follow-up     HISTORY OF PRESENT ILLNESS:    Micheal Goetz is a 73 y.o.  female who is currently being followed for invasive ductal carcinoma of the right breast, triple negative, ER 4%. She is status post neoadjuvant chemotherapy, right lumpectomy with sentinel lymph node biopsy, adjuvant radiation therapy and currently taking adjuvant endocrine therapy with letrozole 2.5 mg daily. Valentin Burgos returns today in scheduled follow-up for evaluation, lab monitoring, side effect monitoring and further treatment recommendations. She reports being compliant with the letrozole and tolerating without significant difficulty. She does have mild arthralgias but currently tolerable. She denies any new breast complaints. Unfortunately she continues to have residual neuropathy to the tips of her fingers and her toes, from therapy with Taxol. Bilateral breast examination today revealed no dominant masses, no skin or nipple changes and no axillary adenopathy. No suspicious abnormality to suggest recurrent breast cancer. Valentin Burgos was seen in scheduled follow-up by Dr Han Vigil on 09/13/2021, I reviewed the progress note from the office visit that documented bilateral breast exam with no new findings. Note states she has no dominant masses, no skin or nipple changes, and no axillary adenopathy. There is no evidence of new or recurrent neoplasm.     ONCOLOGIC HISTORY:     Diagnosis  · Invasive ductal carcinoma right breast, March 2020  · ER 4%, [place] 0.1%, HER-2/paty IHC 0, Ki-67 86%  · [id number]   · Mammaprint High risk basal     Treatment summary   · 05/06/2020-8/12/2020 ddAC with G-CSF support followed by dose dense Taxol x4 cycles with G-CSF. · 9/15/20-Right breast lumpectomy with 1 sentinel lymph node negative  · 9/23/20- Adjuvant endocrine therapy-Femara 2.5 mg daily  · 10/21/20 - 11/11/20 4256 cGY radiation therapy to right breast     Milton Young was first seen by Dr. Carmelo Daley on 4/16/2020 referred by Dr. Leidy Jeff for diagnosis of breast cancer. This was in a screening detected lesion. The patient has a remote history of hormone replacement. She denies any family history of breast cancer. · 3/19/2020-core needle biopsy of suspicious right breast lesion consistent with invasive ductal carcinoma grade 3 measuring 0.7 cm. ER 4%, CT 0%, HER-2/paty IHC 0.  Ki-67 86%. MammaPrint high risk basal type. · 4/1/2020- bilateral breast MRI showed right breast lesion measuring 1.1 x 1.4 x 0.85 cm. Nonenlarged lymph nodes within the upper outer right breast.  · 4/16/2020- she was first seen by Dr. Carmelo Daley. Recommend neoadjuvant chemotherapy with dose dense AC with G-CSF support followed by dose dense Taxol. · 4/23/2020 CT Chest Mild pulmonary scarring. Known right breast cancer. No metastatic disease evident. CT Abdomen Pelvis No acute pathology int the abdomen or pelvis. No evidence of metastatic disease. 2D Echo Normal with EF 60%. · 7/6/2020- Us Breast Limited Right Positive response to therapy with decreased size of the known right breast malignancy. · 8/12/2020-completion of neoadjuvant chemotherapy with dose dense AC followed by 4 cycles of dose dense Taxol with G-CSF support. · 9/1/2020 Mri Breast Bilateral- No residual soft tissue mass at the site of RIGHT upper outer quadrant biopsy clip, correlating with site of biopsy-proven neoplasm. No evidence of residual disease by MR. The small satellite nodule identified on prior breast MR is also no longer identified. No axillary or internal mammary lymphadenopathy. · 9/15/2020-patient underwent a right lumpectomy/sentinel lymph node biopsy revealing no residual disease. Focal atypical ductal hyperplasia. Margins negative. AJCC STAGE: ypT0, (sn)pN0, pMx   · 20- Adjuvant endocrine therapy-Femara 2.5 mg daily  · 10/21/20 - 20 4256 cGY radiation therapy to right breast  · 3/10/2021 Bilateral mammogram documented no mammographic evidence of malignancy. Age-appropriate health screenin2020 Bone Density Femur Neck T-Score--1.5. This patient is considered Osteopenic.     Past Medical History:    Past Medical History:   Diagnosis Date    Anxiety     Breast cancer ([address] 2020    infiltrating ductal carcinoma     Cancer ([facility]     Breast Cancer    Carpal tunnel syndrome     pt denies having carpal tunnel syndrome    Cervicalgia     Depression     Esophagitis     History of therapeutic radiation 2020    Hx antineoplastic chemo 2020    Hyperlipidemia     Idiopathic peripheral neuropathy     Insomnia     Menopause     Obesity due to excess calories     Osteoarthritis     Osteopenia     Type 2 diabetes mellitus without complication ([facility]     Vitamin D deficiency        Past Surgical History:    Past Surgical History:   Procedure Laterality Date    BREAST BIOPSY Right 2020    infiltrating ductal carcinoma    BREAST LUMPECTOMY Right 9/15/2020    RIGHT LUMPECTOMY WITH SNB, PREOP AND INTRAOP US GUIDED NL, FLAPS, BIOZORB, MARGIN PROBE,PEC BLOCK performed by Jayne Ramon MD at [facility] Right 5/3/2016    THORACOTOMY, WEDGE RESECTION PULMONARY NODULE RIGHT MIDDLE LOBE performed by Rudy Carlin MD at [address] PORT SURGERY N/A 2020    PORT INSERTION WITH FLUORO performed by Jayne Ramon MD at [address] N/A 2020    PORT REMOVAL WITH FLUOROSCOPY performed by Jayne Ramon MD at [address] [place] [address] Ave OF RIGHT BREAST  2020    US GUIDED NEEDLE LOC OF RIGHT BREAST 2020 [facility] [address] Current Medications:    Current Outpatient Medications   Medication Sig Dispense Refill    Semaglutide,0.25 or 0.5MG/DOS, 2 MG/1.5ML SOPN Inject 0.5 mg into the skin once a week 3 pen 3    montelukast (SINGULAIR) 10 MG tablet Take 1 tablet by mouth nightly 90 tablet 1    lovastatin (MEVACOR) 20 MG tablet TAKE TWO TABLETS BY MOUTH DAILY 180 tablet 1    escitalopram (LEXAPRO) 10 MG tablet Take 0.5 tablets by mouth daily 45 tablet 1    letrozole (FEMARA) 2.5 MG tablet TAKE ONE TABLET BY MOUTH DAILY 60 tablet 4    Cholecalciferol (VITAMIN D) 2000 units CAPS capsule Take by mouth daily        No current facility-administered medications for this visit. Allergies: Allergies   Allergen Reactions    Codeine Itching    Other Rash     Chloroprep surgical prep       Social History:    Social History     Tobacco Use    Smoking status: Former Smoker     Packs/day: 1.00     Years: 3.00     Pack years: 3.00     Types: Cigarettes     Quit date: 10/1/1984     Years since quittin.2    Smokeless tobacco: Never Used    Tobacco comment: 3 YEARS PLAYED AROUND AND QUIT IN THE    Vaping Use    Vaping Use: Never used   Substance Use Topics    Alcohol use: No     Alcohol/week: 0.0 standard drinks    Drug use: No       Family History:   Family History   Problem Relation Age of Onset    Cancer Father         bladder ca    Other Sister         MULTIPLE SCLEROSIS    High Blood Pressure Mother     Heart Disease Mother     Stroke Mother        Vitals:  Vitals:    21 1301   BP: 120/80   Pulse: 86   SpO2: 97%   Weight: 174 lb 14.4 oz (79.3 kg)   Height: 5' 4\" (1.626 m)        Subjective   REVIEW OF SYSTEMS:   Review of Systems   Constitutional: Positive for fatigue. Negative for chills, diaphoresis and fever. HENT: Negative. Negative for congestion, ear pain, hearing loss, nosebleeds, sore throat and tinnitus. Eyes: Negative. Negative for pain, discharge and redness. Respiratory: Negative. Negative for cough, shortness of breath and wheezing. Cardiovascular: Negative. Negative for chest pain, palpitations and leg swelling. Gastrointestinal: Negative. Negative for abdominal pain, blood in stool, constipation, diarrhea, nausea and vomiting. Endocrine: Negative for polydipsia. Genitourinary: Negative for dysuria, flank pain, frequency, hematuria and urgency. Musculoskeletal: Positive for arthralgias (Mild). Negative for back pain, myalgias and neck pain. Skin: Negative. Negative for rash. Neurological: Positive for numbness (Tips of fingers and toes). Negative for dizziness, tremors, seizures, weakness and headaches. Hematological: Does not bruise/bleed easily. Psychiatric/Behavioral: Negative. The patient is not nervous/anxious. Objective   PHYSICAL EXAM:  Physical Exam  Vitals reviewed. Constitutional:       General: She is not in acute distress. Appearance: She is well-developed. She is not diaphoretic. HENT:      Head: Normocephalic and atraumatic. Mouth/Throat:      Pharynx: Uvula midline. Tonsils: No tonsillar exudate. Eyes:      General: Lids are normal. No scleral icterus. Right eye: No discharge. Left eye: No discharge. Conjunctiva/sclera: Conjunctivae normal.      Pupils: Pupils are equal, round, and reactive to light. Neck:      Thyroid: No thyroid mass or thyromegaly. Vascular: No JVD. Trachea: Trachea normal. No tracheal deviation. Cardiovascular:      Rate and Rhythm: Normal rate and regular rhythm. Heart sounds: Normal heart sounds. No murmur heard. No friction rub. No gallop. Pulmonary:      Effort: Pulmonary effort is normal. No respiratory distress. Breath sounds: Normal breath sounds. No wheezing or rales. Chest:      Chest wall: No tenderness. Abdominal:      General: Bowel sounds are normal. There is no distension. Palpations: Abdomen is soft. There is no mass.       Tenderness: I reviewed the progress note from the office visit that documented bilateral breast exam with no new findings. Note states she has no dominant masses, no skin or nipple changes, and no axillary adenopathy. There is no evidence of new or recurrent neoplasm    -Continue letrozole 2.5 mg daily  -Encourage self breast examinations  -Anticipating annual mammogram in March 2022    I discussed the importance of compliance with Femara/letrozole. Decreased compliance with adjuvant endocrine therapy has been linked to decreased survival. We discussed the various barriers and side effects of endocrine therapy and ways to improve compliance. She acknowledged understanding. 2. Encounter for monitoring aromatase inhibitor therapy  -Denies denies hot flashes or vaginal dryness  -Mild arthralgias which are currently tolerable    3. Chemotherapy-induced neuropathy, secondary to Taxol  -Mild numbness and tingling to fingertips and toes, no change from previous evaluations    4. Osteopenia, bone mineral density study on 6/5/2020 reported a T score of -1.5. Vitamin D level 37.8 on 5/27/2021. Reports compliant with calcium supplement and vitamin D replacement. Vitamin D level was 35.5 on 11/29/2021    -Continue calcium 1200 mg with vitamin D 1000 units daily. -Repeat bone mineral density study after 6/5/2022 if T score decreases any further will need to consider biphosphonate therapy    I discussed all of the above findings included in the assessment and plan with the patient and the patient is in agreement to move forward with current recommendations/treatment. I have addressed all of their questions and concerns that were verbalized.     FOLLOW UP:  Follow-up appointment given for 6 months, sooner if needed  Continue to follow with other medical providers as recommended.   Labs at next visit: CMP and vitamin D level if not obtained recently      EMR Dragon/Transcription disclaimer:   Much of this encounter note is an electronic transcription/translation of spoken language to printed text. The electronic translation of spoken language may permit erroneous, or at times, nonsensical words or phrases to be inadvertently transcribed; although attempts have made to review the note for such errors, some may still exist.  Please excuse any unrecognized transcription errors and contact us if the air is unintelligible or needs documented correction. Also, portions of this note have been copied forward, however, changed to reflect the most current clinical status of this patient. Marlo GILMORE am pre-charting as a registered nurse for [employer].     Electronically signed by ASHWIN Crouch on 12/17/2021 at 4:30 PM

## 2021-12-20 RX ORDER — ESCITALOPRAM OXALATE 10 MG/1
5 TABLET ORAL DAILY
Qty: 45 TABLET | Refills: 1 | Status: SHIPPED | OUTPATIENT
Start: 2021-12-20 | End: 2022-09-12

## 2021-12-20 RX ORDER — LOVASTATIN 20 MG/1
TABLET ORAL
Qty: 180 TABLET | Refills: 1 | Status: SHIPPED | OUTPATIENT
Start: 2021-12-20 | End: 2022-09-26

## 2022-03-06 DIAGNOSIS — R73.9 HYPERGLYCEMIA: ICD-10-CM

## 2022-03-06 DIAGNOSIS — E11.9 TYPE 2 DIABETES MELLITUS WITHOUT COMPLICATION, WITHOUT LONG-TERM CURRENT USE OF INSULIN (HCC): ICD-10-CM

## 2022-03-07 RX ORDER — SEMAGLUTIDE 1.34 MG/ML
INJECTION, SOLUTION SUBCUTANEOUS
Qty: 3 ML | OUTPATIENT
Start: 2022-03-07

## 2022-03-08 ENCOUNTER — PATIENT MESSAGE (OUTPATIENT)
Dept: INTERNAL MEDICINE | Age: 72
End: 2022-03-08

## 2022-03-08 DIAGNOSIS — E11.9 TYPE 2 DIABETES MELLITUS WITHOUT COMPLICATION, WITHOUT LONG-TERM CURRENT USE OF INSULIN (HCC): ICD-10-CM

## 2022-03-08 DIAGNOSIS — R73.9 HYPERGLYCEMIA: ICD-10-CM

## 2022-03-09 NOTE — TELEPHONE ENCOUNTER
From: Armaan Agarwal  To: Dr. Alexia Rose: 3/8/2022 7:14 PM CST  Subject: Rosales Gandhi    The pharmacist told me today that if Dr. Yesy Marroquin would write the script 4mg by 3ml pen I would get double the amt of Ozempic for $25 then the one sent to them yesterday that I got filled today. So can she write another scrip for next month?

## 2022-03-10 NOTE — PROGRESS NOTES
HISTORY OF PRESENT ILLNESS:    Ms. Skyler Phan presents today for a six month breast exam followed by her annual mammogram. She is underwent Right lumpectomy with Right SNB on 9/15/2020. This was following neoadjuvant chemotherapy for essentially triple negative breast cancer with a pathologic complete response. On 3/19/2020 she underwent an ultrasound guided breast biopsy  on the right which revealed a 1.1 cm grade 3 invasive ductal carcinoma. ER is positive at 4%. OK is Negative. Her2 is Negative. Ki67 is High at 86%. Mammaprint is High Risk Basal Type. 9/1/2020-Bilateral Breast MRI  No suspicious soft tissue mass within the region of the    biopsy clip. The satellite lesion identified on prior breast MR is    also no longer visualized. No new suspicious mass or abnormal enhancement in either breast. LEFT    breast ductal ectasia with T1 hyperintense proteinaceous debris within    the ducts is again noted. No axillary or internal mammary    lymphadenopathy. LEFT chest port catheter is noted. No suspicious bone    lesions identified. No acute finding in the anterior liver.         Impression    No residual soft tissue mass at the site of RIGHT upper outer quadrant    biopsy clip, correlating with site of biopsy-proven neoplasm. No    evidence of residual disease by MR. The small satellite nodule    identified on prior breast MR is also no longer identified. No    axillary or internal mammary lymphadenopathy. BI-RADS CATEGORY 6: KNOWN BIOPSY-PROVEN MALIGNANCY. Signed by Dr Layne Browning on 9/1/2020 1:51 PM      Right lumpectomy with Right sentinel lymph node biopsy on 9/15/2020.       PATHOLOGY REVEALS:  FINAL DIAGNOSIS:     A.  Breast, right lumpectomy:    1.  Prior biopsy site identified, negative for residual carcinoma or carcinoma in situ.    2.  Focal atypical ductal hyperplasia, margins negative.    3.  Extensive fibrosis present adjacent to prior biopsy site consistent with complete therapeutic response.    4.  Surgical excision margins are negative for prior biopsy site. B.  Breast, excision of additional right breast anterior margin: Benign breast parenchyma. C.  Breast, excision of additional right breast cranial margin: Benign breast parenchyma. D.  Lymph node, right [place] lymph node biopsy: One lymph node, negative for evidence of malignancy. Mammogram-3/14/2022  FINDINGS:  No new masses, new architectural distortion or new suspicious  microcalcifications are identified. There has been no significant  interval change. This study was interpreted with CAD. IMPRESSION AND RECOMMENDATION:   No mammographic evidence of malignancy. Recommendation is for the  patient to return for routine mammography in one year or sooner, if  clinically indicated. BI-RADS CATEGORY 2: BENIGN FINDINGS   The patient's information has been added to a reminder system with a  target due date for the next mammogram.  Signed by Dr Gisela Segovia:  The  wounds look good with no evidence of infection, fluid accumulation, or skin necrosis. She has fibrocystic changes to both breasts and minimal scarring and minimal radiation changes. She has no dominant masses, no skin or nipple changes, and no axillary adenopathy. There is no evidence of new or recurrent neoplasm    She has seen Dr. Lashon Youngblood and has completed radiation treatments 11/2020. She received a 3-week course. IMPRESSION:     Right lumpectomy with Right sentinel lymph node biopsy      PLAN: I will see her back for a physical exam in 6-months. She will call us with any new concerns. I have seen, examined and reviewed this patient medication list, appropriate labs and imaging studies. I reviewed relevant medical records and others physicians notes. I discussed the plans of care with the patient. I answered all the questions to the patients satisfaction.   I, Dr Kristi Sommers, personally performed the services described in this documentation as scribed by Holger Yepez MA in my presence and is both accurate and complete. (Please note that portions of this note were completed with a voice recognition program. Efforts were made to edit the dictations but occasionally words are mis-transcribed.)  Over 50% of the total visit time of 15 minutes in face to face encounter with the patient, out of which more than 50% of the time was spent in counseling patient or family and coordination of care. Counseling included but was not limited to time spent reviewing labs, imaging studies/ treatment plan and answering questions.

## 2022-03-14 ENCOUNTER — OFFICE VISIT (OUTPATIENT)
Dept: SURGERY | Age: 72
End: 2022-03-14
Payer: COMMERCIAL

## 2022-03-14 ENCOUNTER — HOSPITAL ENCOUNTER (OUTPATIENT)
Dept: WOMENS IMAGING | Age: 72
Discharge: HOME | End: 2022-03-14
Payer: COMMERCIAL

## 2022-03-14 VITALS
WEIGHT: 177 LBS | DIASTOLIC BLOOD PRESSURE: 70 MMHG | BODY MASS INDEX: 30.22 KG/M2 | SYSTOLIC BLOOD PRESSURE: 128 MMHG | TEMPERATURE: 97.9 F | HEIGHT: 64 IN

## 2022-03-14 DIAGNOSIS — Z17.1 MALIGNANT NEOPLASM OF OVERLAPPING SITES OF RIGHT BREAST IN FEMALE, ESTROGEN RECEPTOR NEGATIVE (HCC): ICD-10-CM

## 2022-03-14 DIAGNOSIS — Z98.890 S/P LUMPECTOMY, RIGHT BREAST: Primary | ICD-10-CM

## 2022-03-14 DIAGNOSIS — C50.811 MALIGNANT NEOPLASM OF OVERLAPPING SITES OF RIGHT BREAST IN FEMALE, ESTROGEN RECEPTOR NEGATIVE (HCC): ICD-10-CM

## 2022-03-14 DIAGNOSIS — Z85.3 PERSONAL HISTORY OF BREAST CANCER: ICD-10-CM

## 2022-03-14 PROCEDURE — 99213 OFFICE O/P EST LOW 20 MIN: CPT | Performed by: SURGERY

## 2022-03-14 PROCEDURE — G0279 TOMOSYNTHESIS, MAMMO: HCPCS

## 2022-05-31 DIAGNOSIS — F41.1 GENERALIZED ANXIETY DISORDER: ICD-10-CM

## 2022-05-31 DIAGNOSIS — E05.90 SUBCLINICAL HYPERTHYROIDISM: ICD-10-CM

## 2022-05-31 DIAGNOSIS — E78.00 PURE HYPERCHOLESTEROLEMIA: ICD-10-CM

## 2022-05-31 DIAGNOSIS — E55.9 VITAMIN D DEFICIENCY: ICD-10-CM

## 2022-05-31 DIAGNOSIS — E11.9 TYPE 2 DIABETES MELLITUS WITHOUT COMPLICATION, WITHOUT LONG-TERM CURRENT USE OF INSULIN (HCC): ICD-10-CM

## 2022-05-31 LAB
ALBUMIN SERPL-MCNC: 4.2 G/DL (ref 3.5–5.2)
ALP BLD-CCNC: 99 U/L (ref 35–104)
ALT SERPL-CCNC: 20 U/L (ref 5–33)
ANION GAP SERPL CALCULATED.3IONS-SCNC: 10 MMOL/L (ref 7–19)
AST SERPL-CCNC: 17 U/L (ref 5–32)
BASOPHILS ABSOLUTE: 0 K/UL (ref 0–0.2)
BASOPHILS RELATIVE PERCENT: 0.4 % (ref 0–1)
BILIRUB SERPL-MCNC: <0.2 MG/DL (ref 0.2–1.2)
BILIRUBIN URINE: NEGATIVE
BLOOD, URINE: NEGATIVE
BUN BLDV-MCNC: 15 MG/DL (ref 8–23)
CALCIUM SERPL-MCNC: 9.4 MG/DL (ref 8.8–10.2)
CHLORIDE BLD-SCNC: 103 MMOL/L (ref 98–111)
CHOLESTEROL, TOTAL: 204 MG/DL (ref 160–199)
CLARITY: ABNORMAL
CO2: 25 MMOL/L (ref 22–29)
COLOR: YELLOW
CREAT SERPL-MCNC: 0.6 MG/DL (ref 0.5–0.9)
EOSINOPHILS ABSOLUTE: 0.2 K/UL (ref 0–0.6)
EOSINOPHILS RELATIVE PERCENT: 4.3 % (ref 0–5)
GFR AFRICAN AMERICAN: >59
GFR NON-AFRICAN AMERICAN: >60
GLUCOSE BLD-MCNC: 106 MG/DL (ref 74–109)
GLUCOSE URINE: NEGATIVE MG/DL
HBA1C MFR BLD: 5.6 % (ref 4–6)
HCT VFR BLD CALC: 39.2 % (ref 37–47)
HDLC SERPL-MCNC: 59 MG/DL (ref 65–121)
HEMOGLOBIN: 12.6 G/DL (ref 12–16)
IMMATURE GRANULOCYTES #: 0 K/UL
KETONES, URINE: NEGATIVE MG/DL
LDL CHOLESTEROL CALCULATED: 128 MG/DL
LEUKOCYTE ESTERASE, URINE: NEGATIVE
LYMPHOCYTES ABSOLUTE: 1.1 K/UL (ref 1.1–4.5)
LYMPHOCYTES RELATIVE PERCENT: 24.1 % (ref 20–40)
MCH RBC QN AUTO: 31.3 PG (ref 27–31)
MCHC RBC AUTO-ENTMCNC: 32.1 G/DL (ref 33–37)
MCV RBC AUTO: 97.3 FL (ref 81–99)
MONOCYTES ABSOLUTE: 0.5 K/UL (ref 0–0.9)
MONOCYTES RELATIVE PERCENT: 10.3 % (ref 0–10)
NEUTROPHILS ABSOLUTE: 2.8 K/UL (ref 1.5–7.5)
NEUTROPHILS RELATIVE PERCENT: 60.7 % (ref 50–65)
NITRITE, URINE: NEGATIVE
PDW BLD-RTO: 13.2 % (ref 11.5–14.5)
PH UA: 5 (ref 5–8)
PLATELET # BLD: 232 K/UL (ref 130–400)
PMV BLD AUTO: 10.8 FL (ref 9.4–12.3)
POTASSIUM SERPL-SCNC: 4.4 MMOL/L (ref 3.5–5)
PROTEIN UA: NEGATIVE MG/DL
RBC # BLD: 4.03 M/UL (ref 4.2–5.4)
SODIUM BLD-SCNC: 138 MMOL/L (ref 136–145)
SPECIFIC GRAVITY UA: 1.02 (ref 1–1.03)
TOTAL PROTEIN: 6.6 G/DL (ref 6.6–8.7)
TRIGL SERPL-MCNC: 85 MG/DL (ref 0–149)
TSH SERPL DL<=0.05 MIU/L-ACNC: 1.12 UIU/ML (ref 0.27–4.2)
UROBILINOGEN, URINE: 0.2 E.U./DL
VITAMIN D 25-HYDROXY: 42.5 NG/ML
WBC # BLD: 4.6 K/UL (ref 4.8–10.8)

## 2022-06-01 LAB
CREATININE URINE: 156.4 MG/DL (ref 4.2–622)
MICROALBUMIN UR-MCNC: <1.2 MG/DL (ref 0–19)
MICROALBUMIN/CREAT UR-RTO: NORMAL MG/G

## 2022-06-02 ENCOUNTER — OFFICE VISIT (OUTPATIENT)
Dept: INTERNAL MEDICINE | Age: 72
End: 2022-06-02
Payer: MEDICARE

## 2022-06-02 VITALS
DIASTOLIC BLOOD PRESSURE: 70 MMHG | HEART RATE: 81 BPM | RESPIRATION RATE: 18 BRPM | HEIGHT: 64 IN | WEIGHT: 176 LBS | BODY MASS INDEX: 30.05 KG/M2 | SYSTOLIC BLOOD PRESSURE: 112 MMHG | OXYGEN SATURATION: 97 %

## 2022-06-02 DIAGNOSIS — G89.29 CHRONIC BILATERAL LOW BACK PAIN WITHOUT SCIATICA: ICD-10-CM

## 2022-06-02 DIAGNOSIS — G47.33 OSA (OBSTRUCTIVE SLEEP APNEA): ICD-10-CM

## 2022-06-02 DIAGNOSIS — E11.9 TYPE 2 DIABETES MELLITUS WITHOUT COMPLICATION, WITHOUT LONG-TERM CURRENT USE OF INSULIN (HCC): ICD-10-CM

## 2022-06-02 DIAGNOSIS — E66.09 EXOGENOUS OBESITY: ICD-10-CM

## 2022-06-02 DIAGNOSIS — M54.50 CHRONIC BILATERAL LOW BACK PAIN WITHOUT SCIATICA: ICD-10-CM

## 2022-06-02 DIAGNOSIS — F41.1 GENERALIZED ANXIETY DISORDER: ICD-10-CM

## 2022-06-02 DIAGNOSIS — Z00.00 WELCOME TO MEDICARE PREVENTIVE VISIT: Primary | ICD-10-CM

## 2022-06-02 DIAGNOSIS — E05.90 SUBCLINICAL HYPERTHYROIDISM: ICD-10-CM

## 2022-06-02 DIAGNOSIS — E55.9 VITAMIN D DEFICIENCY: ICD-10-CM

## 2022-06-02 DIAGNOSIS — E78.00 PURE HYPERCHOLESTEROLEMIA: ICD-10-CM

## 2022-06-02 DIAGNOSIS — M85.862 OSTEOPENIA OF LEFT LOWER LEG: ICD-10-CM

## 2022-06-02 PROCEDURE — G0403 EKG FOR INITIAL PREVENT EXAM: HCPCS | Performed by: INTERNAL MEDICINE

## 2022-06-02 PROCEDURE — 1090F PRES/ABSN URINE INCON ASSESS: CPT | Performed by: INTERNAL MEDICINE

## 2022-06-02 PROCEDURE — 1036F TOBACCO NON-USER: CPT | Performed by: INTERNAL MEDICINE

## 2022-06-02 PROCEDURE — G8417 CALC BMI ABV UP PARAM F/U: HCPCS | Performed by: INTERNAL MEDICINE

## 2022-06-02 PROCEDURE — 99214 OFFICE O/P EST MOD 30 MIN: CPT | Performed by: INTERNAL MEDICINE

## 2022-06-02 PROCEDURE — G8399 PT W/DXA RESULTS DOCUMENT: HCPCS | Performed by: INTERNAL MEDICINE

## 2022-06-02 PROCEDURE — G8427 DOCREV CUR MEDS BY ELIG CLIN: HCPCS | Performed by: INTERNAL MEDICINE

## 2022-06-02 PROCEDURE — 3044F HG A1C LEVEL LT 7.0%: CPT | Performed by: INTERNAL MEDICINE

## 2022-06-02 PROCEDURE — 2022F DILAT RTA XM EVC RTNOPTHY: CPT | Performed by: INTERNAL MEDICINE

## 2022-06-02 PROCEDURE — G0402 INITIAL PREVENTIVE EXAM: HCPCS | Performed by: INTERNAL MEDICINE

## 2022-06-02 PROCEDURE — 3017F COLORECTAL CA SCREEN DOC REV: CPT | Performed by: INTERNAL MEDICINE

## 2022-06-02 PROCEDURE — 1123F ACP DISCUSS/DSCN MKR DOCD: CPT | Performed by: INTERNAL MEDICINE

## 2022-06-02 ASSESSMENT — ENCOUNTER SYMPTOMS
CONSTIPATION: 0
SORE THROAT: 0
WHEEZING: 0
COUGH: 0
ABDOMINAL PAIN: 0
CHEST TIGHTNESS: 0
BACK PAIN: 1

## 2022-06-02 ASSESSMENT — PATIENT HEALTH QUESTIONNAIRE - PHQ9
SUM OF ALL RESPONSES TO PHQ9 QUESTIONS 1 & 2: 0
2. FEELING DOWN, DEPRESSED OR HOPELESS: 0
SUM OF ALL RESPONSES TO PHQ QUESTIONS 1-9: 0
1. LITTLE INTEREST OR PLEASURE IN DOING THINGS: 0
SUM OF ALL RESPONSES TO PHQ QUESTIONS 1-9: 0

## 2022-06-02 ASSESSMENT — LIFESTYLE VARIABLES: HOW OFTEN DO YOU HAVE A DRINK CONTAINING ALCOHOL: NEVER

## 2022-06-02 ASSESSMENT — VISUAL ACUITY
OD_CC: 20/15
OS_CC: 20/15

## 2022-06-02 NOTE — PROGRESS NOTES
Chief Complaint   Patient presents with    Multiple medical problems          History of presenting illness:  Charisma Son is a80 y.o. female who presents today for follow up on her chronic medical conditions as noted below.       Patient Active Problem List    Diagnosis Date Noted    S/P lumpectomy, right breast 9/15/2020 09/22/2020    Adverse effect of antineoplastic and immunosuppressive drugs, initial encounter      Malignant neoplasm of right breast in female, estrogen receptor negative ([address] 04/17/2020    Microhematuria 06/07/2019    Bilateral low back pain without sciatica 05/04/2018    Osteopenia of left lower leg 08/16/2017     Overview Note:     12/16 lumbar spine normal, left hip neck -1.7  6/2020 lumbar spine -1.4, hip neck -1.5/-1.3      Type 2 diabetes mellitus without complication, without long-term current use of insulin ([address] 08/12/2017    Vitamin D deficiency 08/12/2017    Pure hypercholesterolemia 08/12/2017    GERD (gastroesophageal reflux disease) 08/12/2017    Exogenous obesity 08/12/2017    Generalized anxiety disorder 08/12/2017     Past Medical History:   Diagnosis Date    Anxiety     Breast cancer ([address] 2020    infiltrating ductal carcinoma     Cancer ([address]     Breast Cancer    Carpal tunnel syndrome     pt denies having carpal tunnel syndrome    Cervicalgia     Depression     Esophagitis     History of therapeutic radiation 2020    Hx antineoplastic chemo 2020    Hyperlipidemia     Idiopathic peripheral neuropathy     Insomnia     Menopause     Obesity due to excess calories     Osteoarthritis     Osteopenia     Type 2 diabetes mellitus without complication ([address]     Vitamin D deficiency       Past Surgical History:   Procedure Laterality Date    BREAST BIOPSY Right 2020    infiltrating ductal carcinoma    BREAST LUMPECTOMY Right 9/15/2020    RIGHT LUMPECTOMY WITH SNB, PREOP AND INTRAOP US GUIDED NL, FLAPS, BIOZORB, MARGIN PROBE,PEC BLOCK performed by Tala Beal MD at [facility] Right 5/3/2016    THORACOTOMY, WEDGE RESECTION PULMONARY NODULE RIGHT MIDDLE LOBE performed by Raúl Black MD at [address] N/A 2020    PORT INSERTION WITH FLUORO performed by Tala Beal MD at [address] N/A 2020    PORT REMOVAL WITH FLUOROSCOPY performed by [facility]ala Beal MD at [facility] LOC OF RIGHT BREAST  2020    US GUIDED NEEDLE LOC OF RIGHT BREAST 2020 [facility] [place] [facility]     Current Outpatient Medications   Medication Sig Dispense Refill    Semaglutide, 1 MG/DOSE, 4 MG/3ML SOPN Inject 0.5 mg into the skin once a week 3 mL 1    lovastatin (MEVACOR) 20 MG tablet TAKE TWO TABLETS BY MOUTH DAILY 180 tablet 1    escitalopram (LEXAPRO) 10 MG tablet Take 0.5 tablets by mouth daily 45 tablet 1    letrozole (FEMARA) 2.5 MG tablet TAKE ONE TABLET BY MOUTH DAILY 60 tablet 4    Cholecalciferol (VITAMIN D) 2000 units CAPS capsule Take by mouth daily        No current facility-administered medications for this visit. Allergies   Allergen Reactions    Codeine Itching    Other Rash     Chloroprep surgical prep     Social History     Tobacco Use    Smoking status: Former Smoker     Packs/day: 1.00     Years: 3.00     Pack years: 3.00     Types: Cigarettes     Quit date: 10/1/1984     Years since quittin.6    Smokeless tobacco: Never Used    Tobacco comment: 3 YEARS PLAYED AROUND AND QUIT IN THE    Substance Use Topics    Alcohol use: No     Alcohol/week: 0.0 standard drinks      Family History   Problem Relation Age of Onset    Cancer Father         bladder ca    Other Sister         MULTIPLE SCLEROSIS    High Blood Pressure Mother     Heart Disease Mother     Stroke Mother        Review of Systems   Constitutional: Positive for fatigue.  Negative for chills and fever.   HENT: Negative for congestion, ear pain, nosebleeds, postnasal drip and sore throat. Respiratory: Negative for cough, chest tightness and wheezing. Cardiovascular: Negative for chest pain, palpitations and leg swelling. Gastrointestinal: Negative for abdominal pain and constipation. Genitourinary: Negative for dysuria and urgency. Musculoskeletal: Positive for arthralgias and back pain. Skin: Negative for rash. Neurological: Negative for dizziness and headaches. Psychiatric/Behavioral: Negative. Vitals:    06/02/22 1037   BP: 112/70   Site: Left Upper Arm   Position: Sitting   Cuff Size: Large Adult   Pulse: 81   Resp: 18   SpO2: 97%   Weight: 176 lb (79.8 kg)   Height: 5' 4\" (1.626 m)     Body mass index is 30.21 kg/m². Physical Exam  Constitutional:       Appearance: She is well-developed. HENT:      Right Ear: External ear normal.      Left Ear: External ear normal.      Mouth/Throat:      Pharynx: No oropharyngeal exudate. Eyes:      Conjunctiva/sclera: Conjunctivae normal.      Pupils: Pupils are equal, round, and reactive to light. Neck:      Thyroid: No thyromegaly. Vascular: No JVD. Cardiovascular:      Rate and Rhythm: Normal rate. Heart sounds: Normal heart sounds. No murmur heard. Pulmonary:      Effort: No respiratory distress. Breath sounds: Normal breath sounds. No wheezing or rales. Chest:      Chest wall: No tenderness. Abdominal:      General: Bowel sounds are normal.      Palpations: Abdomen is soft. Musculoskeletal:      Cervical back: Neck supple. Lymphadenopathy:      Cervical: No cervical adenopathy. Skin:     Findings: No rash.          Lab Review   Orders Only on 05/31/2022   Component Date Value    TSH 05/31/2022 1.120     Color, UA 05/31/2022 YELLOW     Clarity, UA 05/31/2022 CLOUDY*    Glucose, Ur 05/31/2022 Negative     Bilirubin Urine 05/31/2022 Negative     Ketones, Urine 05/31/2022 Negative     Specific Gravity, UA 05/31/2022 1.024     Blood, Urine 05/31/2022 Negative     pH, UA 05/31/2022 5.0     Protein, UA 05/31/2022 Negative     Urobilinogen, Urine 05/31/2022 0.2     Nitrite, Urine 05/31/2022 Negative     Leukocyte Esterase, Urine 05/31/2022 Negative     Vit D, 25-Hydroxy 05/31/2022 42.5     Microalbumin, Random Uri* 05/31/2022 <1.20     Creatinine, Ur 05/31/2022 156.4     Microalbumin Creatinine * 05/31/2022 see below     Cholesterol, Total 05/31/2022 204*    Triglycerides 05/31/2022 85     HDL 05/31/2022 59*    LDL Calculated 05/31/2022 128     WBC 05/31/2022 4.6*    RBC 05/31/2022 4.03*    Hemoglobin 05/31/2022 12.6     Hematocrit 05/31/2022 39.2     MCV 05/31/2022 97.3     MCH 05/31/2022 31.3*    MCHC 05/31/2022 32.1*    RDW 05/31/2022 13.2     Platelets 45/50/5747 232     MPV 05/31/2022 10.8     Neutrophils % 05/31/2022 60.7     Lymphocytes % 05/31/2022 24.1     Monocytes % 05/31/2022 10.3*    Eosinophils % 05/31/2022 4.3     Basophils % 05/31/2022 0.4     Neutrophils Absolute 05/31/2022 2.8     Immature Granulocytes # 05/31/2022 0.0     Lymphocytes Absolute 05/31/2022 1.1     Monocytes Absolute 05/31/2022 0.50     Eosinophils Absolute 05/31/2022 0.20     Basophils Absolute 05/31/2022 0.00     Sodium 05/31/2022 138     Potassium 05/31/2022 4.4     Chloride 05/31/2022 103     CO2 05/31/2022 25     Anion Gap 05/31/2022 10     Glucose 05/31/2022 106     BUN 05/31/2022 15     CREATININE 05/31/2022 0.6     GFR Non- 05/31/2022 >60     GFR  05/31/2022 >59     Calcium 05/31/2022 9.4     Total Protein 05/31/2022 6.6     Albumin 05/31/2022 4.2     Total Bilirubin 05/31/2022 <0.2     Alkaline Phosphatase 05/31/2022 99     ALT 05/31/2022 20     AST 05/31/2022 17     Hemoglobin A1C 05/31/2022 5.6            ASSESSMENT/PLAN:    Type 2 diabetes mellitus without complication, without long-term current use of insulin (HCC)  Her A1c is 5.6 in 5/2022  she was unable to tolerate metformin. Strict diet and wtl oss recommneded  RX ozempic  0.5 weekly     Vitamin D deficiency-   her level was good at 42 in5/2022  RX vit D 2000 daily     Generalized anxiety disorder-  RX Lexapro 5 mg daily     Hyperlipidemia  I have personally reviewed and interpreted these lab results and thoroughly discussed with patient  LDL elevated 128  - RX lovastatin 20 mg daily  Healthy, mostly fiber rich nonstarchy plant-based diet recommended  Recommend to decrease intake of processed foods, simple carbohydrates and animal-based products that high in saturated fats     TFT's now normal     Mild alt/ ast elevation  Now normal    DAYTime sleepiness  Snoring  Increased fatigue  Likely sleep apnea  Obtain sleep study  Orders placed     Obesity  Pt was given approximately 5 minutes of counseling about diet and exercise including education on what calories are, where calories come from, the need for portion control,following lower carbohydrate dietary regimen and healthy snacks along side an active lifestyle with supplementary exercise of approx 30 minutes a week, 5 days a week of exercise for weight loss.  The patient voiced increased understanding of the topics discussed.      Low back pain, at times radiates to right knee area  Suggest a daily strengthening exercises  Suggest back strengthening yoga with Alina Pollack, [employer]     DX RT breast cancer  Per dr Karly Guzmán This Encounter   Procedures    Lipid Panel    Hemoglobin A1C    Comprehensive Metabolic Panel    Home Sleep Study    MS EKG FOR INITIAL PREVENT EXAM     New Prescriptions    No medications on file         Return for Medicare Annual Wellness Visit in 1 year. EMR Dragon/transcription disclaimer:Significant part of this  encounter note is electronic transcription/translationof spoken language to printed text.  The electronic translation of spoken language may be erroneous, or at times, nonsensical words or phrases may be inadvertently transcribed.  Although I have reviewed the note for sucherrors, some may still exist.

## 2022-06-02 NOTE — PROGRESS NOTES
Medicare Annual Wellness Visit    Scarlett Harrington is here for Medicare AWV (Welcome to [place])   Recommendations for Preventive Services Due: see orders and patient instructions/AVS.  Recommended screening schedule for the next 5-10 years is provided to the patient in written form: see Patient Instructions/AVS.     Return for Medicare Annual Wellness Visit in 1 year. Subjective    Patient's complete Health Risk Assessment and screening values have been reviewed and are found in Flowsheets. The following problems were reviewed today and where indicated follow up appointments were made and/or referrals ordered.     Positive Risk Factor Screenings with Interventions:             General Health and ACP:  General  In general, how would you say your health is?: Good  In the past 7 days, have you experienced any of the following: New or Increased Pain, New or Increased Fatigue, Loneliness, Social Isolation, Stress or Anger?: No  Do you get the social and emotional support that you need?: Yes  Do you have a Living Will?: Yes    Advance Directives     Power of  Living Will ACP-Advance Directive ACP-Power of     Not on File Not on File Not on File Not on File      General Health Risk Interventions:  Health Habits/Nutrition:     Physical Activity: Inactive    Days of Exercise per Week: 0 days    Minutes of Exercise per Session: 0 min     Have you lost any weight without trying in the past 3 months?: No  Body mass index: (!) 30.21  Have you seen the dentist within the past year?: Yes    Health Habits/Nutrition Interventions:  Healthy, mostly fiber rich nonstarchy plant-based diet recommended  Recommend to decrease intake of processed foods, simple carbohydrates and animal-based products that high in saturated fats  ·     Hearing/Vision:  Do you or your family notice any trouble with your hearing that hasn't been managed with hearing aids?: No  Do you have difficulty driving, watching TV, or doing any of your daily activities because of your eyesight?: No  Have you had an eye exam within the past year?: (!) No   Visual Acuity Screening    Right eye Left eye Both eyes   Without correction:      With correction: 20/15 20/15 20/13     Hearing/Vision Interventions:  · Vision concerns:  patient encouraged to make appointment with his/her eye specialist            Objective   Vitals:    06/02/22 1037   BP: 112/70   Site: Left Upper Arm   Position: Sitting   Cuff Size: Large Adult   Pulse: 81   Resp: 18   SpO2: 97%   Weight: 176 lb (79.8 kg)   Height: 5' 4\" (1.626 m)      Body mass index is 30.21 kg/m². Allergies   Allergen Reactions    Codeine Itching    Other Rash     Chloroprep surgical prep     Prior to Visit Medications    Medication Sig Taking?  Authorizing Provider   Semaglutide, 1 MG/DOSE, 4 MG/3ML SOPN Inject 0.5 mg into the skin once a week Yes Cornell Hernandez MD   lovastatin (MEVACOR) 20 MG tablet TAKE TWO TABLETS BY MOUTH DAILY Yes Cornell Hernandez MD   escitalopram (LEXAPRO) 10 MG tablet Take 0.5 tablets by mouth daily Yes Cornell Hernandez MD   letrozole ([facility]) 2.5 MG tablet TAKE ONE TABLET BY MOUTH DAILY Yes Devorah Davalos MD   Cholecalciferol (VITAMIN D) 2000 units CAPS capsule Take by mouth daily  Yes Historical Provider, MD Shafer (Including outside providers/suppliers regularly involved in providing care):   Patient Care Team:  Cornell Hernandez MD as PCP - General (Internal Medicine)  Cornell Hernandez MD as PCP - [facility] Empaneled Provider  Aly Hernández MD as Consulting Physician (Pulmonology)     Reviewed and updated this visit:  Tobacco  Allergies  Meds  Med Hx  Surg Hx  Soc Hx  Fam Hx             MEDICARE MAINTENANCE    * mammogram per oncology  * cscope 2018 repeat 10 yrs ( hyperplastic cecum)  * BD 6/2020  * TOS 8943  * pneumonia injection- refuses  *Vision  OS 20/50  OD 20/50  OU 20/13, corrected  *Electrocardiogram Medicare welcome  Sinus rhythm 79 bpm, low voltage otherwise normal electrocardiogram      Patient Instructions     Personalized Preventive Plan for Charisma Son - 6/2/2022  Medicare offers a range of preventive health benefits. Some of the tests and screenings are paid in full while other may be subject to a deductible, co-insurance, and/or copay. Some of these benefits include a comprehensive review of your medical history including lifestyle, illnesses that may run in your family, and various assessments and screenings as appropriate. After reviewing your medical record and screening and assessments performed today your provider may have ordered immunizations, labs, imaging, and/or referrals for you. A list of these orders (if applicable) as well as your Preventive Care list are included within your After Visit Summary for your review. Other Preventive Recommendations:    · A preventive eye exam performed by an eye specialist is recommended every 1-2 years to screen for glaucoma; cataracts, macular degeneration, and other eye disorders. · A preventive dental visit is recommended every 6 months. · Try to get at least 150 minutes of exercise per week or 10,000 steps per day on a pedometer . · Order or download the FREE \"Exercise & Physical Activity: Your Everyday Guide\" from [employer]. Call 6-810.210.7874 or search [employer] online. · You need 9587-4333 mg of calcium and 6906-8334 IU of vitamin D per day. It is possible to meet your calcium requirement with diet alone, but a vitamin D supplement is usually necessary to meet this goal.  · When exposed to the sun, use a sunscreen that protects against both UVA and UVB radiation with an SPF of 30 or greater. Reapply every 2 to 3 hours or after sweating, drying off with a towel, or swimming. · Always wear a seat belt when traveling in a car. Always wear a helmet when riding a bicycle or motorcycle.

## 2022-06-02 NOTE — PATIENT INSTRUCTIONS
Personalized Preventive Plan for Darryn Cash - 6/2/2022  Medicare offers a range of preventive health benefits. Some of the tests and screenings are paid in full while other may be subject to a deductible, co-insurance, and/or copay. Some of these benefits include a comprehensive review of your medical history including lifestyle, illnesses that may run in your family, and various assessments and screenings as appropriate. After reviewing your medical record and screening and assessments performed today your provider may have ordered immunizations, labs, imaging, and/or referrals for you. A list of these orders (if applicable) as well as your Preventive Care list are included within your After Visit Summary for your review. Other Preventive Recommendations:    · A preventive eye exam performed by an eye specialist is recommended every 1-2 years to screen for glaucoma; cataracts, macular degeneration, and other eye disorders. · A preventive dental visit is recommended every 6 months. · Try to get at least 150 minutes of exercise per week or 10,000 steps per day on a pedometer . · Order or download the FREE \"Exercise & Physical Activity: Your Everyday Guide\" from The [employer]. Call 9-713.209.1210 or search The [employer] [address] [employer] online. · You need 3983-0289 mg of calcium and 0408-9328 IU of vitamin D per day. It is possible to meet your calcium requirement with diet alone, but a vitamin D supplement is usually necessary to meet this goal.  · When exposed to the sun, use a sunscreen that protects against both UVA and UVB radiation with an SPF of 30 or greater. Reapply every 2 to 3 hours or after sweating, drying off with a towel, or swimming. · Always wear a seat belt when traveling in a car. Always wear a helmet when riding a bicycle or motorcycle.

## 2022-06-20 ENCOUNTER — HOSPITAL ENCOUNTER (OUTPATIENT)
Dept: SLEEP CENTER | Age: 72
Discharge: HOME | End: 2022-06-22
Payer: MEDICARE

## 2022-06-20 PROCEDURE — G0399 HOME SLEEP TEST/TYPE 3 PORTA: HCPCS

## 2022-06-23 NOTE — PROGRESS NOTES
Progress Note      Pt Name: Elke Senters: 1950  MRN: [id number]    Date of evaluation: 06/24/2022  History Obtained From:  patient, electronic medical record    CHIEF COMPLAINT:    Chief Complaint   Patient presents with    Follow-up     Malignant neoplasm of breast in female, estrogen receptor positive, unspecified laterality, unspecified site of breast ([facility] [address]     HISTORY OF PRESENT ILLNESS:    Sami Yancey is a 73 y.o.  female who is currently being followed for invasive ductal carcinoma of the right breast, triple negative, ER 4%. She is status post neoadjuvant chemotherapy, right lumpectomy with sentinel lymph node biopsy, adjuvant radiation therapy and currently taking adjuvant endocrine therapy with letrozole 2.5 mg daily. Migdalia Robles returns today in scheduled follow-up for evaluation, lab monitoring, side effect monitoring and further treatment recommendations. Today's clinic visit to include physical assessment, review of systems, any lab or radiographic findings that were available and plan of care are documented below. ONCOLOGIC HISTORY:     Diagnosis  · Invasive ductal carcinoma right breast, March 2020  · ER 4%, CA 0.1%, HER-2/paty IHC 0, Ki-67 86%  · [id number]   · Mammaprint High risk basal     Treatment summary   · 05/06/2020-8/12/2020 ddAC with G-CSF support followed by dose dense Taxol x4 cycles with G-CSF. · 9/15/20-Right breast lumpectomy with 1 sentinel lymph node negative  · 9/23/20- Adjuvant endocrine therapy-Femara 2.5 mg daily  · 10/21/20 - 11/11/20 4256 cGY radiation therapy to right breast     Lalit Delacruz was first seen by Dr. Carroll Contreras on 4/16/2020 referred by Dr. Sonali Bronson for diagnosis of breast cancer. This was in a screening detected lesion. The patient has a remote history of hormone replacement. She denies any family history of breast cancer.   · 3/19/2020-core needle biopsy of suspicious right breast lesion consistent with invasive ductal carcinoma grade 3 measuring 0.7 cm. ER 4%, SC 0%, HER-2/paty IHC 0.  Ki-67 86%. MammaPrint high risk basal type. · 2020- bilateral breast MRI showed right breast lesion measuring 1.1 x 1.4 x 0.85 cm. Nonenlarged lymph nodes within the upper outer right breast.  · 2020- she was first seen by Dr. Leydi Hebert. Recommend neoadjuvant chemotherapy with dose dense AC with G-CSF support followed by dose dense Taxol. · 2020 CT Chest Mild pulmonary scarring. Known right breast cancer. No metastatic disease evident. CT Abdomen Pelvis No acute pathology int the abdomen or pelvis. No evidence of metastatic disease. 2D Echo Normal with EF 60%. · 2020- Us Breast Limited Right Positive response to therapy with decreased size of the known right breast malignancy. · 2020-completion of neoadjuvant chemotherapy with dose dense AC followed by 4 cycles of dose dense Taxol with G-CSF support. · 2020 Mri Breast Bilateral- No residual soft tissue mass at the site of RIGHT upper outer quadrant biopsy clip, correlating with site of biopsy-proven neoplasm. No evidence of residual disease by MR. The small satellite nodule identified on prior breast MR is also no longer identified. No axillary or internal mammary lymphadenopathy. · 9/15/2020-patient underwent a right lumpectomy/sentinel lymph node biopsy revealing no residual disease. Focal atypical ductal hyperplasia. Margins negative. AJCC STAGE: ypT0, (sn)pN0, pMx   · 20- Adjuvant endocrine therapy-Femara 2.5 mg daily  · 10/21/20 - 20 4256 cGY radiation therapy to right breast  · 3/10/2021 Bilateral mammogram documented no mammographic evidence of malignancy. · 2022 Bilateral mammogram- no mammographic evidence of malignancy    Age-appropriate health screenin2020 Bone Density Femur Neck T-Score--1.5. This patient is considered Osteopenic.     Past Medical History:    Past Medical History:   Diagnosis Date    Anxiety     Breast cancer ([facility] [address] 2020    infiltrating ductal carcinoma     Cancer ([facility] [address]     Breast Cancer    Carpal tunnel syndrome     pt denies having carpal tunnel syndrome    Cervicalgia     Depression     Esophagitis     History of therapeutic radiation 2020    Hx antineoplastic chemo 2020    Hyperlipidemia     Idiopathic peripheral neuropathy     Insomnia     Menopause     Obesity due to excess calories     Osteoarthritis     Osteopenia     Type 2 diabetes mellitus without complication ([facility] [address]     Vitamin D deficiency        Past Surgical History:    Past Surgical History:   Procedure Laterality Date    BREAST BIOPSY Right 2020    infiltrating ductal carcinoma    BREAST LUMPECTOMY Right 9/15/2020    RIGHT LUMPECTOMY WITH SNB, PREOP AND INTRAOP US GUIDED NL, FLAPS, BIOZORB, MARGIN PROBE,PEC BLOCK performed by Christa Petty MD at [facility] Right 5/3/2016    THORACOTOMY, WEDGE RESECTION PULMONARY NODULE RIGHT MIDDLE LOBE performed by Genevieve Darling MD at [address] N/A 4/22/2020    PORT INSERTION WITH FLUORO performed by Christa Petty MD at [address] N/A 11/12/2020    PORT REMOVAL WITH FLUOROSCOPY performed by Christa Petty MD at [address] [place] [address] LOC OF RIGHT BREAST  9/14/2020    US GUIDED NEEDLE LOC OF RIGHT BREAST 9/14/2020 [facility] [address]       Current Medications:    Current Outpatient Medications   Medication Sig Dispense Refill    lovastatin (MEVACOR) 20 MG tablet TAKE TWO TABLETS BY MOUTH DAILY 180 tablet 1    escitalopram (LEXAPRO) 10 MG tablet Take 0.5 tablets by mouth daily 45 tablet 1    letrozole (FEMARA) 2.5 MG tablet TAKE ONE TABLET BY MOUTH DAILY 60 tablet 4    Cholecalciferol (VITAMIN D) 2000 units CAPS capsule Take by mouth daily       Semaglutide, 1 MG/DOSE, 4 MG/3ML SOPN Inject 1 mg into the skin once a week 3 and headaches. Hematological: Does not bruise/bleed easily. Psychiatric/Behavioral: Negative. The patient is not nervous/anxious. Objective   PHYSICAL EXAM:  Physical Exam  Vitals reviewed. Constitutional:       General: She is not in acute distress. Appearance: She is well-developed. HENT:      Head: Normocephalic and atraumatic. Mouth/Throat:      Pharynx: Uvula midline. Tonsils: No tonsillar exudate. Eyes:      General: Lids are normal.      Conjunctiva/sclera: Conjunctivae normal.      Pupils: Pupils are equal, round, and reactive to light. Neck:      Thyroid: No thyroid mass or thyromegaly. Vascular: No JVD. Trachea: Trachea normal. No tracheal deviation. Cardiovascular:      Rate and Rhythm: Normal rate and regular rhythm. Pulses: Normal pulses. Heart sounds: Normal heart sounds. Pulmonary:      Effort: Pulmonary effort is normal. No respiratory distress. Breath sounds: Normal breath sounds. No wheezing or rales. Chest:      Chest wall: No tenderness. Abdominal:      General: Bowel sounds are normal. There is no distension. Palpations: Abdomen is soft. There is no mass. Tenderness: There is no abdominal tenderness. There is no guarding. Musculoskeletal:         General: No tenderness or deformity. Cervical back: Normal range of motion and neck supple. Comments: Range of motion within normal limits x4 extremities   Skin:     General: Skin is warm. Findings: No bruising, erythema or rash. Neurological:      Mental Status: She is alert and oriented to person, place, and time. Cranial Nerves: No cranial nerve deficit. Coordination: Coordination normal.   Psychiatric:         Behavior: Behavior normal.         Thought Content:  Thought content normal.         Labs reviewed today:  Lab Results   Component Value Date    WBC 5.05 06/24/2022    HGB 12.8 06/24/2022    HCT 40.3 06/24/2022    MCV 98.1 (H) 06/24/2022     (L) 06/24/2022     Lab Results   Component Value Date    NEUTROABS 2.84 06/24/2022     Lab Results   Component Value Date     05/31/2022    K 4.4 05/31/2022     05/31/2022    CO2 25 05/31/2022    BUN 15 05/31/2022    CREATININE 0.6 05/31/2022    GLUCOSE 106 05/31/2022    CALCIUM 9.4 05/31/2022    PROT 6.6 05/31/2022    LABALBU 4.2 05/31/2022    BILITOT <0.2 05/31/2022    ALKPHOS 99 05/31/2022    AST 17 05/31/2022    ALT 20 05/31/2022    LABGLOM >60 05/31/2022    GFRAA >59 05/31/2022    GLOB 2.4 08/12/2020       ASSESSMENT/PLAN:      1. Invasive ductal carcinoma of the right breast, triple negative, ER 4%. Currently taking adjuvant endocrine therapy with letrozole 2.5 mg, anticipate 5-year dosing through September 2025. Reports compliant with letrozole 2.5 mg daily. No new breast complaints. Bilateral breast examination today revealed no dominant masses, no skin or nipple changes and no axillary adenopathy. No suspicious abnormality to suggest recurrent breast cancer. 03/14/2022 Bilateral mammogram- no mammographic evidence of malignancy    Sherly Degroot was seen in scheduled follow-up by Dr Juan Diego Campbell on 03/14/2022, I reviewed the progress note from that office visit which documented bilateral breast exam with no new findings or evidence of recurrence.     -Continue letrozole 2.5 mg daily  -Encourage self breast examinations  -Anticipating annual mammogram in March 2023      2. Encounter for monitoring aromatase inhibitor therapy  -Denies denies hot flashes or vaginal dryness  -Reports chronic arthralgias are stable and tolerable    3. Chemotherapy-induced neuropathy, secondary to Taxol  Chronic mild numbness and tingling to fingertips and toes with no change from previous evaluation. 4. Osteopenia, bone mineral density study on 6/5/2020 reported a T score of -1.5. Vitamin D level 37.8 on 5/27/2021. Vitamin D of 42.5 on 05/31/2022.   Reports compliant with calcium 1200 mg with vitamin D daily.    -Continue calcium 1200 mg with vitamin D 1000 units daily.  -Schedule bone mineral density study after at [place], if T score decreases any further will need to consider biphosphonate therapy    Discussed the need for biphosphonate therapy if decline in bone density is identified  The use of bisphosphonates as adjuvant therapy should be considered for all postmenopausal women with early breast cancer who are deemed to be candidates for adjuvant therapy, according to a joint clinical practice guideline from [address] [place] [address] and the [facility] Insurance of Clinical Oncology (ASCO). I discussed all of the above findings included in the assessment and plan with the patient and the patient is in agreement to move forward with current recommendations/treatment. I have addressed all of their questions and concerns that were verbalized.     FOLLOW UP:  Follow-up appointment given for 6 months, sooner if needed  Continue to follow with other medical providers as recommended. Labs at next visit: CMP and vitamin D level if not obtained recently      EMR Dragon/Transcription disclaimer:   Much of this encounter note is an electronic transcription/translation of spoken language to printed text. The electronic translation of spoken language may permit erroneous, or at times, nonsensical words or phrases to be inadvertently transcribed; although attempts have made to review the note for such errors, some may still exist.  Please excuse any unrecognized transcription errors and contact us if the air is unintelligible or needs documented correction. Also, portions of this note have been copied forward, however, changed to reflect the most current clinical status of this patient. IJohn, am pre-charting as a registered nurse for [employer].     Electronically signed by ASHWIN Higuera on 7/3/2022 at 7:45 PM

## 2022-06-24 ENCOUNTER — OFFICE VISIT (OUTPATIENT)
Dept: HEMATOLOGY | Age: 72
End: 2022-06-24
Payer: MEDICARE

## 2022-06-24 ENCOUNTER — HOSPITAL ENCOUNTER (OUTPATIENT)
Dept: INFUSION THERAPY | Age: 72
Discharge: HOME | End: 2022-06-24
Payer: COMMERCIAL

## 2022-06-24 VITALS
HEART RATE: 85 BPM | OXYGEN SATURATION: 98 % | HEIGHT: 64 IN | BODY MASS INDEX: 29.88 KG/M2 | SYSTOLIC BLOOD PRESSURE: 120 MMHG | WEIGHT: 175 LBS | DIASTOLIC BLOOD PRESSURE: 68 MMHG

## 2022-06-24 DIAGNOSIS — Z17.0 MALIGNANT NEOPLASM OF BREAST IN FEMALE, ESTROGEN RECEPTOR POSITIVE, UNSPECIFIED LATERALITY, UNSPECIFIED SITE OF BREAST (HCC): Primary | ICD-10-CM

## 2022-06-24 DIAGNOSIS — T45.1X5A CHEMOTHERAPY-INDUCED NEUROPATHY (HCC): ICD-10-CM

## 2022-06-24 DIAGNOSIS — M85.80 OSTEOPENIA, UNSPECIFIED LOCATION: ICD-10-CM

## 2022-06-24 DIAGNOSIS — C50.919 MALIGNANT NEOPLASM OF BREAST IN FEMALE, ESTROGEN RECEPTOR POSITIVE, UNSPECIFIED LATERALITY, UNSPECIFIED SITE OF BREAST (HCC): Primary | ICD-10-CM

## 2022-06-24 DIAGNOSIS — Z17.1 MALIGNANT NEOPLASM OF OVERLAPPING SITES OF RIGHT BREAST IN FEMALE, ESTROGEN RECEPTOR NEGATIVE (HCC): ICD-10-CM

## 2022-06-24 DIAGNOSIS — Z51.81 ENCOUNTER FOR MONITORING AROMATASE INHIBITOR THERAPY: ICD-10-CM

## 2022-06-24 DIAGNOSIS — C50.811 MALIGNANT NEOPLASM OF OVERLAPPING SITES OF RIGHT BREAST IN FEMALE, ESTROGEN RECEPTOR NEGATIVE (HCC): ICD-10-CM

## 2022-06-24 DIAGNOSIS — Z71.89 CARE PLAN DISCUSSED WITH PATIENT: ICD-10-CM

## 2022-06-24 DIAGNOSIS — Z79.811 ENCOUNTER FOR MONITORING AROMATASE INHIBITOR THERAPY: ICD-10-CM

## 2022-06-24 DIAGNOSIS — Z78.0 POST-MENOPAUSAL: ICD-10-CM

## 2022-06-24 DIAGNOSIS — G62.0 CHEMOTHERAPY-INDUCED NEUROPATHY (HCC): ICD-10-CM

## 2022-06-24 DIAGNOSIS — Z79.811 LONG TERM CURRENT USE OF AROMATASE INHIBITOR: ICD-10-CM

## 2022-06-24 LAB
BASOPHILS ABSOLUTE: 0.02 K/UL (ref 0.01–0.08)
BASOPHILS RELATIVE PERCENT: 0.4 % (ref 0.1–1.2)
EOSINOPHILS ABSOLUTE: 0.2 K/UL (ref 0.04–0.54)
EOSINOPHILS RELATIVE PERCENT: 4 % (ref 0.7–7)
HCT VFR BLD CALC: 40.3 % (ref 34.1–44.9)
HEMOGLOBIN: 12.8 G/DL (ref 11.2–15.7)
LYMPHOCYTES ABSOLUTE: 1.48 K/UL (ref 1.18–3.74)
LYMPHOCYTES RELATIVE PERCENT: 29.3 % (ref 19.3–53.1)
MCH RBC QN AUTO: 31.1 PG (ref 25.6–32.2)
MCHC RBC AUTO-ENTMCNC: 31.8 G/DL (ref 32.3–35.5)
MCV RBC AUTO: 98.1 FL (ref 79.4–94.8)
MONOCYTES ABSOLUTE: 0.5 K/UL (ref 0.24–0.82)
MONOCYTES RELATIVE PERCENT: 9.9 % (ref 4.7–12.5)
NEUTROPHILS ABSOLUTE: 2.84 K/UL (ref 1.56–6.13)
NEUTROPHILS RELATIVE PERCENT: 56.2 % (ref 34–71.1)
PDW BLD-RTO: 13.1 % (ref 11.7–14.4)
PLATELET # BLD: 165 K/UL (ref 182–369)
PMV BLD AUTO: 11.2 FL (ref 7.4–10.4)
RBC # BLD: 4.11 M/UL (ref 3.93–5.22)
WBC # BLD: 5.05 K/UL (ref 3.98–10.04)

## 2022-06-24 PROCEDURE — 1036F TOBACCO NON-USER: CPT | Performed by: NURSE PRACTITIONER

## 2022-06-24 PROCEDURE — 1090F PRES/ABSN URINE INCON ASSESS: CPT | Performed by: NURSE PRACTITIONER

## 2022-06-24 PROCEDURE — G8417 CALC BMI ABV UP PARAM F/U: HCPCS | Performed by: NURSE PRACTITIONER

## 2022-06-24 PROCEDURE — 3017F COLORECTAL CA SCREEN DOC REV: CPT | Performed by: NURSE PRACTITIONER

## 2022-06-24 PROCEDURE — 85025 COMPLETE CBC W/AUTO DIFF WBC: CPT

## 2022-06-24 PROCEDURE — 1123F ACP DISCUSS/DSCN MKR DOCD: CPT | Performed by: NURSE PRACTITIONER

## 2022-06-24 PROCEDURE — 99213 OFFICE O/P EST LOW 20 MIN: CPT | Performed by: NURSE PRACTITIONER

## 2022-06-24 PROCEDURE — 99212 OFFICE O/P EST SF 10 MIN: CPT

## 2022-06-24 PROCEDURE — G8399 PT W/DXA RESULTS DOCUMENT: HCPCS | Performed by: NURSE PRACTITIONER

## 2022-06-24 PROCEDURE — G8427 DOCREV CUR MEDS BY ELIG CLIN: HCPCS | Performed by: NURSE PRACTITIONER

## 2022-07-03 ASSESSMENT — ENCOUNTER SYMPTOMS
EYE PAIN: 0
CONSTIPATION: 0
BLOOD IN STOOL: 0
DIARRHEA: 0
BACK PAIN: 0
SHORTNESS OF BREATH: 0
VOMITING: 0
EYE REDNESS: 0
EYE DISCHARGE: 0
RESPIRATORY NEGATIVE: 1
COUGH: 0
EYES NEGATIVE: 1
SORE THROAT: 0
ABDOMINAL PAIN: 0
GASTROINTESTINAL NEGATIVE: 1
WHEEZING: 0
NAUSEA: 0

## 2022-07-15 DIAGNOSIS — G47.33 SLEEP APNEA, OBSTRUCTIVE: Primary | ICD-10-CM

## 2022-07-15 PROCEDURE — 95806 SLEEP STUDY UNATT&RESP EFFT: CPT | Performed by: PSYCHIATRY & NEUROLOGY

## 2022-07-16 NOTE — PROGRESS NOTES
[place] [address]  [place], [address]  Phone [phone] Fax [phone]     Patient Name: Rox Collins 2022  : 1950  Age: 81 y.o.   Patient Address: [address] [place] [address]       Patient Phone: [phone] (home)     REFERRAL  Referred to: DME provider of patient's choice  Rox Collins is referred for the following:    DME Equipment HPCPS Code Setting   Auto Adjusting CPAP device with flex or comparable pressure relief per comfort  6cm to 16cm   Heated Humidifier  Patient Choice       Replinishible PAP Supplies, 1 year supply  Item HPCPS Code Frequency   Mask of choice  or  1 per 3 months   Nasal Mask cushion/pillows  or  2 per 30 days   Full Face Mask Interface  1 per 30 days   Headgear  1 per 6 months   Tubing, length of choice  or  1 per 3 months   Water Chamber  1 per 6 months   Chinstrap  1 per 6 months   Disposable Filters  2 per 30 days   Reusable Filters  1 per 6 months     Diagnoses:  Obstructive sleep apnea (G47.33)  Length of Need: Lifetime, 99    Ordering Provider: JAIME Cunha: [id number]         Signature:       Date: 2022      Electronically Signed by Yvonne Escalera M.D.

## 2022-08-02 ENCOUNTER — CLINICAL DOCUMENTATION (OUTPATIENT)
Dept: HEMATOLOGY | Age: 72
End: 2022-08-02

## 2022-08-02 ENCOUNTER — TELEPHONE (OUTPATIENT)
Dept: HEMATOLOGY | Age: 72
End: 2022-08-02

## 2022-08-02 NOTE — TELEPHONE ENCOUNTER
Spoke with MsRober Margaret Orlando related to her bone mineral density study results from 7/28/2022 completed at [place]. Bone density has declined and initiating biphosphonate therapy is warranted. I discussed treatment with Prolia 60 mg subcu every 6 months versus Fosamax 70 mg p.o. weekly and she requested to see if insurance would approve Prolia every 6 months. Ms. Margaret Orlando will be contacted once insurance approval has been obtained.

## 2022-08-03 DIAGNOSIS — Z79.811 LONG TERM CURRENT USE OF AROMATASE INHIBITOR: ICD-10-CM

## 2022-08-03 RX ORDER — FAMOTIDINE 10 MG/ML
20 INJECTION, SOLUTION INTRAVENOUS
OUTPATIENT
Start: 2022-08-19

## 2022-08-03 RX ORDER — ACETAMINOPHEN 325 MG/1
650 TABLET ORAL
OUTPATIENT
Start: 2022-08-19

## 2022-08-03 RX ORDER — EPINEPHRINE 1 MG/ML
0.3 INJECTION, SOLUTION, CONCENTRATE INTRAVENOUS PRN
OUTPATIENT
Start: 2022-08-19

## 2022-08-03 RX ORDER — DIPHENHYDRAMINE HYDROCHLORIDE 50 MG/ML
50 INJECTION INTRAMUSCULAR; INTRAVENOUS
OUTPATIENT
Start: 2022-08-19

## 2022-08-03 RX ORDER — SODIUM CHLORIDE 9 MG/ML
INJECTION, SOLUTION INTRAVENOUS CONTINUOUS
OUTPATIENT
Start: 2022-08-19

## 2022-08-03 RX ORDER — ONDANSETRON 2 MG/ML
8 INJECTION INTRAMUSCULAR; INTRAVENOUS
OUTPATIENT
Start: 2022-08-19

## 2022-08-03 RX ORDER — ALBUTEROL SULFATE 90 UG/1
4 AEROSOL, METERED RESPIRATORY (INHALATION) PRN
OUTPATIENT
Start: 2022-08-19

## 2022-08-19 ENCOUNTER — HOSPITAL ENCOUNTER (OUTPATIENT)
Dept: INFUSION THERAPY | Age: 72
Discharge: HOME | End: 2022-08-19
Payer: MEDICARE

## 2022-08-19 VITALS
WEIGHT: 173 LBS | OXYGEN SATURATION: 97 % | HEIGHT: 64 IN | BODY MASS INDEX: 29.53 KG/M2 | HEART RATE: 83 BPM | SYSTOLIC BLOOD PRESSURE: 120 MMHG | DIASTOLIC BLOOD PRESSURE: 68 MMHG

## 2022-08-19 DIAGNOSIS — Z79.811 LONG TERM CURRENT USE OF AROMATASE INHIBITOR: Primary | ICD-10-CM

## 2022-08-19 DIAGNOSIS — C50.919 MALIGNANT NEOPLASM OF BREAST IN FEMALE, ESTROGEN RECEPTOR POSITIVE, UNSPECIFIED LATERALITY, UNSPECIFIED SITE OF BREAST (HCC): ICD-10-CM

## 2022-08-19 DIAGNOSIS — Z17.0 MALIGNANT NEOPLASM OF BREAST IN FEMALE, ESTROGEN RECEPTOR POSITIVE, UNSPECIFIED LATERALITY, UNSPECIFIED SITE OF BREAST (HCC): ICD-10-CM

## 2022-08-19 DIAGNOSIS — Z17.1 MALIGNANT NEOPLASM OF OVERLAPPING SITES OF RIGHT BREAST IN FEMALE, ESTROGEN RECEPTOR NEGATIVE (HCC): ICD-10-CM

## 2022-08-19 DIAGNOSIS — M85.862 OSTEOPENIA OF LEFT LOWER LEG: ICD-10-CM

## 2022-08-19 DIAGNOSIS — C50.811 MALIGNANT NEOPLASM OF OVERLAPPING SITES OF RIGHT BREAST IN FEMALE, ESTROGEN RECEPTOR NEGATIVE (HCC): ICD-10-CM

## 2022-08-19 LAB
BASOPHILS ABSOLUTE: 0.03 K/UL (ref 0.01–0.08)
BASOPHILS RELATIVE PERCENT: 0.5 % (ref 0.1–1.2)
EOSINOPHILS ABSOLUTE: 0.19 K/UL (ref 0.04–0.54)
EOSINOPHILS RELATIVE PERCENT: 3.2 % (ref 0.7–7)
HCT VFR BLD CALC: 37.6 % (ref 34.1–44.9)
HEMOGLOBIN: 11.9 G/DL (ref 11.2–15.7)
LYMPHOCYTES ABSOLUTE: 1.43 K/UL (ref 1.18–3.74)
LYMPHOCYTES RELATIVE PERCENT: 24.2 % (ref 19.3–53.1)
MCH RBC QN AUTO: 30.8 PG (ref 25.6–32.2)
MCHC RBC AUTO-ENTMCNC: 31.6 G/DL (ref 32.3–35.5)
MCV RBC AUTO: 97.4 FL (ref 79.4–94.8)
MONOCYTES ABSOLUTE: 0.5 K/UL (ref 0.24–0.82)
MONOCYTES RELATIVE PERCENT: 8.5 % (ref 4.7–12.5)
NEUTROPHILS ABSOLUTE: 3.74 K/UL (ref 1.56–6.13)
NEUTROPHILS RELATIVE PERCENT: 63.4 % (ref 34–71.1)
PDW BLD-RTO: 13 % (ref 11.7–14.4)
PLATELET # BLD: 217 K/UL (ref 182–369)
PMV BLD AUTO: 10.4 FL (ref 7.4–10.4)
RBC # BLD: 3.86 M/UL (ref 3.93–5.22)
WBC # BLD: 5.9 K/UL (ref 3.98–10.04)

## 2022-08-19 PROCEDURE — 96372 THER/PROPH/DIAG INJ SC/IM: CPT

## 2022-08-19 PROCEDURE — 6360000002 HC RX W HCPCS: Performed by: NURSE PRACTITIONER

## 2022-08-19 PROCEDURE — 85025 COMPLETE CBC W/AUTO DIFF WBC: CPT

## 2022-08-19 RX ORDER — EPINEPHRINE 1 MG/ML
0.3 INJECTION, SOLUTION, CONCENTRATE INTRAVENOUS PRN
OUTPATIENT
Start: 2023-02-17

## 2022-08-19 RX ORDER — FAMOTIDINE 10 MG/ML
20 INJECTION, SOLUTION INTRAVENOUS
OUTPATIENT
Start: 2023-02-17

## 2022-08-19 RX ORDER — ALBUTEROL SULFATE 90 UG/1
4 AEROSOL, METERED RESPIRATORY (INHALATION) PRN
OUTPATIENT
Start: 2023-02-17

## 2022-08-19 RX ORDER — ONDANSETRON 2 MG/ML
8 INJECTION INTRAMUSCULAR; INTRAVENOUS
OUTPATIENT
Start: 2023-02-17

## 2022-08-19 RX ORDER — DIPHENHYDRAMINE HYDROCHLORIDE 50 MG/ML
50 INJECTION INTRAMUSCULAR; INTRAVENOUS
OUTPATIENT
Start: 2023-02-17

## 2022-08-19 RX ORDER — ACETAMINOPHEN 325 MG/1
650 TABLET ORAL
OUTPATIENT
Start: 2023-02-17

## 2022-08-19 RX ORDER — SODIUM CHLORIDE 9 MG/ML
INJECTION, SOLUTION INTRAVENOUS CONTINUOUS
OUTPATIENT
Start: 2023-02-17

## 2022-08-19 RX ADMIN — DENOSUMAB 60 MG: 60 INJECTION SUBCUTANEOUS at 15:11

## 2022-09-12 DIAGNOSIS — C50.919 MALIGNANT NEOPLASM OF BREAST IN FEMALE, ESTROGEN RECEPTOR POSITIVE, UNSPECIFIED LATERALITY, UNSPECIFIED SITE OF BREAST (HCC): ICD-10-CM

## 2022-09-12 DIAGNOSIS — Z17.0 MALIGNANT NEOPLASM OF BREAST IN FEMALE, ESTROGEN RECEPTOR POSITIVE, UNSPECIFIED LATERALITY, UNSPECIFIED SITE OF BREAST (HCC): ICD-10-CM

## 2022-09-12 RX ORDER — ESCITALOPRAM OXALATE 10 MG/1
TABLET ORAL
Qty: 45 TABLET | Refills: 1 | Status: SHIPPED | OUTPATIENT
Start: 2022-09-12

## 2022-09-12 RX ORDER — LETROZOLE 2.5 MG/1
TABLET, FILM COATED ORAL
Qty: 60 TABLET | Refills: 4 | Status: SHIPPED | OUTPATIENT
Start: 2022-09-12

## 2022-09-22 NOTE — PROGRESS NOTES
HISTORY OF PRESENT ILLNESS:    Ms. Leatha Ayala presents today for a six month breast exam. She is underwent Right lumpectomy with Right SNB on 9/15/2020. This was following neoadjuvant chemotherapy for essentially triple negative breast cancer with a pathologic complete response. On 3/19/2020 she underwent an ultrasound guided breast biopsy  on the right which revealed a 1.1 cm grade 3 invasive ductal carcinoma. ER is positive at 4%. [place] is Negative. Her2 is Negative. Ki67 is High at 86%. Mammaprint is High Risk Basal Type. 9/1/2020-Bilateral Breast MRI  No suspicious soft tissue mass within the region of the    biopsy clip. The satellite lesion identified on prior breast MR is    also no longer visualized. No new suspicious mass or abnormal enhancement in either breast. LEFT    breast ductal ectasia with T1 hyperintense proteinaceous debris within    the ducts is again noted. No axillary or internal mammary    lymphadenopathy. LEFT chest port catheter is noted. No suspicious bone    lesions identified. No acute finding in the anterior liver. Impression    No residual soft tissue mass at the site of RIGHT upper outer quadrant    biopsy clip, correlating with site of biopsy-proven neoplasm. No    evidence of residual disease by MR. The small satellite nodule    identified on prior breast MR is also no longer identified. No    axillary or internal mammary lymphadenopathy. BI-RADS CATEGORY 6: KNOWN BIOPSY-PROVEN MALIGNANCY. Signed by Dr Cayden Anthony on 9/1/2020 1:51 PM      Right lumpectomy with Right sentinel lymph node biopsy on 9/15/2020. PATHOLOGY REVEALS:  FINAL DIAGNOSIS:     A. Breast, right lumpectomy:    1. Prior biopsy site identified, negative for residual carcinoma or carcinoma in situ. 2.  Focal atypical ductal hyperplasia, margins negative. 3.  Extensive fibrosis present adjacent to prior biopsy site consistent with complete therapeutic response.     4.  Surgical excision margins are negative for prior biopsy site. B.  Breast, excision of additional right breast anterior margin: Benign breast parenchyma. C.  Breast, excision of additional right breast cranial margin: Benign breast parenchyma. D.  Lymph node, right [place] lymph node biopsy: One lymph node, negative for evidence of malignancy. Mammogram-3/14/2022  FINDINGS:  No new masses, new architectural distortion or new suspicious  microcalcifications are identified. There has been no significant  interval change. This study was interpreted with CAD. IMPRESSION AND RECOMMENDATION:   No mammographic evidence of malignancy. Recommendation is for the  patient to return for routine mammography in one year or sooner, if  clinically indicated. BI-RADS CATEGORY 2: BENIGN FINDINGS   The patient's information has been added to a reminder system with a  target due date for the next mammogram.  Signed by Dr Patrick Altamirano     She complains of intermittent sharp right chest wall pain that may be related to her prior thoracotomy or her breast surgery. There are no palpable abnormalities associated with this. PHYSICAL EXAM:  The  wounds look good with no evidence of infection, fluid accumulation, or skin necrosis. She has fibrocystic changes to both breasts and minimal scarring and minimal radiation changes. She has no dominant masses, no skin or nipple changes, and no axillary adenopathy. There is no evidence of new or recurrent neoplasm    She has seen Dr. Joshua Penn and has completed radiation treatments 11/2020. She received a 3-week course. IMPRESSION:     Right lumpectomy with Right sentinel lymph node biopsy      PLAN: Follow-up in 6 months with bilateral mammograms     I have seen, examined and reviewed this patient medication list, appropriate labs and imaging studies. I reviewed relevant medical records and others physicians notes. I discussed the plans of care with the patient.  I answered all the questions to the patients satisfaction. I, Dr Dahlia Harry, personally performed the services described in this documentation as scribed by Dominique Mckenna MA in my presence and is both accurate and complete. (Please note that portions of this note were completed with a voice recognition program. Efforts were made to edit the dictations but occasionally words are mis-transcribed.)  Over 50% of the total visit time of 20 minutes in face to face encounter with the patient, out of which more than 50% of the time was spent in counseling patient or family and coordination of care. Counseling included but was not limited to time spent reviewing labs, imaging studies/ treatment plan and answering questions.

## 2022-09-26 ENCOUNTER — OFFICE VISIT (OUTPATIENT)
Dept: SURGERY | Age: 72
End: 2022-09-26
Payer: MEDICARE

## 2022-09-26 VITALS — HEART RATE: 72 BPM | SYSTOLIC BLOOD PRESSURE: 120 MMHG | DIASTOLIC BLOOD PRESSURE: 80 MMHG

## 2022-09-26 DIAGNOSIS — Z17.1 MALIGNANT NEOPLASM OF OVERLAPPING SITES OF RIGHT BREAST IN FEMALE, ESTROGEN RECEPTOR NEGATIVE (HCC): ICD-10-CM

## 2022-09-26 DIAGNOSIS — Z98.890 S/P LUMPECTOMY, RIGHT BREAST: ICD-10-CM

## 2022-09-26 DIAGNOSIS — C50.811 MALIGNANT NEOPLASM OF OVERLAPPING SITES OF RIGHT BREAST IN FEMALE, ESTROGEN RECEPTOR NEGATIVE (HCC): ICD-10-CM

## 2022-09-26 DIAGNOSIS — Z85.3 PERSONAL HISTORY OF BREAST CANCER: Primary | ICD-10-CM

## 2022-09-26 PROCEDURE — 1090F PRES/ABSN URINE INCON ASSESS: CPT | Performed by: SURGERY

## 2022-09-26 PROCEDURE — 1123F ACP DISCUSS/DSCN MKR DOCD: CPT | Performed by: SURGERY

## 2022-09-26 PROCEDURE — 1036F TOBACCO NON-USER: CPT | Performed by: SURGERY

## 2022-09-26 PROCEDURE — 99213 OFFICE O/P EST LOW 20 MIN: CPT | Performed by: SURGERY

## 2022-09-26 PROCEDURE — G8399 PT W/DXA RESULTS DOCUMENT: HCPCS | Performed by: SURGERY

## 2022-09-26 PROCEDURE — G8417 CALC BMI ABV UP PARAM F/U: HCPCS | Performed by: SURGERY

## 2022-09-26 PROCEDURE — G8427 DOCREV CUR MEDS BY ELIG CLIN: HCPCS | Performed by: SURGERY

## 2022-09-26 PROCEDURE — 3017F COLORECTAL CA SCREEN DOC REV: CPT | Performed by: SURGERY

## 2022-09-26 RX ORDER — LOVASTATIN 20 MG/1
TABLET ORAL
Qty: 180 TABLET | Refills: 1 | Status: SHIPPED | OUTPATIENT
Start: 2022-09-26

## 2022-09-26 NOTE — TELEPHONE ENCOUNTER
Aleksey Muñiz called requesting a refill of the below medication which has been pended for you:     Requested Prescriptions     Pending Prescriptions Disp Refills    lovastatin (MEVACOR) 20 MG tablet [Pharmacy Med Name: LOVASTATIN 20 MG TABLET] 180 tablet 1     Sig: TAKE TWO TABLETS BY MOUTH DAILY       Last Appointment Date: 6/2/2022  Next Appointment Date: 12/5/2022    Allergies   Allergen Reactions    Codeine Itching    Other Rash     Chloroprep surgical prep

## 2022-10-04 NOTE — TELEPHONE ENCOUNTER
"  History   Chief Complaint:  Dental Pain    The history is provided by the patient.      Bhavana Amato is a 41 year old female who presents after a broken tooth. Patient reports breaking a tooth on her top right side of her mouth. She notes that after she has begun experiencing pain in both the upper and lower gums. Patient denies facial swelling or fever. Patient reports a dentist appointment on Thursday.    Review of Systems   Constitutional: Negative for fever.   HENT: Positive for dental problem (right molar tooth break). Negative for facial swelling.    All other systems reviewed and are negative.    Allergies:  The patient has no known allergies.     Medications:  Alesse    Past Medical History:     Migraine   H, Pylori infection   GERD   Vitamin D deficiency    Rectus sheath hematoma   Placenta abruption   Ovarian cyst    Past Surgical History:    Caesarean section   Cholecystectomy   Colonoscopy   Laparoscopic lysis adhesions   Laparoscopic tubal dye study      Family History:    Diabetes     Social History:  The patient presents to the ED via private vehicle.   Patient presents to the ED alone.   PCP: Clinic, [facility]     Physical Exam     Patient Vitals for the past 24 hrs:   BP Temp Temp src Pulse Resp SpO2 Height Weight   10/04/22 0951 114/64 97.5  F (36.4  C) Temporal 106 21 99 % 1.6 m (5' 3\") 72.6 kg (160 lb)       Physical Exam  Physical Exam   General:  Sitting on bed, comfortable appearing.   HENT:  No obvious trauma to head. Posterior oropharynx without erythema, uvula is midline and no soft palate petechiae. No swelling beneath tongue.  Right rear molar with partially avulsed tooth.  No surrounding abscess.  Right Ear:  External ear normal.   Left Ear:  External ear normal.   Nose:  Nose normal.   Eyes:  Conjunctivae and EOM are normal.  Neck: Normal range of motion. Neck supple. No tracheal deviation present.   Pulm/Chest: No respiratory distress  M/S: Normal range of motion. " Poncho Tran called requesting a refill of the below medication which has been pended for you:     Requested Prescriptions     Pending Prescriptions Disp Refills    lovastatin (MEVACOR) 20 MG tablet 180 tablet 1     Sig: TAKE TWO TABLETS BY MOUTH DAILY    escitalopram (LEXAPRO) 10 MG tablet 45 tablet 1     Sig: Take 0.5 tablets by mouth daily       Last Appointment Date: 12/1/2021  Next Appointment Date: 6/2/2022    Allergies   Allergen Reactions    Codeine Itching    Other Rash     Chloroprep surgical prep   Neuro: Alert. GCS 15.  Skin: Skin is warm and dry. No rash noted. Not diaphoretic.   Psych: Normal mood and affect. Behavior is normal.       Emergency Department Course     Emergency Department Course:  Reviewed:  I reviewed nursing notes, vitals, past medical history and Care Everywhere    Assessments:  1100 I obtained history and examined the patient as noted above. At this point I feel that the patient is safe for discharge, and the patient agrees.    Disposition:  The patient was discharged to home.     Impression & Plan   Medical Decision Making:  Bhavana Amato is a very pleasant 41 year old year old patient who presents to the emergency department with concern of jaw pain after her right upper rear molar partially broke.  She has an appointment on Thursday.  The pain has been persistent so she presents now for evaluation.  There is no abscess detected around the tooth amenable to incision and drainage.  The differential diagnosis includes: cracked tooth syndrome, pulpitis, sub-apical abscess, amongst others.  There is no evidence of buccinator/canine space infections, significant facial swelling, or Tai's angina. There are no posterior pharyngeal space infections detected.  Follow up with a dentist/endodontist in the coming days is indicated for further work up and treatment.  She has an appointment on Thursday.    The treatment plan was discussed with the patient and they expressed understanding of this plan and consented to the plan.  In addition, the patient will return to the emergency department if their symptoms persist, worsen, if new symptoms arise or if there is any concern as other pathology may be present that is not evident at this time. They also understand the importance of close follow up in the clinic and if unable to do so will return to the emergency department for a reevaluation. All questions were answered.    Diagnosis:    ICD-10-CM    1. Jaw pain  R68.84    2. Pain, dental   K08.89        Discharge Medications:  Discharge Medication List as of 10/4/2022 11:33 AM      START taking these medications    Details   ibuprofen (ADVIL/MOTRIN) 600 MG tablet Take 1 tablet (600 mg) by mouth every 6 hours as needed for moderate pain, Disp-21 tablet, R-0, E-Prescribe      penicillin V (VEETID) 500 MG tablet Take 1 tablet (500 mg) by mouth 3 times daily for 7 days, Disp-21 tablet, R-0, E-Prescribe             Scribe Disclosure:  I, Cande Wilson, am serving as a scribe at 11:09 AM on 10/4/2022 to document services personally performed by Antwan Nova DO based on my observations and the provider's statements to me.       Antwan Nova DO  10/04/22 3659

## 2022-10-11 ENCOUNTER — OFFICE VISIT (OUTPATIENT)
Dept: INTERNAL MEDICINE | Age: 72
End: 2022-10-11
Payer: MEDICARE

## 2022-10-11 ENCOUNTER — HOSPITAL ENCOUNTER (OUTPATIENT)
Dept: GENERAL RADIOLOGY | Age: 72
Discharge: HOME | End: 2022-10-11
Payer: MEDICARE

## 2022-10-11 VITALS
HEART RATE: 78 BPM | RESPIRATION RATE: 18 BRPM | SYSTOLIC BLOOD PRESSURE: 110 MMHG | OXYGEN SATURATION: 97 % | HEIGHT: 64 IN | BODY MASS INDEX: 30.22 KG/M2 | DIASTOLIC BLOOD PRESSURE: 70 MMHG | WEIGHT: 177 LBS

## 2022-10-11 DIAGNOSIS — R10.9 RIGHT LATERAL ABDOMINAL PAIN: Primary | ICD-10-CM

## 2022-10-11 DIAGNOSIS — Z85.3 HISTORY OF BREAST CANCER: ICD-10-CM

## 2022-10-11 DIAGNOSIS — R07.89 CHEST WALL PAIN: ICD-10-CM

## 2022-10-11 DIAGNOSIS — R10.9 RIGHT FLANK PAIN: ICD-10-CM

## 2022-10-11 PROCEDURE — 71046 X-RAY EXAM CHEST 2 VIEWS: CPT

## 2022-10-11 PROCEDURE — 1123F ACP DISCUSS/DSCN MKR DOCD: CPT | Performed by: INTERNAL MEDICINE

## 2022-10-11 PROCEDURE — G8484 FLU IMMUNIZE NO ADMIN: HCPCS | Performed by: INTERNAL MEDICINE

## 2022-10-11 PROCEDURE — G8399 PT W/DXA RESULTS DOCUMENT: HCPCS | Performed by: INTERNAL MEDICINE

## 2022-10-11 PROCEDURE — 1090F PRES/ABSN URINE INCON ASSESS: CPT | Performed by: INTERNAL MEDICINE

## 2022-10-11 PROCEDURE — 3017F COLORECTAL CA SCREEN DOC REV: CPT | Performed by: INTERNAL MEDICINE

## 2022-10-11 PROCEDURE — G8417 CALC BMI ABV UP PARAM F/U: HCPCS | Performed by: INTERNAL MEDICINE

## 2022-10-11 PROCEDURE — 1036F TOBACCO NON-USER: CPT | Performed by: INTERNAL MEDICINE

## 2022-10-11 PROCEDURE — 99214 OFFICE O/P EST MOD 30 MIN: CPT | Performed by: INTERNAL MEDICINE

## 2022-10-11 PROCEDURE — G8427 DOCREV CUR MEDS BY ELIG CLIN: HCPCS | Performed by: INTERNAL MEDICINE

## 2022-10-11 SDOH — ECONOMIC STABILITY: FOOD INSECURITY: WITHIN THE PAST 12 MONTHS, YOU WORRIED THAT YOUR FOOD WOULD RUN OUT BEFORE YOU GOT MONEY TO BUY MORE.: NEVER TRUE

## 2022-10-11 SDOH — ECONOMIC STABILITY: FOOD INSECURITY: WITHIN THE PAST 12 MONTHS, THE FOOD YOU BOUGHT JUST DIDN'T LAST AND YOU DIDN'T HAVE MONEY TO GET MORE.: NEVER TRUE

## 2022-10-11 ASSESSMENT — ENCOUNTER SYMPTOMS
COUGH: 0
SORE THROAT: 0
WHEEZING: 0
CHEST TIGHTNESS: 0
ABDOMINAL PAIN: 1
CONSTIPATION: 0

## 2022-10-11 ASSESSMENT — SOCIAL DETERMINANTS OF HEALTH (SDOH): HOW HARD IS IT FOR YOU TO PAY FOR THE VERY BASICS LIKE FOOD, HOUSING, MEDICAL CARE, AND HEATING?: NOT HARD AT ALL

## 2022-10-11 NOTE — PROGRESS NOTES
Chief Complaint   Patient presents with    Other     Right side pain on-going for about 1 month, just seems like it is getting worse      History of presenting illness:  Alfa Higuera is a67 y.o. female who presents today for follow up on her chronic medical conditions as noted below.     Patient Active Problem List    Diagnosis Date Noted    Long term current use of aromatase inhibitor 08/03/2022     Priority: Medium    S/P lumpectomy, right breast 9/15/2020 09/22/2020    Adverse effect of antineoplastic and immunosuppressive drugs, initial encounter      Malignant neoplasm of right breast in female, estrogen receptor negative ([address] 04/17/2020    Microhematuria 06/07/2019    Bilateral low back pain without sciatica 05/04/2018    Osteopenia of left lower leg 08/16/2017     Overview Note:     12/16 lumbar spine normal, left hip neck -1.7  6/2020 lumbar spine -1.4, hip neck -1.5/-1.3      Type 2 diabetes mellitus without complication, without long-term current use of insulin ([address] 08/12/2017    Vitamin D deficiency 08/12/2017    Pure hypercholesterolemia 08/12/2017    GERD (gastroesophageal reflux disease) 08/12/2017    Exogenous obesity 08/12/2017    Generalized anxiety disorder 08/12/2017     Past Medical History:   Diagnosis Date    Anxiety     Breast cancer ([address] 2020    infiltrating ductal carcinoma     Cancer ([address]     Breast Cancer    Carpal tunnel syndrome     pt denies having carpal tunnel syndrome    Cervicalgia     Depression     Esophagitis     History of therapeutic radiation 2020    Hx antineoplastic chemo 2020    Hyperlipidemia     Idiopathic peripheral neuropathy     Insomnia     Long term current use of aromatase inhibitor 8/3/2022    Long term current use of aromatase inhibitor 8/3/2022    Long term current use of aromatase inhibitor 8/3/2022    Menopause     Obesity due to excess calories     Osteoarthritis     Osteopenia     Type 2 diabetes mellitus without complication (HCC)     Vitamin D deficiency       Past Surgical History:   Procedure Laterality Date    BREAST BIOPSY Right 2020    infiltrating ductal carcinoma    BREAST LUMPECTOMY Right 9/15/2020    RIGHT LUMPECTOMY WITH SNB, PREOP AND INTRAOP US GUIDED NL, FLAPS, BIOZORB, MARGIN PROBE,PEC BLOCK performed by Frieda Hernandez MD at [address] [place] Right 5/3/2016    THORACOTOMY, WEDGE RESECTION PULMONARY NODULE RIGHT MIDDLE LOBE performed by Zachery Hicks MD at [address] [facility] N/A 2020    PORT INSERTION WITH FLUORO performed by Frieda Hernandez MD at [address] [facility] N/A 2020    PORT REMOVAL WITH FLUOROSCOPY performed by Frieda Hernandez MD at [address] [place] [address] [address] LOC OF RIGHT BREAST  2020    US GUIDED NEEDLE LOC OF RIGHT BREAST 2020 [facility] [address]     Current Outpatient Medications   Medication Sig Dispense Refill    lovastatin (MEVACOR) 20 MG tablet TAKE TWO TABLETS BY MOUTH DAILY 180 tablet 1    escitalopram (LEXAPRO) 10 MG tablet TAKE 1/2 TABLET BY MOUTH DAILY 45 tablet 1    letrozole (FEMARA) 2.5 MG tablet TAKE ONE TABLET BY MOUTH DAILY 60 tablet 4    Semaglutide, 1 MG/DOSE, 4 MG/3ML SOPN Inject 1 mg into the skin once a week 3 mL 1    Cholecalciferol (VITAMIN D) 2000 units CAPS capsule Take by mouth daily        No current facility-administered medications for this visit.      Allergies   Allergen Reactions    Codeine Itching    Other Rash     Chloroprep surgical prep     Social History     Tobacco Use    Smoking status: Former     Packs/day: 1.00     Years: 3.00     Pack years: 3.00     Types: Cigarettes     Quit date: 10/1/1984     Years since quittin.0    Smokeless tobacco: Never    Tobacco comments:     3 YEARS PLAYED AROUND AND QUIT IN THE    Substance Use Topics    Alcohol use: No     Alcohol/week: 0.0 standard drinks      Family History   Problem Relation Age of Onset    Cancer Father         bladder ca    Other Sister         MULTIPLE SCLEROSIS    High Blood Pressure Mother     Heart Disease Mother     Stroke Mother        Review of Systems   Constitutional:  Positive for fatigue. Negative for chills and fever. HENT:  Negative for congestion, ear pain, nosebleeds, postnasal drip and sore throat. Respiratory:  Negative for cough, chest tightness and wheezing. Cardiovascular:  Positive for chest pain. Negative for palpitations and leg swelling. Gastrointestinal:  Positive for abdominal pain. Negative for constipation. Genitourinary:  Negative for dysuria and urgency. Musculoskeletal: Negative. Negative for arthralgias. Skin:  Negative for rash. Neurological:  Negative for dizziness and headaches. Psychiatric/Behavioral: Negative. Vitals:    10/11/22 1045   BP: 110/70   Site: Left Upper Arm   Position: Sitting   Cuff Size: Large Adult   Pulse: 78   Resp: 18   SpO2: 97%   Weight: 177 lb (80.3 kg)   Height: 5' 4\" (1.626 m)     Body mass index is 30.38 kg/m². Physical Exam  Constitutional:       Appearance: She is well-developed. HENT:      Right Ear: External ear normal.      Left Ear: External ear normal.      Mouth/Throat:      Pharynx: No oropharyngeal exudate. Eyes:      Conjunctiva/sclera: Conjunctivae normal.      Pupils: Pupils are equal, round, and reactive to light. Neck:      Thyroid: No thyromegaly. Vascular: No JVD. Cardiovascular:      Rate and Rhythm: Normal rate. Heart sounds: Normal heart sounds. No murmur heard. Pulmonary:      Effort: No respiratory distress. Breath sounds: Normal breath sounds. No wheezing or rales. Chest:      Chest wall: No tenderness. Abdominal:      General: Bowel sounds are normal.      Palpations: Abdomen is soft. Comments: Pain on palpation over lateral upper right abdomen, mild pain on palpation  No organomegaly  Good bowel sounds   Musculoskeletal:      Cervical back: Neck supple. Comments: Palpation of the right lateral chest wall   Lymphadenopathy:      Cervical: No cervical adenopathy. Skin:     Findings: No rash. Lab Review   Hospital Outpatient Visit on 08/19/2022   Component Date Value    WBC 08/19/2022 5.90     RBC 08/19/2022 3.86 (A)     Hemoglobin 08/19/2022 11.9     Hematocrit 08/19/2022 37.6     MCV 08/19/2022 97.4 (A)     MCH 08/19/2022 30.8     MCHC 08/19/2022 31.6 (A)     RDW 08/19/2022 13.0     Platelets 63/57/7390 217     MPV 08/19/2022 10.4     Neutrophils % 08/19/2022 63.4     Lymphocytes % 08/19/2022 24.2     Monocytes % 08/19/2022 8.5     Eosinophils % 08/19/2022 3.2     Basophils % 08/19/2022 0.5     Neutrophils Absolute 08/19/2022 3.74     Lymphocytes Absolute 08/19/2022 1.43     Monocytes Absolute 08/19/2022 0.50     Eosinophils Absolute 08/19/2022 0.19     Basophils Absolute 08/19/2022 0.03            ASSESSMENT/PLAN:    Right lateral abdominal pain    Right flank pain    Chest wall pain    Going symptoms for last 1 month  This patient is breast cancer survivor  Now experiencing right lateral chest wall/flank area pain and right sided abdominal pains  Pain is almost continuous but gets worse and slightly better  Seems to be radiating  Denies any recent injury  No changes in bowel habits  No changes in breathing  Examination there is some pain on palpation over right chest wall lower lateral ribs approximately on midaxillary line and right abdomen    We will proceed with further evaluation    Obtain:  -     CT ABDOMEN PELVIS W IV CONTRAST Additional Contrast? None; Future  -     XR CHEST (2 VW);  Future    May also need T-spine evaluation if above all negative    If sx not improving and resolving , if sx continue or re-occur pt has been instructed to call us and / or return here for follow- up evaluation          Orders Placed This Encounter   Procedures    CT ABDOMEN PELVIS W IV CONTRAST Additional Contrast? None    XR CHEST (2 VW)     New Prescriptions    No medications on file         No follow-ups on file. There are no Patient Instructions on file for this visit. EMR Dragon/transcription disclaimer:Significant part of this  encounter note is electronic transcription/translationof spoken language to printed text. The electronic translation of spoken language may be erroneous, or at times, nonsensical words or phrases may be inadvertently transcribed.  Although I have reviewed the note for sucherrors, some may still exist.

## 2022-10-14 ENCOUNTER — HOSPITAL ENCOUNTER (OUTPATIENT)
Dept: CT IMAGING | Age: 72
Discharge: HOME | End: 2022-10-14
Payer: MEDICARE

## 2022-10-14 DIAGNOSIS — R10.9 RIGHT LATERAL ABDOMINAL PAIN: ICD-10-CM

## 2022-10-14 DIAGNOSIS — R10.9 RIGHT FLANK PAIN: ICD-10-CM

## 2022-10-14 LAB
GFR AFRICAN AMERICAN: >60
GFR NON-AFRICAN AMERICAN: >60
PERFORMED ON: NORMAL
POC CREATININE: 0.5 MG/DL (ref 0.3–1.3)
POC SAMPLE TYPE: NORMAL

## 2022-10-14 PROCEDURE — 82565 ASSAY OF CREATININE: CPT

## 2022-10-14 PROCEDURE — 74177 CT ABD & PELVIS W/CONTRAST: CPT

## 2022-10-14 PROCEDURE — 6360000004 HC RX CONTRAST MEDICATION: Performed by: INTERNAL MEDICINE

## 2022-10-14 RX ADMIN — IOPAMIDOL 75 ML: 755 INJECTION, SOLUTION INTRAVENOUS at 10:22

## 2022-10-18 DIAGNOSIS — N20.0 RENAL CALCULI: Primary | ICD-10-CM

## 2022-10-18 DIAGNOSIS — M71.38 SYNOVIAL CYST OF SACRAL REGION: ICD-10-CM

## 2022-10-19 ENCOUNTER — TELEPHONE (OUTPATIENT)
Dept: NEUROSURGERY | Age: 72
End: 2022-10-19

## 2022-10-19 NOTE — TELEPHONE ENCOUNTER
[facility] New Patient Questionnaire    Diagnosis/Reason for Referral?    Synovial cyst of sacral region    2. Who is completing questionnaire? Patient  Caregiver Family      3. Has the patient had any previous spinal/brain surgeries? NO      A. If yes, what is the name of the facility in which the surgery was performed? B. Procedure/Surgery performed? C. Who was the surgeon? D. When was the surgery? MM/YY       E. Did the patient improve after the surgery? 4. Is this a second opinion? If yes, Dr. Severiano Boateng would like to review patient first before making the appointment. NO    5. Have MRI Images been obtain within the last year? Yes  No      XR  CT     If yes, where was the imaging performed? [facility]   If yes, what part of the body? Lumbar  Cervical  Thoracic  Brain  ABD PELVIS   If yes, when was it obtained? 10/17/22    Note: if the scan was performed at a facility other than [facility], the disc will need to be brought to the appointment or we need to reach out to obtain the disc. A. Was the patient instructed to provide the disc? Yes   No      8. Has the patient had a NCV/EMG within the last year? Yes  No     If yes, where was it performed and date? MM/YY  Location:      9. Has the patient been to Physical Therapy? Yes  No     If yes, what location, how long attended, and last visit? Location:        Therapy Lasted:    Date of Last Visit:      10. Has the patient been to Pain Management? Yes  No     If yes, what location and last visit     Location:   Last Visit:   Is it helping?

## 2022-10-28 ENCOUNTER — HOSPITAL ENCOUNTER (OUTPATIENT)
Dept: ULTRASOUND IMAGING | Age: 72
Discharge: HOME | End: 2022-10-28
Payer: MEDICARE

## 2022-10-28 DIAGNOSIS — N20.0 RENAL CALCULI: ICD-10-CM

## 2022-10-28 PROCEDURE — 76775 US EXAM ABDO BACK WALL LIM: CPT | Performed by: RADIOLOGY

## 2022-10-28 PROCEDURE — 76770 US EXAM ABDO BACK WALL COMP: CPT

## 2022-11-02 ENCOUNTER — OFFICE VISIT (OUTPATIENT)
Dept: NEUROSURGERY | Age: 72
End: 2022-11-02
Payer: MEDICARE

## 2022-11-02 VITALS
WEIGHT: 177 LBS | SYSTOLIC BLOOD PRESSURE: 102 MMHG | DIASTOLIC BLOOD PRESSURE: 74 MMHG | BODY MASS INDEX: 30.22 KG/M2 | RESPIRATION RATE: 18 BRPM | HEIGHT: 64 IN | OXYGEN SATURATION: 97 % | HEART RATE: 78 BPM

## 2022-11-02 DIAGNOSIS — R10.9 RIGHT FLANK PAIN: ICD-10-CM

## 2022-11-02 DIAGNOSIS — D05.11 DUCTAL CARCINOMA IN SITU (DCIS) OF RIGHT BREAST: ICD-10-CM

## 2022-11-02 DIAGNOSIS — G96.191 TARLOV CYST: Primary | ICD-10-CM

## 2022-11-02 PROCEDURE — G8417 CALC BMI ABV UP PARAM F/U: HCPCS | Performed by: NURSE PRACTITIONER

## 2022-11-02 PROCEDURE — 3017F COLORECTAL CA SCREEN DOC REV: CPT | Performed by: NURSE PRACTITIONER

## 2022-11-02 PROCEDURE — G8427 DOCREV CUR MEDS BY ELIG CLIN: HCPCS | Performed by: NURSE PRACTITIONER

## 2022-11-02 PROCEDURE — G8484 FLU IMMUNIZE NO ADMIN: HCPCS | Performed by: NURSE PRACTITIONER

## 2022-11-02 PROCEDURE — 1123F ACP DISCUSS/DSCN MKR DOCD: CPT | Performed by: NURSE PRACTITIONER

## 2022-11-02 PROCEDURE — G8399 PT W/DXA RESULTS DOCUMENT: HCPCS | Performed by: NURSE PRACTITIONER

## 2022-11-02 PROCEDURE — 1036F TOBACCO NON-USER: CPT | Performed by: NURSE PRACTITIONER

## 2022-11-02 PROCEDURE — 99204 OFFICE O/P NEW MOD 45 MIN: CPT | Performed by: NURSE PRACTITIONER

## 2022-11-02 PROCEDURE — 1090F PRES/ABSN URINE INCON ASSESS: CPT | Performed by: NURSE PRACTITIONER

## 2022-11-02 ASSESSMENT — ENCOUNTER SYMPTOMS
RESPIRATORY NEGATIVE: 1
BACK PAIN: 1
GASTROINTESTINAL NEGATIVE: 1
EYES NEGATIVE: 1

## 2022-11-02 NOTE — PROGRESS NOTES
[facility]  Office Visit      Chief Complaint   Patient presents with    New Patient     Establishing care     Results     CT Abd/Pelv (10/14/2022)    Back Pain     Patient states she has pain in her lower back and tail bone area. She states the pain has increased over the past few months. She is taking Aleve PRN to help manage the pain. Numbness     Patient states she does have numbness/tingling in her feet but has had it since she completed chemo. HISTORY OF PRESENT ILLNESS:    Jackson Weiss is a 81 y.o. female with a history of right breast CA (2020) followed by our oncology team treated chemotherapy and radiation, osteopenia of left lower leg, well controlled DM Type II, hypercholesterolemia, KRZYSZTOF who was referred by Dr. Garcia Expose presents with a Tarlov cyst. A CT abdomen pelvis was obtained by Dr. Shante Castle due complaint of right lateral abdominal pain, right flank pain, and right chest wall pain ongoing for 1 month without a known injury. She describes the pain as throbbing, aching, with some sharpness at times that worsens with activity. When taking a deep breath, the pain does not worsen. She denies radiation to the left side. She denies back pain. The patient denies numbness or tingling down the legs. She does have numbness in the fingertips; however, she states this is due to her chemotherapy. The patient has underwent a non-operative treatment course that has included:  NSAIDs (aleve does not change the character of the pain)    She denies previous spinal surgeries. Of note she does not use tobacco and does take blood thinning medications (81 mg ASA PO daily).                Past Medical History:   Diagnosis Date    Anxiety     Breast cancer ([address] 2020    infiltrating ductal carcinoma     Cancer [facility]     Breast Cancer    Carpal tunnel syndrome     pt denies having carpal tunnel syndrome    Cervicalgia     Depression     Esophagitis     History of therapeutic radiation 2020 Hx antineoplastic chemo 2020    Hyperlipidemia     Idiopathic peripheral neuropathy     Insomnia     Long term current use of aromatase inhibitor 8/3/2022    Long term current use of aromatase inhibitor 8/3/2022    Long term current use of aromatase inhibitor 8/3/2022    Menopause     Obesity due to excess calories     Osteoarthritis     Osteopenia     Type 2 diabetes mellitus without complication [address]     Vitamin D deficiency        Past Surgical History:   Procedure Laterality Date    BREAST BIOPSY Right 2020    infiltrating ductal carcinoma    BREAST LUMPECTOMY Right 9/15/2020    RIGHT LUMPECTOMY WITH SNB, PREOP AND INTRAOP US GUIDED NL, FLAPS, BIOZORB, MARGIN PROBE,PEC BLOCK performed by Jose Antonio Garcia MD at [address] [facility] Right 5/3/2016    THORACOTOMY, WEDGE RESECTION PULMONARY NODULE RIGHT MIDDLE LOBE performed by Eliezer Delaney MD at [address] [place] [address] N/A 4/22/2020    PORT INSERTION WITH FLUORO performed by Jose Antonio Garcia MD at [address] [place] [address] N/A 11/12/2020    PORT REMOVAL WITH FLUOROSCOPY performed by Jose Antonio Garcia MD at [address] [place] [address] [address] LOC OF RIGHT BREAST  9/14/2020    US GUIDED NEEDLE LOC OF RIGHT BREAST 9/14/2020 [facility] [address]       Current Outpatient Medications   Medication Sig Dispense Refill    lovastatin (MEVACOR) 20 MG tablet TAKE TWO TABLETS BY MOUTH DAILY 180 tablet 1    escitalopram (LEXAPRO) 10 MG tablet TAKE 1/2 TABLET BY MOUTH DAILY 45 tablet 1    letrozole (FEMARA) 2.5 MG tablet TAKE ONE TABLET BY MOUTH DAILY 60 tablet 4    Semaglutide, 1 MG/DOSE, 4 MG/3ML SOPN Inject 1 mg into the skin once a week 3 mL 1    Cholecalciferol (VITAMIN D) 2000 units CAPS capsule Take by mouth daily        No current facility-administered medications for this visit.        Allergies:  Codeine and Other    Social History:   Social History     Tobacco Use   Smoking Status Former    Packs/day: 1.00    Years: 3.00    Pack years: 3.00    Types: Cigarettes    Quit date: 10/1/1984    Years since quittin.1   Smokeless Tobacco Never   Tobacco Comments    3 YEARS PLAYED AROUND AND QUIT IN THE      Social History     Substance and Sexual Activity   Alcohol Use No    Alcohol/week: 0.0 standard drinks         Family History:   Family History   Problem Relation Age of Onset    Cancer Father         bladder ca    Other Sister         MULTIPLE SCLEROSIS    High Blood Pressure Mother     Heart Disease Mother     Stroke Mother        REVIEW OF SYSTEMS:  Constitutional: Negative. HENT: Negative. Eyes: Negative. Respiratory: Negative. Cardiovascular: Negative. Gastrointestinal: Negative. Genitourinary: Negative. Musculoskeletal:  Positive for back pain, joint pain and myalgias. Skin: Negative. Neurological:  Positive for tingling. Endo/Heme/Allergies: Negative. Psychiatric/Behavioral: Negative. PHYSICAL EXAM:  Vitals:    22 0918   BP: 102/74   Pulse: 78   Resp: 18   SpO2: 97%     Constitutional: appears well-developed and well-nourished. Eyes - conjunctiva normal.  Pupils react to light  Ear, nose, throat - hearing intact to finger rub, No scars, masses, or lesions over external nose or ears, no atrophy oftongue  Neck- symmetric, no masses noted, no jugular vein distension  Respiration- chest wall appears symmetric, good expansion, normal effort without use of accessory muscles  Musculoskeletal - no significant wasting of muscles noted, no bony deformities, gait no gross ataxia  Extremities- no clubbing, cyanosis oredema  Skin - warm, dry, and intact. No rash, erythema, or pallor.   Psychiatric - mood, affect, and behavior appear normal.     Neurologic Examination  Awake, Alert and oriented x 4  Normal speech pattern, following commands    Motor:  RIGHT:     iliopsoas 5/5    knee flexor 5/5    knee extension 5/5    EHL 5/5   dorsiflexion 5/5 plantar flexion 5/5    LEFT:     iliopsoas 5/5    knee flexor 5/5    knee extension 5/5    EHL 5/5   dorsiflexion 5/5    plantar flexion 5/5    No deficits to light touch   Reflexes are 1+ and symmetric  No myofacial tenderness to palpation  Normal Gait pattern      DATA and IMAGING:    Nursing/pcp notes, imaging, labs, and vitals reviewed. PT,OT and/or speech notes reviewed    Lab Results   Component Value Date    WBC 5.90 08/19/2022    HGB 11.9 08/19/2022    HCT 37.6 08/19/2022    MCV 97.4 (H) 08/19/2022     08/19/2022     Lab Results   Component Value Date     05/31/2022    K 4.4 05/31/2022     05/31/2022    CO2 25 05/31/2022    BUN 15 05/31/2022    CREATININE 0.5 10/14/2022    GLUCOSE 106 05/31/2022    CALCIUM 9.4 05/31/2022    PROT 6.6 05/31/2022    LABALBU 4.2 05/31/2022    BILITOT <0.2 05/31/2022    ALKPHOS 99 05/31/2022    AST 17 05/31/2022    ALT 20 05/31/2022    LABGLOM >60 10/14/2022    GFRAA >60 10/14/2022    GLOB 2.4 08/12/2020   No results found for: INR, PROTIME  Narrative   CLINICAL HISTORY:   Right flank pain   COMPARISON:   None   TECHNIQUE: Axial images of the abdomen and pelvis were obtained after the   administration of IV contrast.Coronal and sagittal multiplanar reconstructions   were performed. One or more of the following dose reduction techniques were   used: Automated exposure control, adjustment of the mA and/or kV according to   patient size, and/or use of iterative reconstruction technique. COMPARISON: None. FINDINGS:   Statements: None. Lower Chest: The imaged lung bases are clear. Hepatobiliary: The liver is normal in morphology. No focal lesion is identified. The gallbladder is surgically absent. No biliary ductal dilatation is evident. Pancreas: The pancreatic parenchyma is unremarkable and there is no main duct   dilatation. Spleen: The spleen is nonenlarged. Adrenals: No adrenal glands are symmetric.    Genitourinary: The kidneys are symmetric and enhance appropriately. Indeterminant 1.5 cm low-density focus in the right lower pole kidney. Nonobstructing punctate left renal calculi measuring up to 1-2 mm. No   nephrolithiasis seen in the right collecting system. There is no collecting   system dilatation. The bladder is unremarkable, as imaged. The uterus and adnexa   appear unremarkable. Gastrointestinal: Small and large bowel caliber and wall thickness are within   normal limits. No CT evidence of appendicitis. Colonic diverticulosis without   diverticulitis. Lymphatics: No enlarged lymph nodes are identified. Vascular: Mild calcific atherosclerosis. The abdominal aorta is normal in   caliber. MSK/Body Wall: No concerning bony lesion is identified. Large left-sided Tarlov   cyst at the S2 level measuring 2.5 x 2.7 cm. Large right-sided Tarlov cyst at   the right S2 level measuring 2.5 x 2.8 cm. Small fat-containing umbilical   hernia. Peritoneum/Other: There is no free fluid or abnormal collection. No free gas. Impression   1. Nonobstructing punctate left renal calculi. No hydronephrosis. 2.Recommend renal ultrasound for characterization of an indeterminate, probably   benign, low-density focus in the right inferior kidney. 3.Colonic diverticulosis without diverticulitis. 4.Large bilateral sacral Tarlov cysts at the S2 Level. CT abdomen/pelvis (10/17/2022) @ [facility]:   I have personally reviewed these images and my interpretation is: There is a moderate to large Tarlov cyst bilaterally at the S2 level. This does not appear to result in any major neural element compression per the CT. Typically benign in nature. ASSESSMENT:    Sari Gold is a 81 y.o. female with a history of right breast CA treated chemotherapy and radiation in 2020, osteopenia of left lower leg, well controlled DM Type II, hypercholesterolemia, KRZYSZTOF who was referred by Dr. Gautam Diaz presents with a synovial cyst.       ICD-10-CM    1.  Tarlov cyst G96.191       2. Right flank pain  R10.9       3. Ductal carcinoma in situ (DCIS) of right breast  D05.11           PLAN:  I have discussed and reviewed the results of the CT abdomen/pelvis with Ms. Tayler Guzmán at length. I explained that she has a moderate sized cyst at the sacrum that is likely not contributing to her pain. I explained that the cyst is benign and does not typically require surgical intervention especially since she is asymptomatic. I did not appreciate any other spinal issues that would explain her RIGHT sided pain. She could have some pain from the radiation she received? May be related to the right renal cyst but not likely. May need bone scan? I suggest she mention this to her oncology team at her follow up.   Certainly no neurosurgical intervention at this time.     -Will send note to Dr. Alex LOPEZ with hematology/oncology team  -ASHWIN Cadet

## 2022-11-21 DIAGNOSIS — R73.9 HYPERGLYCEMIA: ICD-10-CM

## 2022-11-21 DIAGNOSIS — E11.9 TYPE 2 DIABETES MELLITUS WITHOUT COMPLICATION, WITHOUT LONG-TERM CURRENT USE OF INSULIN (HCC): ICD-10-CM

## 2022-11-21 NOTE — TELEPHONE ENCOUNTER
Carmen Lopez called requesting a refill of the below medication which has been pended for you:     Requested Prescriptions     Pending Prescriptions Disp Refills    Semaglutide, 1 MG/DOSE, 4 MG/3ML SOPN 3 mL 1     Sig: Inject 1 mg into the skin once a week       Last Appointment Date: 10/11/2022  Next Appointment Date: 12/5/2022    Allergies   Allergen Reactions    Codeine Itching    Other Rash     Chloroprep surgical prep

## 2022-12-01 DIAGNOSIS — E78.00 PURE HYPERCHOLESTEROLEMIA: ICD-10-CM

## 2022-12-01 DIAGNOSIS — E66.09 EXOGENOUS OBESITY: ICD-10-CM

## 2022-12-01 DIAGNOSIS — F41.1 GENERALIZED ANXIETY DISORDER: ICD-10-CM

## 2022-12-01 DIAGNOSIS — E11.9 TYPE 2 DIABETES MELLITUS WITHOUT COMPLICATION, WITHOUT LONG-TERM CURRENT USE OF INSULIN (HCC): ICD-10-CM

## 2022-12-01 DIAGNOSIS — M54.50 CHRONIC BILATERAL LOW BACK PAIN WITHOUT SCIATICA: ICD-10-CM

## 2022-12-01 DIAGNOSIS — E55.9 VITAMIN D DEFICIENCY: ICD-10-CM

## 2022-12-01 DIAGNOSIS — Z00.00 WELCOME TO MEDICARE PREVENTIVE VISIT: ICD-10-CM

## 2022-12-01 DIAGNOSIS — G89.29 CHRONIC BILATERAL LOW BACK PAIN WITHOUT SCIATICA: ICD-10-CM

## 2022-12-01 DIAGNOSIS — M85.862 OSTEOPENIA OF LEFT LOWER LEG: ICD-10-CM

## 2022-12-01 DIAGNOSIS — E05.90 SUBCLINICAL HYPERTHYROIDISM: ICD-10-CM

## 2022-12-01 DIAGNOSIS — G47.33 OSA (OBSTRUCTIVE SLEEP APNEA): ICD-10-CM

## 2022-12-01 LAB
ALBUMIN SERPL-MCNC: 4.5 G/DL (ref 3.5–5.2)
ALP BLD-CCNC: 91 U/L (ref 35–104)
ALT SERPL-CCNC: 25 U/L (ref 5–33)
ANION GAP SERPL CALCULATED.3IONS-SCNC: 12 MMOL/L (ref 7–19)
AST SERPL-CCNC: 20 U/L (ref 5–32)
BILIRUB SERPL-MCNC: 0.3 MG/DL (ref 0.2–1.2)
BUN BLDV-MCNC: 14 MG/DL (ref 8–23)
CALCIUM SERPL-MCNC: 9.6 MG/DL (ref 8.8–10.2)
CHLORIDE BLD-SCNC: 103 MMOL/L (ref 98–111)
CHOLESTEROL, TOTAL: 213 MG/DL (ref 160–199)
CO2: 27 MMOL/L (ref 22–29)
CREAT SERPL-MCNC: 0.6 MG/DL (ref 0.5–0.9)
GFR SERPL CREATININE-BSD FRML MDRD: >60 ML/MIN/{1.73_M2}
GLUCOSE BLD-MCNC: 116 MG/DL (ref 74–109)
HBA1C MFR BLD: 6 % (ref 4–6)
HDLC SERPL-MCNC: 59 MG/DL (ref 65–121)
LDL CHOLESTEROL CALCULATED: 126 MG/DL
POTASSIUM SERPL-SCNC: 4.8 MMOL/L (ref 3.5–5)
SODIUM BLD-SCNC: 142 MMOL/L (ref 136–145)
TOTAL PROTEIN: 6.5 G/DL (ref 6.6–8.7)
TRIGL SERPL-MCNC: 138 MG/DL (ref 0–149)

## 2022-12-05 ENCOUNTER — OFFICE VISIT (OUTPATIENT)
Dept: INTERNAL MEDICINE | Age: 72
End: 2022-12-05
Payer: MEDICARE

## 2022-12-05 VITALS
HEART RATE: 76 BPM | BODY MASS INDEX: 30.9 KG/M2 | DIASTOLIC BLOOD PRESSURE: 70 MMHG | RESPIRATION RATE: 18 BRPM | OXYGEN SATURATION: 98 % | WEIGHT: 181 LBS | HEIGHT: 64 IN | SYSTOLIC BLOOD PRESSURE: 128 MMHG

## 2022-12-05 DIAGNOSIS — E11.9 TYPE 2 DIABETES MELLITUS WITHOUT COMPLICATION, WITHOUT LONG-TERM CURRENT USE OF INSULIN (HCC): Primary | ICD-10-CM

## 2022-12-05 DIAGNOSIS — E66.09 EXOGENOUS OBESITY: ICD-10-CM

## 2022-12-05 DIAGNOSIS — E55.9 VITAMIN D DEFICIENCY: ICD-10-CM

## 2022-12-05 DIAGNOSIS — E05.90 SUBCLINICAL HYPERTHYROIDISM: ICD-10-CM

## 2022-12-05 DIAGNOSIS — F41.1 GENERALIZED ANXIETY DISORDER: ICD-10-CM

## 2022-12-05 DIAGNOSIS — E78.00 PURE HYPERCHOLESTEROLEMIA: ICD-10-CM

## 2022-12-05 PROCEDURE — 1036F TOBACCO NON-USER: CPT | Performed by: INTERNAL MEDICINE

## 2022-12-05 PROCEDURE — G8484 FLU IMMUNIZE NO ADMIN: HCPCS | Performed by: INTERNAL MEDICINE

## 2022-12-05 PROCEDURE — 3017F COLORECTAL CA SCREEN DOC REV: CPT | Performed by: INTERNAL MEDICINE

## 2022-12-05 PROCEDURE — 2022F DILAT RTA XM EVC RTNOPTHY: CPT | Performed by: INTERNAL MEDICINE

## 2022-12-05 PROCEDURE — 1123F ACP DISCUSS/DSCN MKR DOCD: CPT | Performed by: INTERNAL MEDICINE

## 2022-12-05 PROCEDURE — G8427 DOCREV CUR MEDS BY ELIG CLIN: HCPCS | Performed by: INTERNAL MEDICINE

## 2022-12-05 PROCEDURE — G8399 PT W/DXA RESULTS DOCUMENT: HCPCS | Performed by: INTERNAL MEDICINE

## 2022-12-05 PROCEDURE — 99214 OFFICE O/P EST MOD 30 MIN: CPT | Performed by: INTERNAL MEDICINE

## 2022-12-05 PROCEDURE — 3044F HG A1C LEVEL LT 7.0%: CPT | Performed by: INTERNAL MEDICINE

## 2022-12-05 PROCEDURE — 1090F PRES/ABSN URINE INCON ASSESS: CPT | Performed by: INTERNAL MEDICINE

## 2022-12-05 PROCEDURE — G8417 CALC BMI ABV UP PARAM F/U: HCPCS | Performed by: INTERNAL MEDICINE

## 2022-12-05 ASSESSMENT — ENCOUNTER SYMPTOMS
WHEEZING: 0
CONSTIPATION: 0
SORE THROAT: 0
CHEST TIGHTNESS: 0
ABDOMINAL PAIN: 0
COUGH: 0

## 2022-12-05 NOTE — PROGRESS NOTES
Chief Complaint   Patient presents with    6 Month Follow-Up     History of presenting illness:  Elenita Mckeon is a67 y.o. female who presents today for follow up on her chronic medical conditions as noted below.     Patient Active Problem List    Diagnosis Date Noted    Long term current use of aromatase inhibitor 08/03/2022     Priority: Medium    S/P lumpectomy, right breast 9/15/2020 09/22/2020    Adverse effect of antineoplastic and immunosuppressive drugs, initial encounter      Malignant neoplasm of right breast in female, estrogen receptor negative ([address] 04/17/2020    Microhematuria 06/07/2019    Bilateral low back pain without sciatica 05/04/2018    Osteopenia of left lower leg 08/16/2017     Overview Note:     12/16 lumbar spine normal, left hip neck -1.7  6/2020 lumbar spine -1.4, hip neck -1.5/-1.3      Type 2 diabetes mellitus without complication, without long-term current use of insulin ([address] 08/12/2017    Vitamin D deficiency 08/12/2017    Pure hypercholesterolemia 08/12/2017    GERD (gastroesophageal reflux disease) 08/12/2017    Exogenous obesity 08/12/2017    Generalized anxiety disorder 08/12/2017     Past Medical History:   Diagnosis Date    Anxiety     Breast cancer ([address] 2020    infiltrating ductal carcinoma     Cancer ([address]     Breast Cancer    Carpal tunnel syndrome     pt denies having carpal tunnel syndrome    Cervicalgia     Depression     Esophagitis     History of therapeutic radiation 2020    Hx antineoplastic chemo 2020    Hyperlipidemia     Idiopathic peripheral neuropathy     Insomnia     Long term current use of aromatase inhibitor 8/3/2022    Long term current use of aromatase inhibitor 8/3/2022    Long term current use of aromatase inhibitor 8/3/2022    Menopause     Obesity due to excess calories     Osteoarthritis     Osteopenia     Type 2 diabetes mellitus without complication ([address]     Vitamin D deficiency       Past Surgical History:   Procedure Laterality Date BREAST BIOPSY Right 2020    infiltrating ductal carcinoma    BREAST LUMPECTOMY Right 9/15/2020    RIGHT LUMPECTOMY WITH SNB, PREOP AND INTRAOP US GUIDED NL, FLAPS, BIOZORB, MARGIN PROBE,PEC BLOCK [facility] 5/3/2016    THORACOTOMY, WEDGE RESECTION PULMONARY NODULE RIGHT MIDDLE LOBE performed by Noe Bryant MD at [facility] 91 N/A 2020    PORT INSERTION WITH FLUORO performed by Corrina Price MD at [facility] 912 N/A 2020    PORT REMOVAL WITH FLUOROSCOPY performed by Corrina Price MD at [address] [place] [address] [address] LOC OF RIGHT BREAST  2020    US GUIDED NEEDLE LOC OF RIGHT BREAST 2020 [facility] [address]     Current Outpatient Medications   Medication Sig Dispense Refill    Semaglutide, 1 MG/DOSE, 4 MG/3ML SOPN Inject 1 mg into the skin once a week 3 mL 1    lovastatin (MEVACOR) 20 MG tablet TAKE TWO TABLETS BY MOUTH DAILY 180 tablet 1    escitalopram (LEXAPRO) 10 MG tablet TAKE 1/2 TABLET BY MOUTH DAILY 45 tablet 1    letrozole (FEMARA) 2.5 MG tablet TAKE ONE TABLET BY MOUTH DAILY 60 tablet 4    Cholecalciferol (VITAMIN D) 2000 units CAPS capsule Take by mouth daily        No current facility-administered medications for this visit.      Allergies   Allergen Reactions    Codeine Itching    Other Rash     Chloroprep surgical prep     Social History     Tobacco Use    Smoking status: Former     Packs/day: 1.00     Years: 3.00     Pack years: 3.00     Types: Cigarettes     Quit date: 10/1/1984     Years since quittin.2    Smokeless tobacco: Never    Tobacco comments:     3 YEARS PLAYED AROUND AND QUIT IN THE    Substance Use Topics    Alcohol use: No     Alcohol/week: 0.0 standard drinks      Family History   Problem Relation Age of Onset    Cancer Father         bladder ca    Other Sister         MULTIPLE SCLEROSIS High Blood Pressure Mother     Heart Disease Mother     Stroke Mother        Review of Systems   Constitutional:  Negative for chills, fatigue and fever. HENT:  Negative for congestion, ear pain, nosebleeds, postnasal drip and sore throat. Respiratory:  Negative for cough, chest tightness and wheezing. Cardiovascular:  Negative for chest pain, palpitations and leg swelling. Gastrointestinal:  Negative for abdominal pain and constipation. Genitourinary:  Negative for dysuria and urgency. Musculoskeletal: Negative. Negative for arthralgias. Skin:  Negative for rash. Neurological:  Negative for dizziness and headaches. Psychiatric/Behavioral: Negative. Vitals:    12/05/22 1037   BP: 128/70   Site: Left Upper Arm   Position: Sitting   Cuff Size: Large Adult   Pulse: 76   Resp: 18   SpO2: 98%   Weight: 181 lb (82.1 kg)   Height: 5' 4\" (1.626 m)     Body mass index is 31.07 kg/m². Physical Exam  Constitutional:       Appearance: She is well-developed. HENT:      Right Ear: External ear normal.      Left Ear: External ear normal.      Mouth/Throat:      Pharynx: No oropharyngeal exudate. Eyes:      Conjunctiva/sclera: Conjunctivae normal.      Pupils: Pupils are equal, round, and reactive to light. Neck:      Thyroid: No thyromegaly. Vascular: No JVD. Cardiovascular:      Rate and Rhythm: Normal rate. Heart sounds: Normal heart sounds. No murmur heard. Pulmonary:      Effort: No respiratory distress. Breath sounds: Normal breath sounds. No wheezing or rales. Chest:      Chest wall: No tenderness. Abdominal:      General: Bowel sounds are normal.      Palpations: Abdomen is soft. Musculoskeletal:      Cervical back: Neck supple. Lymphadenopathy:      Cervical: No cervical adenopathy. Skin:     Findings: No rash. Neurological:      Mental Status: She is oriented to person, place, and time.        Lab Review   Orders Only on 12/01/2022   Component Date Value Sodium 12/01/2022 142     Potassium 12/01/2022 4.8     Chloride 12/01/2022 103     CO2 12/01/2022 27     Anion Gap 12/01/2022 12     Glucose 12/01/2022 116 (A)     BUN 12/01/2022 14     Creatinine 12/01/2022 0.6     Est, Glom Filt Rate 12/01/2022 >60     Calcium 12/01/2022 9.6     Total Protein 12/01/2022 6.5 (A)     Albumin 12/01/2022 4.5     Total Bilirubin 12/01/2022 0.3     Alkaline Phosphatase 12/01/2022 91     ALT 12/01/2022 25     AST 12/01/2022 20     Hemoglobin A1C 12/01/2022 6.0     Cholesterol, Total 12/01/2022 213 (A)     Triglycerides 12/01/2022 138     HDL 12/01/2022 59 (A)     LDL Calculated 12/01/2022 126    Hospital Outpatient Visit on 10/14/2022   Component Date Value    POC Creatinine 10/14/2022 0.5     GFR Non- 10/14/2022 >60     GFR  10/14/2022 >60     Sample Type 10/14/2022 Venous     Performed on 10/14/2022 EPO            ASSESSMENT/PLAN:    Type 2 diabetes mellitus without complication, without long-term current use of insulin (HCC)  Her A1c is 6.0 in 11/2022  Was 5.6 in 5/2022  she was unable to tolerate metformin.   Strict diet and wtl oss recommneded  RX ozempic  0.5 weekly     Vitamin D deficiency-   her level was good at 43   RX vit D 2000 daily     Generalized anxiety disorder-  RX Lexapro 5 mg daily     Hyperlipidemia  I have personally reviewed and interpreted these lab results and thoroughly discussed with patient  LDL elevated 126  - RX lovastatin 20 mg daily  Healthy, mostly fiber rich nonstarchy plant-based diet recommended  Recommend to decrease intake of processed foods, simple carbohydrates and animal-based products that high in saturated fats     TFT's now normal     Mild alt/ ast elevation  Now normal     Time sleepiness  Snoring  Increased fatigue  Likely sleep apnea  Obtain sleep study  Orders placed     Obesity  Pt was given approximately 5 minutes of counseling about diet and exercise including education on what calories are, where calories come from, the need for portion control,following lower carbohydrate dietary regimen and healthy snacks along side an active lifestyle with supplementary exercise of approx 30 minutes a week, 5 days a week of exercise for weight loss. The patient voiced increased understanding of the topics discussed. Low back pain, at times radiates to right knee area  Suggest a daily strengthening exercises  Suggest back strengthening yoga with [employer], [employer]     DX RT breast cancer  Per dr Karyle Carter This Encounter   Procedures    Comprehensive Metabolic Panel    CBC with Auto Differential    Hemoglobin A1C    Lipid Panel    Microalbumin / Creatinine Urine Ratio    Urinalysis    TSH    Vitamin D 25 Hydroxy       New Prescriptions    No medications on file         No follow-ups on file. There are no Patient Instructions on file for this visit. EMR Dragon/transcription disclaimer:Significant part of this  encounter note is electronic transcription/translationof spoken language to printed text. The electronic translation of spoken language may be erroneous, or at times, nonsensical words or phrases may be inadvertently transcribed.  Although I have reviewed the note for sucherrors, some may still exist.

## 2022-12-19 NOTE — PROGRESS NOTES
Progress Note      Pt Name: Amilcar Ferrell: 1950  MRN: [id number]    Date of evaluation: 12/20/2022  History Obtained From:  patient, electronic medical record    CHIEF COMPLAINT:    Chief Complaint   Patient presents with    Follow-up     Malignant neoplasm of breast in female, estrogen receptor positive, unspecified laterality, unspecified site of breast ([address]    Other     Osteopenia     HISTORY OF PRESENT ILLNESS:    Daniel Ornelas is a 81 y.o.  female who is currently being followed for invasive ductal carcinoma of the right breast, triple negative, ER 4%, diagnosed March 2020 and osteopenia. Current recommendation is for adjuvant endocrine therapy with letrozole 2.5 mg daily for anticipated 5 years and Prolia 60 mg subcu every 6 months. Carmen Lopez returns today in scheduled follow-up for evaluation, lab monitoring, side effect monitoring and further treatment recommendations. She presents today with no new breast complaints and reports being compliant with letrozole and reports not taking calcium as recommended. Reports tolerating her first dose of Prolia in August without difficulty. Today's clinic visit to include physical assessment, review of systems, any lab or radiographic findings that were available and plan of care are documented below.     ONCOLOGIC HISTORY:     Diagnosis  Invasive ductal carcinoma right breast, March 2020  ER 4%, LA 0.1%, HER-2/paty IHC 0, Ki-67 86%  [id number]   Mammaprint High risk basal     Treatment summary   05/06/2020-8/12/2020 ddAC with G-CSF support followed by dose dense Taxol x4 cycles with G-CSF.  9/15/20-Right breast lumpectomy with 1 sentinel lymph node negative  9/23/20- Adjuvant endocrine therapy-Femara 2.5 mg daily  10/21/20 - 11/11/20 4256 cGY radiation therapy to right breast  8/19/2021-initiated Prolia 60 mg subcu every 6 months for increased bone loss in the setting of adjuvant endocrine therapy and osteopenia Mario Julien was first seen by Dr. Candice Lopez on 4/16/2020 referred by Dr. Shilo Dee for diagnosis of breast cancer. This was in a screening detected lesion. The patient has a remote history of hormone replacement. She denies any family history of breast cancer. 3/19/2020-core needle biopsy of suspicious right breast lesion consistent with invasive ductal carcinoma grade 3 measuring 0.7 cm. ER 4%, FL 0%, HER-2/paty IHC 0.  Ki-67 86%. MammaPrint high risk basal type. 4/1/2020- bilateral breast MRI showed right breast lesion measuring 1.1 x 1.4 x 0.85 cm. Nonenlarged lymph nodes within the upper outer right breast.  4/16/2020- she was first seen by Dr. Candice Lopez. Recommend neoadjuvant chemotherapy with dose dense AC with G-CSF support followed by dose dense Taxol. 4/23/2020 CT Chest Mild pulmonary scarring. Known right breast cancer. No metastatic disease evident. CT Abdomen Pelvis No acute pathology int the abdomen or pelvis. No evidence of metastatic disease. 2D Echo Normal with EF 60%. 7/6/2020- Us Breast Limited Right Positive response to therapy with decreased size of the known right breast malignancy. 8/12/2020-completion of neoadjuvant chemotherapy with dose dense AC followed by 4 cycles of dose dense Taxol with G-CSF support. 9/1/2020 Mri Breast Bilateral- No residual soft tissue mass at the site of RIGHT upper outer quadrant biopsy clip, correlating with site of biopsy-proven neoplasm. No evidence of residual disease by MR. The small satellite nodule identified on prior breast MR is also no longer identified. No axillary or internal mammary lymphadenopathy. 9/15/2020-patient underwent a right lumpectomy/sentinel lymph node biopsy revealing no residual disease. Focal atypical ductal hyperplasia. Margins negative.  AJCC STAGE: ypT0, (sn)pN0, pMx   9/23/20- Adjuvant endocrine therapy-Femara 2.5 mg daily  10/21/20 - 11/11/20 4256 cGY radiation therapy to right breast  3/10/2021 Bilateral mammogram documented no mammographic evidence of malignancy. 2022 Bilateral mammogram- no mammographic evidence of malignancy    Age-appropriate health screenin2020 Bone Density Femur Neck T-Score--1.5.  This patient is considered Osteopenic.  2022 Bone density Kira Levi)- osteopenia; left femoral neck T score -1.7; lumbar spine T score -1.7    Past Medical History:    Past Medical History:   Diagnosis Date    Anxiety     Breast cancer ([facility] 2020    infiltrating ductal carcinoma     Cancer ([facility]     Breast Cancer    Carpal tunnel syndrome     pt denies having carpal tunnel syndrome    Cervicalgia     Depression     Esophagitis     History of therapeutic radiation 2020    Hx antineoplastic chemo 2020    Hyperlipidemia     Idiopathic peripheral neuropathy     Insomnia     Long term current use of aromatase inhibitor 8/3/2022    Long term current use of aromatase inhibitor 8/3/2022    Long term current use of aromatase inhibitor 8/3/2022    Menopause     Obesity due to excess calories     Osteoarthritis     Osteopenia     Type 2 diabetes mellitus without complication ([facility]     Vitamin D deficiency        Past Surgical History:    Past Surgical History:   Procedure Laterality Date    BREAST BIOPSY Right 2020    infiltrating ductal carcinoma    BREAST LUMPECTOMY Right 9/15/2020    RIGHT LUMPECTOMY WITH SNB, PREOP AND INTRAOP US GUIDED NL, FLAPS, BIOZORB, MARGIN PROBE,PEC BLOCK performed by Mart Adams MD at [facility] Right 5/3/2016    THORACOTOMY, WEDGE RESECTION PULMONARY NODULE RIGHT MIDDLE LOBE performed by Yaquelin Gonzalez MD at [address] [place] [address] N/A 2020    PORT INSERTION WITH FLUORO performed by Mart Adams MD at [facility] N/A 2020    PORT REMOVAL WITH FLUOROSCOPY performed by Matr Adams MD at [address] [place] [address] [address] LOC OF RIGHT BREAST 2020    US GUIDED NEEDLE LOC OF RIGHT BREAST 2020 [facility] [address]       Current Medications:    Current Outpatient Medications   Medication Sig Dispense Refill    Semaglutide, 1 MG/DOSE, 4 MG/3ML SOPN Inject 1 mg into the skin once a week 3 mL 1    lovastatin (MEVACOR) 20 MG tablet TAKE TWO TABLETS BY MOUTH DAILY 180 tablet 1    escitalopram (LEXAPRO) 10 MG tablet TAKE 1/2 TABLET BY MOUTH DAILY 45 tablet 1    letrozole (FEMARA) 2.5 MG tablet TAKE ONE TABLET BY MOUTH DAILY 60 tablet 4    Cholecalciferol (VITAMIN D) 2000 units CAPS capsule Take by mouth daily        No current facility-administered medications for this visit. Allergies: Allergies   Allergen Reactions    Codeine Itching    Other Rash     Chloroprep surgical prep       Social History:    Social History     Tobacco Use    Smoking status: Former     Packs/day: 1.00     Years: 3.00     Pack years: 3.00     Types: Cigarettes     Quit date: 10/1/1984     Years since quittin.2    Smokeless tobacco: Never    Tobacco comments:     3 YEARS PLAYED AROUND AND QUIT IN THE    Vaping Use    Vaping Use: Never used   Substance Use Topics    Alcohol use: No     Alcohol/week: 0.0 standard drinks    Drug use: No       Family History:   Family History   Problem Relation Age of Onset    Cancer Father         bladder ca    Other Sister         MULTIPLE SCLEROSIS    High Blood Pressure Mother     Heart Disease Mother     Stroke Mother        Vitals:  Vitals:    22 1102   BP: 130/72   Pulse: 82   SpO2: 97%   Weight: 179 lb 6.4 oz (81.4 kg)        Subjective   REVIEW OF SYSTEMS:   Review of Systems   Constitutional:  Positive for fatigue. Negative for chills, diaphoresis and fever. HENT: Negative. Negative for congestion, ear pain, hearing loss, nosebleeds, sore throat and tinnitus. Eyes: Negative. Negative for pain, discharge and redness. Respiratory: Negative. Negative for cough, shortness of breath and wheezing. Cardiovascular: Negative. Negative for chest pain, palpitations and leg swelling. Gastrointestinal: Negative. Negative for abdominal pain, blood in stool, constipation, diarrhea, nausea and vomiting. Endocrine: Negative for polydipsia. Genitourinary:  Negative for dysuria, flank pain, frequency, hematuria and urgency. Musculoskeletal:  Positive for arthralgias. Negative for back pain, myalgias and neck pain. Skin: Negative. Negative for rash. Neurological:  Positive for numbness (increased in right hand). Negative for dizziness, tremors, seizures, weakness and headaches. Hematological:  Does not bruise/bleed easily. Psychiatric/Behavioral: Negative. The patient is not nervous/anxious. Objective   PHYSICAL EXAM:  Physical Exam  Vitals reviewed. Constitutional:       General: She is not in acute distress. Appearance: She is well-developed. HENT:      Head: Normocephalic and atraumatic. Mouth/Throat:      Pharynx: Uvula midline. Tonsils: No tonsillar exudate. Eyes:      General: Lids are normal.      Conjunctiva/sclera: Conjunctivae normal.      Pupils: Pupils are equal, round, and reactive to light. Neck:      Thyroid: No thyroid mass or thyromegaly. Vascular: No JVD. Trachea: Trachea normal. No tracheal deviation. Cardiovascular:      Rate and Rhythm: Normal rate and regular rhythm. Pulses: Normal pulses. Heart sounds: Normal heart sounds. Pulmonary:      Effort: Pulmonary effort is normal. No respiratory distress. Breath sounds: Normal breath sounds. No wheezing or rales. Chest:      Chest wall: No tenderness. Abdominal:      General: Bowel sounds are normal. There is no distension. Palpations: Abdomen is soft. There is no mass. Tenderness: There is no abdominal tenderness. There is no guarding. Musculoskeletal:         General: No tenderness or deformity. Cervical back: Normal range of motion and neck supple. Comments: Range of motion within normal limits x4 extremities   Skin:     General: Skin is warm. Findings: No bruising, erythema or rash. Neurological:      Mental Status: She is alert and oriented to person, place, and time. Cranial Nerves: No cranial nerve deficit. Coordination: Coordination normal.   Psychiatric:         Behavior: Behavior normal.         Thought Content: Thought content normal.       Labs reviewed today:  Lab Results   Component Value Date    WBC 4.49 12/20/2022    HGB 12.7 12/20/2022    HCT 39.6 12/20/2022    MCV 95.0 (H) 12/20/2022     (L) 12/20/2022     Lab Results   Component Value Date    NEUTROABS 2.60 12/20/2022     Lab Results   Component Value Date     12/01/2022    K 4.8 12/01/2022     12/01/2022    CO2 27 12/01/2022    BUN 14 12/01/2022    CREATININE 0.6 12/01/2022    GLUCOSE 116 (H) 12/01/2022    CALCIUM 9.6 12/01/2022    PROT 6.5 (L) 12/01/2022    LABALBU 4.5 12/01/2022    BILITOT 0.3 12/01/2022    ALKPHOS 91 12/01/2022    AST 20 12/01/2022    ALT 25 12/01/2022    LABGLOM >60 12/01/2022    GFRAA >60 10/14/2022    GLOB 2.4 08/12/2020       ASSESSMENT/PLAN:      1. Invasive ductal carcinoma of the right breast, triple negative, ER 4%, March 2020. Current recommendation is for adjuvant endocrine therapy with letrozole 2.5 mg daily, anticipate 5 years dosing through September 2025. No new breast complaints. Reports compliant with letrozole daily as recommended. No known radiographic evidence of recurrent/progression of disease. Jed Alvarado was seen in scheduled follow-up by Dr Yakelin Castañeda on 09/26/2022, I reviewed the progress note from that office visit which documented bilateral breast exam with no new findings or evidence of recurrence.     -Continue letrozole 2.5 mg daily, in the adjuvant setting, no radiographic evidence of recurrent disease.   Anticipate dosing for 5 years  -Encourage self breast examinations  -Keep follow up with Dr Yakelin Castañeda and transcription/translation of spoken language to printed text. The electronic translation of spoken language may permit erroneous, or at times, nonsensical words or phrases to be inadvertently transcribed; although attempts have made to review the note for such errors, some may still exist.  Please excuse any unrecognized transcription errors and contact us if the error is unintelligible or needs documented correction. Also, portions of this note have been copied forward, however, changed to reflect the most current clinical status of this patient. Lauryn GILMORE, [place] pre-charting as a registered nurse for [employer], [employer]SH[place].     Electronically signed by ASHWIN Hansen on 12/26/2022 at 5:41 PM

## 2022-12-20 ENCOUNTER — OFFICE VISIT (OUTPATIENT)
Dept: HEMATOLOGY | Age: 72
End: 2022-12-20
Payer: MEDICARE

## 2022-12-20 ENCOUNTER — HOSPITAL ENCOUNTER (OUTPATIENT)
Dept: INFUSION THERAPY | Age: 72
Discharge: HOME | End: 2022-12-20
Payer: MEDICARE

## 2022-12-20 VITALS
OXYGEN SATURATION: 97 % | HEART RATE: 82 BPM | SYSTOLIC BLOOD PRESSURE: 130 MMHG | DIASTOLIC BLOOD PRESSURE: 72 MMHG | BODY MASS INDEX: 30.79 KG/M2 | WEIGHT: 179.4 LBS

## 2022-12-20 DIAGNOSIS — Z71.89 CARE PLAN DISCUSSED WITH PATIENT: ICD-10-CM

## 2022-12-20 DIAGNOSIS — Z79.811 ENCOUNTER FOR MONITORING AROMATASE INHIBITOR THERAPY: ICD-10-CM

## 2022-12-20 DIAGNOSIS — M85.80 OSTEOPENIA, UNSPECIFIED LOCATION: ICD-10-CM

## 2022-12-20 DIAGNOSIS — Z17.0 MALIGNANT NEOPLASM OF BREAST IN FEMALE, ESTROGEN RECEPTOR POSITIVE, UNSPECIFIED LATERALITY, UNSPECIFIED SITE OF BREAST (HCC): ICD-10-CM

## 2022-12-20 DIAGNOSIS — G62.0 CHEMOTHERAPY-INDUCED NEUROPATHY (HCC): ICD-10-CM

## 2022-12-20 DIAGNOSIS — C50.919 MALIGNANT NEOPLASM OF BREAST IN FEMALE, ESTROGEN RECEPTOR POSITIVE, UNSPECIFIED LATERALITY, UNSPECIFIED SITE OF BREAST (HCC): ICD-10-CM

## 2022-12-20 DIAGNOSIS — C50.919 MALIGNANT NEOPLASM OF BREAST IN FEMALE, ESTROGEN RECEPTOR POSITIVE, UNSPECIFIED LATERALITY, UNSPECIFIED SITE OF BREAST (HCC): Primary | ICD-10-CM

## 2022-12-20 DIAGNOSIS — Z78.0 POST-MENOPAUSAL: ICD-10-CM

## 2022-12-20 DIAGNOSIS — T45.1X5A CHEMOTHERAPY-INDUCED NEUROPATHY (HCC): ICD-10-CM

## 2022-12-20 DIAGNOSIS — Z79.811 LONG TERM CURRENT USE OF AROMATASE INHIBITOR: ICD-10-CM

## 2022-12-20 DIAGNOSIS — Z51.81 ENCOUNTER FOR MONITORING AROMATASE INHIBITOR THERAPY: ICD-10-CM

## 2022-12-20 DIAGNOSIS — Z17.0 MALIGNANT NEOPLASM OF BREAST IN FEMALE, ESTROGEN RECEPTOR POSITIVE, UNSPECIFIED LATERALITY, UNSPECIFIED SITE OF BREAST (HCC): Primary | ICD-10-CM

## 2022-12-20 LAB
BASOPHILS ABSOLUTE: 0.02 K/UL (ref 0.01–0.08)
BASOPHILS RELATIVE PERCENT: 0.4 % (ref 0.1–1.2)
EOSINOPHILS ABSOLUTE: 0.14 K/UL (ref 0.04–0.54)
EOSINOPHILS RELATIVE PERCENT: 3.1 % (ref 0.7–7)
HCT VFR BLD CALC: 39.6 % (ref 34.1–44.9)
HEMOGLOBIN: 12.7 G/DL (ref 11.2–15.7)
LYMPHOCYTES ABSOLUTE: 1.32 K/UL (ref 1.18–3.74)
LYMPHOCYTES RELATIVE PERCENT: 29.4 % (ref 19.3–53.1)
MCH RBC QN AUTO: 30.5 PG (ref 25.6–32.2)
MCHC RBC AUTO-ENTMCNC: 32.1 G/DL (ref 32.3–35.5)
MCV RBC AUTO: 95 FL (ref 79.4–94.8)
MONOCYTES ABSOLUTE: 0.4 K/UL (ref 0.24–0.82)
MONOCYTES RELATIVE PERCENT: 8.9 % (ref 4.7–12.5)
NEUTROPHILS ABSOLUTE: 2.6 K/UL (ref 1.56–6.13)
NEUTROPHILS RELATIVE PERCENT: 58 % (ref 34–71.1)
PDW BLD-RTO: 13.4 % (ref 11.7–14.4)
PLATELET # BLD: 149 K/UL (ref 182–369)
PMV BLD AUTO: 11.1 FL (ref 7.4–10.4)
RBC # BLD: 4.17 M/UL (ref 3.93–5.22)
WBC # BLD: 4.49 K/UL (ref 3.98–10.04)

## 2022-12-20 PROCEDURE — 99211 OFF/OP EST MAY X REQ PHY/QHP: CPT

## 2022-12-20 PROCEDURE — G8417 CALC BMI ABV UP PARAM F/U: HCPCS | Performed by: NURSE PRACTITIONER

## 2022-12-20 PROCEDURE — 85025 COMPLETE CBC W/AUTO DIFF WBC: CPT

## 2022-12-20 PROCEDURE — G8484 FLU IMMUNIZE NO ADMIN: HCPCS | Performed by: NURSE PRACTITIONER

## 2022-12-20 PROCEDURE — 1090F PRES/ABSN URINE INCON ASSESS: CPT | Performed by: NURSE PRACTITIONER

## 2022-12-20 PROCEDURE — 36415 COLL VENOUS BLD VENIPUNCTURE: CPT

## 2022-12-20 PROCEDURE — 1036F TOBACCO NON-USER: CPT | Performed by: NURSE PRACTITIONER

## 2022-12-20 PROCEDURE — 3017F COLORECTAL CA SCREEN DOC REV: CPT | Performed by: NURSE PRACTITIONER

## 2022-12-20 PROCEDURE — 99214 OFFICE O/P EST MOD 30 MIN: CPT | Performed by: NURSE PRACTITIONER

## 2022-12-20 PROCEDURE — G8399 PT W/DXA RESULTS DOCUMENT: HCPCS | Performed by: NURSE PRACTITIONER

## 2022-12-20 PROCEDURE — 1123F ACP DISCUSS/DSCN MKR DOCD: CPT | Performed by: NURSE PRACTITIONER

## 2022-12-20 PROCEDURE — G8427 DOCREV CUR MEDS BY ELIG CLIN: HCPCS | Performed by: NURSE PRACTITIONER

## 2022-12-26 ASSESSMENT — ENCOUNTER SYMPTOMS
BLOOD IN STOOL: 0
EYES NEGATIVE: 1
VOMITING: 0
GASTROINTESTINAL NEGATIVE: 1
CONSTIPATION: 0
DIARRHEA: 0
BACK PAIN: 0
NAUSEA: 0
COUGH: 0
EYE DISCHARGE: 0
RESPIRATORY NEGATIVE: 1
EYE PAIN: 0
ABDOMINAL PAIN: 0
WHEEZING: 0
SHORTNESS OF BREATH: 0
EYE REDNESS: 0
SORE THROAT: 0

## 2023-03-15 ENCOUNTER — HOSPITAL ENCOUNTER (OUTPATIENT)
Dept: INFUSION THERAPY | Age: 73
Discharge: HOME | End: 2023-03-15
Payer: MEDICARE

## 2023-03-15 ENCOUNTER — OFFICE VISIT (OUTPATIENT)
Dept: HEMATOLOGY | Age: 73
End: 2023-03-15
Payer: MEDICARE

## 2023-03-15 VITALS
DIASTOLIC BLOOD PRESSURE: 78 MMHG | BODY MASS INDEX: 30.93 KG/M2 | OXYGEN SATURATION: 97 % | WEIGHT: 180.2 LBS | SYSTOLIC BLOOD PRESSURE: 120 MMHG | HEART RATE: 97 BPM

## 2023-03-15 DIAGNOSIS — Z17.0 MALIGNANT NEOPLASM OF BREAST IN FEMALE, ESTROGEN RECEPTOR POSITIVE, UNSPECIFIED LATERALITY, UNSPECIFIED SITE OF BREAST (HCC): Primary | ICD-10-CM

## 2023-03-15 DIAGNOSIS — Z17.1 MALIGNANT NEOPLASM OF OVERLAPPING SITES OF RIGHT BREAST IN FEMALE, ESTROGEN RECEPTOR NEGATIVE (HCC): ICD-10-CM

## 2023-03-15 DIAGNOSIS — C50.919 MALIGNANT NEOPLASM OF BREAST IN FEMALE, ESTROGEN RECEPTOR POSITIVE, UNSPECIFIED LATERALITY, UNSPECIFIED SITE OF BREAST (HCC): Primary | ICD-10-CM

## 2023-03-15 DIAGNOSIS — Z79.811 ENCOUNTER FOR MONITORING AROMATASE INHIBITOR THERAPY: ICD-10-CM

## 2023-03-15 DIAGNOSIS — Z71.89 CARE PLAN DISCUSSED WITH PATIENT: ICD-10-CM

## 2023-03-15 DIAGNOSIS — Z79.811 LONG TERM CURRENT USE OF AROMATASE INHIBITOR: Primary | ICD-10-CM

## 2023-03-15 DIAGNOSIS — G62.0 CHEMOTHERAPY-INDUCED NEUROPATHY (HCC): ICD-10-CM

## 2023-03-15 DIAGNOSIS — Z78.0 POST-MENOPAUSAL: ICD-10-CM

## 2023-03-15 DIAGNOSIS — M85.862 OSTEOPENIA OF LEFT LOWER LEG: ICD-10-CM

## 2023-03-15 DIAGNOSIS — C50.811 MALIGNANT NEOPLASM OF OVERLAPPING SITES OF RIGHT BREAST IN FEMALE, ESTROGEN RECEPTOR NEGATIVE (HCC): ICD-10-CM

## 2023-03-15 DIAGNOSIS — Z51.81 ENCOUNTER FOR MONITORING AROMATASE INHIBITOR THERAPY: ICD-10-CM

## 2023-03-15 DIAGNOSIS — M85.80 OSTEOPENIA, UNSPECIFIED LOCATION: ICD-10-CM

## 2023-03-15 DIAGNOSIS — Z17.0 MALIGNANT NEOPLASM OF BREAST IN FEMALE, ESTROGEN RECEPTOR POSITIVE, UNSPECIFIED LATERALITY, UNSPECIFIED SITE OF BREAST (HCC): ICD-10-CM

## 2023-03-15 DIAGNOSIS — T45.1X5A CHEMOTHERAPY-INDUCED NEUROPATHY (HCC): ICD-10-CM

## 2023-03-15 DIAGNOSIS — Z79.811 LONG TERM CURRENT USE OF AROMATASE INHIBITOR: ICD-10-CM

## 2023-03-15 DIAGNOSIS — C50.919 MALIGNANT NEOPLASM OF BREAST IN FEMALE, ESTROGEN RECEPTOR POSITIVE, UNSPECIFIED LATERALITY, UNSPECIFIED SITE OF BREAST (HCC): ICD-10-CM

## 2023-03-15 LAB
BASOPHILS # BLD: 0.03 K/UL (ref 0.01–0.08)
BASOPHILS NFR BLD: 0.5 % (ref 0.1–1.2)
EOSINOPHIL # BLD: 0.13 K/UL (ref 0.04–0.54)
EOSINOPHIL NFR BLD: 2.3 % (ref 0.7–7)
ERYTHROCYTE [DISTWIDTH] IN BLOOD BY AUTOMATED COUNT: 13 % (ref 11.7–14.4)
HCT VFR BLD AUTO: 39.7 % (ref 34.1–44.9)
HGB BLD-MCNC: 12.8 G/DL (ref 11.2–15.7)
LYMPHOCYTES # BLD: 1.42 K/UL (ref 1.18–3.74)
LYMPHOCYTES NFR BLD: 24.7 % (ref 19.3–53.1)
MCH RBC QN AUTO: 31.7 PG (ref 25.6–32.2)
MCHC RBC AUTO-ENTMCNC: 32.2 G/DL (ref 32.3–35.5)
MCV RBC AUTO: 98.3 FL (ref 79.4–94.8)
MONOCYTES # BLD: 0.45 K/UL (ref 0.24–0.82)
MONOCYTES NFR BLD: 7.8 % (ref 4.7–12.5)
NEUTROPHILS # BLD: 3.72 K/UL (ref 1.56–6.13)
NEUTS SEG NFR BLD: 64.5 % (ref 34–71.1)
PLATELET # BLD AUTO: 217 K/UL (ref 182–369)
PMV BLD AUTO: 11.1 FL (ref 7.4–10.4)
RBC # BLD AUTO: 4.04 M/UL (ref 3.93–5.22)
WBC # BLD AUTO: 5.76 K/UL (ref 3.98–10.04)

## 2023-03-15 PROCEDURE — 1123F ACP DISCUSS/DSCN MKR DOCD: CPT | Performed by: NURSE PRACTITIONER

## 2023-03-15 PROCEDURE — G8399 PT W/DXA RESULTS DOCUMENT: HCPCS | Performed by: NURSE PRACTITIONER

## 2023-03-15 PROCEDURE — 99214 OFFICE O/P EST MOD 30 MIN: CPT | Performed by: NURSE PRACTITIONER

## 2023-03-15 PROCEDURE — 99211 OFF/OP EST MAY X REQ PHY/QHP: CPT

## 2023-03-15 PROCEDURE — G8417 CALC BMI ABV UP PARAM F/U: HCPCS | Performed by: NURSE PRACTITIONER

## 2023-03-15 PROCEDURE — 36415 COLL VENOUS BLD VENIPUNCTURE: CPT

## 2023-03-15 PROCEDURE — 3017F COLORECTAL CA SCREEN DOC REV: CPT | Performed by: NURSE PRACTITIONER

## 2023-03-15 PROCEDURE — 85025 COMPLETE CBC W/AUTO DIFF WBC: CPT

## 2023-03-15 PROCEDURE — 1090F PRES/ABSN URINE INCON ASSESS: CPT | Performed by: NURSE PRACTITIONER

## 2023-03-15 PROCEDURE — 1036F TOBACCO NON-USER: CPT | Performed by: NURSE PRACTITIONER

## 2023-03-15 PROCEDURE — G8427 DOCREV CUR MEDS BY ELIG CLIN: HCPCS | Performed by: NURSE PRACTITIONER

## 2023-03-15 PROCEDURE — G8484 FLU IMMUNIZE NO ADMIN: HCPCS | Performed by: NURSE PRACTITIONER

## 2023-03-15 RX ORDER — FAMOTIDINE 10 MG/ML
20 INJECTION, SOLUTION INTRAVENOUS
OUTPATIENT
Start: 2023-08-16

## 2023-03-15 RX ORDER — ONDANSETRON 2 MG/ML
8 INJECTION INTRAMUSCULAR; INTRAVENOUS
OUTPATIENT
Start: 2023-08-16

## 2023-03-15 RX ORDER — DIPHENHYDRAMINE HYDROCHLORIDE 50 MG/ML
50 INJECTION INTRAMUSCULAR; INTRAVENOUS
OUTPATIENT
Start: 2023-08-16

## 2023-03-15 RX ORDER — ACETAMINOPHEN 325 MG/1
650 TABLET ORAL
OUTPATIENT
Start: 2023-08-16

## 2023-03-15 RX ORDER — ALBUTEROL SULFATE 90 UG/1
4 AEROSOL, METERED RESPIRATORY (INHALATION) PRN
OUTPATIENT
Start: 2023-08-16

## 2023-03-15 RX ORDER — EPINEPHRINE 1 MG/ML
0.3 INJECTION, SOLUTION, CONCENTRATE INTRAVENOUS PRN
OUTPATIENT
Start: 2023-08-16

## 2023-03-15 RX ORDER — SODIUM CHLORIDE 9 MG/ML
INJECTION, SOLUTION INTRAVENOUS CONTINUOUS
OUTPATIENT
Start: 2023-08-16

## 2023-03-15 NOTE — PROGRESS NOTES
Progress Note      Pt Name: Kim Cue: 1950  MRN: [id number]    Date of evaluation: 03/15/2023  History Obtained From:  patient, electronic medical record    CHIEF COMPLAINT:    Chief Complaint   Patient presents with    Follow-up     Malignant neoplasm of breast in female, estrogen receptor positive, unspecified laterality, unspecified site of breast ([facility] [address]    Other     Osteopenia     HISTORY OF PRESENT ILLNESS:    Preeti Dumont is a 81 y.o.  female who is currently being followed for invasive ductal carcinoma of the right breast, triple negative, ER 4%, diagnosed March 2020 and osteopenia. Current recommendation is for adjuvant endocrine therapy with letrozole 2.5 mg daily for anticipated 5 years and Prolia 60 mg subcu every 6 months osteopenia in the setting of adjuvant endocrine therapy. Evon Fleming returns today in scheduled follow-up for evaluation, lab monitoring, side effect monitoring and further treatment recommendations. She reports being compliant with the letrozole and has no new breast complaints. She is due for dose of Prolia today and indicates that she has a $360 co-pay and prefers not to move forward with the injection today until this is clarified. Today's clinic visit to include physical assessment, review of systems, any lab or radiographic findings that were available and plan of care are documented below.     ONCOLOGIC HISTORY:     Diagnosis  Invasive ductal carcinoma right breast, March 2020  ER 4%, SC 0.1%, HER-2/paty IHC 0, Ki-67 86%  [id number]   Mammaprint High risk basal     Treatment summary   05/06/2020-8/12/2020 ddAC with G-CSF support followed by dose dense Taxol x4 cycles with G-CSF.  9/15/20-Right breast lumpectomy with 1 sentinel lymph node negative  9/23/20- Adjuvant endocrine therapy-Femara 2.5 mg daily  10/21/20 - 11/11/20 4256 cGY radiation therapy to right breast  8/19/2021-initiated Prolia 60 mg subcu every 6 months for increased bone loss in the setting of adjuvant endocrine therapy and osteopenia     Jolene Fonseca was first seen by Dr. Tex Lane on 4/16/2020 referred by Dr. Tessa Perkins for diagnosis of breast cancer. This was in a screening detected lesion. The patient has a remote history of hormone replacement. She denies any family history of breast cancer. 3/19/2020-core needle biopsy of suspicious right breast lesion consistent with invasive ductal carcinoma grade 3 measuring 0.7 cm. ER 4%, MT 0%, HER-2/paty IHC 0.  Ki-67 86%. MammaPrint high risk basal type. 4/1/2020- bilateral breast MRI showed right breast lesion measuring 1.1 x 1.4 x 0.85 cm. Nonenlarged lymph nodes within the upper outer right breast.  4/16/2020- she was first seen by Dr. Tex aLne. Recommend neoadjuvant chemotherapy with dose dense AC with G-CSF support followed by dose dense Taxol. 4/23/2020 CT Chest Mild pulmonary scarring. Known right breast cancer. No metastatic disease evident. CT Abdomen Pelvis No acute pathology int the abdomen or pelvis. No evidence of metastatic disease. 2D Echo Normal with EF 60%. 7/6/2020- Us Breast Limited Right Positive response to therapy with decreased size of the known right breast malignancy. 8/12/2020-completion of neoadjuvant chemotherapy with dose dense AC followed by 4 cycles of dose dense Taxol with G-CSF support. 9/1/2020 Mri Breast Bilateral- No residual soft tissue mass at the site of RIGHT upper outer quadrant biopsy clip, correlating with site of biopsy-proven neoplasm. No evidence of residual disease by MR. The small satellite nodule identified on prior breast MR is also no longer identified. No axillary or internal mammary lymphadenopathy. 9/15/2020-patient underwent a right lumpectomy/sentinel lymph node biopsy revealing no residual disease. Focal atypical ductal hyperplasia. Margins negative.  AJCC STAGE: ypT0, (sn)pN0, pMx   9/23/20- Adjuvant endocrine therapy-Femara 2.5 mg daily  10/21/20 - 20 4256 cGY radiation therapy to right breast  3/10/2021 Bilateral mammogram documented no mammographic evidence of malignancy. 2022 Bilateral mammogram- no mammographic evidence of malignancy    Age-appropriate health screenin2020 Bone Density Femur Neck T-Score--1.5.  This patient is considered Osteopenic.  2022 Bone density Catrina Luster)- osteopenia; left femoral neck T score -1.7; lumbar spine T score -1.7    Past Medical History:    Past Medical History:   Diagnosis Date    Anxiety     Breast cancer ([address] 2020    infiltrating ductal carcinoma     Cancer ([facility] [address]     Breast Cancer    Carpal tunnel syndrome     pt denies having carpal tunnel syndrome    Cervicalgia     Depression     Esophagitis     History of therapeutic radiation 2020    Hx antineoplastic chemo 2020    Hyperlipidemia     Idiopathic peripheral neuropathy     Insomnia     Long term current use of aromatase inhibitor 8/3/2022    Long term current use of aromatase inhibitor 8/3/2022    Long term current use of aromatase inhibitor 8/3/2022    Menopause     Obesity due to excess calories     Osteoarthritis     Osteopenia     Type 2 diabetes mellitus without complication ([address]     Vitamin D deficiency        Past Surgical History:    Past Surgical History:   Procedure Laterality Date    BREAST BIOPSY Right 2020    infiltrating ductal carcinoma    BREAST LUMPECTOMY Right 9/15/2020    RIGHT LUMPECTOMY WITH SNB, PREOP AND INTRAOP US GUIDED NL, FLAPS, BIOZORB, MARGIN PROBE,PEC BLOCK performed by Malou Veras MD at [address] [place] [address] Right 5/3/2016    THORACOTOMY, WEDGE RESECTION PULMONARY NODULE RIGHT MIDDLE LOBE performed by Boom Rodriguez MD at [address] [place] [address] N/A 2020    PORT INSERTION WITH FLUORO performed by Malou Veras MD at [address] [place] [address] N/A 2020    PORT REMOVAL WITH FLUOROSCOPY performed by Malou Veras MD at [facility]    THORACOTOMY      TONSILLECTOMY      US GUIDED NEEDLE LOC OF RIGHT BREAST  2020    US GUIDED NEEDLE LOC OF RIGHT BREAST 2020 [facility] [address]aves[address]       Current Medications:    Current Outpatient Medications   Medication Sig Dispense Refill    Semaglutide, 1 MG/DOSE, 4 MG/3ML SOPN Inject 1 mg into the skin once a week 3 mL 1    lovastatin (MEVACOR) 20 MG tablet TAKE TWO TABLETS BY MOUTH DAILY 180 tablet 1    escitalopram (LEXAPRO) 10 MG tablet TAKE 1/2 TABLET BY MOUTH DAILY 45 tablet 1    letrozole (FEMARA) 2.5 MG tablet TAKE ONE TABLET BY MOUTH DAILY 60 tablet 4    Cholecalciferol (VITAMIN D) 2000 units CAPS capsule Take by mouth daily       alendronate (FOSAMAX) 70 MG tablet Take 1 tablet by mouth every 7 days 4 tablet 5     No current facility-administered medications for this visit. Allergies: Allergies   Allergen Reactions    Codeine Itching    Other Rash     Chloroprep surgical prep       Social History:    Social History     Tobacco Use    Smoking status: Former     Packs/day: 1.00     Years: 3.00     Pack years: 3.00     Types: Cigarettes     Quit date: 10/1/1984     Years since quittin.4    Smokeless tobacco: Never    Tobacco comments:     3 YEARS PLAYED AROUND AND QUIT IN THE    Vaping Use    Vaping Use: Never used   Substance Use Topics    Alcohol use: No     Alcohol/week: 0.0 standard drinks    Drug use: No       Family History:   Family History   Problem Relation Age of Onset    Cancer Father         bladder ca    Other Sister         MULTIPLE SCLEROSIS    High Blood Pressure Mother     Heart Disease Mother     Stroke Mother        Vitals:  Vitals:    03/15/23 1341   BP: 120/78   Pulse: 97   SpO2: 97%   Weight: 180 lb 3.2 oz (81.7 kg)        Subjective   REVIEW OF SYSTEMS:   Review of Systems   Constitutional:  Positive for fatigue. Negative for chills, diaphoresis and fever. HENT: Negative.   Negative for congestion, ear pain, hearing loss, nosebleeds, sore throat and tinnitus. Eyes: Negative. Negative for pain, discharge and redness. Respiratory: Negative. Negative for cough, shortness of breath and wheezing. Cardiovascular: Negative. Negative for chest pain, palpitations and leg swelling. Gastrointestinal: Negative. Negative for abdominal pain, blood in stool, constipation, diarrhea, nausea and vomiting. Endocrine: Negative for polydipsia. Genitourinary:  Negative for dysuria, flank pain, frequency, hematuria and urgency. Musculoskeletal:  Positive for arthralgias. Negative for back pain, myalgias and neck pain. Skin: Negative. Negative for rash. Neurological: Negative. Negative for dizziness, tremors, seizures, weakness and headaches. Hematological:  Does not bruise/bleed easily. Psychiatric/Behavioral: Negative. The patient is not nervous/anxious. Objective   PHYSICAL EXAM:  Physical Exam  Vitals reviewed. Constitutional:       General: She is not in acute distress. Appearance: She is well-developed. HENT:      Head: Normocephalic and atraumatic. Mouth/Throat:      Pharynx: Uvula midline. Tonsils: No tonsillar exudate. Eyes:      General: Lids are normal.      Conjunctiva/sclera: Conjunctivae normal.      Pupils: Pupils are equal, round, and reactive to light. Neck:      Thyroid: No thyroid mass or thyromegaly. Vascular: No JVD. Trachea: Trachea normal. No tracheal deviation. Cardiovascular:      Rate and Rhythm: Normal rate and regular rhythm. Pulses: Normal pulses. Heart sounds: Normal heart sounds. Pulmonary:      Effort: Pulmonary effort is normal. No respiratory distress. Breath sounds: Normal breath sounds. No wheezing or rales. Chest:      Chest wall: No tenderness. Abdominal:      General: Bowel sounds are normal. There is no distension. Palpations: Abdomen is soft. There is no mass. Tenderness: There is no abdominal tenderness. There is no guarding. Musculoskeletal:         General: No tenderness or deformity. Cervical back: Normal range of motion and neck supple. Comments: Range of motion within normal limits x4 extremities   Skin:     General: Skin is warm. Findings: No bruising, erythema or rash. Neurological:      Mental Status: She is alert and oriented to person, place, and time. Cranial Nerves: No cranial nerve deficit. Coordination: Coordination normal.   Psychiatric:         Behavior: Behavior normal.         Thought Content: Thought content normal.       Labs reviewed today:  Lab Results   Component Value Date    WBC 5.76 03/15/2023    HGB 12.8 03/15/2023    HCT 39.7 03/15/2023    MCV 98.3 (H) 03/15/2023     03/15/2023     Lab Results   Component Value Date    NEUTROABS 3.72 03/15/2023     Lab Results   Component Value Date     12/01/2022    K 4.8 12/01/2022     12/01/2022    CO2 27 12/01/2022    BUN 14 12/01/2022    CREATININE 0.6 12/01/2022    GLUCOSE 116 (H) 12/01/2022    CALCIUM 9.6 12/01/2022    PROT 6.5 (L) 12/01/2022    LABALBU 4.5 12/01/2022    BILITOT 0.3 12/01/2022    ALKPHOS 91 12/01/2022    AST 20 12/01/2022    ALT 25 12/01/2022    LABGLOM >60 12/01/2022    GFRAA >60 10/14/2022    GLOB 2.4 08/12/2020       ASSESSMENT/PLAN:      1. Invasive ductal carcinoma of the right breast, triple negative, ER 4%, March 2020. Current recommendation is for adjuvant endocrine therapy with letrozole 2.5 mg daily, anticipate 5 years dosing through September 2025. She has remained without known radiographic evidence to suggest progression of disease. Tolerating the letrozole and has no new breast complaints. She is scheduled to see MANUEL Sotelo on 3/23/2023 for routine breast examination, will defer my exam today    -Continue letrozole 2.5 mg daily, in the adjuvant setting, no radiographic evidence of recurrent disease.   Anticipate dosing for 5 years: Continue to decrease risk of recurrence of breast cancer    -Encourage self breast examinations  -Keep follow up with MANUEL Davis and mammogram in March 23, 2023    2. Encounter for monitoring aromatase inhibitor therapy  -Continues to deny any vaginal dryness or hot flashes  -Chronic arthralgias remain stable with no significant change    3. Chemotherapy-induced neuropathy, secondary to Taxol  Continues to have chronic mild numbness and tingling to her fingertips and toes, no change from previous exam    4. Osteopenia, 07/28/2022 Bone density Catrinadarian Oh)- osteopenia; left femoral neck T score -1.7; lumbar spine T score -1.7 indicated progression of bone loss compared to bone mineral density study on 6/5/2020 reported a T score of -1.5. Vitamin D of 42.5 on 05/31/2022. Calcium 9.6 and creatinine 0.6 on 12/1/2022. Reports compliant with daily calcium supplement as recommended    Prolia 60 mg subcu every 6 months was initiated on 8/19/2022 in the setting of adjuvant endocrine therapy with osteopenia, she is due for second dose today. She voices that she has a $360 co-pay for the Prolia and if so prefers not to receive the Prolia and would move forward with oral biphosphonate therapy.    -Continue calcium 1200 mg with vitamin D 1000 units daily, to prevent further bone loss. -Prolia injection not given today due to high co-pay: Once verified that the co-pay is what will be due with injection continues to decline can transition to Fosamax 70 mg p.o. once weekly. The use of bisphosphonates as adjuvant therapy should be considered for all postmenopausal women with early breast cancer who are deemed to be candidates for adjuvant therapy, according to a joint clinical practice guideline from [address] [place] [address] and the [address] [place] [address] ([employer]). I discussed all of the above findings included in the assessment and plan with the patient and the patient is in agreement to move forward with current recommendations/treatment.   I have addressed all of their questions and concerns that were verbalized. FOLLOW UP:  Follow-up appointment given for 3 months for breast cancer and osteopenia  Continue to follow with other medical providers as recommended. Labs at next visit: CMP and vitamin D level if not obtained recently      EMR Dragon/Transcription disclaimer:   Much of this encounter note is an electronic transcription/translation of spoken language to printed text. The electronic translation of spoken language may permit erroneous, or at times, nonsensical words or phrases to be inadvertently transcribed; although attempts have made to review the note for such errors, some may still exist.  Please excuse any unrecognized transcription errors and contact us if the error is unintelligible or needs documented correction. Also, portions of this note have been copied forward, however, changed to reflect the most current clinical status of this patient. Electronically signed by ASHWIN Correa on 3/19/2023 at 5:48 PM  Gisele GILMORE am starting this note as a registered nurse for ASHWIN Rudolph.

## 2023-03-17 DIAGNOSIS — M85.89 OSTEOPENIA OF MULTIPLE SITES: Primary | ICD-10-CM

## 2023-03-17 RX ORDER — ALENDRONATE SODIUM 70 MG/1
70 TABLET ORAL
Qty: 4 TABLET | Refills: 5 | Status: SHIPPED | OUTPATIENT
Start: 2023-03-17

## 2023-03-17 NOTE — TELEPHONE ENCOUNTER
Patient states that she cannot afford co-pay with the Prolia injection. Instructed that [place] ASHWIN Javed would like for her to start on Fosamax once weekly-instructed patient on medication, dosage, frequency, and side effect. Prescription sent to [facility].  Instructed to call if she cannot tolerate. Patient v/u.

## 2023-03-19 ASSESSMENT — ENCOUNTER SYMPTOMS
BLOOD IN STOOL: 0
CONSTIPATION: 0
SORE THROAT: 0
COUGH: 0
ABDOMINAL PAIN: 0
SHORTNESS OF BREATH: 0
EYE PAIN: 0
NAUSEA: 0
DIARRHEA: 0
BACK PAIN: 0
VOMITING: 0
EYE REDNESS: 0
GASTROINTESTINAL NEGATIVE: 1
EYES NEGATIVE: 1
WHEEZING: 0
EYE DISCHARGE: 0
RESPIRATORY NEGATIVE: 1

## 2023-03-23 ENCOUNTER — OFFICE VISIT (OUTPATIENT)
Dept: SURGERY | Age: 73
End: 2023-03-23
Payer: MEDICARE

## 2023-03-23 ENCOUNTER — HOSPITAL ENCOUNTER (OUTPATIENT)
Dept: WOMENS IMAGING | Age: 73
Discharge: HOME | End: 2023-03-23
Payer: MEDICARE

## 2023-03-23 VITALS
HEIGHT: 64 IN | OXYGEN SATURATION: 96 % | WEIGHT: 181.8 LBS | TEMPERATURE: 98 F | BODY MASS INDEX: 31.04 KG/M2 | HEART RATE: 91 BPM

## 2023-03-23 DIAGNOSIS — Z85.3 PERSONAL HISTORY OF BREAST CANCER: ICD-10-CM

## 2023-03-23 DIAGNOSIS — R10.32 LEFT GROIN PAIN: Primary | ICD-10-CM

## 2023-03-23 PROCEDURE — G8427 DOCREV CUR MEDS BY ELIG CLIN: HCPCS | Performed by: PHYSICIAN ASSISTANT

## 2023-03-23 PROCEDURE — G0279 TOMOSYNTHESIS, MAMMO: HCPCS

## 2023-03-23 PROCEDURE — 1036F TOBACCO NON-USER: CPT | Performed by: PHYSICIAN ASSISTANT

## 2023-03-23 PROCEDURE — 1090F PRES/ABSN URINE INCON ASSESS: CPT | Performed by: PHYSICIAN ASSISTANT

## 2023-03-23 PROCEDURE — 3017F COLORECTAL CA SCREEN DOC REV: CPT | Performed by: PHYSICIAN ASSISTANT

## 2023-03-23 PROCEDURE — G8417 CALC BMI ABV UP PARAM F/U: HCPCS | Performed by: PHYSICIAN ASSISTANT

## 2023-03-23 PROCEDURE — 1123F ACP DISCUSS/DSCN MKR DOCD: CPT | Performed by: PHYSICIAN ASSISTANT

## 2023-03-23 PROCEDURE — G8484 FLU IMMUNIZE NO ADMIN: HCPCS | Performed by: PHYSICIAN ASSISTANT

## 2023-03-23 PROCEDURE — 99212 OFFICE O/P EST SF 10 MIN: CPT | Performed by: PHYSICIAN ASSISTANT

## 2023-03-23 PROCEDURE — G8399 PT W/DXA RESULTS DOCUMENT: HCPCS | Performed by: PHYSICIAN ASSISTANT

## 2023-03-27 NOTE — PROGRESS NOTES
Subjective  Fred Lilihelen Any         Objective  Patient Active Problem List    Diagnosis Date Noted    Long term current use of aromatase inhibitor 08/03/2022    S/P lumpectomy, right breast 9/15/2020 09/22/2020    Adverse effect of antineoplastic and immunosuppressive drugs, initial encounter      Malignant neoplasm of right breast in female, estrogen receptor negative ([facility] [address] 04/17/2020    Microhematuria 06/07/2019    Bilateral low back pain without sciatica 05/04/2018    Osteopenia of left lower leg 08/16/2017    Type 2 diabetes mellitus without complication, without long-term current use of insulin ([facility] 08/12/2017    Vitamin D deficiency 08/12/2017    Pure hypercholesterolemia 08/12/2017    GERD (gastroesophageal reflux disease) 08/12/2017    Exogenous obesity 08/12/2017    Generalized anxiety disorder 08/12/2017       Current Outpatient Medications   Medication Sig Dispense Refill    alendronate (FOSAMAX) 70 MG tablet Take 1 tablet by mouth every 7 days 4 tablet 5    Semaglutide, 1 MG/DOSE, 4 MG/3ML SOPN Inject 1 mg into the skin once a week 3 mL 1    lovastatin (MEVACOR) 20 MG tablet TAKE TWO TABLETS BY MOUTH DAILY 180 tablet 1    escitalopram (LEXAPRO) 10 MG tablet TAKE 1/2 TABLET BY MOUTH DAILY 45 tablet 1    letrozole (FEMARA) 2.5 MG tablet TAKE ONE TABLET BY MOUTH DAILY 60 tablet 4    Cholecalciferol (VITAMIN D) 2000 units CAPS capsule Take by mouth daily        No current facility-administered medications for this visit.        Allergies Codeine and Other    Past Medical History:   Diagnosis Date    Anxiety     Breast cancer ([facility] 2020    infiltrating ductal carcinoma     Cancer ([facility]     Breast Cancer    Carpal tunnel syndrome     pt denies having carpal tunnel syndrome    Cervicalgia     Depression     Esophagitis     History of therapeutic radiation 2020    Hx antineoplastic chemo 2020    Hyperlipidemia     Idiopathic peripheral neuropathy     Insomnia     Long term current use of aromatase inhibitor

## 2023-04-17 ENCOUNTER — HOSPITAL ENCOUNTER (OUTPATIENT)
Dept: CT IMAGING | Age: 73
Discharge: HOME | End: 2023-04-17
Payer: MEDICARE

## 2023-04-17 DIAGNOSIS — R10.32 LEFT GROIN PAIN: ICD-10-CM

## 2023-04-17 PROCEDURE — 74176 CT ABD & PELVIS W/O CONTRAST: CPT | Performed by: RADIOLOGY

## 2023-04-17 PROCEDURE — 74176 CT ABD & PELVIS W/O CONTRAST: CPT

## 2023-04-24 ENCOUNTER — OFFICE VISIT (OUTPATIENT)
Dept: SURGERY | Age: 73
End: 2023-04-24

## 2023-04-24 VITALS — SYSTOLIC BLOOD PRESSURE: 120 MMHG | DIASTOLIC BLOOD PRESSURE: 70 MMHG | HEART RATE: 76 BPM

## 2023-04-24 DIAGNOSIS — R10.32 LEFT GROIN PAIN: Primary | ICD-10-CM

## 2023-05-13 DIAGNOSIS — E11.9 TYPE 2 DIABETES MELLITUS WITHOUT COMPLICATION, WITHOUT LONG-TERM CURRENT USE OF INSULIN (HCC): ICD-10-CM

## 2023-05-13 DIAGNOSIS — R73.9 HYPERGLYCEMIA: ICD-10-CM

## 2023-05-15 RX ORDER — SEMAGLUTIDE 1.34 MG/ML
INJECTION, SOLUTION SUBCUTANEOUS
Qty: 3 ML | Refills: 1 | Status: SHIPPED | OUTPATIENT
Start: 2023-05-15

## 2023-06-02 DIAGNOSIS — F41.1 GENERALIZED ANXIETY DISORDER: ICD-10-CM

## 2023-06-02 DIAGNOSIS — E66.09 EXOGENOUS OBESITY: ICD-10-CM

## 2023-06-02 DIAGNOSIS — E78.00 PURE HYPERCHOLESTEROLEMIA: ICD-10-CM

## 2023-06-02 DIAGNOSIS — E05.90 SUBCLINICAL HYPERTHYROIDISM: ICD-10-CM

## 2023-06-02 DIAGNOSIS — E11.9 TYPE 2 DIABETES MELLITUS WITHOUT COMPLICATION, WITHOUT LONG-TERM CURRENT USE OF INSULIN (HCC): ICD-10-CM

## 2023-06-02 DIAGNOSIS — E55.9 VITAMIN D DEFICIENCY: ICD-10-CM

## 2023-06-02 LAB
25(OH)D3 SERPL-MCNC: 57.1 NG/ML
ALBUMIN SERPL-MCNC: 4.5 G/DL (ref 3.5–5.2)
ALP SERPL-CCNC: 93 U/L (ref 35–104)
ALT SERPL-CCNC: 24 U/L (ref 5–33)
ANION GAP SERPL CALCULATED.3IONS-SCNC: 15 MMOL/L (ref 7–19)
AST SERPL-CCNC: 23 U/L (ref 5–32)
BACTERIA URNS QL MICRO: NEGATIVE /HPF
BASOPHILS # BLD: 0 K/UL (ref 0–0.2)
BASOPHILS NFR BLD: 0.8 % (ref 0–1)
BILIRUB SERPL-MCNC: 0.3 MG/DL (ref 0.2–1.2)
BILIRUB UR QL STRIP: NEGATIVE
BUN SERPL-MCNC: 15 MG/DL (ref 8–23)
CALCIUM SERPL-MCNC: 9.5 MG/DL (ref 8.8–10.2)
CHLORIDE SERPL-SCNC: 101 MMOL/L (ref 98–111)
CHOLEST SERPL-MCNC: 196 MG/DL (ref 160–199)
CLARITY UR: ABNORMAL
CO2 SERPL-SCNC: 24 MMOL/L (ref 22–29)
COLOR UR: YELLOW
CREAT SERPL-MCNC: 0.7 MG/DL (ref 0.5–0.9)
CREAT UR-MCNC: 177.2 MG/DL (ref 4.2–622)
CRYSTALS URNS MICRO: ABNORMAL /HPF
EOSINOPHIL # BLD: 0.2 K/UL (ref 0–0.6)
EOSINOPHIL NFR BLD: 3.1 % (ref 0–5)
ERYTHROCYTE [DISTWIDTH] IN BLOOD BY AUTOMATED COUNT: 13.2 % (ref 11.5–14.5)
GLUCOSE SERPL-MCNC: 109 MG/DL (ref 74–109)
GLUCOSE UR STRIP.AUTO-MCNC: NEGATIVE MG/DL
HBA1C MFR BLD: 5.7 % (ref 4–6)
HCT VFR BLD AUTO: 39 % (ref 37–47)
HDLC SERPL-MCNC: 64 MG/DL (ref 65–121)
HGB BLD-MCNC: 12.7 G/DL (ref 12–16)
HGB UR STRIP.AUTO-MCNC: NEGATIVE MG/L
IMM GRANULOCYTES # BLD: 0 K/UL
KETONES UR STRIP.AUTO-MCNC: NEGATIVE MG/DL
LDLC SERPL CALC-MCNC: 116 MG/DL
LEUKOCYTE ESTERASE UR QL STRIP.AUTO: ABNORMAL
LYMPHOCYTES # BLD: 1.3 K/UL (ref 1.1–4.5)
LYMPHOCYTES NFR BLD: 24 % (ref 20–40)
MCH RBC QN AUTO: 31.5 PG (ref 27–31)
MCHC RBC AUTO-ENTMCNC: 32.6 G/DL (ref 33–37)
MCV RBC AUTO: 96.8 FL (ref 81–99)
MICROALBUMIN UR-MCNC: 3.8 MG/DL (ref 0–19)
MICROALBUMIN/CREAT UR-RTO: 21.4 MG/G
MONOCYTES # BLD: 0.4 K/UL (ref 0–0.9)
MONOCYTES NFR BLD: 7.9 % (ref 0–10)
NEUTROPHILS # BLD: 3.3 K/UL (ref 1.5–7.5)
NEUTS SEG NFR BLD: 64 % (ref 50–65)
NITRITE UR QL STRIP.AUTO: NEGATIVE
PH UR STRIP.AUTO: 5.5 [PH] (ref 5–8)
PLATELET # BLD AUTO: 261 K/UL (ref 130–400)
PMV BLD AUTO: 11.2 FL (ref 9.4–12.3)
POTASSIUM SERPL-SCNC: 4.2 MMOL/L (ref 3.5–5)
PROT SERPL-MCNC: 7 G/DL (ref 6.6–8.7)
PROT UR STRIP.AUTO-MCNC: NEGATIVE MG/DL
RBC # BLD AUTO: 4.03 M/UL (ref 4.2–5.4)
RBC #/AREA URNS HPF: ABNORMAL /HPF (ref 0–2)
SODIUM SERPL-SCNC: 140 MMOL/L (ref 136–145)
SP GR UR STRIP.AUTO: 1.02 (ref 1–1.03)
SQUAMOUS #/AREA URNS HPF: ABNORMAL /HPF
TRIGL SERPL-MCNC: 82 MG/DL (ref 0–149)
TSH SERPL DL<=0.005 MIU/L-ACNC: 1.17 UIU/ML (ref 0.27–4.2)
UROBILINOGEN UR STRIP.AUTO-MCNC: 0.2 E.U./DL
WBC # BLD AUTO: 5.2 K/UL (ref 4.8–10.8)
WBC #/AREA URNS HPF: ABNORMAL /HPF (ref 0–5)

## 2023-06-05 ENCOUNTER — OFFICE VISIT (OUTPATIENT)
Dept: INTERNAL MEDICINE | Age: 73
End: 2023-06-05
Payer: MEDICARE

## 2023-06-05 VITALS
BODY MASS INDEX: 30.39 KG/M2 | DIASTOLIC BLOOD PRESSURE: 70 MMHG | HEIGHT: 64 IN | SYSTOLIC BLOOD PRESSURE: 108 MMHG | OXYGEN SATURATION: 97 % | WEIGHT: 178 LBS | RESPIRATION RATE: 18 BRPM | HEART RATE: 83 BPM

## 2023-06-05 DIAGNOSIS — Z00.00 MEDICARE ANNUAL WELLNESS VISIT, INITIAL: ICD-10-CM

## 2023-06-05 DIAGNOSIS — E55.9 VITAMIN D DEFICIENCY: ICD-10-CM

## 2023-06-05 DIAGNOSIS — M85.862 OSTEOPENIA OF LEFT LOWER LEG: ICD-10-CM

## 2023-06-05 DIAGNOSIS — E11.9 TYPE 2 DIABETES MELLITUS WITHOUT COMPLICATION, WITHOUT LONG-TERM CURRENT USE OF INSULIN (HCC): ICD-10-CM

## 2023-06-05 DIAGNOSIS — F41.1 GENERALIZED ANXIETY DISORDER: ICD-10-CM

## 2023-06-05 DIAGNOSIS — E05.90 SUBCLINICAL HYPERTHYROIDISM: ICD-10-CM

## 2023-06-05 DIAGNOSIS — Z00.00 INITIAL MEDICARE ANNUAL WELLNESS VISIT: Primary | ICD-10-CM

## 2023-06-05 DIAGNOSIS — G47.33 OSA (OBSTRUCTIVE SLEEP APNEA): ICD-10-CM

## 2023-06-05 DIAGNOSIS — E78.00 PURE HYPERCHOLESTEROLEMIA: ICD-10-CM

## 2023-06-05 PROCEDURE — G8417 CALC BMI ABV UP PARAM F/U: HCPCS | Performed by: INTERNAL MEDICINE

## 2023-06-05 PROCEDURE — 1123F ACP DISCUSS/DSCN MKR DOCD: CPT | Performed by: INTERNAL MEDICINE

## 2023-06-05 PROCEDURE — G0438 PPPS, INITIAL VISIT: HCPCS | Performed by: INTERNAL MEDICINE

## 2023-06-05 PROCEDURE — G8427 DOCREV CUR MEDS BY ELIG CLIN: HCPCS | Performed by: INTERNAL MEDICINE

## 2023-06-05 PROCEDURE — 1036F TOBACCO NON-USER: CPT | Performed by: INTERNAL MEDICINE

## 2023-06-05 PROCEDURE — 2022F DILAT RTA XM EVC RTNOPTHY: CPT | Performed by: INTERNAL MEDICINE

## 2023-06-05 PROCEDURE — 1090F PRES/ABSN URINE INCON ASSESS: CPT | Performed by: INTERNAL MEDICINE

## 2023-06-05 PROCEDURE — 3044F HG A1C LEVEL LT 7.0%: CPT | Performed by: INTERNAL MEDICINE

## 2023-06-05 PROCEDURE — 3017F COLORECTAL CA SCREEN DOC REV: CPT | Performed by: INTERNAL MEDICINE

## 2023-06-05 PROCEDURE — G8399 PT W/DXA RESULTS DOCUMENT: HCPCS | Performed by: INTERNAL MEDICINE

## 2023-06-05 PROCEDURE — 99214 OFFICE O/P EST MOD 30 MIN: CPT | Performed by: INTERNAL MEDICINE

## 2023-06-05 SDOH — ECONOMIC STABILITY: INCOME INSECURITY: HOW HARD IS IT FOR YOU TO PAY FOR THE VERY BASICS LIKE FOOD, HOUSING, MEDICAL CARE, AND HEATING?: NOT HARD AT ALL

## 2023-06-05 SDOH — ECONOMIC STABILITY: TRANSPORTATION INSECURITY
IN THE PAST 12 MONTHS, HAS LACK OF TRANSPORTATION KEPT YOU FROM MEETINGS, WORK, OR FROM GETTING THINGS NEEDED FOR DAILY LIVING?: NO

## 2023-06-05 SDOH — ECONOMIC STABILITY: FOOD INSECURITY: WITHIN THE PAST 12 MONTHS, YOU WORRIED THAT YOUR FOOD WOULD RUN OUT BEFORE YOU GOT MONEY TO BUY MORE.: NEVER TRUE

## 2023-06-05 SDOH — HEALTH STABILITY: PHYSICAL HEALTH
ON AVERAGE, HOW MANY DAYS PER WEEK DO YOU ENGAGE IN MODERATE TO STRENUOUS EXERCISE (LIKE A BRISK WALK)?: PATIENT DECLINED

## 2023-06-05 SDOH — ECONOMIC STABILITY: HOUSING INSECURITY
IN THE LAST 12 MONTHS, WAS THERE A TIME WHEN YOU DID NOT HAVE A STEADY PLACE TO SLEEP OR SLEPT IN A SHELTER (INCLUDING NOW)?: NO

## 2023-06-05 SDOH — ECONOMIC STABILITY: FOOD INSECURITY: WITHIN THE PAST 12 MONTHS, THE FOOD YOU BOUGHT JUST DIDN'T LAST AND YOU DIDN'T HAVE MONEY TO GET MORE.: NEVER TRUE

## 2023-06-05 ASSESSMENT — ENCOUNTER SYMPTOMS
ABDOMINAL PAIN: 0
CONSTIPATION: 0
WHEEZING: 0
CHEST TIGHTNESS: 0
COUGH: 0
SORE THROAT: 0

## 2023-06-05 ASSESSMENT — LIFESTYLE VARIABLES
HOW MANY STANDARD DRINKS CONTAINING ALCOHOL DO YOU HAVE ON A TYPICAL DAY: PATIENT DOES NOT DRINK
HOW OFTEN DO YOU HAVE A DRINK CONTAINING ALCOHOL: 1
HOW MANY STANDARD DRINKS CONTAINING ALCOHOL DO YOU HAVE ON A TYPICAL DAY: 0
HOW OFTEN DO YOU HAVE SIX OR MORE DRINKS ON ONE OCCASION: 1
HOW OFTEN DO YOU HAVE A DRINK CONTAINING ALCOHOL: NEVER

## 2023-06-05 ASSESSMENT — PATIENT HEALTH QUESTIONNAIRE - PHQ9
SUM OF ALL RESPONSES TO PHQ QUESTIONS 1-9: 0
1. LITTLE INTEREST OR PLEASURE IN DOING THINGS: 0
2. FEELING DOWN, DEPRESSED OR HOPELESS: 0
SUM OF ALL RESPONSES TO PHQ QUESTIONS 1-9: 0
SUM OF ALL RESPONSES TO PHQ9 QUESTIONS 1 & 2: 0

## 2023-06-05 NOTE — PATIENT INSTRUCTIONS
program that offers meal replacement bars or shakes may be better for you. Or if you like to prepare meals, finding a plan that includes daily menus and recipes may be best.  Ask your doctor about other health professionals who can help you achieve your weight loss goals. A dietitian can help you make healthy changes in your diet. An exercise specialist or  can help you develop a safe and effective exercise program.  A counselor or psychiatrist can help you cope with issues such as depression, anxiety, or family problems that can make it hard to focus on weight loss. Consider joining a support group for people who are trying to lose weight. Your doctor can suggest groups in your area. Where can you learn more? Go to http://www.woods.com/ and enter U357 to learn more about \"Starting a Weight Loss Plan: Care Instructions. \"  Current as of: May 9, 2022               Content Version: 13.6  © 2006-2023 [employer]. Care instructions adapted under license by [facility] ([facility]). If you have questions about a medical condition or this instruction, always ask your healthcare professional. [place] [address] any warranty or liability for your use of this information. A Healthy Heart: Care Instructions  Your Care Instructions     Coronary artery disease, also called heart disease, occurs when a substance called plaque builds up in the vessels that supply oxygen-rich blood to your heart muscle. This can narrow the blood vessels and reduce blood flow. A heart attack happens when blood flow is completely blocked. A high-fat diet, smoking, and other factors increase the risk of heart disease. Your doctor has found that you have a chance of having heart disease. You can do lots of things to keep your heart healthy. It may not be easy, but you can change your diet, exercise more, and quit smoking.  These steps really work to lower your chance of heart

## 2023-06-05 NOTE — PROGRESS NOTES
No rash. Neurological:      Mental Status: She is oriented to person, place, and time.        Lab Review   Orders Only on 06/02/2023   Component Date Value    Vit D, 25-Hydroxy 06/02/2023 57.1     TSH 06/02/2023 1.170     Color, UA 06/02/2023 YELLOW     Clarity, UA 06/02/2023 TURBID (A)     Glucose, Ur 06/02/2023 Negative     Bilirubin Urine 06/02/2023 Negative     Ketones, Urine 06/02/2023 Negative     Specific Gravity, UA 06/02/2023 1.020     Blood, Urine 06/02/2023 Negative     pH, UA 06/02/2023 5.5     Protein, UA 06/02/2023 Negative     Urobilinogen, Urine 06/02/2023 0.2     Nitrite, Urine 06/02/2023 Negative     Leukocyte Esterase, Urine 06/02/2023 SMALL (A)     Cholesterol, Total 06/02/2023 196     Triglycerides 06/02/2023 82     HDL 06/02/2023 64 (L)     LDL Calculated 06/02/2023 116     Hemoglobin A1C 06/02/2023 5.7     WBC 06/02/2023 5.2     RBC 06/02/2023 4.03 (L)     Hemoglobin 06/02/2023 12.7     Hematocrit 06/02/2023 39.0     MCV 06/02/2023 96.8     MCH 06/02/2023 31.5 (H)     MCHC 06/02/2023 32.6 (L)     RDW 06/02/2023 13.2     Platelets 49/71/9591 261     MPV 06/02/2023 11.2     Neutrophils % 06/02/2023 64.0     Lymphocytes % 06/02/2023 24.0     Monocytes % 06/02/2023 7.9     Eosinophils % 06/02/2023 3.1     Basophils % 06/02/2023 0.8     Neutrophils Absolute 06/02/2023 3.3     Immature Granulocytes # 06/02/2023 0.0     Lymphocytes Absolute 06/02/2023 1.3     Monocytes Absolute 06/02/2023 0.40     Eosinophils Absolute 06/02/2023 0.20     Basophils Absolute 06/02/2023 0.00     Sodium 06/02/2023 140     Potassium 06/02/2023 4.2     Chloride 06/02/2023 101     CO2 06/02/2023 24     Anion Gap 06/02/2023 15     Glucose 06/02/2023 109     BUN 06/02/2023 15     Creatinine 06/02/2023 0.7     Est, Glom Filt Rate 06/02/2023 >60     Calcium 06/02/2023 9.5     Total Protein 06/02/2023 7.0     Albumin 06/02/2023 4.5     Total Bilirubin 06/02/2023 0.3    
amount of solid fat-butter, margarine, and shortening-you eat. Use olive, peanut, or canola oil when you cook. Bake, broil, and steam foods instead of frying them. Eat a variety of fruit and vegetables every day. Dark green, deep orange, red, or yellow fruits and vegetables are especially good for you. Examples include spinach, carrots, peaches, and berries. Foods high in fiber can reduce your cholesterol and provide important vitamins and minerals. High-fiber foods include whole-grain cereals and breads, oatmeal, beans, brown rice, citrus fruits, and apples. Eat lean proteins. Heart-healthy proteins include seafood, lean meats and poultry, eggs, beans, peas, nuts, seeds, and soy products. Limit drinks and foods with added sugar. These include candy, desserts, and soda pop. Lifestyle changes    If your doctor recommends it, get more exercise. Walking is a good choice. Bit by bit, increase the amount you walk every day. Try for at least 30 minutes on most days of the week. You also may want to swim, bike, or do other activities. Do not smoke. If you need help quitting, talk to your doctor about stop-smoking programs and medicines. These can increase your chances of quitting for good. Quitting smoking may be the most important step you can take to protect your heart. It is never too late to quit. Limit alcohol to 2 drinks a day for men and 1 drink a day for women. Too much alcohol can cause health problems. Manage other health problems such as diabetes, high blood pressure, and high cholesterol. If you think you may have a problem with alcohol or drug use, talk to your doctor. Medicines    Take your medicines exactly as prescribed. Call your doctor if you think you are having a problem with your medicine. If your doctor recommends aspirin, take the amount directed each day. Make sure you take aspirin and not another kind of pain reliever, such as acetaminophen (Tylenol).    When

## 2023-06-06 ENCOUNTER — OFFICE VISIT (OUTPATIENT)
Dept: GASTROENTEROLOGY | Facility: CLINIC | Age: 73
End: 2023-06-06
Payer: MEDICARE

## 2023-06-06 VITALS
HEIGHT: 64 IN | BODY MASS INDEX: 30.39 KG/M2 | OXYGEN SATURATION: 98 % | SYSTOLIC BLOOD PRESSURE: 120 MMHG | HEART RATE: 85 BPM | TEMPERATURE: 96 F | DIASTOLIC BLOOD PRESSURE: 68 MMHG | WEIGHT: 178 LBS

## 2023-06-06 DIAGNOSIS — R93.5 ABNORMAL CT OF THE ABDOMEN: Primary | ICD-10-CM

## 2023-06-06 DIAGNOSIS — R11.0 NAUSEA: ICD-10-CM

## 2023-06-06 PROCEDURE — 1159F MED LIST DOCD IN RCRD: CPT | Performed by: NURSE PRACTITIONER

## 2023-06-06 PROCEDURE — 99214 OFFICE O/P EST MOD 30 MIN: CPT | Performed by: NURSE PRACTITIONER

## 2023-06-06 PROCEDURE — 1160F RVW MEDS BY RX/DR IN RCRD: CPT | Performed by: NURSE PRACTITIONER

## 2023-06-06 NOTE — PROGRESS NOTES
[facility] GASTROENTEROLOGY - OFFICE NOTE    6/6/2023    Chely Venegas   1950    Primary Physician: Mary Becker MD    Referring Provider: Jeff JASSO.    Chief Complaint   Patient presents with    Abnormal Imaging         HISTORY OF PRESENT ILLNESS:     Chely Venegas is a 72 y.o. female presents  with abnormal ct abdomen. She has had occasional nausea for couple of months. Appetite is good. Has also noted occasional diarrhea over the last 1 year.  No abdominal pain. No vomiting or fever. No rectal bleeding. No weight loss.         She has history of breast cancer 2020. She had chemo and radiation. No surgery.       CT ABDOMEN PELVIS WO CONTRAST Additional Contrast? Oral (04/17/2023 16:28 EDT)   Done for left groin pain   Impression  Thickening of a segment of the proximal jejunum measures 6.8 cm in length.No  adjacent inflammatory changes are visualized.This may represent bowel wall  thickening secondary to inflammatory process or transient peristalsis.  Short-term follow-up CT with oral and preferably IV contrast is suggested.There  is no evidence of bowel obstruction.  Post cholecystectomy.  Punctate left-sided nonobstructing nephrolithiasis.  No evidence of abdominal wall hernia.  No evidence of abdominal, pelvic, or inguinal lymphadenopathy.        COLONOSCOPY (11/16/2018 09:17) Recommend  repeat 10 years.   Tissue Pathology Exam (11/16/2018 09:37) hyperplastic         UPPER GI ENDOSCOPY (11/16/2018 09:14)         Past Medical History:   Diagnosis Date    Anxiety     Arthritis     Breast cancer     Depression     Esophagitis     Hyperlipidemia        Past Surgical History:   Procedure Laterality Date    BREAST LUMPECTOMY      CHOLECYSTECTOMY      COLONOSCOPY  07/21/2006    normal    COLONOSCOPY N/A 11/16/2018    Procedure: COLONOSCOPY WITH ANESTHESIA;  Surgeon: Joe Dougherty MD;  Location: [facility] ENDOSCOPY;  Service: Gastroenterology    ENDOSCOPY N/A 11/16/2018    Procedure:  "ESOPHAGOGASTRODUODENOSCOPY WITH ANESTHESIA;  Surgeon: Joe Dougherty MD;  Location: [facility] ENDOSCOPY;  Service: Gastroenterology    HERNIA REPAIR      LUNG LOBECTOMY      THORACOTOMY      TONSILLECTOMY         Outpatient Medications Marked as Taking for the 23 encounter (Office Visit) with Renata Almodovar APRN   Medication Sig Dispense Refill    aspirin 81 MG EC tablet Take 1 tablet by mouth Daily.      Cholecalciferol (VITAMIN D) 50 MCG (2000 UT) capsule Take  by mouth.      escitalopram (LEXAPRO) 5 MG tablet Take 1 tablet by mouth Daily.      letrozole (FEMARA) 2.5 MG tablet Take 1 tablet by mouth Daily.      lovastatin (MEVACOR) 20 MG tablet Take 2 tablets by mouth Every Night.      OZEMPIC, 0.25 OR 0.5 MG/DOSE, 2 MG/1.5ML solution pen-injector          Allergies   Allergen Reactions    Codeine Itching    Other Rash     Chloroprep surgical prep       Social History     Socioeconomic History    Marital status:    Tobacco Use    Smoking status: Former     Years: 3.00     Types: Cigarettes     Quit date: 10/7/1983     Years since quittin.6    Smokeless tobacco: Never   Substance and Sexual Activity    Alcohol use: No    Drug use: No    Sexual activity: Defer       Family History   Problem Relation Age of Onset    Stroke Mother     Cancer Father     Colon cancer Neg Hx     Colon polyps Neg Hx        Review of Systems   Constitutional:  Negative for chills, fever and unexpected weight change.   Respiratory:  Negative for shortness of breath.    Cardiovascular:  Negative for chest pain.   Gastrointestinal:  Negative for abdominal distention, abdominal pain, anal bleeding, blood in stool, constipation, diarrhea, nausea and vomiting.      Vitals:    23 0908   BP: 120/68   Pulse: 85   Temp: 96 °F (35.6 °C)   SpO2: 98%   Weight: 80.7 kg (178 lb)   Height: 162.6 cm (64\")      Body mass index is 30.55 kg/m².    Physical Exam  Vitals reviewed.   Constitutional:       General: She is not in acute " distress.  Cardiovascular:      Rate and Rhythm: Normal rate and regular rhythm.      Heart sounds: Normal heart sounds.   Pulmonary:      Effort: Pulmonary effort is normal.      Breath sounds: Normal breath sounds.   Abdominal:      General: Bowel sounds are normal. There is no distension.      Palpations: Abdomen is soft.      Tenderness: There is no abdominal tenderness.   Skin:     General: Skin is warm and dry.   Neurological:      Mental Status: She is alert.       Results for orders placed or performed during the hospital encounter of 07/20/21   COVID-19,[facility] IN-HOUSE,Nasal Swab No Transport Media 3-4 HR TAT - Swab, Nasal Cavity    Specimen: Nasal Cavity; Swab   Result Value Ref Range    COVID19 Not Detected Not Detected - Ref. Range           ASSESSMENT AND PLAN    Assessment & Plan     Diagnoses and all orders for this visit:    1. Abnormal CT of the abdomen (Primary)  -     Case Request; Standing  -     Case Request  -     CT Abdomen Pelvis With & Without Contrast Enterography    2. Nausea  -     Case Request; Standing  -     Case Request    Other orders  -     Implement Anesthesia Orders Day of Procedure; Standing  -     Obtain Informed Consent; Standing      Differential diagnoses discussed including but not limited to inflammation/IBD, artifact, or worse case scenario of malignancy.  I recommend egd with small bowel enteroscopy. I did inform her that I cannot guarantee that this area will be visualized with enteroscopy but willing to give it a try. She does want to proceed. I also recommend ct enterography of abdomen/pelvis as well.           ESOPHAGOGASTRODUODENOSCOPY WITH ANESTHESIA (N/A)  Risk, benefits, and alternatives of endoscopy were explained in full.  They understand that there is a risk of bleeding, perforation, and infection.  The risk of perforation goes up with esophageal dilation.  Other options to evaluate UGI complaints could involve barium swallow or UGI series, but these would be  diagnostic tests only.  Patient was given time to ask questions.  I answered them to their satisfaction and they are agreeable to proceeding         No follow-ups on file.          There are no Patient Instructions on file for this visit.      Renata Almodovar, APRN

## 2023-06-09 ENCOUNTER — HOSPITAL ENCOUNTER (OUTPATIENT)
Dept: CT IMAGING | Facility: HOSPITAL | Age: 73
Discharge: HOME | End: 2023-06-09
Payer: MEDICARE

## 2023-06-09 PROCEDURE — 25510000001 IOPAMIDOL PER 1 ML: Performed by: NURSE PRACTITIONER

## 2023-06-09 PROCEDURE — 74178 CT ABD&PLV WO CNTR FLWD CNTR: CPT

## 2023-06-09 RX ADMIN — IOPAMIDOL 100 ML: 755 INJECTION, SOLUTION INTRAVENOUS at 15:58

## 2023-06-12 ENCOUNTER — TELEPHONE (OUTPATIENT)
Dept: GASTROENTEROLOGY | Facility: CLINIC | Age: 73
End: 2023-06-12
Payer: MEDICARE

## 2023-06-12 ENCOUNTER — TELEPHONE (OUTPATIENT)
Dept: GASTROENTEROLOGY | Facility: CLINIC | Age: 73
End: 2023-06-12

## 2023-06-12 NOTE — TELEPHONE ENCOUNTER
Ulices, please call Dr. Becker's office and let them know that I ordered ct enterography of abdomen on this patient that noted kidney cyst and Tarlov cyst. Will defer if any further workup/evaluation of these findings to Dr. Becker.  Please fax ct the her office. Thank you

## 2023-06-12 NOTE — TELEPHONE ENCOUNTER
PT called wanting to cancel her endo.  PT said her test were good. At this time, she would like to wait on the endo.

## 2023-06-19 RX ORDER — SEMAGLUTIDE 1.34 MG/ML
1 INJECTION, SOLUTION SUBCUTANEOUS WEEKLY
Qty: 9 ML | Refills: 1 | Status: SHIPPED | OUTPATIENT
Start: 2023-06-19

## 2023-06-19 NOTE — TELEPHONE ENCOUNTER
[facility] called to see if pt can have a 3-month supply of Ozempic. Med pended if ok.     Requested Prescriptions     Pending Prescriptions Disp Refills    Semaglutide, 1 MG/DOSE, (OZEMPIC, 1 MG/DOSE,) 4 MG/3ML SOPN 9 mL 1     Sig: Inject 1 mg into the skin once a week

## 2023-06-20 ENCOUNTER — OFFICE VISIT (OUTPATIENT)
Dept: HEMATOLOGY | Age: 73
End: 2023-06-20
Payer: MEDICARE

## 2023-06-20 ENCOUNTER — HOSPITAL ENCOUNTER (OUTPATIENT)
Dept: INFUSION THERAPY | Age: 73
Discharge: HOME | End: 2023-06-20
Payer: MEDICARE

## 2023-06-20 VITALS
HEART RATE: 89 BPM | OXYGEN SATURATION: 95 % | BODY MASS INDEX: 30.21 KG/M2 | SYSTOLIC BLOOD PRESSURE: 120 MMHG | DIASTOLIC BLOOD PRESSURE: 70 MMHG | WEIGHT: 176 LBS

## 2023-06-20 DIAGNOSIS — Z51.81 ENCOUNTER FOR MONITORING AROMATASE INHIBITOR THERAPY: ICD-10-CM

## 2023-06-20 DIAGNOSIS — Z17.0 MALIGNANT NEOPLASM OF BREAST IN FEMALE, ESTROGEN RECEPTOR POSITIVE, UNSPECIFIED LATERALITY, UNSPECIFIED SITE OF BREAST (HCC): Primary | ICD-10-CM

## 2023-06-20 DIAGNOSIS — T45.1X5A CHEMOTHERAPY-INDUCED NEUROPATHY (HCC): ICD-10-CM

## 2023-06-20 DIAGNOSIS — Z78.0 POST-MENOPAUSAL: ICD-10-CM

## 2023-06-20 DIAGNOSIS — M85.80 OSTEOPENIA, UNSPECIFIED LOCATION: ICD-10-CM

## 2023-06-20 DIAGNOSIS — C50.919 MALIGNANT NEOPLASM OF BREAST IN FEMALE, ESTROGEN RECEPTOR POSITIVE, UNSPECIFIED LATERALITY, UNSPECIFIED SITE OF BREAST (HCC): ICD-10-CM

## 2023-06-20 DIAGNOSIS — Z79.811 ENCOUNTER FOR MONITORING AROMATASE INHIBITOR THERAPY: ICD-10-CM

## 2023-06-20 DIAGNOSIS — C50.919 MALIGNANT NEOPLASM OF BREAST IN FEMALE, ESTROGEN RECEPTOR POSITIVE, UNSPECIFIED LATERALITY, UNSPECIFIED SITE OF BREAST (HCC): Primary | ICD-10-CM

## 2023-06-20 DIAGNOSIS — Z17.0 MALIGNANT NEOPLASM OF BREAST IN FEMALE, ESTROGEN RECEPTOR POSITIVE, UNSPECIFIED LATERALITY, UNSPECIFIED SITE OF BREAST (HCC): ICD-10-CM

## 2023-06-20 DIAGNOSIS — Z79.811 LONG TERM CURRENT USE OF AROMATASE INHIBITOR: ICD-10-CM

## 2023-06-20 DIAGNOSIS — G62.0 CHEMOTHERAPY-INDUCED NEUROPATHY (HCC): ICD-10-CM

## 2023-06-20 LAB
BASOPHILS # BLD: 0.02 K/UL (ref 0.01–0.08)
BASOPHILS NFR BLD: 0.4 % (ref 0.1–1.2)
EOSINOPHIL # BLD: 0.13 K/UL (ref 0.04–0.54)
EOSINOPHIL NFR BLD: 2.7 % (ref 0.7–7)
ERYTHROCYTE [DISTWIDTH] IN BLOOD BY AUTOMATED COUNT: 13.2 % (ref 11.7–14.4)
HCT VFR BLD AUTO: 41.7 % (ref 34.1–44.9)
HGB BLD-MCNC: 12.7 G/DL (ref 11.2–15.7)
LYMPHOCYTES # BLD: 1.19 K/UL (ref 1.18–3.74)
LYMPHOCYTES NFR BLD: 24.4 % (ref 19.3–53.1)
MCH RBC QN AUTO: 31.7 PG (ref 25.6–32.2)
MCHC RBC AUTO-ENTMCNC: 30.5 G/DL (ref 32.3–35.5)
MCV RBC AUTO: 104 FL (ref 79.4–94.8)
MONOCYTES # BLD: 0.43 K/UL (ref 0.24–0.82)
MONOCYTES NFR BLD: 8.8 % (ref 4.7–12.5)
NEUTROPHILS # BLD: 3.1 K/UL (ref 1.56–6.13)
NEUTS SEG NFR BLD: 63.5 % (ref 34–71.1)
PLATELET # BLD AUTO: 175 K/UL (ref 182–369)
PMV BLD AUTO: 11.1 FL (ref 7.4–10.4)
RBC # BLD AUTO: 4.01 M/UL (ref 3.93–5.22)
WBC # BLD AUTO: 4.88 K/UL (ref 3.98–10.04)

## 2023-06-20 PROCEDURE — 99214 OFFICE O/P EST MOD 30 MIN: CPT | Performed by: NURSE PRACTITIONER

## 2023-06-20 PROCEDURE — 85025 COMPLETE CBC W/AUTO DIFF WBC: CPT

## 2023-06-20 PROCEDURE — 99211 OFF/OP EST MAY X REQ PHY/QHP: CPT

## 2023-06-20 PROCEDURE — G8417 CALC BMI ABV UP PARAM F/U: HCPCS | Performed by: NURSE PRACTITIONER

## 2023-06-20 PROCEDURE — G8427 DOCREV CUR MEDS BY ELIG CLIN: HCPCS | Performed by: NURSE PRACTITIONER

## 2023-06-20 PROCEDURE — G8399 PT W/DXA RESULTS DOCUMENT: HCPCS | Performed by: NURSE PRACTITIONER

## 2023-06-20 PROCEDURE — 1036F TOBACCO NON-USER: CPT | Performed by: NURSE PRACTITIONER

## 2023-06-20 PROCEDURE — 1090F PRES/ABSN URINE INCON ASSESS: CPT | Performed by: NURSE PRACTITIONER

## 2023-06-20 PROCEDURE — 36415 COLL VENOUS BLD VENIPUNCTURE: CPT

## 2023-06-20 PROCEDURE — 3017F COLORECTAL CA SCREEN DOC REV: CPT | Performed by: NURSE PRACTITIONER

## 2023-06-20 PROCEDURE — 1123F ACP DISCUSS/DSCN MKR DOCD: CPT | Performed by: NURSE PRACTITIONER

## 2023-06-26 ASSESSMENT — ENCOUNTER SYMPTOMS
EYE PAIN: 0
BACK PAIN: 0
SORE THROAT: 0
EYE REDNESS: 0
GASTROINTESTINAL NEGATIVE: 1
VOMITING: 0
ABDOMINAL PAIN: 0
EYE DISCHARGE: 0
SHORTNESS OF BREATH: 0
WHEEZING: 0
NAUSEA: 0
DIARRHEA: 0
EYES NEGATIVE: 1
CONSTIPATION: 0
BLOOD IN STOOL: 0
RESPIRATORY NEGATIVE: 1
COUGH: 0

## 2023-09-05 RX ORDER — ESCITALOPRAM OXALATE 10 MG/1
TABLET ORAL
Qty: 45 TABLET | Refills: 0 | Status: SHIPPED | OUTPATIENT
Start: 2023-09-05

## 2023-09-05 NOTE — TELEPHONE ENCOUNTER
Patient is needing refill for letrozole sent to [facility] pharmacy.

## 2023-09-05 NOTE — TELEPHONE ENCOUNTER
Lenora Rios called requesting a refill of the below medication which has been pended for you:     Requested Prescriptions     Pending Prescriptions Disp Refills    escitalopram (LEXAPRO) 10 MG tablet [Pharmacy Med Name: Escitalopram Oxalate 10 MG Oral Tablet] 45 tablet 0     Sig: Take 1/2 (one-half) tablet by mouth once daily       Last Appointment Date: 6/5/2023  Next Appointment Date: 12/5/2023    Allergies   Allergen Reactions    Codeine Itching    Other Rash     Chloroprep surgical prep

## 2023-09-07 DIAGNOSIS — C50.919 MALIGNANT NEOPLASM OF BREAST IN FEMALE, ESTROGEN RECEPTOR POSITIVE, UNSPECIFIED LATERALITY, UNSPECIFIED SITE OF BREAST (HCC): ICD-10-CM

## 2023-09-07 DIAGNOSIS — Z17.0 MALIGNANT NEOPLASM OF BREAST IN FEMALE, ESTROGEN RECEPTOR POSITIVE, UNSPECIFIED LATERALITY, UNSPECIFIED SITE OF BREAST (HCC): ICD-10-CM

## 2023-09-07 RX ORDER — LETROZOLE 2.5 MG/1
2.5 TABLET, FILM COATED ORAL DAILY
Qty: 60 TABLET | Refills: 4 | Status: SHIPPED | OUTPATIENT
Start: 2023-09-07

## 2023-09-26 RX ORDER — LOVASTATIN 20 MG/1
TABLET ORAL
Qty: 180 TABLET | Refills: 0 | OUTPATIENT
Start: 2023-09-26

## 2023-09-26 RX ORDER — LOVASTATIN 20 MG/1
40 TABLET ORAL DAILY
Qty: 180 TABLET | Refills: 1 | Status: SHIPPED | OUTPATIENT
Start: 2023-09-26

## 2023-09-26 NOTE — TELEPHONE ENCOUNTER
Trini Harrison called requesting a refill of the below medication which has been pended for you:     Requested Prescriptions     Pending Prescriptions Disp Refills    lovastatin (MEVACOR) 20 MG tablet 180 tablet 1     Sig: Take 2 tablets by mouth daily       Last Appointment Date: 6/5/2023  Next Appointment Date: 12/5/2023    Allergies   Allergen Reactions    Codeine Itching    Other Rash     Chloroprep surgical prep

## 2023-10-05 ENCOUNTER — TELEPHONE (OUTPATIENT)
Dept: HEMATOLOGY | Age: 73
End: 2023-10-05

## 2023-10-05 NOTE — TELEPHONE ENCOUNTER
----- Message from Gareth Paulino RN sent at 10/5/2023 12:23 PM CDT -----  Regarding: RE: Flaco  Contact: [phone]  Forwarding to you    ----- Message -----  From: Faby Duran \"Sandra\"  Sent: 10/5/2023  12:19 PM CDT  To: [facility] Clinical Staff  Subject: Severancedavid Lo                                         I got a letter from [facility] [place] am [place] for the shot. I have not taken the pills so can I get an appt to get the shot if does not cost much  , thank you.

## 2023-10-05 NOTE — TELEPHONE ENCOUNTER
Called patient and informed that our Financial Navigator, Aleksey Girard called her insurance and was told that she will only have a $25 copay. Patient states that she will call her insurance to verify. She will then call me back and to schedule appointment.

## 2023-10-11 ENCOUNTER — TELEPHONE (OUTPATIENT)
Dept: INFUSION THERAPY | Age: 73
End: 2023-10-11

## 2023-10-11 NOTE — TELEPHONE ENCOUNTER
Called patient and appointment set up for 10/16 at [phone] for her first Prolia injection. Patient v/u.

## 2023-10-11 NOTE — TELEPHONE ENCOUNTER
----- Message from Mathew Kirkland RN sent at 10/11/2023  8:18 AM CDT -----  Regarding: RE: Prolia  Contact: [phone]  Forwarding to you    ----- Message -----  From: Penelope Tello \"Sandra\"  Sent: 10/11/2023   8:18 AM CDT  To: [facility] Clinical Staff  Subject: Prolia                                           I talked with Dudley and it is just a $25 co PMT.

## 2023-10-16 ENCOUNTER — HOSPITAL ENCOUNTER (OUTPATIENT)
Dept: INFUSION THERAPY | Age: 73
Discharge: HOME | End: 2023-10-16
Payer: MEDICARE

## 2023-10-16 VITALS
DIASTOLIC BLOOD PRESSURE: 70 MMHG | BODY MASS INDEX: 30.04 KG/M2 | HEART RATE: 81 BPM | SYSTOLIC BLOOD PRESSURE: 116 MMHG | WEIGHT: 175 LBS | OXYGEN SATURATION: 96 %

## 2023-10-16 DIAGNOSIS — M85.862 OSTEOPENIA OF LEFT LOWER LEG: ICD-10-CM

## 2023-10-16 DIAGNOSIS — Z17.1 MALIGNANT NEOPLASM OF OVERLAPPING SITES OF RIGHT BREAST IN FEMALE, ESTROGEN RECEPTOR NEGATIVE (HCC): ICD-10-CM

## 2023-10-16 DIAGNOSIS — Z79.811 LONG TERM CURRENT USE OF AROMATASE INHIBITOR: Primary | ICD-10-CM

## 2023-10-16 DIAGNOSIS — C50.811 MALIGNANT NEOPLASM OF OVERLAPPING SITES OF RIGHT BREAST IN FEMALE, ESTROGEN RECEPTOR NEGATIVE (HCC): ICD-10-CM

## 2023-10-16 PROCEDURE — 6360000002 HC RX W HCPCS: Performed by: NURSE PRACTITIONER

## 2023-10-16 PROCEDURE — 96372 THER/PROPH/DIAG INJ SC/IM: CPT

## 2023-10-16 RX ADMIN — DENOSUMAB 60 MG: 60 INJECTION SUBCUTANEOUS at 11:29

## 2023-12-04 DIAGNOSIS — Z00.00 INITIAL MEDICARE ANNUAL WELLNESS VISIT: ICD-10-CM

## 2023-12-04 DIAGNOSIS — Z00.00 MEDICARE ANNUAL WELLNESS VISIT, INITIAL: ICD-10-CM

## 2023-12-04 DIAGNOSIS — E11.9 TYPE 2 DIABETES MELLITUS WITHOUT COMPLICATION, WITHOUT LONG-TERM CURRENT USE OF INSULIN (HCC): ICD-10-CM

## 2023-12-04 DIAGNOSIS — E05.90 SUBCLINICAL HYPERTHYROIDISM: ICD-10-CM

## 2023-12-04 DIAGNOSIS — E78.00 PURE HYPERCHOLESTEROLEMIA: ICD-10-CM

## 2023-12-04 DIAGNOSIS — E55.9 VITAMIN D DEFICIENCY: ICD-10-CM

## 2023-12-04 DIAGNOSIS — F41.1 GENERALIZED ANXIETY DISORDER: ICD-10-CM

## 2023-12-04 DIAGNOSIS — M85.862 OSTEOPENIA OF LEFT LOWER LEG: ICD-10-CM

## 2023-12-04 DIAGNOSIS — G47.33 OSA (OBSTRUCTIVE SLEEP APNEA): ICD-10-CM

## 2023-12-04 LAB
25(OH)D3 SERPL-MCNC: 41.5 NG/ML
ALBUMIN SERPL-MCNC: 4.1 G/DL (ref 3.5–5.2)
ALP SERPL-CCNC: 84 U/L (ref 35–104)
ALT SERPL-CCNC: 36 U/L (ref 5–33)
ANION GAP SERPL CALCULATED.3IONS-SCNC: 10 MMOL/L (ref 7–19)
AST SERPL-CCNC: 17 U/L (ref 5–32)
BILIRUB SERPL-MCNC: 0.3 MG/DL (ref 0.2–1.2)
BUN SERPL-MCNC: 20 MG/DL (ref 8–23)
CALCIUM SERPL-MCNC: 9.1 MG/DL (ref 8.8–10.2)
CHLORIDE SERPL-SCNC: 102 MMOL/L (ref 98–111)
CHOLEST SERPL-MCNC: 183 MG/DL (ref 160–199)
CO2 SERPL-SCNC: 27 MMOL/L (ref 22–29)
CREAT SERPL-MCNC: 0.7 MG/DL (ref 0.5–0.9)
GLUCOSE SERPL-MCNC: 99 MG/DL (ref 74–109)
HBA1C MFR BLD: 6 % (ref 4–6)
HDLC SERPL-MCNC: 63 MG/DL (ref 65–121)
LDLC SERPL CALC-MCNC: 92 MG/DL
POTASSIUM SERPL-SCNC: 4.2 MMOL/L (ref 3.5–5)
PROT SERPL-MCNC: 6.7 G/DL (ref 6.6–8.7)
SODIUM SERPL-SCNC: 139 MMOL/L (ref 136–145)
TRIGL SERPL-MCNC: 142 MG/DL (ref 0–149)
TSH SERPL DL<=0.005 MIU/L-ACNC: 1.33 UIU/ML (ref 0.27–4.2)

## 2023-12-06 ENCOUNTER — OFFICE VISIT (OUTPATIENT)
Dept: INTERNAL MEDICINE | Age: 73
End: 2023-12-06
Payer: MEDICARE

## 2023-12-06 VITALS
SYSTOLIC BLOOD PRESSURE: 122 MMHG | BODY MASS INDEX: 30.05 KG/M2 | DIASTOLIC BLOOD PRESSURE: 76 MMHG | OXYGEN SATURATION: 98 % | HEIGHT: 64 IN | WEIGHT: 176 LBS | HEART RATE: 84 BPM

## 2023-12-06 DIAGNOSIS — K59.09 OTHER CONSTIPATION: ICD-10-CM

## 2023-12-06 DIAGNOSIS — R10.30 LOWER ABDOMINAL PAIN: Primary | ICD-10-CM

## 2023-12-06 DIAGNOSIS — E55.9 VITAMIN D DEFICIENCY: ICD-10-CM

## 2023-12-06 DIAGNOSIS — R10.2 PELVIC PAIN: ICD-10-CM

## 2023-12-06 DIAGNOSIS — E78.00 PURE HYPERCHOLESTEROLEMIA: ICD-10-CM

## 2023-12-06 DIAGNOSIS — F41.1 GENERALIZED ANXIETY DISORDER: ICD-10-CM

## 2023-12-06 DIAGNOSIS — M85.862 OSTEOPENIA OF LEFT LOWER LEG: ICD-10-CM

## 2023-12-06 DIAGNOSIS — K64.1 GRADE II HEMORRHOIDS: ICD-10-CM

## 2023-12-06 DIAGNOSIS — E11.9 TYPE 2 DIABETES MELLITUS WITHOUT COMPLICATION, WITHOUT LONG-TERM CURRENT USE OF INSULIN (HCC): ICD-10-CM

## 2023-12-06 PROCEDURE — 3017F COLORECTAL CA SCREEN DOC REV: CPT | Performed by: INTERNAL MEDICINE

## 2023-12-06 PROCEDURE — 99214 OFFICE O/P EST MOD 30 MIN: CPT | Performed by: INTERNAL MEDICINE

## 2023-12-06 PROCEDURE — 1036F TOBACCO NON-USER: CPT | Performed by: INTERNAL MEDICINE

## 2023-12-06 PROCEDURE — 2022F DILAT RTA XM EVC RTNOPTHY: CPT | Performed by: INTERNAL MEDICINE

## 2023-12-06 PROCEDURE — G8399 PT W/DXA RESULTS DOCUMENT: HCPCS | Performed by: INTERNAL MEDICINE

## 2023-12-06 PROCEDURE — 3044F HG A1C LEVEL LT 7.0%: CPT | Performed by: INTERNAL MEDICINE

## 2023-12-06 PROCEDURE — G8417 CALC BMI ABV UP PARAM F/U: HCPCS | Performed by: INTERNAL MEDICINE

## 2023-12-06 PROCEDURE — G8484 FLU IMMUNIZE NO ADMIN: HCPCS | Performed by: INTERNAL MEDICINE

## 2023-12-06 PROCEDURE — 1090F PRES/ABSN URINE INCON ASSESS: CPT | Performed by: INTERNAL MEDICINE

## 2023-12-06 PROCEDURE — 1123F ACP DISCUSS/DSCN MKR DOCD: CPT | Performed by: INTERNAL MEDICINE

## 2023-12-06 PROCEDURE — G8427 DOCREV CUR MEDS BY ELIG CLIN: HCPCS | Performed by: INTERNAL MEDICINE

## 2023-12-06 RX ORDER — HYDROCORTISONE ACETATE 25 MG/1
25 SUPPOSITORY RECTAL EVERY 12 HOURS
Qty: 20 SUPPOSITORY | Refills: 0 | Status: SHIPPED | OUTPATIENT
Start: 2023-12-06

## 2023-12-06 RX ORDER — HYDROCORTISONE 25 MG/G
CREAM TOPICAL 2 TIMES DAILY
Qty: 28 G | Refills: 0 | Status: SHIPPED | OUTPATIENT
Start: 2023-12-06

## 2023-12-06 ASSESSMENT — ENCOUNTER SYMPTOMS
WHEEZING: 0
SINUS PAIN: 1
COUGH: 0
CONSTIPATION: 0
RECTAL PAIN: 1
SORE THROAT: 0
CHEST TIGHTNESS: 0
SINUS PRESSURE: 1
ABDOMINAL PAIN: 1

## 2023-12-08 ENCOUNTER — HOSPITAL ENCOUNTER (OUTPATIENT)
Dept: ULTRASOUND IMAGING | Age: 73
Discharge: HOME | End: 2023-12-08
Attending: INTERNAL MEDICINE
Payer: MEDICARE

## 2023-12-08 DIAGNOSIS — R10.2 PELVIC PAIN: ICD-10-CM

## 2023-12-08 PROCEDURE — 76830 TRANSVAGINAL US NON-OB: CPT

## 2023-12-12 ENCOUNTER — OFFICE VISIT (OUTPATIENT)
Dept: GASTROENTEROLOGY | Age: 73
End: 2023-12-12

## 2023-12-12 VITALS
HEART RATE: 90 BPM | WEIGHT: 177 LBS | OXYGEN SATURATION: 97 % | DIASTOLIC BLOOD PRESSURE: 77 MMHG | HEIGHT: 64 IN | BODY MASS INDEX: 30.22 KG/M2 | SYSTOLIC BLOOD PRESSURE: 120 MMHG

## 2023-12-12 DIAGNOSIS — Z12.11 COLON CANCER SCREENING: Primary | ICD-10-CM

## 2023-12-12 NOTE — PROGRESS NOTES
Subjective:     Patient ID: Jerry Apple is a 72 y.o. female  PCP: Dawson Chen MD  Referring Provider: Dawson Chen MD    HPI  Patient presents to the office today with the following complaints: New Patient and Abdominal Pain      Patient seen in the office today referred due to lower abd pain she is pointing to her suprapubic area   Reports her bowels go from diarrhea stools to constipation and this has been this way for \"years\"   Reports her lower abd pain is only there when her hemorrhoids flare up   Reports bright red blood when she wipes   She does have Anusol suppositories for her hemorrhoids     She had her last colonoscopy in 2018 and there was reported to be polyps. She declines having a colonoscopy, but states she will do cologuard testing      Assessment:     1. Colon cancer screening  -     Cologuard (Fecal DNA Colorectal Cancer Screening)           Plan:   Cologuard   Follow up pending cologuard results  Orders  Orders Placed This Encounter   Procedures    Cologuard (Fecal DNA Colorectal Cancer Screening)     Medications  No orders of the defined types were placed in this encounter.         Patient History:     Past Medical History:   Diagnosis Date    Anxiety     Breast cancer ([address]) 2020    infiltrating ductal carcinoma     Cancer ([address])     Breast Cancer    Carpal tunnel syndrome     pt denies having carpal tunnel syndrome    Cervicalgia     Depression     Esophagitis     History of therapeutic radiation 2020    Hx antineoplastic chemo 2020    Hyperlipidemia     Idiopathic peripheral neuropathy     Insomnia     Long term current use of aromatase inhibitor 8/3/2022    Long term current use of aromatase inhibitor 8/3/2022    Long term current use of aromatase inhibitor 8/3/2022    Menopause     Obesity due to excess calories     Osteoarthritis     Osteopenia     Type 2 diabetes mellitus without complication ([address])     Vitamin D deficiency        Past Surgical History:   Procedure Laterality Date

## 2023-12-26 ENCOUNTER — TELEPHONE (OUTPATIENT)
Dept: HEMATOLOGY | Age: 73
End: 2023-12-26

## 2023-12-26 NOTE — TELEPHONE ENCOUNTER
Called patient and reminded patient of their appointment on 12/28/2023 and patient confirmed they would be here.

## 2023-12-27 NOTE — PROGRESS NOTES
fever.   HENT: Negative.  Negative for congestion, ear pain, hearing loss, nosebleeds, sore throat and tinnitus.    Eyes: Negative.  Negative for pain, discharge and redness.   Respiratory: Negative.  Negative for cough, shortness of breath and wheezing.    Cardiovascular: Negative.  Negative for chest pain, palpitations and leg swelling.   Gastrointestinal: Negative.  Negative for abdominal pain, blood in stool, constipation, diarrhea, nausea and vomiting.   Endocrine: Negative for polydipsia.   Genitourinary:  Negative for dysuria, flank pain, frequency, hematuria and urgency.   Musculoskeletal:  Positive for arthralgias. Negative for back pain, myalgias and neck pain.   Skin: Negative.  Negative for rash.   Neurological:  Positive for numbness. Negative for dizziness, tremors, seizures, weakness and headaches.   Hematological:  Does not bruise/bleed easily.   Psychiatric/Behavioral: Negative.  The patient is not nervous/anxious.        Objective   PHYSICAL EXAM:  Physical Exam  Vitals reviewed.   Constitutional:       General: She is not in acute distress.     Appearance: She is well-developed.   HENT:      Head: Normocephalic and atraumatic.      Mouth/Throat:      Pharynx: Uvula midline.      Tonsils: No tonsillar exudate.   Eyes:      General: Lids are normal.      Conjunctiva/sclera: Conjunctivae normal.      Pupils: Pupils are equal, round, and reactive to light.   Neck:      Thyroid: No thyroid mass or thyromegaly.      Vascular: No JVD.      Trachea: Trachea normal. No tracheal deviation.   Cardiovascular:      Rate and Rhythm: Normal rate and regular rhythm.      Pulses: Normal pulses.      Heart sounds: Normal heart sounds.   Pulmonary:      Effort: Pulmonary effort is normal. No respiratory distress.      Breath sounds: Normal breath sounds. No wheezing or rales.   Chest:      Chest wall: No tenderness.       Abdominal:      General: Bowel sounds are normal. There is no distension.      Palpations:

## 2023-12-28 ENCOUNTER — HOSPITAL ENCOUNTER (OUTPATIENT)
Dept: INFUSION THERAPY | Age: 73
Discharge: HOME | End: 2023-12-28
Payer: MEDICARE

## 2023-12-28 ENCOUNTER — OFFICE VISIT (OUTPATIENT)
Dept: HEMATOLOGY | Age: 73
End: 2023-12-28
Payer: MEDICARE

## 2023-12-28 VITALS
DIASTOLIC BLOOD PRESSURE: 68 MMHG | SYSTOLIC BLOOD PRESSURE: 126 MMHG | OXYGEN SATURATION: 98 % | HEART RATE: 89 BPM | WEIGHT: 177.7 LBS | BODY MASS INDEX: 30.34 KG/M2 | HEIGHT: 64 IN

## 2023-12-28 DIAGNOSIS — G62.0 CHEMOTHERAPY-INDUCED NEUROPATHY (HCC): ICD-10-CM

## 2023-12-28 DIAGNOSIS — T45.1X5A CHEMOTHERAPY-INDUCED NEUROPATHY (HCC): ICD-10-CM

## 2023-12-28 DIAGNOSIS — M85.80 OSTEOPENIA, UNSPECIFIED LOCATION: ICD-10-CM

## 2023-12-28 DIAGNOSIS — Z17.0 MALIGNANT NEOPLASM OF BREAST IN FEMALE, ESTROGEN RECEPTOR POSITIVE, UNSPECIFIED LATERALITY, UNSPECIFIED SITE OF BREAST (HCC): Primary | ICD-10-CM

## 2023-12-28 DIAGNOSIS — Z51.81 ENCOUNTER FOR MONITORING AROMATASE INHIBITOR THERAPY: ICD-10-CM

## 2023-12-28 DIAGNOSIS — Z79.811 ENCOUNTER FOR MONITORING AROMATASE INHIBITOR THERAPY: ICD-10-CM

## 2023-12-28 DIAGNOSIS — C50.919 MALIGNANT NEOPLASM OF BREAST IN FEMALE, ESTROGEN RECEPTOR POSITIVE, UNSPECIFIED LATERALITY, UNSPECIFIED SITE OF BREAST (HCC): Primary | ICD-10-CM

## 2023-12-28 PROCEDURE — 1090F PRES/ABSN URINE INCON ASSESS: CPT | Performed by: NURSE PRACTITIONER

## 2023-12-28 PROCEDURE — G8484 FLU IMMUNIZE NO ADMIN: HCPCS | Performed by: NURSE PRACTITIONER

## 2023-12-28 PROCEDURE — 3017F COLORECTAL CA SCREEN DOC REV: CPT | Performed by: NURSE PRACTITIONER

## 2023-12-28 PROCEDURE — 1123F ACP DISCUSS/DSCN MKR DOCD: CPT | Performed by: NURSE PRACTITIONER

## 2023-12-28 PROCEDURE — G8399 PT W/DXA RESULTS DOCUMENT: HCPCS | Performed by: NURSE PRACTITIONER

## 2023-12-28 PROCEDURE — G8417 CALC BMI ABV UP PARAM F/U: HCPCS | Performed by: NURSE PRACTITIONER

## 2023-12-28 PROCEDURE — 1036F TOBACCO NON-USER: CPT | Performed by: NURSE PRACTITIONER

## 2023-12-28 PROCEDURE — 99214 OFFICE O/P EST MOD 30 MIN: CPT | Performed by: NURSE PRACTITIONER

## 2023-12-28 PROCEDURE — 99211 OFF/OP EST MAY X REQ PHY/QHP: CPT

## 2023-12-28 PROCEDURE — G8427 DOCREV CUR MEDS BY ELIG CLIN: HCPCS | Performed by: NURSE PRACTITIONER

## 2024-01-05 ASSESSMENT — ENCOUNTER SYMPTOMS
NAUSEA: 0
EYE DISCHARGE: 0
ABDOMINAL PAIN: 0
VOMITING: 0
WHEEZING: 0
SHORTNESS OF BREATH: 0
COUGH: 0
SORE THROAT: 0
EYE REDNESS: 0
GASTROINTESTINAL NEGATIVE: 1
EYES NEGATIVE: 1
BLOOD IN STOOL: 0
DIARRHEA: 0
EYE PAIN: 0
CONSTIPATION: 0
RESPIRATORY NEGATIVE: 1
BACK PAIN: 0

## 2024-01-24 ENCOUNTER — OFFICE VISIT (OUTPATIENT)
Dept: INTERNAL MEDICINE | Age: 74
End: 2024-01-24
Payer: MEDICARE

## 2024-01-24 VITALS
DIASTOLIC BLOOD PRESSURE: 78 MMHG | OXYGEN SATURATION: 97 % | BODY MASS INDEX: 30 KG/M2 | HEART RATE: 95 BPM | WEIGHT: 174.8 LBS | TEMPERATURE: 98.4 F | SYSTOLIC BLOOD PRESSURE: 120 MMHG

## 2024-01-24 DIAGNOSIS — R10.30 LOWER ABDOMINAL PAIN: ICD-10-CM

## 2024-01-24 DIAGNOSIS — R09.81 SINUS CONGESTION: ICD-10-CM

## 2024-01-24 DIAGNOSIS — E11.9 TYPE 2 DIABETES MELLITUS WITHOUT COMPLICATION, WITHOUT LONG-TERM CURRENT USE OF INSULIN (HCC): ICD-10-CM

## 2024-01-24 DIAGNOSIS — U07.1 ACUTE BRONCHITIS DUE TO COVID-19 VIRUS: Primary | ICD-10-CM

## 2024-01-24 DIAGNOSIS — J20.8 ACUTE BRONCHITIS DUE TO COVID-19 VIRUS: Primary | ICD-10-CM

## 2024-01-24 DIAGNOSIS — R05.1 ACUTE COUGH: ICD-10-CM

## 2024-01-24 DIAGNOSIS — E66.09 EXOGENOUS OBESITY: ICD-10-CM

## 2024-01-24 PROCEDURE — G8417 CALC BMI ABV UP PARAM F/U: HCPCS | Performed by: INTERNAL MEDICINE

## 2024-01-24 PROCEDURE — 99214 OFFICE O/P EST MOD 30 MIN: CPT | Performed by: INTERNAL MEDICINE

## 2024-01-24 PROCEDURE — 3046F HEMOGLOBIN A1C LEVEL >9.0%: CPT | Performed by: INTERNAL MEDICINE

## 2024-01-24 PROCEDURE — 1036F TOBACCO NON-USER: CPT | Performed by: INTERNAL MEDICINE

## 2024-01-24 PROCEDURE — 1123F ACP DISCUSS/DSCN MKR DOCD: CPT | Performed by: INTERNAL MEDICINE

## 2024-01-24 PROCEDURE — G8399 PT W/DXA RESULTS DOCUMENT: HCPCS | Performed by: INTERNAL MEDICINE

## 2024-01-24 PROCEDURE — G8484 FLU IMMUNIZE NO ADMIN: HCPCS | Performed by: INTERNAL MEDICINE

## 2024-01-24 PROCEDURE — 1090F PRES/ABSN URINE INCON ASSESS: CPT | Performed by: INTERNAL MEDICINE

## 2024-01-24 PROCEDURE — 3017F COLORECTAL CA SCREEN DOC REV: CPT | Performed by: INTERNAL MEDICINE

## 2024-01-24 PROCEDURE — G8427 DOCREV CUR MEDS BY ELIG CLIN: HCPCS | Performed by: INTERNAL MEDICINE

## 2024-01-24 PROCEDURE — 2022F DILAT RTA XM EVC RTNOPTHY: CPT | Performed by: INTERNAL MEDICINE

## 2024-01-24 RX ORDER — ALBUTEROL SULFATE 90 UG/1
2 AEROSOL, METERED RESPIRATORY (INHALATION) 4 TIMES DAILY PRN
Qty: 18 G | Refills: 0 | Status: SHIPPED | OUTPATIENT
Start: 2024-01-24

## 2024-01-24 RX ORDER — PREDNISONE 10 MG/1
10 TABLET ORAL DAILY
Qty: 5 TABLET | Refills: 0 | Status: SHIPPED | OUTPATIENT
Start: 2024-01-24 | End: 2024-01-29

## 2024-01-24 ASSESSMENT — ENCOUNTER SYMPTOMS
CONSTIPATION: 0
SINUS PAIN: 1
CHEST TIGHTNESS: 0
WHEEZING: 0
RHINORRHEA: 1
ABDOMINAL PAIN: 1
COUGH: 1
SORE THROAT: 0
SINUS PRESSURE: 1

## 2024-01-24 ASSESSMENT — PATIENT HEALTH QUESTIONNAIRE - PHQ9
2. FEELING DOWN, DEPRESSED OR HOPELESS: 0
1. LITTLE INTEREST OR PLEASURE IN DOING THINGS: 0
SUM OF ALL RESPONSES TO PHQ QUESTIONS 1-9: 0
SUM OF ALL RESPONSES TO PHQ9 QUESTIONS 1 & 2: 0
SUM OF ALL RESPONSES TO PHQ QUESTIONS 1-9: 0

## 2024-01-24 NOTE — PROGRESS NOTES
Conjunctiva/sclera: Conjunctivae normal.      Pupils: Pupils are equal, round, and reactive to light.   Neck:      Thyroid: No thyromegaly.      Vascular: No JVD.   Cardiovascular:      Rate and Rhythm: Normal rate.      Heart sounds: Normal heart sounds. No murmur heard.  Pulmonary:      Effort: No respiratory distress.      Breath sounds: Wheezing and rales present.   Chest:      Chest wall: No tenderness.   Abdominal:      General: Bowel sounds are normal.      Palpations: Abdomen is soft.   Musculoskeletal:      Cervical back: Neck supple.   Lymphadenopathy:      Cervical: No cervical adenopathy.   Skin:     Findings: No rash.   Neurological:      Mental Status: She is oriented to person, place, and time.         Lab Review   Orders Only on 12/04/2023   Component Date Value    TSH 12/04/2023 1.330     Vit D, 25-Hydroxy 12/04/2023 41.5     Cholesterol, Total 12/04/2023 183     Triglycerides 12/04/2023 142     HDL 12/04/2023 63 (L)     LDL Calculated 12/04/2023 92     Sodium 12/04/2023 139     Potassium 12/04/2023 4.2     Chloride 12/04/2023 102     CO2 12/04/2023 27     Anion Gap 12/04/2023 10     Glucose 12/04/2023 99     BUN 12/04/2023 20     Creatinine 12/04/2023 0.7     Est, Glom Filt Rate 12/04/2023 >60     Calcium 12/04/2023 9.1     Total Protein 12/04/2023 6.7     Albumin 12/04/2023 4.1     Total Bilirubin 12/04/2023 0.3     Alkaline Phosphatase 12/04/2023 84     ALT 12/04/2023 36 (H)     AST 12/04/2023 17     Hemoglobin A1C 12/04/2023 6.0            ASSESSMENT/PLAN:    Acute bronchitis due to COVID-19 virus    Acute cough    Sinus congestion    Symptoms initially started on 1/19, tested +1/19  Tested negative not for COVID this morning  Still continues to cough, severe congestion  RX  -     predniSONE (DELTASONE) 10 MG tablet; Take 1 tablet by mouth daily for 5 days  -     albuterol sulfate HFA (VENTOLIN HFA) 108 (90 Base) MCG/ACT inhaler; Inhale 2 puffs into the lungs 4 times daily as needed for 
152.4

## 2024-02-26 RX ORDER — ESCITALOPRAM OXALATE 10 MG/1
TABLET ORAL
Qty: 45 TABLET | Refills: 1 | Status: SHIPPED | OUTPATIENT
Start: 2024-02-26

## 2024-02-26 NOTE — TELEPHONE ENCOUNTER
Cinthya Agarwal called to request a refill on her medication.      Last office visit : 1/24/2024   Next office visit : 6/6/2024     Requested Prescriptions     Pending Prescriptions Disp Refills    escitalopram (LEXAPRO) 10 MG tablet [Pharmacy Med Name: Escitalopram Oxalate 10 MG Oral Tablet] 45 tablet 0     Sig: Take 1/2 (one-half) tablet by mouth once daily            Astrid Zamora MA

## 2024-03-27 ENCOUNTER — CLINICAL DOCUMENTATION (OUTPATIENT)
Facility: HOSPITAL | Age: 74
End: 2024-03-27

## 2024-03-27 NOTE — PROGRESS NOTES
I called the pt on 3/26 to discuss out of pocket costs with prolia injection. I explained that there wasn't assistance besides [facility] assistance and what the out of pocket costs would look like but it would help her reach her deductible faster in one treatment and satisfy max out of pocket. Once that is met her insurance would cover in full later in the year when she would receive her next injection. Pt stated she would think about it if she decided to go through with the injection or not.

## 2024-03-28 ENCOUNTER — HOSPITAL ENCOUNTER (OUTPATIENT)
Dept: WOMENS IMAGING | Age: 74
Discharge: HOME | End: 2024-03-28
Payer: MEDICARE

## 2024-03-28 VITALS — BODY MASS INDEX: 29.88 KG/M2 | WEIGHT: 175 LBS | HEIGHT: 64 IN

## 2024-03-28 DIAGNOSIS — Z85.3 PERSONAL HISTORY OF BREAST CANCER: ICD-10-CM

## 2024-03-28 PROCEDURE — G0279 TOMOSYNTHESIS, MAMMO: HCPCS

## 2024-04-11 ENCOUNTER — TELEPHONE (OUTPATIENT)
Dept: HEMATOLOGY | Age: 74
End: 2024-04-11

## 2024-04-15 DIAGNOSIS — C50.919 MALIGNANT NEOPLASM OF BREAST IN FEMALE, ESTROGEN RECEPTOR POSITIVE, UNSPECIFIED LATERALITY, UNSPECIFIED SITE OF BREAST (HCC): Primary | ICD-10-CM

## 2024-04-15 DIAGNOSIS — Z17.0 MALIGNANT NEOPLASM OF BREAST IN FEMALE, ESTROGEN RECEPTOR POSITIVE, UNSPECIFIED LATERALITY, UNSPECIFIED SITE OF BREAST (HCC): Primary | ICD-10-CM

## 2024-04-15 NOTE — PROGRESS NOTES
Progress Note      Pt Name: Cinthya Agarwal  YOB: 1950  MRN: [id number]    Date of evaluation: 04/16/2024  History Obtained From:  patient, electronic medical record    CHIEF COMPLAINT:    Chief Complaint   Patient presents with    Follow-up     Malignant neoplasm of breast in female, estrogen receptor positive, unspecified laterality, unspecified site of breast     HISTORY OF PRESENT ILLNESS:    Cinthya Agarwal is a 73 y.o.  female who is currently being followed for invasive ductal carcinoma of the right breast, triple negative, ER 4%, diagnosed March 2020 and osteopenia.  Current recommendation is for adjuvant endocrine therapy with letrozole 2.5 mg daily for anticipated 5 years through September 2025 and 70 mg p.o. weekly in the setting of adjuvant aromatase inhibitor therapy with osteopenia.  She has had no known radiographic imaging to suggest progression of disease.  Cinthya returns today in scheduled follow-up for evaluation, lab monitoring, side effect monitoring and further treatment recommendations.       Today's clinic visit to include physical assessment, review of systems, any lab or radiographic findings that were available and plan of care are documented below.    ONCOLOGIC HISTORY:     Diagnosis  Invasive ductal carcinoma right breast, March 2020  ER 4%, WA 0.1%, HER-2/paty IHC 0, Ki-67 86%  [id number]   Mammaprint High risk basal     Treatment summary   05/06/2020-8/12/2020 ddAC with G-CSF support followed by dose dense Taxol x4 cycles with G-CSF.  9/15/20-Right breast lumpectomy with 1 sentinel lymph node negative  9/23/20- Adjuvant endocrine therapy-Femara 2.5 mg daily  10/21/20 - 11/11/20 4256 cGY radiation therapy to right breast  8/19/2021-biphosphonate therapy recommended insurance did not approve Prolia 60 mg subcu every 6 months for increased bone loss in the setting of adjuvant endocrine therapy and osteopenia and a prescription was sent for Fosamax 70 mg

## 2024-04-16 ENCOUNTER — HOSPITAL ENCOUNTER (OUTPATIENT)
Dept: INFUSION THERAPY | Age: 74
Discharge: HOME | End: 2024-04-16
Payer: MEDICARE

## 2024-04-16 ENCOUNTER — OFFICE VISIT (OUTPATIENT)
Dept: HEMATOLOGY | Age: 74
End: 2024-04-16
Payer: MEDICARE

## 2024-04-16 VITALS
HEIGHT: 64 IN | TEMPERATURE: 97.9 F | BODY MASS INDEX: 30.05 KG/M2 | OXYGEN SATURATION: 98 % | SYSTOLIC BLOOD PRESSURE: 116 MMHG | DIASTOLIC BLOOD PRESSURE: 74 MMHG | HEART RATE: 54 BPM | WEIGHT: 176 LBS

## 2024-04-16 DIAGNOSIS — C50.919 MALIGNANT NEOPLASM OF BREAST IN FEMALE, ESTROGEN RECEPTOR POSITIVE, UNSPECIFIED LATERALITY, UNSPECIFIED SITE OF BREAST (HCC): ICD-10-CM

## 2024-04-16 DIAGNOSIS — Z79.811 LONG TERM CURRENT USE OF AROMATASE INHIBITOR: ICD-10-CM

## 2024-04-16 DIAGNOSIS — Z79.811 ENCOUNTER FOR MONITORING AROMATASE INHIBITOR THERAPY: ICD-10-CM

## 2024-04-16 DIAGNOSIS — Z51.81 ENCOUNTER FOR MONITORING AROMATASE INHIBITOR THERAPY: ICD-10-CM

## 2024-04-16 DIAGNOSIS — M85.89 OSTEOPENIA OF MULTIPLE SITES: ICD-10-CM

## 2024-04-16 DIAGNOSIS — Z17.0 MALIGNANT NEOPLASM OF BREAST IN FEMALE, ESTROGEN RECEPTOR POSITIVE, UNSPECIFIED LATERALITY, UNSPECIFIED SITE OF BREAST (HCC): Primary | ICD-10-CM

## 2024-04-16 DIAGNOSIS — C50.919 MALIGNANT NEOPLASM OF BREAST IN FEMALE, ESTROGEN RECEPTOR POSITIVE, UNSPECIFIED LATERALITY, UNSPECIFIED SITE OF BREAST (HCC): Primary | ICD-10-CM

## 2024-04-16 DIAGNOSIS — T45.1X5A CHEMOTHERAPY-INDUCED NEUROPATHY (HCC): ICD-10-CM

## 2024-04-16 DIAGNOSIS — G62.0 CHEMOTHERAPY-INDUCED NEUROPATHY (HCC): ICD-10-CM

## 2024-04-16 DIAGNOSIS — Z17.0 MALIGNANT NEOPLASM OF BREAST IN FEMALE, ESTROGEN RECEPTOR POSITIVE, UNSPECIFIED LATERALITY, UNSPECIFIED SITE OF BREAST (HCC): ICD-10-CM

## 2024-04-16 LAB
ALBUMIN SERPL-MCNC: 4.4 G/DL (ref 3.5–5.2)
ALP SERPL-CCNC: 65 U/L (ref 35–104)
ALT SERPL-CCNC: 29 U/L (ref 9–52)
ANION GAP SERPL CALCULATED.3IONS-SCNC: 12 MMOL/L (ref 7–19)
AST SERPL-CCNC: 24 U/L (ref 14–36)
BASOPHILS # BLD: 0.02 K/UL (ref 0.01–0.08)
BASOPHILS NFR BLD: 0.4 % (ref 0.1–1.2)
BILIRUB SERPL-MCNC: 0.4 MG/DL (ref 0.2–1.3)
BUN SERPL-MCNC: 17 MG/DL (ref 7–17)
CALCIUM SERPL-MCNC: 9.4 MG/DL (ref 8.4–10.2)
CHLORIDE SERPL-SCNC: 103 MMOL/L (ref 98–111)
CO2 SERPL-SCNC: 27 MMOL/L (ref 22–29)
CREAT SERPL-MCNC: 0.6 MG/DL (ref 0.5–1)
EOSINOPHIL # BLD: 0.16 K/UL (ref 0.04–0.54)
EOSINOPHIL NFR BLD: 3.2 % (ref 0.7–7)
ERYTHROCYTE [DISTWIDTH] IN BLOOD BY AUTOMATED COUNT: 13.1 % (ref 11.7–14.4)
GLOBULIN: 3 G/DL
GLUCOSE SERPL-MCNC: 106 MG/DL (ref 74–106)
HCT VFR BLD AUTO: 37.3 % (ref 34.1–44.9)
HGB BLD-MCNC: 12.5 G/DL (ref 11.2–15.7)
LYMPHOCYTES # BLD: 1.33 K/UL (ref 1.18–3.74)
LYMPHOCYTES NFR BLD: 26.8 % (ref 19.3–53.1)
MCH RBC QN AUTO: 30.7 PG (ref 25.6–32.2)
MCHC RBC AUTO-ENTMCNC: 33.5 G/DL (ref 32.3–35.5)
MCV RBC AUTO: 91.6 FL (ref 79.4–94.8)
MONOCYTES # BLD: 0.47 K/UL (ref 0.24–0.82)
MONOCYTES NFR BLD: 9.5 % (ref 4.7–12.5)
NEUTROPHILS # BLD: 2.98 K/UL (ref 1.56–6.13)
NEUTS SEG NFR BLD: 59.9 % (ref 34–71.1)
PLATELET # BLD AUTO: 239 K/UL (ref 182–369)
PMV BLD AUTO: 10.4 FL (ref 7.4–10.4)
POTASSIUM SERPL-SCNC: 4.5 MMOL/L (ref 3.5–5.1)
PROT SERPL-MCNC: 7.4 G/DL (ref 6.3–8.2)
RBC # BLD AUTO: 4.07 M/UL (ref 3.93–5.22)
SODIUM SERPL-SCNC: 142 MMOL/L (ref 137–145)
WBC # BLD AUTO: 4.97 K/UL (ref 3.98–10.04)

## 2024-04-16 PROCEDURE — G2211 COMPLEX E/M VISIT ADD ON: HCPCS | Performed by: NURSE PRACTITIONER

## 2024-04-16 PROCEDURE — G8399 PT W/DXA RESULTS DOCUMENT: HCPCS | Performed by: NURSE PRACTITIONER

## 2024-04-16 PROCEDURE — 1123F ACP DISCUSS/DSCN MKR DOCD: CPT | Performed by: NURSE PRACTITIONER

## 2024-04-16 PROCEDURE — 80053 COMPREHEN METABOLIC PANEL: CPT

## 2024-04-16 PROCEDURE — 36415 COLL VENOUS BLD VENIPUNCTURE: CPT

## 2024-04-16 PROCEDURE — 1036F TOBACCO NON-USER: CPT | Performed by: NURSE PRACTITIONER

## 2024-04-16 PROCEDURE — G8427 DOCREV CUR MEDS BY ELIG CLIN: HCPCS | Performed by: NURSE PRACTITIONER

## 2024-04-16 PROCEDURE — 99214 OFFICE O/P EST MOD 30 MIN: CPT | Performed by: NURSE PRACTITIONER

## 2024-04-16 PROCEDURE — 1090F PRES/ABSN URINE INCON ASSESS: CPT | Performed by: NURSE PRACTITIONER

## 2024-04-16 PROCEDURE — 3017F COLORECTAL CA SCREEN DOC REV: CPT | Performed by: NURSE PRACTITIONER

## 2024-04-16 PROCEDURE — 85025 COMPLETE CBC W/AUTO DIFF WBC: CPT

## 2024-04-16 PROCEDURE — 99212 OFFICE O/P EST SF 10 MIN: CPT

## 2024-04-16 PROCEDURE — G8417 CALC BMI ABV UP PARAM F/U: HCPCS | Performed by: NURSE PRACTITIONER

## 2024-04-16 RX ORDER — SEMAGLUTIDE 1.34 MG/ML
1 INJECTION, SOLUTION SUBCUTANEOUS WEEKLY
COMMUNITY
Start: 2024-02-06

## 2024-04-16 ASSESSMENT — ENCOUNTER SYMPTOMS
BACK PAIN: 0
VOMITING: 0
SORE THROAT: 0
CONSTIPATION: 0
ABDOMINAL PAIN: 0
GASTROINTESTINAL NEGATIVE: 1
WHEEZING: 0
RESPIRATORY NEGATIVE: 1
EYE DISCHARGE: 0
EYE REDNESS: 0
BLOOD IN STOOL: 0
EYES NEGATIVE: 1
SHORTNESS OF BREATH: 0
NAUSEA: 0
DIARRHEA: 0
EYE PAIN: 0
COUGH: 0

## 2024-05-07 NOTE — TELEPHONE ENCOUNTER
Verneice Solon called requesting a refill of the below medication which has been pended for you:     Requested Prescriptions     Pending Prescriptions Disp Refills    OZEMPIC, 1 MG/DOSE, 4 MG/3ML SOPN [Pharmacy Med Name: Ozempic (1 MG/DOSE) 4 MG/3ML Subcutaneous Solution Pen-injector] 3 mL 1     Sig: INJECT 1 MG INTO THE SKIN ONCE WEEKLY       Last Appointment Date: 12/5/2022  Next Appointment Date: 6/5/2023    Allergies   Allergen Reactions    Codeine Itching    Other Rash     Chloroprep surgical prep normal

## 2024-06-03 DIAGNOSIS — R10.30 LOWER ABDOMINAL PAIN: ICD-10-CM

## 2024-06-03 DIAGNOSIS — K59.09 OTHER CONSTIPATION: ICD-10-CM

## 2024-06-03 DIAGNOSIS — E11.9 TYPE 2 DIABETES MELLITUS WITHOUT COMPLICATION, WITHOUT LONG-TERM CURRENT USE OF INSULIN (HCC): ICD-10-CM

## 2024-06-03 DIAGNOSIS — R10.2 PELVIC PAIN: ICD-10-CM

## 2024-06-03 DIAGNOSIS — E78.00 PURE HYPERCHOLESTEROLEMIA: ICD-10-CM

## 2024-06-03 DIAGNOSIS — M85.862 OSTEOPENIA OF LEFT LOWER LEG: ICD-10-CM

## 2024-06-03 DIAGNOSIS — F41.1 GENERALIZED ANXIETY DISORDER: ICD-10-CM

## 2024-06-03 DIAGNOSIS — K64.1 GRADE II HEMORRHOIDS: ICD-10-CM

## 2024-06-03 DIAGNOSIS — E55.9 VITAMIN D DEFICIENCY: ICD-10-CM

## 2024-06-03 LAB
25(OH)D3 SERPL-MCNC: 35.5 NG/ML
ALBUMIN SERPL-MCNC: 4.3 G/DL (ref 3.5–5.2)
ALP SERPL-CCNC: 87 U/L (ref 35–104)
ALT SERPL-CCNC: 27 U/L (ref 5–33)
AST SERPL-CCNC: 17 U/L (ref 5–32)
BASOPHILS # BLD: 0 K/UL (ref 0–0.2)
BASOPHILS NFR BLD: 0.5 % (ref 0–1)
BILIRUB SERPL-MCNC: 0.2 MG/DL (ref 0.2–1.2)
BILIRUB UR QL STRIP: NEGATIVE
BUN SERPL-MCNC: 17 MG/DL (ref 8–23)
CALCIUM SERPL-MCNC: 9.5 MG/DL (ref 8.8–10.2)
CHLORIDE SERPL-SCNC: 101 MMOL/L (ref 98–111)
CHOLEST SERPL-MCNC: 194 MG/DL (ref 160–199)
CLARITY UR: CLEAR
CO2 SERPL-SCNC: 20 MMOL/L (ref 22–29)
COLOR UR: YELLOW
CREAT SERPL-MCNC: 0.6 MG/DL (ref 0.5–0.9)
EOSINOPHIL # BLD: 0.2 K/UL (ref 0–0.6)
EOSINOPHIL NFR BLD: 3 % (ref 0–5)
ERYTHROCYTE [DISTWIDTH] IN BLOOD BY AUTOMATED COUNT: 13 % (ref 11.5–14.5)
GLUCOSE SERPL-MCNC: 109 MG/DL (ref 74–109)
GLUCOSE UR STRIP.AUTO-MCNC: NEGATIVE MG/DL
HBA1C MFR BLD: 6.1 % (ref 4–6)
HCT VFR BLD AUTO: 41.4 % (ref 37–47)
HDLC SERPL-MCNC: 53 MG/DL (ref 65–121)
HGB BLD-MCNC: 13.3 G/DL (ref 12–16)
HGB UR STRIP.AUTO-MCNC: NEGATIVE MG/L
IMM GRANULOCYTES # BLD: 0 K/UL
KETONES UR STRIP.AUTO-MCNC: NEGATIVE MG/DL
LDLC SERPL CALC-MCNC: 113 MG/DL
LEUKOCYTE ESTERASE UR QL STRIP.AUTO: NEGATIVE
LYMPHOCYTES # BLD: 1.3 K/UL (ref 1.1–4.5)
LYMPHOCYTES NFR BLD: 22.3 % (ref 20–40)
MCH RBC QN AUTO: 30.7 PG (ref 27–31)
MCHC RBC AUTO-ENTMCNC: 32.1 G/DL (ref 33–37)
MCV RBC AUTO: 95.6 FL (ref 81–99)
MONOCYTES # BLD: 0.5 K/UL (ref 0–0.9)
MONOCYTES NFR BLD: 9.3 % (ref 0–10)
NEUTROPHILS # BLD: 3.6 K/UL (ref 1.5–7.5)
NEUTS SEG NFR BLD: 64.7 % (ref 50–65)
NITRITE UR QL STRIP.AUTO: NEGATIVE
PH UR STRIP.AUTO: 5.5 [PH] (ref 5–8)
PLATELET # BLD AUTO: 260 K/UL (ref 130–400)
PMV BLD AUTO: 11.3 FL (ref 9.4–12.3)
POTASSIUM SERPL-SCNC: 4.4 MMOL/L (ref 3.5–5)
PROT SERPL-MCNC: 6.9 G/DL (ref 6.6–8.7)
PROT UR STRIP.AUTO-MCNC: NEGATIVE MG/DL
RBC # BLD AUTO: 4.33 M/UL (ref 4.2–5.4)
SODIUM SERPL-SCNC: 140 MMOL/L (ref 136–145)
SP GR UR STRIP.AUTO: 1.01 (ref 1–1.03)
TRIGL SERPL-MCNC: 138 MG/DL (ref 0–149)
TSH SERPL DL<=0.005 MIU/L-ACNC: 1.13 UIU/ML (ref 0.27–4.2)
UROBILINOGEN UR STRIP.AUTO-MCNC: 0.2 E.U./DL
WBC # BLD AUTO: 5.6 K/UL (ref 4.8–10.8)

## 2024-06-04 LAB
CREAT UR-MCNC: 112.6 MG/DL (ref 28–217)
MICROALBUMIN UR-MCNC: <1.2 MG/DL (ref 0–19)
MICROALBUMIN/CREAT UR-RTO: NORMAL MG/G

## 2024-06-05 SDOH — HEALTH STABILITY: PHYSICAL HEALTH: ON AVERAGE, HOW MANY DAYS PER WEEK DO YOU ENGAGE IN MODERATE TO STRENUOUS EXERCISE (LIKE A BRISK WALK)?: 0 DAYS

## 2024-06-05 ASSESSMENT — PATIENT HEALTH QUESTIONNAIRE - PHQ9
SUM OF ALL RESPONSES TO PHQ QUESTIONS 1-9: 0
2. FEELING DOWN, DEPRESSED OR HOPELESS: NOT AT ALL
SUM OF ALL RESPONSES TO PHQ9 QUESTIONS 1 & 2: 0
SUM OF ALL RESPONSES TO PHQ QUESTIONS 1-9: 0
1. LITTLE INTEREST OR PLEASURE IN DOING THINGS: NOT AT ALL

## 2024-06-05 ASSESSMENT — LIFESTYLE VARIABLES
HOW OFTEN DO YOU HAVE A DRINK CONTAINING ALCOHOL: 1
HOW OFTEN DO YOU HAVE SIX OR MORE DRINKS ON ONE OCCASION: 1
HOW MANY STANDARD DRINKS CONTAINING ALCOHOL DO YOU HAVE ON A TYPICAL DAY: PATIENT DOES NOT DRINK
HOW MANY STANDARD DRINKS CONTAINING ALCOHOL DO YOU HAVE ON A TYPICAL DAY: 0
HOW OFTEN DO YOU HAVE A DRINK CONTAINING ALCOHOL: NEVER

## 2024-06-06 ENCOUNTER — OFFICE VISIT (OUTPATIENT)
Dept: INTERNAL MEDICINE | Age: 74
End: 2024-06-06

## 2024-06-06 VITALS
SYSTOLIC BLOOD PRESSURE: 120 MMHG | HEART RATE: 75 BPM | HEIGHT: 64 IN | BODY MASS INDEX: 29.91 KG/M2 | OXYGEN SATURATION: 96 % | WEIGHT: 175.2 LBS | DIASTOLIC BLOOD PRESSURE: 76 MMHG

## 2024-06-06 DIAGNOSIS — Z00.00 MEDICARE ANNUAL WELLNESS VISIT, SUBSEQUENT: Primary | ICD-10-CM

## 2024-06-06 DIAGNOSIS — E66.09 EXOGENOUS OBESITY: ICD-10-CM

## 2024-06-06 DIAGNOSIS — E55.9 VITAMIN D DEFICIENCY: ICD-10-CM

## 2024-06-06 DIAGNOSIS — E78.00 PURE HYPERCHOLESTEROLEMIA: ICD-10-CM

## 2024-06-06 DIAGNOSIS — F41.1 GENERALIZED ANXIETY DISORDER: ICD-10-CM

## 2024-06-06 DIAGNOSIS — M54.50 ACUTE RIGHT-SIDED LOW BACK PAIN WITHOUT SCIATICA: ICD-10-CM

## 2024-06-06 DIAGNOSIS — K59.09 CHRONIC CONSTIPATION: ICD-10-CM

## 2024-06-06 DIAGNOSIS — E11.9 TYPE 2 DIABETES MELLITUS WITHOUT COMPLICATION, WITHOUT LONG-TERM CURRENT USE OF INSULIN (HCC): ICD-10-CM

## 2024-06-06 RX ORDER — SEMAGLUTIDE 1.34 MG/ML
1 INJECTION, SOLUTION SUBCUTANEOUS
Qty: 9 ML | Refills: 0 | Status: SHIPPED | OUTPATIENT
Start: 2024-06-06

## 2024-06-06 RX ORDER — METHOCARBAMOL 500 MG/1
500 TABLET, FILM COATED ORAL NIGHTLY PRN
Qty: 10 TABLET | Refills: 0 | Status: SHIPPED | OUTPATIENT
Start: 2024-06-06 | End: 2024-06-16

## 2024-06-06 RX ORDER — MELOXICAM 7.5 MG/1
7.5 TABLET ORAL DAILY
Qty: 10 TABLET | Refills: 0 | Status: SHIPPED | OUTPATIENT
Start: 2024-06-06

## 2024-06-06 SDOH — ECONOMIC STABILITY: INCOME INSECURITY: HOW HARD IS IT FOR YOU TO PAY FOR THE VERY BASICS LIKE FOOD, HOUSING, MEDICAL CARE, AND HEATING?: NOT HARD AT ALL

## 2024-06-06 SDOH — ECONOMIC STABILITY: FOOD INSECURITY: WITHIN THE PAST 12 MONTHS, YOU WORRIED THAT YOUR FOOD WOULD RUN OUT BEFORE YOU GOT MONEY TO BUY MORE.: NEVER TRUE

## 2024-06-06 SDOH — ECONOMIC STABILITY: FOOD INSECURITY: WITHIN THE PAST 12 MONTHS, THE FOOD YOU BOUGHT JUST DIDN'T LAST AND YOU DIDN'T HAVE MONEY TO GET MORE.: NEVER TRUE

## 2024-06-06 ASSESSMENT — ENCOUNTER SYMPTOMS
WHEEZING: 0
CHEST TIGHTNESS: 0
CONSTIPATION: 0
COUGH: 0
ABDOMINAL PAIN: 0
SORE THROAT: 0

## 2024-06-06 NOTE — PROGRESS NOTES
Chief Complaint   Patient presents with    Multiple medical problems     Right side pain times 2 weeks no injury      History of presenting illness:  Cinthya Agarwal is a73 y.o. female who presents today for follow up on her chronic medical conditions as noted below.    Patient Active Problem List    Diagnosis Date Noted    Long term current use of aromatase inhibitor 08/03/2022     Priority: Medium    S/P lumpectomy, right breast 9/15/2020 09/22/2020    Adverse effect of antineoplastic and immunosuppressive drugs, initial encounter      Malignant neoplasm of right breast in female, estrogen receptor negative (HCC) 04/17/2020    Microhematuria 06/07/2019    Bilateral low back pain without sciatica 05/04/2018    Osteopenia of left lower leg 08/16/2017     Overview Note:     12/16 lumbar spine normal, left hip neck -1.7  6/2020 lumbar spine -1.4, hip neck -1.5/-1.3      Type 2 diabetes mellitus without complication, without long-term current use of insulin (HCC) 08/12/2017    Vitamin D deficiency 08/12/2017    Pure hypercholesterolemia 08/12/2017    GERD (gastroesophageal reflux disease) 08/12/2017    Exogenous obesity 08/12/2017    Generalized anxiety disorder 08/12/2017     Past Medical History:   Diagnosis Date    Anxiety     Breast cancer (HCC) 2020    infiltrating ductal carcinoma     Cancer (HCC)     Breast Cancer    Carpal tunnel syndrome     pt denies having carpal tunnel syndrome    Cervicalgia     Depression     Esophagitis     History of therapeutic radiation 2020    Hx antineoplastic chemo 2020    Hyperlipidemia     Idiopathic peripheral neuropathy     Insomnia     Long term current use of aromatase inhibitor 8/3/2022    Long term current use of aromatase inhibitor 8/3/2022    Long term current use of aromatase inhibitor 8/3/2022    Menopause     Obesity due to excess calories     Osteoarthritis     Osteopenia     Type 2 diabetes mellitus without complication (HCC)     Vitamin D deficiency

## 2024-06-06 NOTE — PROGRESS NOTES
Medicare Annual Wellness Visit    Cinthya Agarwal is here for Medicare AWV (Right side pain times 2 weeks no injury )  Recommendations for Preventive Services Due: see orders and patient instructions/AVS.  Recommended screening schedule for the next 5-10 years is provided to the patient in written form: see Patient Instructions/AVS.     No follow-ups on file.     Subjective       Patient's complete Health Risk Assessment and screening values have been reviewed and are found in Flowsheets. The following problems were reviewed today and where indicated follow up appointments were made and/or referrals ordered.    Positive Risk Factor Screenings with Interventions:               General HRA Questions:  Select all that apply: (!) New or Increased Pain, New or Increased Fatigue    Pain Interventions:  Patient declined any further interventions or treatment    Fatigue Interventions:  Patient declined any further interventions or treatment      Activity, Diet, and Weight:  On average, how many days per week do you engage in moderate to strenuous exercise (like a brisk walk)?: 0 days       Do you eat balanced/healthy meals regularly?: (!) No    Body mass index is 30.07 kg/m². (!) Abnormal    Do you eat balanced/healthy meals regularly Interventions:  low carbohydrate diet  Obesity Interventions:  low carbohydrate diet      Vision Screen:  Do you have difficulty driving, watching TV, or doing any of your daily activities because of your eyesight?: No  Have you had an eye exam within the past year?: (!) No  No results found.    Interventions:   Patient declines any further evaluation or treatment    Safety:  Do you have non-slip mats or non-slip surfaces or shower bars or grab bars in your shower or bathtub?: (!) No  Interventions:  Patient declined any further interventions or treatment           Objective   Vitals:    06/06/24 0845   BP: 120/76   Pulse: 75   SpO2: 96%   Weight: 79.5 kg (175 lb 3.2 oz)   Height: 1.626 m

## 2024-06-06 NOTE — PATIENT INSTRUCTIONS
Fatigue: Care Instructions  Overview     Fatigue is a feeling of tiredness, exhaustion, or lack of energy. You may feel fatigue because of too much or not enough activity. It can also come from stress, lack of sleep, boredom, and poor diet. Many medical problems, such as viral infections, can cause fatigue. Emotional problems, especially depression, are often the cause of fatigue.  Fatigue is most often a symptom of another problem. Treatment for fatigue depends on the cause. For example, if you have fatigue because you have a certain health problem, treating this problem also treats your fatigue. If depression or anxiety is the cause, treatment may help.  Follow-up care is a key part of your treatment and safety. Be sure to make and go to all appointments, and call your doctor if you are having problems. It's also a good idea to know your test results and keep a list of the medicines you take.  How can you care for yourself at home?  Get regular exercise. But try not to overdo it. It may help to go back and forth between rest and exercise.  Get plenty of rest.  Eat a variety of healthy foods. Try not to skip any meals.  Avoid or try to cut back on your use of caffeine, tobacco, and alcohol. Caffeine is most often found in coffee, tea, cola drinks, and energy drinks.  Limit medicines that can cause fatigue. These include medicines such as cold and allergy medicines.  When should you call for help?  Watch closely for changes in your health, and be sure to contact your doctor if:    You have new symptoms such as fever or a rash.     Your fatigue gets worse.     You have been feeling down, depressed, or hopeless. Or you may have lost interest in things that you usually enjoy.     You are not getting better as expected.   Where can you learn more?  Go to https://www.healthwise.net/patientEd and enter W864 to learn more about \"Fatigue: Care Instructions.\"  Current as of: June 24, 2023  Content Version: 14.1  ©

## 2024-10-21 RX ORDER — LOVASTATIN 20 MG/1
40 TABLET ORAL DAILY
Qty: 180 TABLET | Refills: 0 | Status: SHIPPED | OUTPATIENT
Start: 2024-10-21

## 2024-10-21 NOTE — TELEPHONE ENCOUNTER
Cinthya Agarwal called to request a refill on her medication.      Last office visit : 6/6/2024   Next office visit : 12/9/2024     Requested Prescriptions     Pending Prescriptions Disp Refills    lovastatin (MEVACOR) 20 MG tablet [Pharmacy Med Name: Lovastatin 20 MG Oral Tablet] 180 tablet 0     Sig: TAKE 2 TABLETS BY MOUTH DAILY            Astrid Zamora MA

## 2024-10-28 ENCOUNTER — TELEPHONE (OUTPATIENT)
Dept: HEMATOLOGY | Age: 74
End: 2024-10-28

## 2024-10-28 NOTE — TELEPHONE ENCOUNTER
I called patient and reminded patient of their appt on 10/30/24 and patient confirmed they would be here. I also let patient know that we have moved into our new cancer facility and asked patient if they were aware of where we were now located, and patient voiced understanding of our new location. Patient knows not to arrive early and that we will get labs at the time of the follow up appointment and not the lab appointment time. AM

## 2024-10-29 DIAGNOSIS — Z17.0 MALIGNANT NEOPLASM OF BREAST IN FEMALE, ESTROGEN RECEPTOR POSITIVE, UNSPECIFIED LATERALITY, UNSPECIFIED SITE OF BREAST (HCC): Primary | ICD-10-CM

## 2024-10-29 DIAGNOSIS — C50.919 MALIGNANT NEOPLASM OF BREAST IN FEMALE, ESTROGEN RECEPTOR POSITIVE, UNSPECIFIED LATERALITY, UNSPECIFIED SITE OF BREAST (HCC): Primary | ICD-10-CM

## 2024-10-29 NOTE — PROGRESS NOTES
4256 cGY radiation therapy to right breast  3/10/2021 Bilateral mammogram documented no mammographic evidence of malignancy.   2022 Bilateral mammogram- no mammographic evidence of malignancy  2024 Bilateral mammogram- reported no focal suspicious masses, areas of architectural distortion, or suspicious calcifications in either breast.    Age-appropriate health screenin2020 Bone Density Femur Neck T-Score--1.5. This patient is considered Osteopenic.  2022 Bone density ([place])- osteopenia; left femoral neck T score -1.7; lumbar spine T score -1.7    Past Medical History:    Past Medical History:   Diagnosis Date    Anxiety     Breast cancer (HCC) 2020    infiltrating ductal carcinoma     Cancer (HCC)     Breast Cancer    Carpal tunnel syndrome     pt denies having carpal tunnel syndrome    Cervicalgia     Depression     Esophagitis     History of therapeutic radiation     Hx antineoplastic chemo     Hyperlipidemia     Idiopathic peripheral neuropathy     Insomnia     Long term current use of aromatase inhibitor 8/3/2022    Long term current use of aromatase inhibitor 8/3/2022    Long term current use of aromatase inhibitor 8/3/2022    Menopause     Obesity due to excess calories     Osteoarthritis     Osteopenia     Type 2 diabetes mellitus without complication (HCC)     Vitamin D deficiency        Past Surgical History:    Past Surgical History:   Procedure Laterality Date    BREAST BIOPSY Right 2020    infiltrating ductal carcinoma    BREAST LUMPECTOMY Right 09/15/2020    RIGHT LUMPECTOMY WITH SNB, PREOP AND INTRAOP US GUIDED NL, FLAPS, BIOZORB, MARGIN PROBE,PEC BLOCK performed by Mikal Conway MD at [facility] OR    CHOLECYSTECTOMY      COLONOSCOPY  2018    Dr Erwin- polyp, diverticulosis, int hemorrhoids    HERNIA REPAIR      LOBECTOMY Right 2016    THORACOTOMY, WEDGE RESECTION PULMONARY NODULE RIGHT MIDDLE LOBE performed by Gilbert Oro MD at [facility] OR    PORT

## 2024-10-30 ENCOUNTER — HOSPITAL ENCOUNTER (OUTPATIENT)
Dept: INFUSION THERAPY | Age: 74
Discharge: HOME | End: 2024-10-30
Payer: MEDICARE

## 2024-10-30 ENCOUNTER — OFFICE VISIT (OUTPATIENT)
Dept: HEMATOLOGY | Age: 74
End: 2024-10-30
Payer: MEDICARE

## 2024-10-30 VITALS
DIASTOLIC BLOOD PRESSURE: 74 MMHG | OXYGEN SATURATION: 98 % | TEMPERATURE: 97.6 F | HEIGHT: 64 IN | SYSTOLIC BLOOD PRESSURE: 128 MMHG | HEART RATE: 74 BPM | BODY MASS INDEX: 29.08 KG/M2 | WEIGHT: 170.3 LBS

## 2024-10-30 DIAGNOSIS — Z79.811 ENCOUNTER FOR MONITORING AROMATASE INHIBITOR THERAPY: ICD-10-CM

## 2024-10-30 DIAGNOSIS — Z17.0 MALIGNANT NEOPLASM OF BREAST IN FEMALE, ESTROGEN RECEPTOR POSITIVE, UNSPECIFIED LATERALITY, UNSPECIFIED SITE OF BREAST (HCC): Primary | ICD-10-CM

## 2024-10-30 DIAGNOSIS — Z17.0 MALIGNANT NEOPLASM OF BREAST IN FEMALE, ESTROGEN RECEPTOR POSITIVE, UNSPECIFIED LATERALITY, UNSPECIFIED SITE OF BREAST (HCC): ICD-10-CM

## 2024-10-30 DIAGNOSIS — M85.89 OSTEOPENIA OF MULTIPLE SITES: ICD-10-CM

## 2024-10-30 DIAGNOSIS — Z71.89 CARE PLAN DISCUSSED WITH PATIENT: ICD-10-CM

## 2024-10-30 DIAGNOSIS — C50.919 MALIGNANT NEOPLASM OF BREAST IN FEMALE, ESTROGEN RECEPTOR POSITIVE, UNSPECIFIED LATERALITY, UNSPECIFIED SITE OF BREAST (HCC): Primary | ICD-10-CM

## 2024-10-30 DIAGNOSIS — G62.0 CHEMOTHERAPY-INDUCED NEUROPATHY (HCC): ICD-10-CM

## 2024-10-30 DIAGNOSIS — Z51.81 ENCOUNTER FOR MONITORING AROMATASE INHIBITOR THERAPY: ICD-10-CM

## 2024-10-30 DIAGNOSIS — C50.919 MALIGNANT NEOPLASM OF BREAST IN FEMALE, ESTROGEN RECEPTOR POSITIVE, UNSPECIFIED LATERALITY, UNSPECIFIED SITE OF BREAST (HCC): ICD-10-CM

## 2024-10-30 DIAGNOSIS — T45.1X5A CHEMOTHERAPY-INDUCED NEUROPATHY (HCC): ICD-10-CM

## 2024-10-30 LAB
ALBUMIN SERPL-MCNC: 4.2 G/DL (ref 3.5–5.2)
ALP SERPL-CCNC: 110 U/L (ref 35–104)
ALT SERPL-CCNC: 34 U/L (ref 5–33)
ANION GAP SERPL CALCULATED.3IONS-SCNC: 10 MMOL/L (ref 7–19)
AST SERPL-CCNC: 30 U/L (ref 5–32)
BASOPHILS # BLD: 0.02 K/UL (ref 0.01–0.08)
BASOPHILS NFR BLD: 0.4 % (ref 0.1–1.2)
BILIRUB SERPL-MCNC: 0.3 MG/DL (ref 0–1.2)
BUN SERPL-MCNC: 11 MG/DL (ref 8–23)
CALCIUM SERPL-MCNC: 9.3 MG/DL (ref 8.8–10.2)
CHLORIDE SERPL-SCNC: 103 MMOL/L (ref 98–107)
CO2 SERPL-SCNC: 26 MMOL/L (ref 22–29)
CREAT SERPL-MCNC: 0.6 MG/DL (ref 0.5–0.9)
EOSINOPHIL # BLD: 0.27 K/UL (ref 0.04–0.54)
EOSINOPHIL NFR BLD: 5.8 % (ref 0.7–7)
ERYTHROCYTE [DISTWIDTH] IN BLOOD BY AUTOMATED COUNT: 13.1 % (ref 11.7–14.4)
GLUCOSE SERPL-MCNC: 173 MG/DL (ref 70–99)
HCT VFR BLD AUTO: 38.6 % (ref 34.1–44.9)
HGB BLD-MCNC: 12.9 G/DL (ref 11.2–15.7)
LYMPHOCYTES # BLD: 1.31 K/UL (ref 1.18–3.74)
LYMPHOCYTES NFR BLD: 28.1 % (ref 19.3–53.1)
MCH RBC QN AUTO: 31.1 PG (ref 25.6–32.2)
MCHC RBC AUTO-ENTMCNC: 33.4 G/DL (ref 32.3–35.5)
MCV RBC AUTO: 93 FL (ref 79.4–94.8)
MONOCYTES # BLD: 0.44 K/UL (ref 0.24–0.82)
MONOCYTES NFR BLD: 9.4 % (ref 4.7–12.5)
NEUTROPHILS # BLD: 2.62 K/UL (ref 1.56–6.13)
NEUTS SEG NFR BLD: 56.1 % (ref 34–71.1)
PLATELET # BLD AUTO: 259 K/UL (ref 182–369)
PMV BLD AUTO: 10.4 FL (ref 7.4–10.4)
POTASSIUM SERPL-SCNC: 4 MMOL/L (ref 3.5–5.1)
PROT SERPL-MCNC: 7 G/DL (ref 6.4–8.3)
RBC # BLD AUTO: 4.15 M/UL (ref 3.93–5.22)
SODIUM SERPL-SCNC: 139 MMOL/L (ref 136–145)
WBC # BLD AUTO: 4.67 K/UL (ref 3.98–10.04)

## 2024-10-30 PROCEDURE — 85025 COMPLETE CBC W/AUTO DIFF WBC: CPT

## 2024-10-30 PROCEDURE — 80053 COMPREHEN METABOLIC PANEL: CPT

## 2024-10-30 PROCEDURE — 99212 OFFICE O/P EST SF 10 MIN: CPT

## 2024-10-30 PROCEDURE — 36415 COLL VENOUS BLD VENIPUNCTURE: CPT

## 2024-10-30 RX ORDER — CALCIUM CARBONATE 500(1250)
500 TABLET ORAL DAILY
COMMUNITY

## 2024-11-15 ENCOUNTER — HOSPITAL ENCOUNTER (OUTPATIENT)
Dept: WOMENS IMAGING | Age: 74
Discharge: HOME | End: 2024-11-15
Payer: MEDICARE

## 2024-11-15 DIAGNOSIS — M85.89 OSTEOPENIA OF MULTIPLE SITES: ICD-10-CM

## 2024-11-15 DIAGNOSIS — Z79.811 LONG TERM CURRENT USE OF AROMATASE INHIBITOR: ICD-10-CM

## 2024-11-15 PROCEDURE — 77080 DXA BONE DENSITY AXIAL: CPT

## 2024-11-25 DIAGNOSIS — Z17.0 MALIGNANT NEOPLASM OF BREAST IN FEMALE, ESTROGEN RECEPTOR POSITIVE, UNSPECIFIED LATERALITY, UNSPECIFIED SITE OF BREAST (HCC): ICD-10-CM

## 2024-11-25 DIAGNOSIS — C50.919 MALIGNANT NEOPLASM OF BREAST IN FEMALE, ESTROGEN RECEPTOR POSITIVE, UNSPECIFIED LATERALITY, UNSPECIFIED SITE OF BREAST (HCC): ICD-10-CM

## 2024-11-25 RX ORDER — LETROZOLE 2.5 MG/1
2.5 TABLET, FILM COATED ORAL DAILY
Qty: 60 TABLET | Refills: 4 | Status: SHIPPED | OUTPATIENT
Start: 2024-11-25

## 2024-11-25 RX ORDER — SEMAGLUTIDE 1.34 MG/ML
1 INJECTION, SOLUTION SUBCUTANEOUS
Qty: 9 ML | Refills: 0 | Status: SHIPPED | OUTPATIENT
Start: 2024-11-25

## 2024-11-25 NOTE — TELEPHONE ENCOUNTER
Cinthya Agarwal called to request a refill on her medication.      Last office visit : 6/6/2024   Next office visit : 12/9/2024     Requested Prescriptions     Pending Prescriptions Disp Refills    OZEMPIC, 1 MG/DOSE, 4 MG/3ML SOPN sc injection 9 mL 0     Sig: Inject 1 mg into the skin every 7 days            Astrid Zamora MA

## 2024-12-02 RX ORDER — ESCITALOPRAM OXALATE 10 MG/1
5 TABLET ORAL DAILY
Qty: 45 TABLET | Refills: 1 | Status: SHIPPED | OUTPATIENT
Start: 2024-12-02

## 2024-12-02 NOTE — TELEPHONE ENCOUNTER
Cinthya Agarwal called to request a refill on her medication.      Last office visit : 6/6/2024   Next office visit : 12/9/2024     Requested Prescriptions     Pending Prescriptions Disp Refills    escitalopram (LEXAPRO) 10 MG tablet 45 tablet 1     Sig: Take 0.5 tablets by mouth daily            Astrid Zamora MA

## 2024-12-05 DIAGNOSIS — E78.00 PURE HYPERCHOLESTEROLEMIA: ICD-10-CM

## 2024-12-05 DIAGNOSIS — E55.9 VITAMIN D DEFICIENCY: ICD-10-CM

## 2024-12-05 DIAGNOSIS — M54.50 ACUTE RIGHT-SIDED LOW BACK PAIN WITHOUT SCIATICA: ICD-10-CM

## 2024-12-05 DIAGNOSIS — K59.09 CHRONIC CONSTIPATION: ICD-10-CM

## 2024-12-05 DIAGNOSIS — E66.09 EXOGENOUS OBESITY: ICD-10-CM

## 2024-12-05 DIAGNOSIS — Z00.00 MEDICARE ANNUAL WELLNESS VISIT, SUBSEQUENT: ICD-10-CM

## 2024-12-05 DIAGNOSIS — E11.9 TYPE 2 DIABETES MELLITUS WITHOUT COMPLICATION, WITHOUT LONG-TERM CURRENT USE OF INSULIN (HCC): ICD-10-CM

## 2024-12-05 DIAGNOSIS — F41.1 GENERALIZED ANXIETY DISORDER: ICD-10-CM

## 2024-12-05 LAB
ALBUMIN SERPL-MCNC: 4.4 G/DL (ref 3.5–5.2)
ALP SERPL-CCNC: 125 U/L (ref 35–104)
ALT SERPL-CCNC: 34 U/L (ref 5–33)
ANION GAP SERPL CALCULATED.3IONS-SCNC: 12 MMOL/L (ref 7–19)
AST SERPL-CCNC: 23 U/L (ref 5–32)
BILIRUB SERPL-MCNC: 0.4 MG/DL (ref 0.2–1.2)
BUN SERPL-MCNC: 13 MG/DL (ref 8–23)
CALCIUM SERPL-MCNC: 9.7 MG/DL (ref 8.8–10.2)
CHLORIDE SERPL-SCNC: 101 MMOL/L (ref 98–111)
CHOLEST SERPL-MCNC: 184 MG/DL (ref 0–199)
CO2 SERPL-SCNC: 27 MMOL/L (ref 22–29)
CREAT SERPL-MCNC: 0.6 MG/DL (ref 0.5–0.9)
GLUCOSE SERPL-MCNC: 123 MG/DL (ref 70–99)
HBA1C MFR BLD: 6 % (ref 4–5.6)
HDLC SERPL-MCNC: 59 MG/DL (ref 40–60)
LDLC SERPL CALC-MCNC: 104 MG/DL
POTASSIUM SERPL-SCNC: 3.8 MMOL/L (ref 3.5–5)
PROT SERPL-MCNC: 7.2 G/DL (ref 6.4–8.3)
SODIUM SERPL-SCNC: 140 MMOL/L (ref 136–145)
TRIGL SERPL-MCNC: 105 MG/DL (ref 0–149)

## 2024-12-09 ENCOUNTER — OFFICE VISIT (OUTPATIENT)
Dept: INTERNAL MEDICINE | Age: 74
End: 2024-12-09
Payer: MEDICARE

## 2024-12-09 VITALS
SYSTOLIC BLOOD PRESSURE: 120 MMHG | HEIGHT: 64 IN | HEART RATE: 80 BPM | DIASTOLIC BLOOD PRESSURE: 78 MMHG | BODY MASS INDEX: 28.99 KG/M2 | OXYGEN SATURATION: 96 % | WEIGHT: 169.8 LBS

## 2024-12-09 DIAGNOSIS — F41.1 GENERALIZED ANXIETY DISORDER: ICD-10-CM

## 2024-12-09 DIAGNOSIS — E55.9 VITAMIN D DEFICIENCY: ICD-10-CM

## 2024-12-09 DIAGNOSIS — E11.9 TYPE 2 DIABETES MELLITUS WITHOUT COMPLICATION, WITHOUT LONG-TERM CURRENT USE OF INSULIN (HCC): Primary | ICD-10-CM

## 2024-12-09 DIAGNOSIS — M85.862 OSTEOPENIA OF LEFT LOWER LEG: ICD-10-CM

## 2024-12-09 DIAGNOSIS — E78.00 PURE HYPERCHOLESTEROLEMIA: ICD-10-CM

## 2024-12-09 PROCEDURE — G8427 DOCREV CUR MEDS BY ELIG CLIN: HCPCS | Performed by: INTERNAL MEDICINE

## 2024-12-09 PROCEDURE — 1123F ACP DISCUSS/DSCN MKR DOCD: CPT | Performed by: INTERNAL MEDICINE

## 2024-12-09 PROCEDURE — 3044F HG A1C LEVEL LT 7.0%: CPT | Performed by: INTERNAL MEDICINE

## 2024-12-09 PROCEDURE — G8399 PT W/DXA RESULTS DOCUMENT: HCPCS | Performed by: INTERNAL MEDICINE

## 2024-12-09 PROCEDURE — G8484 FLU IMMUNIZE NO ADMIN: HCPCS | Performed by: INTERNAL MEDICINE

## 2024-12-09 PROCEDURE — 3017F COLORECTAL CA SCREEN DOC REV: CPT | Performed by: INTERNAL MEDICINE

## 2024-12-09 PROCEDURE — 1036F TOBACCO NON-USER: CPT | Performed by: INTERNAL MEDICINE

## 2024-12-09 PROCEDURE — 2022F DILAT RTA XM EVC RTNOPTHY: CPT | Performed by: INTERNAL MEDICINE

## 2024-12-09 PROCEDURE — 1159F MED LIST DOCD IN RCRD: CPT | Performed by: INTERNAL MEDICINE

## 2024-12-09 PROCEDURE — 1090F PRES/ABSN URINE INCON ASSESS: CPT | Performed by: INTERNAL MEDICINE

## 2024-12-09 PROCEDURE — 99214 OFFICE O/P EST MOD 30 MIN: CPT | Performed by: INTERNAL MEDICINE

## 2024-12-09 PROCEDURE — G8417 CALC BMI ABV UP PARAM F/U: HCPCS | Performed by: INTERNAL MEDICINE

## 2024-12-09 ASSESSMENT — ENCOUNTER SYMPTOMS
ABDOMINAL PAIN: 0
CHEST TIGHTNESS: 0
SORE THROAT: 0
WHEEZING: 0
CONSTIPATION: 0
COUGH: 0

## 2024-12-09 NOTE — PROGRESS NOTES
robaxin hs+ mobic 7.5 am x 10 days  If sx not improving and resolving , if sx continue or re-occur pt has been instructed to call us and / or return here for follow- up evaluation     Back exercise instrucitons     Hemorrhoids- sx now controlled/ off meds  Chronic constipation  Take miralax + stool softner- instructions to pt           Orders Placed This Encounter   Procedures    Lipid Panel    Hemoglobin A1C    CBC with Auto Differential    Comprehensive Metabolic Panel    TSH    Urinalysis    Vitamin D 25 Hydroxy    Microalbumin / Creatinine Urine Ratio     New Prescriptions    No medications on file         No follow-ups on file.   There are no Patient Instructions on file for this visit.  EMR Dragon/transcription disclaimer:Significant part of this  encounter note is electronic transcription/translationof spoken language to printed text. The electronic translation of spoken language may be erroneous, or at times, nonsensical words or phrases may be inadvertently transcribed. Although I have reviewed the note for sucherrors, some may still exist.

## 2025-02-26 RX ORDER — LOVASTATIN 20 MG/1
40 TABLET ORAL DAILY
Qty: 180 TABLET | Refills: 1 | Status: SHIPPED | OUTPATIENT
Start: 2025-02-26

## 2025-02-26 NOTE — TELEPHONE ENCOUNTER
Cinthya Agarwal called to request a refill on her medication.      Last office visit : 12/9/2024   Next office visit : 6/9/2025     Requested Prescriptions     Pending Prescriptions Disp Refills    lovastatin (MEVACOR) 20 MG tablet [Pharmacy Med Name: Lovastatin 20 MG Oral Tablet] 180 tablet 0     Sig: TAKE 2 TABLETS BY MOUTH DAILY            Astrid Zamora MA

## 2025-03-12 ENCOUNTER — PATIENT MESSAGE (OUTPATIENT)
Dept: SURGERY | Age: 75
End: 2025-03-12

## 2025-03-12 DIAGNOSIS — Z85.3 PERSONAL HISTORY OF BREAST CANCER: Primary | ICD-10-CM

## 2025-03-12 NOTE — TELEPHONE ENCOUNTER
Last seen here for breast date was MARCH 2023. Forwarding to Filipe to see if its ok for me to place bilat diag mammo order?

## 2025-04-01 ENCOUNTER — HOSPITAL ENCOUNTER (OUTPATIENT)
Dept: WOMENS IMAGING | Age: 75
Discharge: HOME | End: 2025-04-01
Payer: MEDICARE

## 2025-04-01 VITALS — BODY MASS INDEX: 28.84 KG/M2 | WEIGHT: 168 LBS

## 2025-04-01 DIAGNOSIS — Z85.3 PERSONAL HISTORY OF BREAST CANCER: ICD-10-CM

## 2025-04-01 PROCEDURE — G0279 TOMOSYNTHESIS, MAMMO: HCPCS

## 2025-04-08 NOTE — PATIENT INSTRUCTIONS
Could you please inform parent that the Developmental Pediatric office reviewed her referral and recommends that she be evaluated by the Adolescent Psychiatry team? I will place a referral for psychiatry if family is interested. Thank you!   Patient Education      Start steroid pack and antibiotic today    COVID test today    Quarantine until results return    Return  As needed  Sinusitis: Care Instructions  Your Care Instructions     Sinusitis is an infection of the lining of the sinus cavities in your head. Sinusitis often follows a cold. It causes pain and pressure in your head and face. In most cases, sinusitis gets better on its own in 1 to 2 weeks. But some mild symptoms may last for several weeks. Sometimes antibiotics are needed. Follow-up care is a key part of your treatment and safety. Be sure to make and go to all appointments, and call your doctor if you are having problems. It's also a good idea to know your test results and keep a list of the medicines you take. How can you care for yourself at home? · Take an over-the-counter pain medicine, such as acetaminophen (Tylenol), ibuprofen (Advil, Motrin), or naproxen (Aleve). Read and follow all instructions on the label. · If the doctor prescribed antibiotics, take them as directed. Do not stop taking them just because you feel better. You need to take the full course of antibiotics. · Be careful when taking over-the-counter cold or flu medicines and Tylenol at the same time. Many of these medicines have acetaminophen, which is Tylenol. Read the labels to make sure that you are not taking more than the recommended dose. Too much acetaminophen (Tylenol) can be harmful. · Breathe warm, moist air from a steamy shower, a hot bath, or a sink filled with hot water. Avoid cold, dry air. Using a humidifier in your home may help. Follow the directions for cleaning the machine. · Use saline (saltwater) nasal washes. This can help keep your nasal passages open and wash out mucus and bacteria. You can buy saline nose drops at a grocery store or drugstore. Or you can make your own at home by adding 1 teaspoon of salt and 1 teaspoon of baking soda to 2 cups of distilled water.  If you make your own,

## 2025-04-24 ENCOUNTER — TELEPHONE (OUTPATIENT)
Dept: HEMATOLOGY | Age: 75
End: 2025-04-24

## 2025-04-29 DIAGNOSIS — C50.919 MALIGNANT NEOPLASM OF BREAST IN FEMALE, ESTROGEN RECEPTOR POSITIVE, UNSPECIFIED LATERALITY, UNSPECIFIED SITE OF BREAST (HCC): Primary | ICD-10-CM

## 2025-04-29 DIAGNOSIS — Z17.0 MALIGNANT NEOPLASM OF BREAST IN FEMALE, ESTROGEN RECEPTOR POSITIVE, UNSPECIFIED LATERALITY, UNSPECIFIED SITE OF BREAST (HCC): Primary | ICD-10-CM

## 2025-04-29 NOTE — PROGRESS NOTES
Progress Note      Pt Name: Cinthya Agarwal  YOB: 1950  MRN: [id number]    Date of evaluation: 04/30/2025  History Obtained From:  patient, electronic medical record    CHIEF COMPLAINT:    Chief Complaint   Patient presents with    Follow-up     Malignant neoplasm of breast in female, estrogen receptor positive, unspecified laterality, unspecified site of breast   Patient states she is not having any issues to discuss today     HISTORY OF PRESENT ILLNESS:    Cinthya Agarwal is a 74 y.o.  female who is currently being followed for invasive ductal carcinoma of the right breast, triple negative, ER 4%, diagnosed March 2020 and osteopenia.  Current recommendation is for adjuvant endocrine therapy with letrozole 2.5 mg daily for anticipated 5 years through September 2025.   She has had no known mammographic imaging to suggest progression of disease.  Cinthya returns today in scheduled follow-up for evaluation, lab monitoring, side effect monitoring and further treatment recommendations.  She reports compliant with the letrozole and has no new breast complaints.    Today's clinic visit to include physical assessment, review of systems, any lab or radiographic findings that were available and plan of care are documented below.    ONCOLOGIC HISTORY:     Diagnosis  Invasive ductal carcinoma right breast, March 2020  ER 4%, MS 0.1%, HER-2/paty IHC 0, Ki-67 86%  [id number]   Mammaprint High risk basal     Treatment summary   05/06/2020-8/12/2020 ddAC with G-CSF support followed by dose dense Taxol x4 cycles with G-CSF.  9/15/20-Right breast lumpectomy with 1 sentinel lymph node negative  9/23/20- Adjuvant endocrine therapy-Femara 2.5 mg daily  10/21/20 - 11/11/20 4256 cGY radiation therapy to right breast  8/19/2021-biphosphonate therapy recommended insurance did not approve Prolia 60 mg subcu every 6 months for increased bone loss in the setting of adjuvant endocrine therapy and osteopenia

## 2025-04-30 ENCOUNTER — HOSPITAL ENCOUNTER (OUTPATIENT)
Dept: INFUSION THERAPY | Age: 75
Discharge: HOME | End: 2025-04-30
Payer: MEDICARE

## 2025-04-30 ENCOUNTER — OFFICE VISIT (OUTPATIENT)
Dept: HEMATOLOGY | Age: 75
End: 2025-04-30
Payer: MEDICARE

## 2025-04-30 VITALS
OXYGEN SATURATION: 99 % | SYSTOLIC BLOOD PRESSURE: 124 MMHG | HEIGHT: 64 IN | WEIGHT: 169.9 LBS | BODY MASS INDEX: 29.01 KG/M2 | TEMPERATURE: 96.9 F | DIASTOLIC BLOOD PRESSURE: 78 MMHG | HEART RATE: 82 BPM

## 2025-04-30 DIAGNOSIS — Z17.0 MALIGNANT NEOPLASM OF BREAST IN FEMALE, ESTROGEN RECEPTOR POSITIVE, UNSPECIFIED LATERALITY, UNSPECIFIED SITE OF BREAST (HCC): ICD-10-CM

## 2025-04-30 DIAGNOSIS — C50.919 MALIGNANT NEOPLASM OF BREAST IN FEMALE, ESTROGEN RECEPTOR POSITIVE, UNSPECIFIED LATERALITY, UNSPECIFIED SITE OF BREAST (HCC): Primary | ICD-10-CM

## 2025-04-30 DIAGNOSIS — Z79.811 ENCOUNTER FOR MONITORING AROMATASE INHIBITOR THERAPY: ICD-10-CM

## 2025-04-30 DIAGNOSIS — G62.0 CHEMOTHERAPY-INDUCED NEUROPATHY: ICD-10-CM

## 2025-04-30 DIAGNOSIS — Z51.81 ENCOUNTER FOR MONITORING AROMATASE INHIBITOR THERAPY: ICD-10-CM

## 2025-04-30 DIAGNOSIS — C50.919 MALIGNANT NEOPLASM OF BREAST IN FEMALE, ESTROGEN RECEPTOR POSITIVE, UNSPECIFIED LATERALITY, UNSPECIFIED SITE OF BREAST (HCC): ICD-10-CM

## 2025-04-30 DIAGNOSIS — M85.862 OSTEOPENIA OF LEFT LOWER LEG: ICD-10-CM

## 2025-04-30 DIAGNOSIS — Z71.89 CARE PLAN DISCUSSED WITH PATIENT: ICD-10-CM

## 2025-04-30 DIAGNOSIS — T45.1X5A CHEMOTHERAPY-INDUCED NEUROPATHY: ICD-10-CM

## 2025-04-30 DIAGNOSIS — Z17.0 MALIGNANT NEOPLASM OF BREAST IN FEMALE, ESTROGEN RECEPTOR POSITIVE, UNSPECIFIED LATERALITY, UNSPECIFIED SITE OF BREAST (HCC): Primary | ICD-10-CM

## 2025-04-30 LAB
ALBUMIN SERPL-MCNC: 4.5 G/DL (ref 3.5–5.2)
ALP SERPL-CCNC: 108 U/L (ref 35–104)
ALT SERPL-CCNC: 22 U/L (ref 5–33)
ANION GAP SERPL CALCULATED.3IONS-SCNC: 10 MMOL/L (ref 7–19)
AST SERPL-CCNC: 21 U/L (ref 5–32)
BASOPHILS # BLD: 0.03 K/UL (ref 0–0.2)
BASOPHILS NFR BLD: 0.5 % (ref 0–1)
BILIRUB SERPL-MCNC: 0.3 MG/DL (ref 0–1.2)
BUN SERPL-MCNC: 13 MG/DL (ref 8–23)
CALCIUM SERPL-MCNC: 9.1 MG/DL (ref 8.8–10.2)
CHLORIDE SERPL-SCNC: 103 MMOL/L (ref 98–107)
CO2 SERPL-SCNC: 26 MMOL/L (ref 22–29)
CREAT SERPL-MCNC: 0.7 MG/DL (ref 0.5–0.9)
EOSINOPHIL # BLD: 0.12 K/UL (ref 0–0.6)
EOSINOPHIL NFR BLD: 2.2 % (ref 0–5)
ERYTHROCYTE [DISTWIDTH] IN BLOOD BY AUTOMATED COUNT: 13 % (ref 11.5–14.5)
GLUCOSE SERPL-MCNC: 105 MG/DL (ref 70–99)
HCT VFR BLD AUTO: 39.1 % (ref 37–47)
HGB BLD-MCNC: 13.1 G/DL (ref 12–16)
LYMPHOCYTES # BLD: 1.53 K/UL (ref 1.1–4.5)
LYMPHOCYTES NFR BLD: 27.7 % (ref 20–40)
MCH RBC QN AUTO: 31.4 PG (ref 27–31)
MCHC RBC AUTO-ENTMCNC: 33.5 G/DL (ref 33–37)
MCV RBC AUTO: 93.8 FL (ref 81–99)
MONOCYTES # BLD: 0.57 K/UL (ref 0–0.9)
MONOCYTES NFR BLD: 10.3 % (ref 1–10)
NEUTROPHILS # BLD: 3.27 K/UL (ref 1.5–7.5)
NEUTS SEG NFR BLD: 59.1 % (ref 50–65)
PLATELET # BLD AUTO: 256 K/UL (ref 130–400)
PMV BLD AUTO: 10.2 FL (ref 9.4–12.3)
POTASSIUM SERPL-SCNC: 4.2 MMOL/L (ref 3.5–5.1)
PROT SERPL-MCNC: 7 G/DL (ref 6.4–8.3)
RBC # BLD AUTO: 4.17 M/UL (ref 4.2–5.4)
SODIUM SERPL-SCNC: 139 MMOL/L (ref 136–145)
WBC # BLD AUTO: 5.53 K/UL (ref 4.8–10.8)

## 2025-04-30 PROCEDURE — 85025 COMPLETE CBC W/AUTO DIFF WBC: CPT

## 2025-04-30 PROCEDURE — 1036F TOBACCO NON-USER: CPT | Performed by: NURSE PRACTITIONER

## 2025-04-30 PROCEDURE — 1159F MED LIST DOCD IN RCRD: CPT | Performed by: NURSE PRACTITIONER

## 2025-04-30 PROCEDURE — 1126F AMNT PAIN NOTED NONE PRSNT: CPT | Performed by: NURSE PRACTITIONER

## 2025-04-30 PROCEDURE — 99212 OFFICE O/P EST SF 10 MIN: CPT

## 2025-04-30 PROCEDURE — 1090F PRES/ABSN URINE INCON ASSESS: CPT | Performed by: NURSE PRACTITIONER

## 2025-04-30 PROCEDURE — 3017F COLORECTAL CA SCREEN DOC REV: CPT | Performed by: NURSE PRACTITIONER

## 2025-04-30 PROCEDURE — 36415 COLL VENOUS BLD VENIPUNCTURE: CPT

## 2025-04-30 PROCEDURE — 1123F ACP DISCUSS/DSCN MKR DOCD: CPT | Performed by: NURSE PRACTITIONER

## 2025-04-30 PROCEDURE — 80053 COMPREHEN METABOLIC PANEL: CPT

## 2025-04-30 PROCEDURE — G8427 DOCREV CUR MEDS BY ELIG CLIN: HCPCS | Performed by: NURSE PRACTITIONER

## 2025-04-30 PROCEDURE — G8417 CALC BMI ABV UP PARAM F/U: HCPCS | Performed by: NURSE PRACTITIONER

## 2025-04-30 PROCEDURE — 99214 OFFICE O/P EST MOD 30 MIN: CPT | Performed by: NURSE PRACTITIONER

## 2025-04-30 PROCEDURE — G8399 PT W/DXA RESULTS DOCUMENT: HCPCS | Performed by: NURSE PRACTITIONER

## 2025-05-02 ENCOUNTER — RESULTS FOLLOW-UP (OUTPATIENT)
Dept: HEMATOLOGY | Age: 75
End: 2025-05-02

## 2025-05-06 ASSESSMENT — ENCOUNTER SYMPTOMS
NAUSEA: 0
SHORTNESS OF BREATH: 0
EYES NEGATIVE: 1
RESPIRATORY NEGATIVE: 1
SORE THROAT: 0
WHEEZING: 0
GASTROINTESTINAL NEGATIVE: 1
EYE REDNESS: 0
BLOOD IN STOOL: 0
CONSTIPATION: 0
BACK PAIN: 0
VOMITING: 0
COUGH: 0
DIARRHEA: 0
ABDOMINAL PAIN: 0
EYE PAIN: 0
EYE DISCHARGE: 0

## 2025-05-20 ENCOUNTER — TELEPHONE (OUTPATIENT)
Dept: HEMATOLOGY | Age: 75
End: 2025-05-20

## 2025-05-20 DIAGNOSIS — M85.89 OSTEOPENIA OF MULTIPLE SITES: Primary | ICD-10-CM

## 2025-05-20 RX ORDER — ALENDRONATE SODIUM 70 MG/1
70 TABLET ORAL
Qty: 4 TABLET | Refills: 5 | Status: SHIPPED | OUTPATIENT
Start: 2025-05-20

## 2025-05-20 NOTE — TELEPHONE ENCOUNTER
Called patient to see how she is doing since stopping the letrozole.  Patient states that she is feeling better and joints not as stiff.  Instructed that ASHWIN Fuller would like for her to restart the letrozole and Fosamax.  Instructed to call if any symptoms return.  Instructed to call with any problems.  Patient v/u and is agreeable to plan.

## 2025-05-22 RX ORDER — ESCITALOPRAM OXALATE 10 MG/1
TABLET ORAL
Qty: 45 TABLET | Refills: 0 | Status: SHIPPED | OUTPATIENT
Start: 2025-05-22

## 2025-05-22 NOTE — TELEPHONE ENCOUNTER
Cinthya Agarwal called to request a refill on her medication.      Last office visit : 12/9/2024   Next office visit : 6/9/2025     Requested Prescriptions     Pending Prescriptions Disp Refills    escitalopram (LEXAPRO) 10 MG tablet [Pharmacy Med Name: Escitalopram Oxalate 10 MG Oral Tablet] 45 tablet 0     Sig: Take 1/2 (one-half) tablet by mouth once daily            Astrid Zamora MA

## 2025-06-04 DIAGNOSIS — E78.00 PURE HYPERCHOLESTEROLEMIA: ICD-10-CM

## 2025-06-04 DIAGNOSIS — E55.9 VITAMIN D DEFICIENCY: ICD-10-CM

## 2025-06-04 DIAGNOSIS — M85.862 OSTEOPENIA OF LEFT LOWER LEG: ICD-10-CM

## 2025-06-04 DIAGNOSIS — E11.9 TYPE 2 DIABETES MELLITUS WITHOUT COMPLICATION, WITHOUT LONG-TERM CURRENT USE OF INSULIN (HCC): ICD-10-CM

## 2025-06-04 DIAGNOSIS — F41.1 GENERALIZED ANXIETY DISORDER: ICD-10-CM

## 2025-06-04 LAB
25(OH)D3 SERPL-MCNC: 33.5 NG/ML
ALBUMIN SERPL-MCNC: 4.2 G/DL (ref 3.5–5.2)
ALP SERPL-CCNC: 99 U/L (ref 35–104)
ALT SERPL-CCNC: 21 U/L (ref 10–35)
ANION GAP SERPL CALCULATED.3IONS-SCNC: 11 MMOL/L (ref 8–16)
AST SERPL-CCNC: 22 U/L (ref 10–35)
BASOPHILS # BLD: 0 K/UL (ref 0–0.2)
BASOPHILS NFR BLD: 0.5 % (ref 0–1)
BILIRUB SERPL-MCNC: <0.2 MG/DL (ref 0.2–1.2)
BILIRUB UR QL STRIP: NEGATIVE
BUN SERPL-MCNC: 15 MG/DL (ref 8–23)
CALCIUM SERPL-MCNC: 9.5 MG/DL (ref 8.8–10.2)
CHLORIDE SERPL-SCNC: 105 MMOL/L (ref 98–107)
CHOLEST SERPL-MCNC: 168 MG/DL (ref 0–199)
CLARITY UR: ABNORMAL
CO2 SERPL-SCNC: 24 MMOL/L (ref 22–29)
COLOR UR: YELLOW
CREAT SERPL-MCNC: 0.7 MG/DL (ref 0.5–0.9)
CREAT UR-MCNC: 152 MG/DL (ref 28–217)
CRYSTALS URNS MICRO: NORMAL /HPF
EOSINOPHIL # BLD: 0.2 K/UL (ref 0–0.6)
EOSINOPHIL NFR BLD: 3.5 % (ref 0–5)
ERYTHROCYTE [DISTWIDTH] IN BLOOD BY AUTOMATED COUNT: 13 % (ref 11.5–14.5)
GLUCOSE SERPL-MCNC: 114 MG/DL (ref 70–99)
GLUCOSE UR STRIP.AUTO-MCNC: NEGATIVE MG/DL
HBA1C MFR BLD: 6.1 % (ref 4–5.6)
HCT VFR BLD AUTO: 38.3 % (ref 37–47)
HDLC SERPL-MCNC: 63 MG/DL (ref 40–60)
HGB BLD-MCNC: 12.4 G/DL (ref 12–16)
HGB UR STRIP.AUTO-MCNC: NEGATIVE MG/L
IMM GRANULOCYTES # BLD: 0 K/UL
KETONES UR STRIP.AUTO-MCNC: NEGATIVE MG/DL
LDLC SERPL CALC-MCNC: 90 MG/DL
LEUKOCYTE ESTERASE UR QL STRIP.AUTO: ABNORMAL
LYMPHOCYTES # BLD: 1.4 K/UL (ref 1.1–4.5)
LYMPHOCYTES NFR BLD: 33.5 % (ref 20–40)
MCH RBC QN AUTO: 31 PG (ref 27–31)
MCHC RBC AUTO-ENTMCNC: 32.4 G/DL (ref 33–37)
MCV RBC AUTO: 95.8 FL (ref 81–99)
MICROALBUMIN UR-MCNC: <1.2 MG/DL (ref 0–1.99)
MICROALBUMIN/CREAT UR-RTO: NORMAL MG/G (ref 0–29)
MONOCYTES # BLD: 0.4 K/UL (ref 0–0.9)
MONOCYTES NFR BLD: 10.4 % (ref 0–10)
NEUTROPHILS # BLD: 2.2 K/UL (ref 1.5–7.5)
NEUTS SEG NFR BLD: 51.9 % (ref 50–65)
NITRITE UR QL STRIP.AUTO: NEGATIVE
PH UR STRIP.AUTO: 6 [PH] (ref 5–8)
PLATELET # BLD AUTO: 232 K/UL (ref 130–400)
PMV BLD AUTO: 11.4 FL (ref 9.4–12.3)
POTASSIUM SERPL-SCNC: 4.2 MMOL/L (ref 3.5–5.1)
PROT SERPL-MCNC: 6.5 G/DL (ref 6.4–8.3)
PROT UR STRIP.AUTO-MCNC: NEGATIVE MG/DL
RBC # BLD AUTO: 4 M/UL (ref 4.2–5.4)
SODIUM SERPL-SCNC: 140 MMOL/L (ref 136–145)
SP GR UR STRIP.AUTO: 1.02 (ref 1–1.03)
SQUAMOUS #/AREA URNS HPF: NORMAL /HPF
TRIGL SERPL-MCNC: 74 MG/DL (ref 0–149)
TSH SERPL DL<=0.005 MIU/L-ACNC: 1.73 UIU/ML (ref 0.27–4.2)
UROBILINOGEN UR STRIP.AUTO-MCNC: 1 E.U./DL
WBC # BLD AUTO: 4.2 K/UL (ref 4.8–10.8)
WBC #/AREA URNS HPF: NORMAL /HPF (ref 0–5)

## 2025-06-09 ENCOUNTER — OFFICE VISIT (OUTPATIENT)
Dept: INTERNAL MEDICINE | Age: 75
End: 2025-06-09
Payer: MEDICARE

## 2025-06-09 VITALS
SYSTOLIC BLOOD PRESSURE: 120 MMHG | DIASTOLIC BLOOD PRESSURE: 76 MMHG | OXYGEN SATURATION: 96 % | WEIGHT: 171.2 LBS | HEIGHT: 64 IN | BODY MASS INDEX: 29.23 KG/M2 | HEART RATE: 78 BPM

## 2025-06-09 DIAGNOSIS — F41.1 GENERALIZED ANXIETY DISORDER: ICD-10-CM

## 2025-06-09 DIAGNOSIS — C50.811 MALIGNANT NEOPLASM OF OVERLAPPING SITES OF RIGHT BREAST IN FEMALE, ESTROGEN RECEPTOR NEGATIVE (HCC): ICD-10-CM

## 2025-06-09 DIAGNOSIS — E78.00 PURE HYPERCHOLESTEROLEMIA: ICD-10-CM

## 2025-06-09 DIAGNOSIS — Z17.1 MALIGNANT NEOPLASM OF OVERLAPPING SITES OF RIGHT BREAST IN FEMALE, ESTROGEN RECEPTOR NEGATIVE (HCC): ICD-10-CM

## 2025-06-09 DIAGNOSIS — E11.9 TYPE 2 DIABETES MELLITUS WITHOUT COMPLICATION, WITHOUT LONG-TERM CURRENT USE OF INSULIN (HCC): ICD-10-CM

## 2025-06-09 DIAGNOSIS — M85.862 OSTEOPENIA OF LEFT LOWER LEG: ICD-10-CM

## 2025-06-09 DIAGNOSIS — Z00.00 MEDICARE ANNUAL WELLNESS VISIT, SUBSEQUENT: Primary | ICD-10-CM

## 2025-06-09 DIAGNOSIS — E66.09 EXOGENOUS OBESITY: ICD-10-CM

## 2025-06-09 DIAGNOSIS — K59.09 CHRONIC CONSTIPATION: ICD-10-CM

## 2025-06-09 DIAGNOSIS — E55.9 VITAMIN D DEFICIENCY: ICD-10-CM

## 2025-06-09 PROCEDURE — 99214 OFFICE O/P EST MOD 30 MIN: CPT | Performed by: INTERNAL MEDICINE

## 2025-06-09 PROCEDURE — G8399 PT W/DXA RESULTS DOCUMENT: HCPCS | Performed by: INTERNAL MEDICINE

## 2025-06-09 PROCEDURE — 1159F MED LIST DOCD IN RCRD: CPT | Performed by: INTERNAL MEDICINE

## 2025-06-09 PROCEDURE — G0439 PPPS, SUBSEQ VISIT: HCPCS | Performed by: INTERNAL MEDICINE

## 2025-06-09 PROCEDURE — G8417 CALC BMI ABV UP PARAM F/U: HCPCS | Performed by: INTERNAL MEDICINE

## 2025-06-09 PROCEDURE — 1036F TOBACCO NON-USER: CPT | Performed by: INTERNAL MEDICINE

## 2025-06-09 PROCEDURE — 2022F DILAT RTA XM EVC RTNOPTHY: CPT | Performed by: INTERNAL MEDICINE

## 2025-06-09 PROCEDURE — G8427 DOCREV CUR MEDS BY ELIG CLIN: HCPCS | Performed by: INTERNAL MEDICINE

## 2025-06-09 PROCEDURE — 3044F HG A1C LEVEL LT 7.0%: CPT | Performed by: INTERNAL MEDICINE

## 2025-06-09 PROCEDURE — 3017F COLORECTAL CA SCREEN DOC REV: CPT | Performed by: INTERNAL MEDICINE

## 2025-06-09 PROCEDURE — 1090F PRES/ABSN URINE INCON ASSESS: CPT | Performed by: INTERNAL MEDICINE

## 2025-06-09 PROCEDURE — 1123F ACP DISCUSS/DSCN MKR DOCD: CPT | Performed by: INTERNAL MEDICINE

## 2025-06-09 RX ORDER — SEMAGLUTIDE 1.34 MG/ML
1 INJECTION, SOLUTION SUBCUTANEOUS
Qty: 9 ML | Refills: 0 | Status: SHIPPED | OUTPATIENT
Start: 2025-06-09

## 2025-06-09 SDOH — ECONOMIC STABILITY: INCOME INSECURITY: IN THE LAST 12 MONTHS, WAS THERE A TIME WHEN YOU WERE NOT ABLE TO PAY THE MORTGAGE OR RENT ON TIME?: NO

## 2025-06-09 SDOH — ECONOMIC STABILITY: FOOD INSECURITY: WITHIN THE PAST 12 MONTHS, THE FOOD YOU BOUGHT JUST DIDN'T LAST AND YOU DIDN'T HAVE MONEY TO GET MORE.: NEVER TRUE

## 2025-06-09 SDOH — ECONOMIC STABILITY: FOOD INSECURITY: WITHIN THE PAST 12 MONTHS, YOU WORRIED THAT YOUR FOOD WOULD RUN OUT BEFORE YOU GOT MONEY TO BUY MORE.: NEVER TRUE

## 2025-06-09 SDOH — HEALTH STABILITY: PHYSICAL HEALTH: ON AVERAGE, HOW MANY DAYS PER WEEK DO YOU ENGAGE IN MODERATE TO STRENUOUS EXERCISE (LIKE A BRISK WALK)?: 0 DAYS

## 2025-06-09 SDOH — ECONOMIC STABILITY: TRANSPORTATION INSECURITY
IN THE PAST 12 MONTHS, HAS THE LACK OF TRANSPORTATION KEPT YOU FROM MEDICAL APPOINTMENTS OR FROM GETTING MEDICATIONS?: NO

## 2025-06-09 ASSESSMENT — PATIENT HEALTH QUESTIONNAIRE - PHQ9
SUM OF ALL RESPONSES TO PHQ QUESTIONS 1-9: 0
2. FEELING DOWN, DEPRESSED OR HOPELESS: NOT AT ALL
SUM OF ALL RESPONSES TO PHQ QUESTIONS 1-9: 0
1. LITTLE INTEREST OR PLEASURE IN DOING THINGS: NOT AT ALL
SUM OF ALL RESPONSES TO PHQ QUESTIONS 1-9: 0
2. FEELING DOWN, DEPRESSED OR HOPELESS: NOT AT ALL
SUM OF ALL RESPONSES TO PHQ QUESTIONS 1-9: 0
1. LITTLE INTEREST OR PLEASURE IN DOING THINGS: NOT AT ALL

## 2025-06-09 ASSESSMENT — ENCOUNTER SYMPTOMS
SORE THROAT: 0
CONSTIPATION: 0
ABDOMINAL PAIN: 0
WHEEZING: 0
COUGH: 0
CHEST TIGHTNESS: 0

## 2025-06-09 ASSESSMENT — LIFESTYLE VARIABLES
HOW OFTEN DO YOU HAVE A DRINK CONTAINING ALCOHOL: NEVER
HOW OFTEN DO YOU HAVE A DRINK CONTAINING ALCOHOL: 1

## 2025-06-09 NOTE — PROGRESS NOTES
Chief Complaint   Patient presents with    Multiple medical problems     History of presenting illness:  Cinthya Agarwal is a74 y.o. female who presents today for follow up on her chronic medical conditions as noted below.    Patient Active Problem List    Diagnosis Date Noted    Long term current use of aromatase inhibitor 08/03/2022     Priority: Medium    S/P lumpectomy, right breast 9/15/2020 09/22/2020    Adverse effect of antineoplastic and immunosuppressive drugs, initial encounter      Malignant neoplasm of right breast in female, estrogen receptor negative (HCC) 04/17/2020    Microhematuria 06/07/2019    Bilateral low back pain without sciatica 05/04/2018    Osteopenia of left lower leg 08/16/2017     Overview Note:     12/16 lumbar spine normal, left hip neck -1.7  6/2020 lumbar spine -1.4, hip neck -1.5/-1.3      Type 2 diabetes mellitus without complication, without long-term current use of insulin (HCC) 08/12/2017    Vitamin D deficiency 08/12/2017    Pure hypercholesterolemia 08/12/2017    GERD (gastroesophageal reflux disease) 08/12/2017    Exogenous obesity 08/12/2017    Generalized anxiety disorder 08/12/2017     Past Medical History:   Diagnosis Date    Anxiety     Breast cancer (HCC) 2020    infiltrating ductal carcinoma     Cancer (HCC)     Breast Cancer    Carpal tunnel syndrome     pt denies having carpal tunnel syndrome    Cervicalgia     Depression     Esophagitis     History of therapeutic radiation 2020    Hx antineoplastic chemo 2020    Hyperlipidemia     Idiopathic peripheral neuropathy     Insomnia     Long term current use of aromatase inhibitor 8/3/2022    Long term current use of aromatase inhibitor 8/3/2022    Long term current use of aromatase inhibitor 8/3/2022    Menopause     Obesity due to excess calories     Osteoarthritis     Osteopenia     Type 2 diabetes mellitus without complication (HCC)     Vitamin D deficiency       Past Surgical History:   Procedure Laterality 
reviewing your medical record and screening and assessments performed today your provider may have ordered immunizations, labs, imaging, and/or referrals for you.  A list of these orders (if applicable) as well as your Preventive Care list are included within your After Visit Summary for your review.

## 2025-08-11 ENCOUNTER — APPOINTMENT (OUTPATIENT)
Dept: CT IMAGING | Facility: HOSPITAL | Age: 75
End: 2025-08-11
Payer: MEDICARE

## 2025-08-11 ENCOUNTER — HOSPITAL ENCOUNTER (EMERGENCY)
Facility: HOSPITAL | Age: 75
Discharge: HOME | End: 2025-08-11
Attending: FAMILY MEDICINE | Admitting: FAMILY MEDICINE
Payer: MEDICARE

## 2025-08-11 VITALS
OXYGEN SATURATION: 96 % | BODY MASS INDEX: 29.19 KG/M2 | RESPIRATION RATE: 18 BRPM | TEMPERATURE: 98.2 F | DIASTOLIC BLOOD PRESSURE: 70 MMHG | HEART RATE: 76 BPM | SYSTOLIC BLOOD PRESSURE: 131 MMHG | HEIGHT: 64 IN | WEIGHT: 171 LBS

## 2025-08-11 DIAGNOSIS — G44.89 OTHER HEADACHE SYNDROME: ICD-10-CM

## 2025-08-11 DIAGNOSIS — R00.2 PALPITATIONS: Primary | ICD-10-CM

## 2025-08-11 DIAGNOSIS — R07.89 ATYPICAL CHEST PAIN: ICD-10-CM

## 2025-08-11 LAB
ALBUMIN SERPL-MCNC: 4.1 G/DL (ref 3.5–5.2)
ALBUMIN/GLOB SERPL: 1.8 G/DL
ALP SERPL-CCNC: 82 U/L (ref 39–117)
ALT SERPL W P-5'-P-CCNC: 22 U/L (ref 1–33)
ANION GAP SERPL CALCULATED.3IONS-SCNC: 9 MMOL/L (ref 5–15)
AST SERPL-CCNC: 19 U/L (ref 1–32)
BASOPHILS # BLD AUTO: 0.02 10*3/MM3 (ref 0–0.2)
BASOPHILS NFR BLD AUTO: 0.3 % (ref 0–1.5)
BILIRUB SERPL-MCNC: 0.2 MG/DL (ref 0–1.2)
BUN SERPL-MCNC: 16.4 MG/DL (ref 8–23)
BUN/CREAT SERPL: 25.2 (ref 7–25)
CALCIUM SPEC-SCNC: 9.1 MG/DL (ref 8.6–10.5)
CHLORIDE SERPL-SCNC: 105 MMOL/L (ref 98–107)
CK SERPL-CCNC: 119 U/L (ref 20–180)
CO2 SERPL-SCNC: 24 MMOL/L (ref 22–29)
CREAT SERPL-MCNC: 0.65 MG/DL (ref 0.57–1)
D-LACTATE SERPL-SCNC: 0.7 MMOL/L (ref 0.5–2)
DEPRECATED RDW RBC AUTO: 46.4 FL (ref 37–54)
EGFRCR SERPLBLD CKD-EPI 2021: 93.1 ML/MIN/1.73
EOSINOPHIL # BLD AUTO: 0.13 10*3/MM3 (ref 0–0.4)
EOSINOPHIL NFR BLD AUTO: 2 % (ref 0.3–6.2)
ERYTHROCYTE [DISTWIDTH] IN BLOOD BY AUTOMATED COUNT: 13.4 % (ref 12.3–15.4)
GEN 5 1HR TROPONIN T REFLEX: <6 NG/L
GLOBULIN UR ELPH-MCNC: 2.3 GM/DL
GLUCOSE SERPL-MCNC: 84 MG/DL (ref 65–99)
HCT VFR BLD AUTO: 36.4 % (ref 34–46.6)
HGB BLD-MCNC: 12.1 G/DL (ref 12–15.9)
IMM GRANULOCYTES # BLD AUTO: 0.02 10*3/MM3 (ref 0–0.05)
IMM GRANULOCYTES NFR BLD AUTO: 0.3 % (ref 0–0.5)
LYMPHOCYTES # BLD AUTO: 1.75 10*3/MM3 (ref 0.7–3.1)
LYMPHOCYTES NFR BLD AUTO: 27.4 % (ref 19.6–45.3)
MAGNESIUM SERPL-MCNC: 2.3 MG/DL (ref 1.6–2.4)
MCH RBC QN AUTO: 31.3 PG (ref 26.6–33)
MCHC RBC AUTO-ENTMCNC: 33.2 G/DL (ref 31.5–35.7)
MCV RBC AUTO: 94.1 FL (ref 79–97)
MONOCYTES # BLD AUTO: 0.57 10*3/MM3 (ref 0.1–0.9)
MONOCYTES NFR BLD AUTO: 8.9 % (ref 5–12)
NEUTROPHILS NFR BLD AUTO: 3.89 10*3/MM3 (ref 1.7–7)
NEUTROPHILS NFR BLD AUTO: 61.1 % (ref 42.7–76)
NRBC BLD AUTO-RTO: 0 /100 WBC (ref 0–0.2)
NT-PROBNP SERPL-MCNC: 89.3 PG/ML (ref 0–900)
PLATELET # BLD AUTO: 216 10*3/MM3 (ref 140–450)
PMV BLD AUTO: 10.5 FL (ref 6–12)
POTASSIUM SERPL-SCNC: 3.9 MMOL/L (ref 3.5–5.2)
PROT SERPL-MCNC: 6.4 G/DL (ref 6–8.5)
RBC # BLD AUTO: 3.87 10*6/MM3 (ref 3.77–5.28)
SODIUM SERPL-SCNC: 138 MMOL/L (ref 136–145)
TROPONIN T NUMERIC DELTA: NORMAL
TROPONIN T SERPL HS-MCNC: <6 NG/L
WBC NRBC COR # BLD AUTO: 6.38 10*3/MM3 (ref 3.4–10.8)

## 2025-08-11 PROCEDURE — 80053 COMPREHEN METABOLIC PANEL: CPT | Performed by: FAMILY MEDICINE

## 2025-08-11 PROCEDURE — 85025 COMPLETE CBC W/AUTO DIFF WBC: CPT | Performed by: FAMILY MEDICINE

## 2025-08-11 PROCEDURE — 70450 CT HEAD/BRAIN W/O DYE: CPT

## 2025-08-11 PROCEDURE — 83880 ASSAY OF NATRIURETIC PEPTIDE: CPT | Performed by: FAMILY MEDICINE

## 2025-08-11 PROCEDURE — 82550 ASSAY OF CK (CPK): CPT | Performed by: FAMILY MEDICINE

## 2025-08-11 PROCEDURE — 93005 ELECTROCARDIOGRAM TRACING: CPT | Performed by: FAMILY MEDICINE

## 2025-08-11 PROCEDURE — 83735 ASSAY OF MAGNESIUM: CPT | Performed by: FAMILY MEDICINE

## 2025-08-11 PROCEDURE — 36415 COLL VENOUS BLD VENIPUNCTURE: CPT

## 2025-08-11 PROCEDURE — 99285 EMERGENCY DEPT VISIT HI MDM: CPT | Performed by: FAMILY MEDICINE

## 2025-08-11 PROCEDURE — 71275 CT ANGIOGRAPHY CHEST: CPT

## 2025-08-11 PROCEDURE — 83605 ASSAY OF LACTIC ACID: CPT | Performed by: FAMILY MEDICINE

## 2025-08-11 PROCEDURE — 25510000001 IOPAMIDOL PER 1 ML: Performed by: FAMILY MEDICINE

## 2025-08-11 PROCEDURE — 84484 ASSAY OF TROPONIN QUANT: CPT | Performed by: FAMILY MEDICINE

## 2025-08-11 RX ORDER — ASPIRIN 81 MG/1
324 TABLET, CHEWABLE ORAL ONCE
Status: COMPLETED | OUTPATIENT
Start: 2025-08-11 | End: 2025-08-11

## 2025-08-11 RX ORDER — IOPAMIDOL 755 MG/ML
100 INJECTION, SOLUTION INTRAVASCULAR
Status: COMPLETED | OUTPATIENT
Start: 2025-08-11 | End: 2025-08-11

## 2025-08-11 RX ORDER — NITROGLYCERIN 0.4 MG/1
0.4 TABLET SUBLINGUAL ONCE
Status: COMPLETED | OUTPATIENT
Start: 2025-08-11 | End: 2025-08-11

## 2025-08-11 RX ADMIN — ASPIRIN 81 MG CHEWABLE TABLET 324 MG: 81 TABLET CHEWABLE at 20:25

## 2025-08-11 RX ADMIN — NITROGLYCERIN 0.4 MG: 0.4 TABLET SUBLINGUAL at 20:25

## 2025-08-11 RX ADMIN — IOPAMIDOL 100 ML: 755 INJECTION, SOLUTION INTRAVENOUS at 20:10

## 2025-08-14 ENCOUNTER — OFFICE VISIT (OUTPATIENT)
Dept: INTERNAL MEDICINE | Age: 75
End: 2025-08-14
Payer: MEDICARE

## 2025-08-14 VITALS
DIASTOLIC BLOOD PRESSURE: 78 MMHG | HEIGHT: 64 IN | HEART RATE: 86 BPM | BODY MASS INDEX: 29.23 KG/M2 | SYSTOLIC BLOOD PRESSURE: 112 MMHG | OXYGEN SATURATION: 96 % | WEIGHT: 171.2 LBS

## 2025-08-14 DIAGNOSIS — R07.89 LEFT CHEST PRESSURE: Primary | ICD-10-CM

## 2025-08-14 DIAGNOSIS — L98.9 SKIN LESION: ICD-10-CM

## 2025-08-14 DIAGNOSIS — R07.9 CHEST PAIN, UNSPECIFIED TYPE: ICD-10-CM

## 2025-08-14 DIAGNOSIS — N20.0 NEPHROLITHIASIS: ICD-10-CM

## 2025-08-14 DIAGNOSIS — R42 VERTIGO: ICD-10-CM

## 2025-08-14 DIAGNOSIS — R06.02 SHORTNESS OF BREATH: ICD-10-CM

## 2025-08-14 DIAGNOSIS — E11.9 TYPE 2 DIABETES MELLITUS WITHOUT COMPLICATION, WITHOUT LONG-TERM CURRENT USE OF INSULIN (HCC): ICD-10-CM

## 2025-08-14 LAB
QT INTERVAL: 388 MS
QTC INTERVAL: 458 MS

## 2025-08-14 PROCEDURE — G8399 PT W/DXA RESULTS DOCUMENT: HCPCS | Performed by: INTERNAL MEDICINE

## 2025-08-14 PROCEDURE — 99214 OFFICE O/P EST MOD 30 MIN: CPT | Performed by: INTERNAL MEDICINE

## 2025-08-14 PROCEDURE — G8427 DOCREV CUR MEDS BY ELIG CLIN: HCPCS | Performed by: INTERNAL MEDICINE

## 2025-08-14 PROCEDURE — 1090F PRES/ABSN URINE INCON ASSESS: CPT | Performed by: INTERNAL MEDICINE

## 2025-08-14 PROCEDURE — 3017F COLORECTAL CA SCREEN DOC REV: CPT | Performed by: INTERNAL MEDICINE

## 2025-08-14 PROCEDURE — 1159F MED LIST DOCD IN RCRD: CPT | Performed by: INTERNAL MEDICINE

## 2025-08-14 PROCEDURE — 1123F ACP DISCUSS/DSCN MKR DOCD: CPT | Performed by: INTERNAL MEDICINE

## 2025-08-14 PROCEDURE — 3044F HG A1C LEVEL LT 7.0%: CPT | Performed by: INTERNAL MEDICINE

## 2025-08-14 PROCEDURE — 1036F TOBACCO NON-USER: CPT | Performed by: INTERNAL MEDICINE

## 2025-08-14 PROCEDURE — G8417 CALC BMI ABV UP PARAM F/U: HCPCS | Performed by: INTERNAL MEDICINE

## 2025-08-14 PROCEDURE — 2022F DILAT RTA XM EVC RTNOPTHY: CPT | Performed by: INTERNAL MEDICINE

## 2025-08-14 SDOH — ECONOMIC STABILITY: FOOD INSECURITY: WITHIN THE PAST 12 MONTHS, YOU WORRIED THAT YOUR FOOD WOULD RUN OUT BEFORE YOU GOT MONEY TO BUY MORE.: NEVER TRUE

## 2025-08-14 SDOH — ECONOMIC STABILITY: FOOD INSECURITY: WITHIN THE PAST 12 MONTHS, THE FOOD YOU BOUGHT JUST DIDN'T LAST AND YOU DIDN'T HAVE MONEY TO GET MORE.: NEVER TRUE

## 2025-08-14 ASSESSMENT — ENCOUNTER SYMPTOMS
COUGH: 0
CONSTIPATION: 0
CHEST TIGHTNESS: 0
SHORTNESS OF BREATH: 1
SORE THROAT: 0
ABDOMINAL PAIN: 0
WHEEZING: 0

## 2025-08-25 RX ORDER — LOVASTATIN 20 MG/1
40 TABLET ORAL DAILY
Qty: 180 TABLET | Refills: 0 | Status: SHIPPED | OUTPATIENT
Start: 2025-08-25

## (undated) DEVICE — GLOVE SURG SZ 75 CRM LTX FREE POLYISOPRENE POLYMER BEAD ANTI

## (undated) DEVICE — PEN: MARKING STD 100/CS: Brand: MEDICAL ACTION INDUSTRIES

## (undated) DEVICE — CHLORAPREP 26ML ORANGE

## (undated) DEVICE — KIT INTRO PTFE TEARAWAY 10FX15CM

## (undated) DEVICE — PROBE DTECT MARGIN F/LUMPECTOMY

## (undated) DEVICE — SYSTEM SKIN CLSR 22CM DERMBND PRINEO

## (undated) DEVICE — THE SINGLE USE ETRAP – POLYP TRAP IS USED FOR SUCTION RETRIEVAL OF ENDOSCOPICALLY REMOVED POLYPS.: Brand: ETRAP

## (undated) DEVICE — SUTURE VCRL SZ 3-0 L27IN ABSRB UD L26MM SH 1/2 CIR J416H

## (undated) DEVICE — SPONGE LAP W18XL18IN WHT COT 4 PLY FLD STRUNG RADPQ DISP ST

## (undated) DEVICE — PACK,UNIVERSAL,NO GOWNS: Brand: MEDLINE

## (undated) DEVICE — GOWN,PREVENTION PLUS,XL,ST,24/CS: Brand: MEDLINE

## (undated) DEVICE — SUREFIT, DUAL DISPERSIVE ELECTRODE, CONTACT QUALITY MONITOR: Brand: SUREFIT

## (undated) DEVICE — JACKSON-PRATT 100CC BULB RESERVOIR: Brand: CARDINAL HEALTH

## (undated) DEVICE — SUTURE PERMAHAND SZ 2-0 L18IN NONABSORBABLE BLK L26MM SH C012D

## (undated) DEVICE — MINOR CDS: Brand: MEDLINE INDUSTRIES, INC.

## (undated) DEVICE — SKIN MARKER,REGULAR TIP WITH RULER: Brand: DEVON

## (undated) DEVICE — SUTURE MCRYL SZ 4-0 L18IN ABSRB UD L19MM PS-2 3/8 CIR PRIM Y496G

## (undated) DEVICE — SPECIMEN ORIENTATION CHARMS, SIX DISTINCTLY SHAPED STERILE 10MM CHARMS: Brand: MARGINMAP

## (undated) DEVICE — THREE QUARTER SHEET: Brand: CONVERTORS

## (undated) DEVICE — TBG SMPL FLTR LINE NASL 02/C02 A/ BX/100

## (undated) DEVICE — SUTURE VCRL SZ 2-0 L36IN ABSRB UD L36MM CT-1 1/2 CIR J945H

## (undated) DEVICE — SOLUTION IV IRRIG WATER 1000ML POUR BRL 2F7114

## (undated) DEVICE — COVER US PRB W5XL96IN LTX W/ GEL

## (undated) DEVICE — DRESSING GRMCDL 6 12FR D1N CNTR HOLE 4MM ANTMCRBL PRTCTVE DI

## (undated) DEVICE — CONTRAST IOTHALAMATE MEGLUMINE 60% 50 ML INJ CONRAY 60

## (undated) DEVICE — ADHESIVE SKIN CLSR 0.7ML TOP DERMBND ADV

## (undated) DEVICE — SUTURE MNCRYL STRATAFIX PS 4-0 30CM

## (undated) DEVICE — STERILE LATEX POWDER FREE SURGICAL GLOVES WITH HYDROGEL COATING: Brand: PROTEXIS

## (undated) DEVICE — 3 ML SYRINGE WITH HYPODERMIC SAFETY NEEDLE: Brand: MAGELLAN

## (undated) DEVICE — CLIP INT M L GRN TI TRNSVRS GRV CHEVRON SHP W/ PRECIS TIP

## (undated) DEVICE — SENSR O2 OXIMAX FNGR A/ 18IN NONSTR

## (undated) DEVICE — SUTURE VCRL SZ 3-0 L36IN ABSRB UD L36MM CT-1 1/2 CIR J944H

## (undated) DEVICE — 6 ML SYRINGE LUER-LOCK TIP: Brand: MONOJECT

## (undated) DEVICE — SHEET,DRAPE,53X77,STERILE: Brand: MEDLINE

## (undated) DEVICE — TOWEL,OR,DSP,ST,BLUE,DLX,4/PK,20PK/CS: Brand: MEDLINE

## (undated) DEVICE — YANKAUER,BULB TIP WITH VENT: Brand: ARGYLE

## (undated) DEVICE — MASK,OXYGEN,MED CONC,ADLT,7' TUB, UC: Brand: PENDING

## (undated) DEVICE — PAD,NON-ADHERENT,3X8,STERILE,LF,1/PK: Brand: MEDLINE

## (undated) DEVICE — CUFF,BP,DISP,1 TUBE,ADULT,HP: Brand: MEDLINE

## (undated) DEVICE — DRAIN JACKSON PRATT ROUND 15FR: Brand: CARDINAL HEALTH

## (undated) DEVICE — SYRINGE IRRIG 60ML SFT PLIABLE BLB EZ TO GRP 1 HND USE W/

## (undated) DEVICE — BLADE SURG NO11 C STL RETRCT DISPOSABLE

## (undated) DEVICE — SUTURE ETHLN SZ 2-0 L30IN NONABSORBABLE BLK L36MM FSLX 3/8 1674H

## (undated) DEVICE — GAUZE,SPONGE,FLUFF,6"X6.75",STRL,10/TRAY: Brand: MEDLINE

## (undated) DEVICE — SNAR POLYP SENSATION MICRO OVL 13 240X40

## (undated) DEVICE — ENDOGATOR AUXILIARY WATER JET CONNECTOR: Brand: ENDOGATOR

## (undated) DEVICE — STANDARD HYPODERMIC NEEDLE,POLYPROPYLENE HUB: Brand: MONOJECT

## (undated) DEVICE — DECANTER FLD 9IN ST BG FOR ASEP TRNSF OF FLD

## (undated) DEVICE — CONMED SCOPE SAVER BITE BLOCK, 20X27 MM: Brand: SCOPE SAVER

## (undated) DEVICE — GLOVE SURG SZ 85 L12IN FNGR ORTHO 126MIL CRM LTX FREE

## (undated) DEVICE — SYRINGE MED 10ML TRNSLUC BRL PLUNG BLK MRK POLYPR CTRL

## (undated) DEVICE — GOWN,PREVENTION PLUS,2XL,ST,22/CS: Brand: MEDLINE

## (undated) DEVICE — SOLUTION IV IRRIG POUR BRL 0.9% SODIUM CHL 2F7124

## (undated) DEVICE — MAJOR BSIN SETUP PK

## (undated) DEVICE — 3M™ IOBAN™ 2 ANTIMICROBIAL INCISE DRAPE 6650EZ: Brand: IOBAN™ 2

## (undated) DEVICE — GLOVE SURG SZ 65 L12IN FNGR THK79MIL GRN LTX FREE

## (undated) DEVICE — SYRINGE, LUER LOCK, 10ML: Brand: MEDLINE

## (undated) DEVICE — THE CHANNEL CLEANING BRUSH IS A NYLON FLEXI BRUSH ATTACHED TO A FLEXIBLE PLASTIC SHEATH DESIGNED TO SAFELY REMOVE DEBRIS FROM FLEXIBLE ENDOSCOPES.

## (undated) DEVICE — Device: Brand: DEFENDO AIR/WATER/SUCTION AND BIOPSY VALVE

## (undated) DEVICE — CONMED GOLDLINE ELECTROSURGICAL HANDPIECE, HAND CONTROLLED WITH BLADE ELECTRODE, BUTTON SWITCH, SAFETY HOLSTER AND 10 FT (3 M) CABLE: Brand: CONMED GOLDLINE

## (undated) DEVICE — C-ARM: Brand: UNBRANDED